# Patient Record
Sex: MALE | Race: OTHER | HISPANIC OR LATINO | URBAN - METROPOLITAN AREA
[De-identification: names, ages, dates, MRNs, and addresses within clinical notes are randomized per-mention and may not be internally consistent; named-entity substitution may affect disease eponyms.]

---

## 2017-01-13 ENCOUNTER — EMERGENCY (EMERGENCY)
Facility: HOSPITAL | Age: 62
LOS: 0 days | Discharge: HOME | End: 2017-01-13

## 2017-06-07 ENCOUNTER — OUTPATIENT (OUTPATIENT)
Dept: OUTPATIENT SERVICES | Facility: HOSPITAL | Age: 62
LOS: 1 days | Discharge: HOME | End: 2017-06-07

## 2017-06-07 DIAGNOSIS — J44.1 CHRONIC OBSTRUCTIVE PULMONARY DISEASE WITH (ACUTE) EXACERBATION: ICD-10-CM

## 2017-06-07 DIAGNOSIS — R06.02 SHORTNESS OF BREATH: ICD-10-CM

## 2017-06-28 DIAGNOSIS — E78.00 PURE HYPERCHOLESTEROLEMIA, UNSPECIFIED: ICD-10-CM

## 2017-06-28 DIAGNOSIS — E11.9 TYPE 2 DIABETES MELLITUS WITHOUT COMPLICATIONS: ICD-10-CM

## 2017-07-19 ENCOUNTER — OUTPATIENT (OUTPATIENT)
Dept: OUTPATIENT SERVICES | Facility: HOSPITAL | Age: 62
LOS: 1 days | Discharge: HOME | End: 2017-07-19

## 2017-07-19 DIAGNOSIS — Z00.00 ENCOUNTER FOR GENERAL ADULT MEDICAL EXAMINATION WITHOUT ABNORMAL FINDINGS: ICD-10-CM

## 2017-07-19 DIAGNOSIS — J44.1 CHRONIC OBSTRUCTIVE PULMONARY DISEASE WITH (ACUTE) EXACERBATION: ICD-10-CM

## 2017-07-19 DIAGNOSIS — R53.82 CHRONIC FATIGUE, UNSPECIFIED: ICD-10-CM

## 2017-07-19 DIAGNOSIS — R06.02 SHORTNESS OF BREATH: ICD-10-CM

## 2018-01-19 ENCOUNTER — OUTPATIENT (OUTPATIENT)
Dept: OUTPATIENT SERVICES | Facility: HOSPITAL | Age: 63
LOS: 1 days | Discharge: HOME | End: 2018-01-19

## 2018-01-19 DIAGNOSIS — I10 ESSENTIAL (PRIMARY) HYPERTENSION: ICD-10-CM

## 2018-01-19 DIAGNOSIS — R06.02 SHORTNESS OF BREATH: ICD-10-CM

## 2018-01-19 DIAGNOSIS — E78.00 PURE HYPERCHOLESTEROLEMIA, UNSPECIFIED: ICD-10-CM

## 2018-01-19 DIAGNOSIS — R55 SYNCOPE AND COLLAPSE: ICD-10-CM

## 2018-01-19 DIAGNOSIS — J44.1 CHRONIC OBSTRUCTIVE PULMONARY DISEASE WITH (ACUTE) EXACERBATION: ICD-10-CM

## 2018-01-28 ENCOUNTER — INPATIENT (INPATIENT)
Facility: HOSPITAL | Age: 63
LOS: 3 days | Discharge: HOME | End: 2018-02-01
Attending: INTERNAL MEDICINE | Admitting: INTERNAL MEDICINE

## 2018-02-04 DIAGNOSIS — J44.1 CHRONIC OBSTRUCTIVE PULMONARY DISEASE WITH (ACUTE) EXACERBATION: ICD-10-CM

## 2018-02-04 DIAGNOSIS — R06.02 SHORTNESS OF BREATH: ICD-10-CM

## 2018-02-04 DIAGNOSIS — R55 SYNCOPE AND COLLAPSE: ICD-10-CM

## 2018-02-05 DIAGNOSIS — K59.03 DRUG INDUCED CONSTIPATION: ICD-10-CM

## 2018-02-05 DIAGNOSIS — I50.32 CHRONIC DIASTOLIC (CONGESTIVE) HEART FAILURE: ICD-10-CM

## 2018-02-05 DIAGNOSIS — F41.9 ANXIETY DISORDER, UNSPECIFIED: ICD-10-CM

## 2018-02-05 DIAGNOSIS — I11.0 HYPERTENSIVE HEART DISEASE WITH HEART FAILURE: ICD-10-CM

## 2018-02-05 DIAGNOSIS — E11.51 TYPE 2 DIABETES MELLITUS WITH DIABETIC PERIPHERAL ANGIOPATHY WITHOUT GANGRENE: ICD-10-CM

## 2018-02-05 DIAGNOSIS — D12.5 BENIGN NEOPLASM OF SIGMOID COLON: ICD-10-CM

## 2018-02-05 DIAGNOSIS — Z79.84 LONG TERM (CURRENT) USE OF ORAL HYPOGLYCEMIC DRUGS: ICD-10-CM

## 2018-02-05 DIAGNOSIS — K21.9 GASTRO-ESOPHAGEAL REFLUX DISEASE WITHOUT ESOPHAGITIS: ICD-10-CM

## 2018-02-05 DIAGNOSIS — Z83.3 FAMILY HISTORY OF DIABETES MELLITUS: ICD-10-CM

## 2018-02-05 DIAGNOSIS — R55 SYNCOPE AND COLLAPSE: ICD-10-CM

## 2018-02-05 DIAGNOSIS — M51.36 OTHER INTERVERTEBRAL DISC DEGENERATION, LUMBAR REGION: ICD-10-CM

## 2018-02-05 DIAGNOSIS — K57.30 DIVERTICULOSIS OF LARGE INTESTINE WITHOUT PERFORATION OR ABSCESS WITHOUT BLEEDING: ICD-10-CM

## 2018-02-05 DIAGNOSIS — R00.1 BRADYCARDIA, UNSPECIFIED: ICD-10-CM

## 2018-02-05 DIAGNOSIS — T40.605A ADVERSE EFFECT OF UNSPECIFIED NARCOTICS, INITIAL ENCOUNTER: ICD-10-CM

## 2018-02-05 DIAGNOSIS — R29.6 REPEATED FALLS: ICD-10-CM

## 2018-02-05 DIAGNOSIS — K64.8 OTHER HEMORRHOIDS: ICD-10-CM

## 2018-02-05 DIAGNOSIS — Z82.49 FAMILY HISTORY OF ISCHEMIC HEART DISEASE AND OTHER DISEASES OF THE CIRCULATORY SYSTEM: ICD-10-CM

## 2018-02-05 DIAGNOSIS — K64.4 RESIDUAL HEMORRHOIDAL SKIN TAGS: ICD-10-CM

## 2018-02-05 DIAGNOSIS — Z87.891 PERSONAL HISTORY OF NICOTINE DEPENDENCE: ICD-10-CM

## 2018-02-05 DIAGNOSIS — G89.29 OTHER CHRONIC PAIN: ICD-10-CM

## 2018-02-05 DIAGNOSIS — E78.5 HYPERLIPIDEMIA, UNSPECIFIED: ICD-10-CM

## 2018-02-05 DIAGNOSIS — J44.9 CHRONIC OBSTRUCTIVE PULMONARY DISEASE, UNSPECIFIED: ICD-10-CM
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
## 2018-02-06 DIAGNOSIS — M54.9 DORSALGIA, UNSPECIFIED: ICD-10-CM

## 2018-03-06 ENCOUNTER — INPATIENT (INPATIENT)
Facility: HOSPITAL | Age: 63
LOS: 2 days | Discharge: HOME | End: 2018-03-09
Attending: INTERNAL MEDICINE | Admitting: INTERNAL MEDICINE

## 2018-03-06 VITALS
RESPIRATION RATE: 20 BRPM | TEMPERATURE: 97 F | DIASTOLIC BLOOD PRESSURE: 82 MMHG | SYSTOLIC BLOOD PRESSURE: 137 MMHG | OXYGEN SATURATION: 93 % | HEART RATE: 66 BPM

## 2018-03-06 LAB
APTT BLD: 35 SEC — SIGNIFICANT CHANGE UP (ref 27–39.2)
BASOPHILS # BLD AUTO: 0.07 K/UL — SIGNIFICANT CHANGE UP (ref 0–0.2)
BASOPHILS NFR BLD AUTO: 1.2 % — HIGH (ref 0–1)
EOSINOPHIL # BLD AUTO: 0.45 K/UL — SIGNIFICANT CHANGE UP (ref 0–0.7)
EOSINOPHIL NFR BLD AUTO: 7.9 % — SIGNIFICANT CHANGE UP (ref 0–8)
HCT VFR BLD CALC: 46.2 % — SIGNIFICANT CHANGE UP (ref 42–52)
HGB BLD-MCNC: 15.7 G/DL — SIGNIFICANT CHANGE UP (ref 14–18)
IMM GRANULOCYTES NFR BLD AUTO: 0.5 % — HIGH (ref 0.1–0.3)
INR BLD: 1.13 RATIO — SIGNIFICANT CHANGE UP (ref 0.65–1.3)
LYMPHOCYTES # BLD AUTO: 2.12 K/UL — SIGNIFICANT CHANGE UP (ref 1.2–3.4)
LYMPHOCYTES # BLD AUTO: 37.3 % — SIGNIFICANT CHANGE UP (ref 20.5–51.1)
MCHC RBC-ENTMCNC: 30.2 PG — SIGNIFICANT CHANGE UP (ref 27–31)
MCHC RBC-ENTMCNC: 34 G/DL — SIGNIFICANT CHANGE UP (ref 32–37)
MCV RBC AUTO: 88.8 FL — SIGNIFICANT CHANGE UP (ref 80–94)
MONOCYTES # BLD AUTO: 0.38 K/UL — SIGNIFICANT CHANGE UP (ref 0.1–0.6)
MONOCYTES NFR BLD AUTO: 6.7 % — SIGNIFICANT CHANGE UP (ref 1.7–9.3)
NEUTROPHILS # BLD AUTO: 2.64 K/UL — SIGNIFICANT CHANGE UP (ref 1.4–6.5)
NEUTROPHILS NFR BLD AUTO: 46.4 % — SIGNIFICANT CHANGE UP (ref 42.2–75.2)
NRBC # BLD: 0 /100 WBCS — SIGNIFICANT CHANGE UP (ref 0–0)
PLATELET # BLD AUTO: 207 K/UL — SIGNIFICANT CHANGE UP (ref 130–400)
PROTHROM AB SERPL-ACNC: 12.2 SEC — SIGNIFICANT CHANGE UP (ref 9.95–12.87)
RBC # BLD: 5.2 M/UL — SIGNIFICANT CHANGE UP (ref 4.7–6.1)
RBC # FLD: 13.8 % — SIGNIFICANT CHANGE UP (ref 11.5–14.5)
WBC # BLD: 5.69 K/UL — SIGNIFICANT CHANGE UP (ref 4.8–10.8)
WBC # FLD AUTO: 5.69 K/UL — SIGNIFICANT CHANGE UP (ref 4.8–10.8)

## 2018-03-06 RX ORDER — SODIUM CHLORIDE 9 MG/ML
1000 INJECTION INTRAMUSCULAR; INTRAVENOUS; SUBCUTANEOUS ONCE
Qty: 0 | Refills: 0 | Status: COMPLETED | OUTPATIENT
Start: 2018-03-06 | End: 2018-03-06

## 2018-03-06 RX ADMIN — SODIUM CHLORIDE 1 MILLILITER(S): 9 INJECTION INTRAMUSCULAR; INTRAVENOUS; SUBCUTANEOUS at 23:30

## 2018-03-07 LAB
ALBUMIN SERPL ELPH-MCNC: 4.3 G/DL — SIGNIFICANT CHANGE UP (ref 3–5.5)
ALP SERPL-CCNC: 58 U/L — SIGNIFICANT CHANGE UP (ref 30–115)
ALT FLD-CCNC: 19 U/L — SIGNIFICANT CHANGE UP (ref 0–41)
ANION GAP SERPL CALC-SCNC: 7 MMOL/L — SIGNIFICANT CHANGE UP (ref 7–14)
ANION GAP SERPL CALC-SCNC: 8 MMOL/L — SIGNIFICANT CHANGE UP (ref 7–14)
AST SERPL-CCNC: 14 U/L — SIGNIFICANT CHANGE UP (ref 0–41)
B-TYPE NATRIURETIC PEPTIDE BNP RESULT: 25 PG/ML — SIGNIFICANT CHANGE UP (ref 0–99)
BASOPHILS # BLD AUTO: 0.06 K/UL — SIGNIFICANT CHANGE UP (ref 0–0.2)
BASOPHILS NFR BLD AUTO: 1.3 % — HIGH (ref 0–1)
BILIRUB SERPL-MCNC: 0.8 MG/DL — SIGNIFICANT CHANGE UP (ref 0.2–1.2)
BUN SERPL-MCNC: 18 MG/DL — SIGNIFICANT CHANGE UP (ref 10–20)
BUN SERPL-MCNC: 20 MG/DL — SIGNIFICANT CHANGE UP (ref 10–20)
CALCIUM SERPL-MCNC: 9.4 MG/DL — SIGNIFICANT CHANGE UP (ref 8.5–10.1)
CALCIUM SERPL-MCNC: 9.5 MG/DL — SIGNIFICANT CHANGE UP (ref 8.5–10.1)
CHLORIDE SERPL-SCNC: 102 MMOL/L — SIGNIFICANT CHANGE UP (ref 98–110)
CHLORIDE SERPL-SCNC: 105 MMOL/L — SIGNIFICANT CHANGE UP (ref 98–110)
CK MB BLD-MCNC: 1 % — SIGNIFICANT CHANGE UP (ref 0–4)
CK MB CFR SERPL CALC: 0.8 NG/ML — SIGNIFICANT CHANGE UP (ref 0.6–6.3)
CK SERPL-CCNC: 72 U/L — SIGNIFICANT CHANGE UP (ref 0–225)
CO2 SERPL-SCNC: 27 MMOL/L — SIGNIFICANT CHANGE UP (ref 17–32)
CO2 SERPL-SCNC: 31 MMOL/L — SIGNIFICANT CHANGE UP (ref 17–32)
CREAT SERPL-MCNC: 1.1 MG/DL — SIGNIFICANT CHANGE UP (ref 0.7–1.5)
CREAT SERPL-MCNC: 1.1 MG/DL — SIGNIFICANT CHANGE UP (ref 0.7–1.5)
EOSINOPHIL # BLD AUTO: 0.44 K/UL — SIGNIFICANT CHANGE UP (ref 0–0.7)
EOSINOPHIL NFR BLD AUTO: 9.4 % — HIGH (ref 0–8)
GLUCOSE SERPL-MCNC: 94 MG/DL — SIGNIFICANT CHANGE UP (ref 70–110)
GLUCOSE SERPL-MCNC: 96 MG/DL — SIGNIFICANT CHANGE UP (ref 70–110)
HCT VFR BLD CALC: 43.2 % — SIGNIFICANT CHANGE UP (ref 42–52)
HGB BLD-MCNC: 14.5 G/DL — SIGNIFICANT CHANGE UP (ref 14–18)
IMM GRANULOCYTES NFR BLD AUTO: 0.4 % — HIGH (ref 0.1–0.3)
LACTATE SERPL-SCNC: 0.8 MMOL/L — SIGNIFICANT CHANGE UP (ref 0.5–2.2)
LYMPHOCYTES # BLD AUTO: 1.4 K/UL — SIGNIFICANT CHANGE UP (ref 1.2–3.4)
LYMPHOCYTES # BLD AUTO: 29.8 % — SIGNIFICANT CHANGE UP (ref 20.5–51.1)
MCHC RBC-ENTMCNC: 30.2 PG — SIGNIFICANT CHANGE UP (ref 27–31)
MCHC RBC-ENTMCNC: 33.6 G/DL — SIGNIFICANT CHANGE UP (ref 32–37)
MCV RBC AUTO: 90 FL — SIGNIFICANT CHANGE UP (ref 80–94)
MONOCYTES # BLD AUTO: 0.44 K/UL — SIGNIFICANT CHANGE UP (ref 0.1–0.6)
MONOCYTES NFR BLD AUTO: 9.4 % — HIGH (ref 1.7–9.3)
NEUTROPHILS # BLD AUTO: 2.34 K/UL — SIGNIFICANT CHANGE UP (ref 1.4–6.5)
NEUTROPHILS NFR BLD AUTO: 49.7 % — SIGNIFICANT CHANGE UP (ref 42.2–75.2)
PLATELET # BLD AUTO: 192 K/UL — SIGNIFICANT CHANGE UP (ref 130–400)
POTASSIUM SERPL-MCNC: 3.5 MMOL/L — SIGNIFICANT CHANGE UP (ref 3.5–5)
POTASSIUM SERPL-MCNC: 3.9 MMOL/L — SIGNIFICANT CHANGE UP (ref 3.5–5)
POTASSIUM SERPL-SCNC: 3.5 MMOL/L — SIGNIFICANT CHANGE UP (ref 3.5–5)
POTASSIUM SERPL-SCNC: 3.9 MMOL/L — SIGNIFICANT CHANGE UP (ref 3.5–5)
PROT SERPL-MCNC: 7 G/DL — SIGNIFICANT CHANGE UP (ref 6–8)
RBC # BLD: 4.8 M/UL — SIGNIFICANT CHANGE UP (ref 4.7–6.1)
RBC # FLD: 13.8 % — SIGNIFICANT CHANGE UP (ref 11.5–14.5)
SODIUM SERPL-SCNC: 139 MMOL/L — SIGNIFICANT CHANGE UP (ref 135–146)
SODIUM SERPL-SCNC: 141 MMOL/L — SIGNIFICANT CHANGE UP (ref 135–146)
TROPONIN I SERPL-MCNC: <0.02 NG/ML — SIGNIFICANT CHANGE UP (ref 0–0.05)
WBC # BLD: 4.7 K/UL — LOW (ref 4.8–10.8)
WBC # FLD AUTO: 4.7 K/UL — LOW (ref 4.8–10.8)

## 2018-03-07 RX ORDER — FUROSEMIDE 40 MG
20 TABLET ORAL DAILY
Qty: 0 | Refills: 0 | Status: DISCONTINUED | OUTPATIENT
Start: 2018-03-07 | End: 2018-03-09

## 2018-03-07 RX ORDER — LEVETIRACETAM 250 MG/1
500 TABLET, FILM COATED ORAL
Qty: 0 | Refills: 0 | Status: DISCONTINUED | OUTPATIENT
Start: 2018-03-07 | End: 2018-03-09

## 2018-03-07 RX ORDER — METRONIDAZOLE 500 MG
500 TABLET ORAL EVERY 8 HOURS
Qty: 0 | Refills: 0 | Status: DISCONTINUED | OUTPATIENT
Start: 2018-03-07 | End: 2018-03-08

## 2018-03-07 RX ORDER — OXYCODONE AND ACETAMINOPHEN 5; 325 MG/1; MG/1
2 TABLET ORAL EVERY 6 HOURS
Qty: 0 | Refills: 0 | Status: DISCONTINUED | OUTPATIENT
Start: 2018-03-07 | End: 2018-03-08

## 2018-03-07 RX ORDER — BACLOFEN 100 %
10 POWDER (GRAM) MISCELLANEOUS THREE TIMES A DAY
Qty: 0 | Refills: 0 | Status: DISCONTINUED | OUTPATIENT
Start: 2018-03-07 | End: 2018-03-09

## 2018-03-07 RX ORDER — SIMVASTATIN 20 MG/1
20 TABLET, FILM COATED ORAL AT BEDTIME
Qty: 0 | Refills: 0 | Status: DISCONTINUED | OUTPATIENT
Start: 2018-03-07 | End: 2018-03-09

## 2018-03-07 RX ORDER — ENOXAPARIN SODIUM 100 MG/ML
40 INJECTION SUBCUTANEOUS DAILY
Qty: 0 | Refills: 0 | Status: DISCONTINUED | OUTPATIENT
Start: 2018-03-07 | End: 2018-03-09

## 2018-03-07 RX ORDER — ALBUTEROL 90 UG/1
2 AEROSOL, METERED ORAL EVERY 4 HOURS
Qty: 0 | Refills: 0 | Status: DISCONTINUED | OUTPATIENT
Start: 2018-03-07 | End: 2018-03-08

## 2018-03-07 RX ORDER — FENOFIBRATE,MICRONIZED 130 MG
145 CAPSULE ORAL DAILY
Qty: 0 | Refills: 0 | Status: DISCONTINUED | OUTPATIENT
Start: 2018-03-07 | End: 2018-03-09

## 2018-03-07 RX ORDER — TAMSULOSIN HYDROCHLORIDE 0.4 MG/1
0.4 CAPSULE ORAL AT BEDTIME
Qty: 0 | Refills: 0 | Status: DISCONTINUED | OUTPATIENT
Start: 2018-03-07 | End: 2018-03-09

## 2018-03-07 RX ORDER — MORPHINE SULFATE 50 MG/1
60 CAPSULE, EXTENDED RELEASE ORAL EVERY 12 HOURS
Qty: 0 | Refills: 0 | Status: DISCONTINUED | OUTPATIENT
Start: 2018-03-07 | End: 2018-03-09

## 2018-03-07 RX ORDER — OXYCODONE AND ACETAMINOPHEN 5; 325 MG/1; MG/1
2 TABLET ORAL ONCE
Qty: 0 | Refills: 0 | Status: DISCONTINUED | OUTPATIENT
Start: 2018-03-07 | End: 2018-03-07

## 2018-03-07 RX ORDER — AZTREONAM 2 G
2000 VIAL (EA) INJECTION ONCE
Qty: 0 | Refills: 0 | Status: COMPLETED | OUTPATIENT
Start: 2018-03-07 | End: 2018-03-07

## 2018-03-07 RX ORDER — VANCOMYCIN HCL 1 G
1500 VIAL (EA) INTRAVENOUS ONCE
Qty: 0 | Refills: 0 | Status: COMPLETED | OUTPATIENT
Start: 2018-03-07 | End: 2018-03-07

## 2018-03-07 RX ORDER — ALPRAZOLAM 0.25 MG
1 TABLET ORAL THREE TIMES A DAY
Qty: 0 | Refills: 0 | Status: DISCONTINUED | OUTPATIENT
Start: 2018-03-07 | End: 2018-03-09

## 2018-03-07 RX ORDER — FAMCICLOVIR 500 MG/1
500 TABLET, FILM COATED ORAL EVERY 8 HOURS
Qty: 0 | Refills: 0 | Status: DISCONTINUED | OUTPATIENT
Start: 2018-03-07 | End: 2018-03-08

## 2018-03-07 RX ORDER — LOSARTAN POTASSIUM 100 MG/1
100 TABLET, FILM COATED ORAL DAILY
Qty: 0 | Refills: 0 | Status: DISCONTINUED | OUTPATIENT
Start: 2018-03-07 | End: 2018-03-09

## 2018-03-07 RX ADMIN — LEVETIRACETAM 500 MILLIGRAM(S): 250 TABLET, FILM COATED ORAL at 18:16

## 2018-03-07 RX ADMIN — OXYCODONE AND ACETAMINOPHEN 2 TABLET(S): 5; 325 TABLET ORAL at 04:02

## 2018-03-07 RX ADMIN — Medication 100 MILLIGRAM(S): at 02:19

## 2018-03-07 RX ADMIN — Medication 100 MILLIGRAM(S): at 14:26

## 2018-03-07 RX ADMIN — SIMVASTATIN 20 MILLIGRAM(S): 20 TABLET, FILM COATED ORAL at 22:06

## 2018-03-07 RX ADMIN — Medication 20 MILLIGRAM(S): at 06:34

## 2018-03-07 RX ADMIN — FAMCICLOVIR 500 MILLIGRAM(S): 500 TABLET, FILM COATED ORAL at 16:24

## 2018-03-07 RX ADMIN — MORPHINE SULFATE 60 MILLIGRAM(S): 50 CAPSULE, EXTENDED RELEASE ORAL at 18:15

## 2018-03-07 RX ADMIN — Medication 100 MILLIGRAM(S): at 22:06

## 2018-03-07 RX ADMIN — Medication 10 MILLIGRAM(S): at 06:34

## 2018-03-07 RX ADMIN — LOSARTAN POTASSIUM 100 MILLIGRAM(S): 100 TABLET, FILM COATED ORAL at 06:34

## 2018-03-07 RX ADMIN — Medication 1 APPLICATION(S): at 22:07

## 2018-03-07 RX ADMIN — FAMCICLOVIR 500 MILLIGRAM(S): 500 TABLET, FILM COATED ORAL at 06:34

## 2018-03-07 RX ADMIN — Medication 145 MILLIGRAM(S): at 12:24

## 2018-03-07 RX ADMIN — Medication 300 MILLIGRAM(S): at 03:42

## 2018-03-07 RX ADMIN — OXYCODONE AND ACETAMINOPHEN 2 TABLET(S): 5; 325 TABLET ORAL at 04:45

## 2018-03-07 RX ADMIN — FAMCICLOVIR 500 MILLIGRAM(S): 500 TABLET, FILM COATED ORAL at 22:06

## 2018-03-07 RX ADMIN — TAMSULOSIN HYDROCHLORIDE 0.4 MILLIGRAM(S): 0.4 CAPSULE ORAL at 22:06

## 2018-03-07 RX ADMIN — Medication 100 MILLIGRAM(S): at 06:08

## 2018-03-07 RX ADMIN — Medication 10 MILLIGRAM(S): at 22:06

## 2018-03-07 RX ADMIN — LEVETIRACETAM 500 MILLIGRAM(S): 250 TABLET, FILM COATED ORAL at 06:34

## 2018-03-07 RX ADMIN — OXYCODONE AND ACETAMINOPHEN 2 TABLET(S): 5; 325 TABLET ORAL at 16:22

## 2018-03-07 RX ADMIN — ENOXAPARIN SODIUM 40 MILLIGRAM(S): 100 INJECTION SUBCUTANEOUS at 12:24

## 2018-03-07 RX ADMIN — Medication 100 MILLIGRAM(S): at 01:36

## 2018-03-07 RX ADMIN — Medication 10 MILLIGRAM(S): at 14:26

## 2018-03-07 NOTE — H&P ADULT - NSHPSOCIALHISTORY_GEN_ALL_CORE
smoked 2 cigarettes daily. 50 PY smoking in the past.  history of smoking weed. previous history of alcohol abuse. smoked 2 cigarettes daily. 50 PY smoking in the past.  history of smoking weed. previous history of alcohol abuse.  On disability; [place]

## 2018-03-07 NOTE — H&P ADULT - REASON FOR ADMISSION
lower extremities pain and redness for 2 weeks lower extremities pain and redness for 2 weeks; only had many years ago around the time of his back injury/surgry.

## 2018-03-07 NOTE — H&P ADULT - NSHPLABSRESULTS_GEN_ALL_CORE
15.7   5.69  )-----------( 207      ( 06 Mar 2018 23:21 )             46.2     03-06    139  |  105  |  18  ----------------------------<  96  3.9   |  27  |  1.1    Ca    9.4      06 Mar 2018 23:21    TPro  7.0  /  Alb  4.3  /  TBili  0.8  /  DBili  x   /  AST  14  /  ALT  19  /  AlkPhos  58  03-06     PT/INR - ( 06 Mar 2018 23:21 )   PT: 12.20 sec;   INR: 1.13 ratio       PTT - ( 06 Mar 2018 23:21 )  PTT:35.0 sec    Lactate Trend  03-06 @ 23:21 Lactate:0.8     CARDIAC MARKERS ( 06 Mar 2018 23:21 )  <0.02 ng/mL / x     / 72 U/L / x     / 0.8 ng/mL    CXR read by me: no acute pathology

## 2018-03-07 NOTE — ED PROVIDER NOTE - NS ED ROS FT
Constitutional:  no fevers, no chills, no malaise  Eyes:  No visual changes  ENMT: No neck pain or stiffness, no nasal congestion, no ear pain, no throat pain  Cardiac:  No chest pain, + HTN  Respiratory:  No cough or sob  GI:  No nausea, vomiting, diarrhea or abdominal pain.  :  No dysuria, frequency or burning.  MS:  No back pain, no joint pain.  Neuro:  No headache, no dizziness, no change in mental status  Skin: As per HPI

## 2018-03-07 NOTE — H&P ADULT - ASSESSMENT
63 yo man with DM2, HTN, DLD, chronic back pain, HFpEF, and seizure disorder, presented with 2 week history of bilateral feet and legs pain, redness and swelling.    1- LE erythema and pain and swelling, DDx: venous stasis, unlikely infectious (not hot, no fever, no chills, no WBC), unlikely arterial disease  admit to med  infectious disease consult  follow up LE venous duplex results for DVT  will hold off antibiotics for now until ID recommendations    2- HFpEF  recent echo, no need for repeat  no SOB, normal BNP, no Acute CHF  continue home dose of lasix    3- DM2  hold metformin for now, check FS    4- HTN  continue home meds    5- DVT ppx  FULL CODE    start flomax for likely BPH 61 yo man with DM2, HTN, DLD, chronic back pain, HFpEF, and seizure disorder, presented with 2 week history of bilateral feet and legs pain, redness and swelling.    1- LE erythema and pain and swelling, DDx: venous stasis, possibly infectious (but not hot, no fever, no chills, no hi WBC, and no change in the pt's BS.), unlikely arterial disease  admit to med  infectious disease consult-agree; & add Derm consult-service  follow up LE venous duplex results for DVT  continue empiric antibiotics for now - until ID recommendations    2- HFpEF; see ECHO-1/18-[facility]  recent echo, no need for repeat  no SOB, normal BNP, no Acute CHF; nl BNP  continue home dose of lasix    3- DM2  hold metformin for now, check FS    4- HTN  continue home meds    se HX-chronic back pain on daily opioids-continue    5- DVT ppx  FULL CODE    start flomax for likely BPH

## 2018-03-07 NOTE — H&P ADULT - PMH
Anxiety    Asthma    Diabetes    High cholesterol    HTN (hypertension) Anxiety    Asthma    Chronic low back pain with sciatica, sciatica laterality unspecified, unspecified back pain laterality    Diabetes    High cholesterol    HTN (hypertension)    Uncomplicated opioid dependence

## 2018-03-07 NOTE — H&P ADULT - HISTORY OF PRESENT ILLNESS
63 yo man with DM2, HTN, DLD, chronic back pain, HFpEF, and seizure disorder, presented with above CC  patient reports pain in both lower extremities, associated with swelling and redness that has been getting worse for the past 2 weeks.  he was hospitalized for a syncopal episode end of january 2018.  never had this problem before.  he denies any fever or chills. no N/V/D, no abd pain or headaches, no chest pain or SOB. 61 yo man with DM2, HTN, DLD, chronic back pain, and seizure disorder, presented with above CC  patient reports pain in both lower extremities, associated with swelling and redness that has been getting worse for the past 2 weeks.  he was hospitalized for a syncopal episode end of january 2018.  never had this problem before.  he denies any fever or chills. no N/V/D, no abd pain or headaches, no chest pain or SOB.

## 2018-03-07 NOTE — ED PROVIDER NOTE - PHYSICAL EXAMINATION
CONSTITUTIONAL: Well-developed; well-nourished; in no acute distress, nontoxic appearing  SKIN: + erythema, edema and ttp of b/l LE. No open skin wounds, no skin necrosis  HEAD: Normocephalic; atraumatic.  EYES: PERRL, 3 mm bilateral, no nystagmus, EOM intact; conjunctiva and sclera clear.  ENT: MMM, no nasal congestion  NECK: Supple; non tender.+ full passive ROM in all directions. No JVD  CARD: S1, S2 normal, no murmur  RESP: + mild diffuse expiratory wheezes, no rales or rhonchi. Good air movement bilaterally  ABD: soft; non-distended; non-tender. No Rebound, No guarding  EXT: Normal ROM. No cyanosis or edema. Dp Pulses intact.   NEURO: awake, alert, following commands, oriented, grossly unremarkable. No Focal deficits. GCS 15.   PSYCH: Cooperative, appropriate.

## 2018-03-07 NOTE — H&P ADULT - NSHPREVIEWOFSYSTEMS_GEN_ALL_CORE
REVIEW OF SYSTEMS:  CONSTITUTIONAL: No fever, weight loss, or fatigue  EYES: No eye pain, visual disturbances, or discharge  ENMT:  No difficulty hearing, tinnitus, vertigo; No sinus or throat pain  NECK: No pain or stiffness  BREASTS: No pain, masses, or nipple discharge  RESPIRATORY: No cough, wheezing, chills or hemoptysis; No shortness of breath  CARDIOVASCULAR: No chest pain, palpitations, dizziness, or leg swelling  GASTROINTESTINAL: No abdominal or epigastric pain. No nausea, vomiting, or hematemesis; No diarrhea or constipation. No melena or hematochezia.  GENITOURINARY: No dysuria, frequency, hematuria, or incontinence  NEUROLOGICAL: No headaches, memory loss, loss of strength, numbness, or tremors  LYMPH NODES: No enlarged glands  ENDOCRINE: No heat or cold intolerance; No hair loss  MUSCULOSKELETAL: No joint pain or swelling; No muscle pain  PSYCHIATRIC: No depression, mood swings, or difficulty sleeping  HEME/LYMPH: No easy bruising, or bleeding gums  ALLERY AND IMMUNOLOGIC: No hives or eczema REVIEW OF SYSTEMS:  CONSTITUTIONAL: No fever, weight loss, or fatigue  EYES: No eye pain, visual disturbances, or discharge  ENMT:  No difficulty hearing, tinnitus, vertigo; No sinus or throat pain  NECK: No pain or stiffness  BREASTS: No pain, masses, or nipple discharge  RESPIRATORY: No cough, wheezing, chills or hemoptysis; No shortness of breath  CARDIOVASCULAR: No chest pain, palpitations, dizziness, or leg swelling  GASTROINTESTINAL: No abdominal or epigastric pain. No nausea, vomiting, or hematemesis; No diarrhea or constipation. No melena or hematochezia.  GENITOURINARY: No dysuria, frequency, hematuria, or incontinence  NEUROLOGICAL: No headaches, memory loss, loss of strength, numbness, or tremors  LYMPH NODES: No enlarged glands  ENDOCRINE: No heat or cold intolerance; No hair loss  MUSCULOSKELETAL: No joint swelling; No muscle pain; see HPI; chronic pain on opioids-MS Contin 60 q12; and [id number]/[id number]/D  PSYCHIATRIC: No depression, mood swings, or difficulty sleeping  HEME/LYMPH: No easy bruising, or bleeding gums  ALLERY AND IMMUNOLOGIC: No hives or eczema

## 2018-03-07 NOTE — ED PROVIDER NOTE - PROGRESS NOTE DETAILS
Pt refused any respiratory treatment for his wheezes As per ED  Dr. Benji leal left a message with his service stating pt her pt can be admitted to her and she will see them in the am

## 2018-03-07 NOTE — ED PROVIDER NOTE - OBJECTIVE STATEMENT
62 yr M with hx of HTN, DM, HLD, and seizure d/o was sent in by Dr. Benji Schwartz his PMD for evaluation of LE pain redness. Pt reports that skincolor changes has been progressing for the past 2 weeks and he can barely walk. Denies any fevers, no trauma, no CP, SOB. No open wounds.

## 2018-03-08 DIAGNOSIS — L03.119 CELLULITIS OF UNSPECIFIED PART OF LIMB: ICD-10-CM

## 2018-03-08 RX ORDER — ALBUTEROL 90 UG/1
2 AEROSOL, METERED ORAL EVERY 4 HOURS
Qty: 0 | Refills: 0 | Status: DISCONTINUED | OUTPATIENT
Start: 2018-03-08 | End: 2018-03-09

## 2018-03-08 RX ORDER — OXYCODONE AND ACETAMINOPHEN 5; 325 MG/1; MG/1
2 TABLET ORAL EVERY 4 HOURS
Qty: 0 | Refills: 0 | Status: DISCONTINUED | OUTPATIENT
Start: 2018-03-08 | End: 2018-03-09

## 2018-03-08 RX ADMIN — OXYCODONE AND ACETAMINOPHEN 2 TABLET(S): 5; 325 TABLET ORAL at 02:24

## 2018-03-08 RX ADMIN — LEVETIRACETAM 500 MILLIGRAM(S): 250 TABLET, FILM COATED ORAL at 06:22

## 2018-03-08 RX ADMIN — Medication 10 MILLIGRAM(S): at 06:22

## 2018-03-08 RX ADMIN — Medication 20 MILLIGRAM(S): at 06:22

## 2018-03-08 RX ADMIN — Medication 1 APPLICATION(S): at 16:12

## 2018-03-08 RX ADMIN — Medication 145 MILLIGRAM(S): at 11:36

## 2018-03-08 RX ADMIN — Medication 1 MILLIGRAM(S): at 16:26

## 2018-03-08 RX ADMIN — Medication 1 APPLICATION(S): at 06:23

## 2018-03-08 RX ADMIN — OXYCODONE AND ACETAMINOPHEN 2 TABLET(S): 5; 325 TABLET ORAL at 16:58

## 2018-03-08 RX ADMIN — MORPHINE SULFATE 60 MILLIGRAM(S): 50 CAPSULE, EXTENDED RELEASE ORAL at 18:40

## 2018-03-08 RX ADMIN — OXYCODONE AND ACETAMINOPHEN 2 TABLET(S): 5; 325 TABLET ORAL at 04:05

## 2018-03-08 RX ADMIN — Medication 10 MILLIGRAM(S): at 21:09

## 2018-03-08 RX ADMIN — ALBUTEROL 2 PUFF(S): 90 AEROSOL, METERED ORAL at 01:56

## 2018-03-08 RX ADMIN — SIMVASTATIN 20 MILLIGRAM(S): 20 TABLET, FILM COATED ORAL at 21:09

## 2018-03-08 RX ADMIN — ENOXAPARIN SODIUM 40 MILLIGRAM(S): 100 INJECTION SUBCUTANEOUS at 11:36

## 2018-03-08 RX ADMIN — FAMCICLOVIR 500 MILLIGRAM(S): 500 TABLET, FILM COATED ORAL at 06:22

## 2018-03-08 RX ADMIN — MORPHINE SULFATE 60 MILLIGRAM(S): 50 CAPSULE, EXTENDED RELEASE ORAL at 06:26

## 2018-03-08 RX ADMIN — Medication 1 APPLICATION(S): at 21:09

## 2018-03-08 RX ADMIN — LOSARTAN POTASSIUM 100 MILLIGRAM(S): 100 TABLET, FILM COATED ORAL at 06:22

## 2018-03-08 RX ADMIN — OXYCODONE AND ACETAMINOPHEN 2 TABLET(S): 5; 325 TABLET ORAL at 21:40

## 2018-03-08 RX ADMIN — TAMSULOSIN HYDROCHLORIDE 0.4 MILLIGRAM(S): 0.4 CAPSULE ORAL at 21:09

## 2018-03-08 RX ADMIN — OXYCODONE AND ACETAMINOPHEN 2 TABLET(S): 5; 325 TABLET ORAL at 10:29

## 2018-03-08 RX ADMIN — LEVETIRACETAM 500 MILLIGRAM(S): 250 TABLET, FILM COATED ORAL at 18:40

## 2018-03-08 RX ADMIN — Medication 10 MILLIGRAM(S): at 16:12

## 2018-03-08 RX ADMIN — Medication 100 MILLIGRAM(S): at 06:23

## 2018-03-08 RX ADMIN — OXYCODONE AND ACETAMINOPHEN 2 TABLET(S): 5; 325 TABLET ORAL at 21:09

## 2018-03-08 RX ADMIN — OXYCODONE AND ACETAMINOPHEN 2 TABLET(S): 5; 325 TABLET ORAL at 11:31

## 2018-03-08 NOTE — CONSULT NOTE ADULT - SUBJECTIVE AND OBJECTIVE BOX
SANA JOE  62y, Male  Allergy: aspirin (Other)  Originally Entered as [ANGIOEDEMA] reaction to [pineapple] (Unknown)  penicillins (Other)      HPI:  63 yo man with DM2, HTN, DLD, chronic back pain, and seizure disorder, presented with above CC.  patient reports pain in both lower extremities, associated with swelling and redness that has been getting worse for the past 2 weeks.  he was hospitalized for a syncopal episode end of january 2018.  never had this problem before.  he denies any fever or chills. no N/V/D, no abd pain or headaches, no chest pain or SOB. (07 Mar 2018 05:27)    FAMILY HISTORY:    PAST MEDICAL & SURGICAL HISTORY:  Uncomplicated opioid dependence  Chronic low back pain with sciatica, sciatica laterality unspecified, unspecified back pain laterality  Asthma  High cholesterol  Diabetes  HTN (hypertension)  Anxiety        VITALS:  T(F): 98, Max: 98 (03-08-18 @ 05:41)  HR: 54  BP: 158/79  RR: 20Vital Signs Last 24 Hrs  T(C): 36.7 (08 Mar 2018 05:41), Max: 36.7 (08 Mar 2018 05:41)  T(F): 98 (08 Mar 2018 05:41), Max: 98 (08 Mar 2018 05:41)  HR: 54 (08 Mar 2018 05:41) (54 - 55)  BP: 158/79 (08 Mar 2018 05:41) (158/79 - 163/68)  BP(mean): --  RR: 20 (08 Mar 2018 05:41) (20 - 20)  SpO2: --    PE:  Gen: NAD  HEENT: NCAT  CVS: nl s1s2, no m/r/g  Abd: +BS, NTND  Ext: b/l PItting edema +1 in b/l LE. +2 pulses b/l LE. +blanching erythema    TESTS & MEASUREMENTS:                        14.5   4.70  )-----------( 192      ( 07 Mar 2018 10:13 )             43.2     03-07    141  |  102  |  20  ----------------------------<  94  3.5   |  31  |  1.1    Ca    9.5      07 Mar 2018 10:13    TPro  7.0  /  Alb  4.3  /  TBili  0.8  /  DBili  x   /  AST  14  /  ALT  19  /  AlkPhos  58  03-06    LIVER FUNCTIONS - ( 06 Mar 2018 23:21 )  Alb: 4.3 g/dL / Pro: 7.0 g/dL / ALK PHOS: 58 U/L / ALT: 19 U/L / AST: 14 U/L / GGT: x               RADIOLOGY & ADDITIONAL TESTS:    CXR: Bibasilar Atelactasis    LE Duplex b/l:  NO DVT or SVT    ANTIBIOTICS:  famciclovir 500 milliGRAM(s) Oral every 8 hours  metroNIDAZOLE  IVPB 500 milliGRAM(s) IV Intermittent every 8 hours
63 yo M PMHx DM, HTN, HFpEF, chronic low back pain p/w BL LE edema and erythema for the past 2 weeks.  States that one day he woke up, and the erythema and edema was suddenly there.  A/w burning sensation in his BL LE's, worsened with ambulation.  Denies fevers, recent travel, new medications.  Was seen by ID- who recommended to stop antibiotics, as the rash is not infectious in etiology.  Has been on triamcinolone cream in the hospital without improvement.      PAST MEDICAL & SURGICAL HISTORY:  Uncomplicated opioid dependence  Chronic low back pain with sciatica, sciatica laterality unspecified, unspecified back pain laterality  Asthma  High cholesterol  Diabetes  HTN (hypertension)  Anxiety    Allergies  aspirin (Other)  Originally Entered as [ANGIOEDEMA] reaction to [pineapple] (Unknown)  penicillins (Other)    MEDICATIONS  (STANDING):  baclofen 10 milliGRAM(s) Oral three times a day  enoxaparin Injectable 40 milliGRAM(s) SubCutaneous daily  fenofibrate Tablet 145 milliGRAM(s) Oral daily  furosemide    Tablet 20 milliGRAM(s) Oral daily  levETIRAcetam 500 milliGRAM(s) Oral two times a day  losartan 100 milliGRAM(s) Oral daily  morphine ER Tablet 60 milliGRAM(s) Oral every 12 hours  simvastatin 20 milliGRAM(s) Oral at bedtime  tamsulosin 0.4 milliGRAM(s) Oral at bedtime  triamcinolone 0.1% Cream 1 Application(s) Topical every 8 hours    MEDICATIONS  (PRN):  ALBUTerol    90 MICROgram(s) HFA Inhaler 2 Puff(s) Inhalation every 4 hours PRN Shortness of Breath  ALPRAZolam 1 milliGRAM(s) Oral three times a day PRN anxiety  oxyCODONE    5 mG/acetaminophen 325 mG 2 Tablet(s) Oral every 6 hours PRN Severe Pain (7 - 10)    Vital Signs Last 24 Hrs  T(C): 36.7 (08 Mar 2018 05:41), Max: 36.7 (08 Mar 2018 05:41)  T(F): 98 (08 Mar 2018 05:41), Max: 98 (08 Mar 2018 05:41)  HR: 54 (08 Mar 2018 05:41) (54 - 55)  BP: 158/79 (08 Mar 2018 05:41) (158/79 - 163/68)  BP(mean): --  RR: 20 (08 Mar 2018 05:41) (20 - 20)  SpO2: --    Gen: NAD, AAOx3, obese  CV:  RRR  Pulm: CTA  Abd: soft, ND, NT  Ext: BL LE +1 pitting edema, circumferential erythema from toes to anterior tibial region (below knee), TTP.  no pulses palpable.  cyanotic appearing toes, which are cool to touch.      < from: [facility] Duplex Lower Ext Vein Scan, Right (03.06.18 @ 23:50) >  Impression:    No evidence of deep venous thrombosis or superficial thrombophlebitis in   right lower extremity     < end of copied text >    < from: [facility] Duplex Lower Ext Vein Scan, Left (03.06.18 @ 23:50) >  Impression:    No evidence of deep venous thrombosis or superficial thrombophlebitis in   left lower extremity   < end of copied text >

## 2018-03-08 NOTE — CONSULT NOTE ADULT - ASSESSMENT
#B/l LE erythema, and swelling likely inflammatory. UNlikely infectious etiology  - No evidence of sepsis. - Pt afebrile and no leukocytosis  - PE: +2 pitting edema and peripheral pulses intact and blanching erythema. No evidence of open wounds or onychomycosis in LE suggesting nidus for infection  - Duplex b/l LE: No Evidence of DVT or SVT  - D/C abx    #VZV  - c/w Acylovir #B/l LE erythema, and swelling likely inflammatory. UNlikely infectious etiology  - No evidence of sepsis. - Pt afebrile and no leukocytosis  - PE: +2 pitting edema and peripheral pulses intact and blanching erythema. No evidence of open wounds or onychomycosis in LE suggesting nidus for infection  - Duplex b/l LE: No Evidence of DVT or SVT  - D/C abx  - will discuss with attending    #VZV  - c/w Acylovir #B/l LE erythema, and swelling likely inflammatory. UNlikely infectious etiology  - No evidence of sepsis. - Pt afebrile and no leukocytosis  - PE: +2 pitting edema and peripheral pulses intact and blanching erythema. No evidence of open wounds or onychomycosis in LE suggesting nidus for infection  - Duplex b/l LE: No Evidence of DVT or SVT  - D/C abx  - will discuss with attending    No evidence of infection. Pt has mixed arterial/venous insufficiency. The pain /skin changes are ischemic in nature.  No antibiotics.   Recall prn please.

## 2018-03-08 NOTE — PROGRESS NOTE ADULT - SUBJECTIVE AND OBJECTIVE BOX
SUBJECTIVE:    Patient is a 62y old Male who presents with a chief complaint of lower extremities pain and redness for 2 weeks; only had many years ago around the time of his back injury/surgry. (07 Mar 2018 05:27)    Today is hospital day 1d. This morning he is resting comfortably in bed and reports no new issues or overnight events.     PAST MEDICAL & SURGICAL HISTORY  Uncomplicated opioid dependence  Chronic low back pain with sciatica, sciatica laterality unspecified, unspecified back pain laterality  Asthma  High cholesterol  Diabetes  HTN (hypertension)  Anxiety    SOCIAL HISTORY:  Negative for smoking/alcohol/drug use.     ALLERGIES:  aspirin (Other)  Originally Entered as [ANGIOEDEMA] reaction to [pineapple] (Unknown)  penicillins (Other)    MEDICATIONS:  STANDING MEDICATIONS  baclofen 10 milliGRAM(s) Oral three times a day  enoxaparin Injectable 40 milliGRAM(s) SubCutaneous daily  fenofibrate Tablet 145 milliGRAM(s) Oral daily  furosemide    Tablet 20 milliGRAM(s) Oral daily  levETIRAcetam 500 milliGRAM(s) Oral two times a day  losartan 100 milliGRAM(s) Oral daily  morphine ER Tablet 60 milliGRAM(s) Oral every 12 hours  simvastatin 20 milliGRAM(s) Oral at bedtime  tamsulosin 0.4 milliGRAM(s) Oral at bedtime  triamcinolone 0.1% Cream 1 Application(s) Topical every 8 hours    PRN MEDICATIONS  ALBUTerol    90 MICROgram(s) HFA Inhaler 2 Puff(s) Inhalation every 4 hours PRN  ALPRAZolam 1 milliGRAM(s) Oral three times a day PRN  oxyCODONE    5 mG/acetaminophen 325 mG 2 Tablet(s) Oral every 6 hours PRN    VITALS:   T(F): 98  HR: 54  BP: 158/79  RR: 20  SpO2: --    LABS:                        14.5   4.70  )-----------( 192      ( 07 Mar 2018 10:13 )             43.2     03-07    141  |  102  |  20  ----------------------------<  94  3.5   |  31  |  1.1    Ca    9.5      07 Mar 2018 10:13    TPro  7.0  /  Alb  4.3  /  TBili  0.8  /  DBili  x   /  AST  14  /  ALT  19  /  AlkPhos  58  03-06    PT/INR - ( 06 Mar 2018 23:21 )   PT: 12.20 sec;   INR: 1.13 ratio         PTT - ( 06 Mar 2018 23:21 )  PTT:35.0 sec          CARDIAC MARKERS ( 06 Mar 2018 23:21 )  <0.02 ng/mL / x     / 72 U/L / x     / 0.8 ng/mL      RADIOLOGY:    PHYSICAL EXAM:  GEN: No acute distress  LUNGS: Clear to auscultation bilaterally   HEART: S1/S2 present. RRR.   ABD: Soft, non-tender, distended. Bowel sounds present  EXT: LE Redness, swelling, mild tenderness  NEURO: AAOX3

## 2018-03-08 NOTE — PROGRESS NOTE ADULT - SUBJECTIVE AND OBJECTIVE BOX
SANA JOE  62y  Male  HPI:  61 yo man with DM2, HTN, DLD, chronic back pain, and seizure disorder, presented with above CC  patient reports pain in both lower extremities, associated with swelling and redness that has been getting worse for the past 2 weeks.  he was hospitalized for a syncopal episode end of january 2018.  never had this problem before.  he denies any fever or chills. no N/V/D, no abd pain or headaches, no chest pain or SOB. (07 Mar 2018 05:27)    MEDICATIONS  (STANDING):  baclofen 10 milliGRAM(s) Oral three times a day  enoxaparin Injectable 40 milliGRAM(s) SubCutaneous daily  famciclovir 500 milliGRAM(s) Oral every 8 hours  fenofibrate Tablet 145 milliGRAM(s) Oral daily  furosemide    Tablet 20 milliGRAM(s) Oral daily  levETIRAcetam 500 milliGRAM(s) Oral two times a day  losartan 100 milliGRAM(s) Oral daily  metroNIDAZOLE  IVPB 500 milliGRAM(s) IV Intermittent every 8 hours  morphine ER Tablet 60 milliGRAM(s) Oral every 12 hours  simvastatin 20 milliGRAM(s) Oral at bedtime  tamsulosin 0.4 milliGRAM(s) Oral at bedtime  triamcinolone 0.1% Cream 1 Application(s) Topical every 8 hours    MEDICATIONS  (PRN):  ALBUTerol    90 MICROgram(s) HFA Inhaler 2 Puff(s) Inhalation every 4 hours PRN Shortness of Breath  ALPRAZolam 1 milliGRAM(s) Oral three times a day PRN anxiety  oxyCODONE    5 mG/acetaminophen 325 mG 2 Tablet(s) Oral every 6 hours PRN Severe Pain (7 - 10)    INTERVAL EVENTS:    T(C): 36.7 (03-08-18 @ 05:41), Max: 36.7 (03-08-18 @ 05:41)  HR: 54 (03-08-18 @ 05:41) (54 - 55)  BP: 158/79 (03-08-18 @ 05:41) (158/79 - 163/68)  RR: 20 (03-08-18 @ 05:41) (20 - 20)  SpO2: --  Wt(kg): --Vital Signs Last 24 Hrs  T(C): 36.7 (08 Mar 2018 05:41), Max: 36.7 (08 Mar 2018 05:41)  T(F): 98 (08 Mar 2018 05:41), Max: 98 (08 Mar 2018 05:41)  HR: 54 (08 Mar 2018 05:41) (54 - 55)  BP: 158/79 (08 Mar 2018 05:41) (158/79 - 163/68)  BP(mean): --  RR: 20 (08 Mar 2018 05:41) (20 - 20)  SpO2: --    PHYSICAL EXAM:  GENERAL:   NECK: Supple, No JVD  CHEST/LUNG: Clear;  HEART: S1, S2, Regular rate and rhythm;   ABDOMEN: Soft, Nontender, Nondistended, BS+  EXTREMITIES: edema  SKIN:    LABS:  Labs:                        14.5   4.70  )-----------( 192      ( 07 Mar 2018 10:13 )             43.2             03-07    141  |  102  |  20  ----------------------------<  94  3.5   |  31  |  1.1    Ca    9.5      07 Mar 2018 10:13    TPro  7.0  /  Alb  4.3  /  TBili  0.8  /  DBili  x   /  AST  14  /  ALT  19  /  AlkPhos  58  03-06    LIVER FUNCTIONS - ( 06 Mar 2018 23:21 )  Alb: 4.3 g/dL / Pro: 7.0 g/dL / ALK PHOS: 58 U/L / ALT: 19 U/L / AST: 14 U/L / GGT: x                 PT/INR - ( 06 Mar 2018 23:21 )   PT: 12.20 sec;   INR: 1.13 ratio         PTT - ( 06 Mar 2018 23:21 )  PTT:35.0 sec  CARDIAC MARKERS ( 06 Mar 2018 23:21 )  <0.02 ng/mL / x     / 72 U/L / x     / 0.8 ng/mL      RADIOLOGY & ADDITIONAL TESTS:  < from: Xray Chest 1 View- PORTABLE-Urgent (03.07.18 @ 01:48) >  Impression:      Bibasilar atelectasis    < from: [facility] Duplex Lower Ext Vein Scan, Right (03.06.18 @ 23:50) >  Impression:    No evidence of deep venous thrombosis or superficial thrombophlebitis in   right lower extremity       < end of copied text >  < from: [facility] Duplex Lower Ext Vein Scan, Left (03.06.18 @ 23:50) >  Impression:    No evidence of deep venous thrombosis or superficial thrombophlebitis in   left lower extremity     < end of copied text > SANA JOE  62y  Male  HPI:  63 yo man with DM2, HTN, DLD, chronic back pain, and seizure disorder, presented with above CC  patient reports pain in both lower extremities, associated with swelling and redness that has been getting worse for the past 2 weeks.  he was hospitalized for a syncopal episode end of january 2018.  never had this problem before.  he denies any fever or chills. no N/V/D, no abd pain or headaches, no chest pain or SOB. (07 Mar 2018 05:27)    MEDICATIONS  (STANDING):  baclofen 10 milliGRAM(s) Oral three times a day  enoxaparin Injectable 40 milliGRAM(s) SubCutaneous daily  famciclovir 500 milliGRAM(s) Oral every 8 hours  fenofibrate Tablet 145 milliGRAM(s) Oral daily  furosemide    Tablet 20 milliGRAM(s) Oral daily  levETIRAcetam 500 milliGRAM(s) Oral two times a day  losartan 100 milliGRAM(s) Oral daily  metroNIDAZOLE  IVPB 500 milliGRAM(s) IV Intermittent every 8 hours  morphine ER Tablet 60 milliGRAM(s) Oral every 12 hours  simvastatin 20 milliGRAM(s) Oral at bedtime  tamsulosin 0.4 milliGRAM(s) Oral at bedtime  triamcinolone 0.1% Cream 1 Application(s) Topical every 8 hours    MEDICATIONS  (PRN):  ALBUTerol    90 MICROgram(s) HFA Inhaler 2 Puff(s) Inhalation every 4 hours PRN Shortness of Breath  ALPRAZolam 1 milliGRAM(s) Oral three times a day PRN anxiety  oxyCODONE    5 mG/acetaminophen 325 mG 2 Tablet(s) Oral every 6 hours PRN Severe Pain (7 - 10)    INTERVAL EVENTS: Patient seen with decreased edema, legs have been elevated.     T(C): 36.7 (03-08-18 @ 05:41), Max: 36.7 (03-08-18 @ 05:41)  HR: 54 (03-08-18 @ 05:41) (54 - 55)  BP: 158/79 (03-08-18 @ 05:41) (158/79 - 163/68)  RR: 20 (03-08-18 @ 05:41) (20 - 20)  SpO2: --  Wt(kg): --Vital Signs Last 24 Hrs  T(C): 36.7 (08 Mar 2018 05:41), Max: 36.7 (08 Mar 2018 05:41)  T(F): 98 (08 Mar 2018 05:41), Max: 98 (08 Mar 2018 05:41)  HR: 54 (08 Mar 2018 05:41) (54 - 55)  BP: 158/79 (08 Mar 2018 05:41) (158/79 - 163/68)  BP(mean): --  RR: 20 (08 Mar 2018 05:41) (20 - 20)  SpO2: --    PHYSICAL EXAM:  GENERAL: NAD  NECK: Supple, No JVD  CHEST/LUNG: Clear;  HEART: S1, S2, Regular rate and rhythm;   ABDOMEN: Soft, Nontender, Nondistended, BS+  EXTREMITIES: edema trace, mild erythema, no warmth, no weeping, no open areas  SKIN:    LABS:  Labs:                        14.5   4.70  )-----------( 192      ( 07 Mar 2018 10:13 )             43.2             03-07    141  |  102  |  20  ----------------------------<  94  3.5   |  31  |  1.1    Ca    9.5      07 Mar 2018 10:13    TPro  7.0  /  Alb  4.3  /  TBili  0.8  /  DBili  x   /  AST  14  /  ALT  19  /  AlkPhos  58  03-06    LIVER FUNCTIONS - ( 06 Mar 2018 23:21 )  Alb: 4.3 g/dL / Pro: 7.0 g/dL / ALK PHOS: 58 U/L / ALT: 19 U/L / AST: 14 U/L / GGT: x                 PT/INR - ( 06 Mar 2018 23:21 )   PT: 12.20 sec;   INR: 1.13 ratio         PTT - ( 06 Mar 2018 23:21 )  PTT:35.0 sec  CARDIAC MARKERS ( 06 Mar 2018 23:21 )  <0.02 ng/mL / x     / 72 U/L / x     / 0.8 ng/mL      RADIOLOGY & ADDITIONAL TESTS:  < from: Xray Chest 1 View- PORTABLE-Urgent (03.07.18 @ 01:48) >  Impression:      Bibasilar atelectasis    < from: [facility] Duplex Lower Ext Vein Scan, Right (03.06.18 @ 23:50) >  Impression:    No evidence of deep venous thrombosis or superficial thrombophlebitis in   right lower extremity       < end of copied text >  < from: [facility] Duplex Lower Ext Vein Scan, Left (03.06.18 @ 23:50) >  Impression:    No evidence of deep venous thrombosis or superficial thrombophlebitis in   left lower extremity     < end of copied text >

## 2018-03-08 NOTE — CONSULT NOTE ADULT - ASSESSMENT
61 yo M PMHx HTN, HLD, DM, HFpEF, chronic low back pain pw 2weeks of BL LE edema + erythema most consistent with stasis dermatitis vs PAD.    1.  LE edema/erythema  - r/o PAD with arterial duplex of LE   - check HbA1c, LDL   - compression stockings and leg elevation  - may benefit from gabapentin for neuropathic like pain 61 yo M PMHx HTN, HLD, DM, HFpEF, chronic low back pain pw 2weeks of BL LE edema + erythema most consistent with stasis dermatitis.    1.  LE edema/erythema likely stasis dermatitis  - r/o PAD with arterial duplex of LE   - 20 mmHg knee high compression stockings (can be bought OTC) and leg elevation 63 yo M PMHx HTN, HLD, DM, HFpEF, chronic low back pain pw 2weeks of BL LE edema + erythema most consistent with stasis dermatitis.    1.  LE edema/erythema likely stasis dermatitis  - r/o PAD with arterial duplex of LE   - 20 mmHg knee high compression stockings (can be bought OTC) and leg elevation  Physical  findings are consistant with stasis dermatitis however he reports a 4 day history of these  changes.  Stasis changes tend to be slowly evolving and chronic in nature and we await arterial studies.   Burning sensation may also be treated with silvadene cream and agree with leg elevation and 20mm Hg compression knee hi stockings.

## 2018-03-09 ENCOUNTER — TRANSCRIPTION ENCOUNTER (OUTPATIENT)
Age: 63
End: 2018-03-09

## 2018-03-09 VITALS
TEMPERATURE: 97 F | HEART RATE: 73 BPM | DIASTOLIC BLOOD PRESSURE: 85 MMHG | RESPIRATION RATE: 18 BRPM | SYSTOLIC BLOOD PRESSURE: 147 MMHG

## 2018-03-09 PROBLEM — Z00.00 ENCOUNTER FOR PREVENTIVE HEALTH EXAMINATION: Status: ACTIVE | Noted: 2018-03-09

## 2018-03-09 RX ORDER — FAMCICLOVIR 500 MG/1
1 TABLET, FILM COATED ORAL
Qty: 0 | Refills: 0 | COMMUNITY

## 2018-03-09 RX ADMIN — Medication 20 MILLIGRAM(S): at 05:13

## 2018-03-09 RX ADMIN — OXYCODONE AND ACETAMINOPHEN 2 TABLET(S): 5; 325 TABLET ORAL at 01:17

## 2018-03-09 RX ADMIN — Medication 145 MILLIGRAM(S): at 12:23

## 2018-03-09 RX ADMIN — OXYCODONE AND ACETAMINOPHEN 2 TABLET(S): 5; 325 TABLET ORAL at 18:18

## 2018-03-09 RX ADMIN — OXYCODONE AND ACETAMINOPHEN 2 TABLET(S): 5; 325 TABLET ORAL at 05:45

## 2018-03-09 RX ADMIN — LEVETIRACETAM 500 MILLIGRAM(S): 250 TABLET, FILM COATED ORAL at 05:13

## 2018-03-09 RX ADMIN — Medication 10 MILLIGRAM(S): at 14:22

## 2018-03-09 RX ADMIN — Medication 1 MILLIGRAM(S): at 09:26

## 2018-03-09 RX ADMIN — Medication 1 MILLIGRAM(S): at 01:17

## 2018-03-09 RX ADMIN — Medication 10 MILLIGRAM(S): at 05:13

## 2018-03-09 RX ADMIN — OXYCODONE AND ACETAMINOPHEN 2 TABLET(S): 5; 325 TABLET ORAL at 18:19

## 2018-03-09 RX ADMIN — MORPHINE SULFATE 60 MILLIGRAM(S): 50 CAPSULE, EXTENDED RELEASE ORAL at 07:05

## 2018-03-09 RX ADMIN — Medication 1 APPLICATION(S): at 05:13

## 2018-03-09 RX ADMIN — LOSARTAN POTASSIUM 100 MILLIGRAM(S): 100 TABLET, FILM COATED ORAL at 05:13

## 2018-03-09 RX ADMIN — OXYCODONE AND ACETAMINOPHEN 2 TABLET(S): 5; 325 TABLET ORAL at 14:23

## 2018-03-09 RX ADMIN — OXYCODONE AND ACETAMINOPHEN 2 TABLET(S): 5; 325 TABLET ORAL at 01:45

## 2018-03-09 RX ADMIN — OXYCODONE AND ACETAMINOPHEN 2 TABLET(S): 5; 325 TABLET ORAL at 09:15

## 2018-03-09 RX ADMIN — ENOXAPARIN SODIUM 40 MILLIGRAM(S): 100 INJECTION SUBCUTANEOUS at 12:23

## 2018-03-09 RX ADMIN — OXYCODONE AND ACETAMINOPHEN 2 TABLET(S): 5; 325 TABLET ORAL at 05:12

## 2018-03-09 NOTE — DISCHARGE NOTE ADULT - REASON FOR ADMISSION
lower extremities pain and redness for 2 weeks; only had many years ago around the time of his back injury/surgry.

## 2018-03-09 NOTE — DISCHARGE NOTE ADULT - HOSPITAL COURSE
62 M w/ pmh DM2, HTN, DLD, chronic back pain, HFpEF, shingles and seizure disorder, presented with 2 week history of bilateral feet and legs pain, redness and swelling originally admitted for cellulitis and was started on flagyl, had a neg keely duplex, a negative arterial duplex. He was seen by ID who recommended to discontinue antibiotics and that is is stasis dermatitis. He was then seen by dermatologist Dr. Patel who recommended silvadene cream and to get compression stockings from the pharmacy for stasis dermatitis and venous insufficiency.

## 2018-03-09 NOTE — DISCHARGE NOTE ADULT - INSTRUCTIONS
none as stated in discharge to apply cream prescribed by Dr. Patel to lower extremities and to use compression stockings(sold over the counter)

## 2018-03-09 NOTE — DISCHARGE NOTE ADULT - PLAN OF CARE
Resolve the dermatitis Apply cream as prescribed by Dr. Patel and follow up at the office.  Buy Compression stockings for your legs from the pharmacy ( they are over the counter item ).

## 2018-03-09 NOTE — DISCHARGE NOTE ADULT - PATIENT PORTAL LINK FT
You can access the [facility] Portal, offered by [facility], by registering with the following website: [email or web address]

## 2018-03-09 NOTE — DISCHARGE NOTE ADULT - CARE PROVIDER_API CALL
Shannan Rios), Internal Medicine  [address] [place] [address]  Phone: [phone]  Fax: [phone]    Sohail Patel), Dermatology  [address] [place] [address]  Phone: [phone]  Fax: [phone]

## 2018-03-09 NOTE — PROGRESS NOTE ADULT - SUBJECTIVE AND OBJECTIVE BOX
SANA JOE  62y  Male  HPI:  63 yo man with DM2, HTN, DLD, chronic back pain, and seizure disorder, presented with above CC  patient reports pain in both lower extremities, associated with swelling and redness that has been getting worse for the past 2 weeks.  he was hospitalized for a syncopal episode end of january 2018.  never had this problem before.  he denies any fever or chills. no N/V/D, no abd pain or headaches, no chest pain or SOB. (07 Mar 2018 05:27)    MEDICATIONS  (STANDING):  baclofen 10 milliGRAM(s) Oral three times a day  enoxaparin Injectable 40 milliGRAM(s) SubCutaneous daily  fenofibrate Tablet 145 milliGRAM(s) Oral daily  furosemide    Tablet 20 milliGRAM(s) Oral daily  levETIRAcetam 500 milliGRAM(s) Oral two times a day  losartan 100 milliGRAM(s) Oral daily  morphine ER Tablet 60 milliGRAM(s) Oral every 12 hours  simvastatin 20 milliGRAM(s) Oral at bedtime  tamsulosin 0.4 milliGRAM(s) Oral at bedtime  triamcinolone 0.1% Cream 1 Application(s) Topical every 8 hours    MEDICATIONS  (PRN):  ALBUTerol    90 MICROgram(s) HFA Inhaler 2 Puff(s) Inhalation every 4 hours PRN Shortness of Breath  ALPRAZolam 1 milliGRAM(s) Oral three times a day PRN anxiety  oxyCODONE    5 mG/acetaminophen 325 mG 2 Tablet(s) Oral every 4 hours PRN Severe Pain (7 - 10)    INTERVAL EVENTS: Patient without distress. No fever. W/u negative    T(C): 36.2 (03-09-18 @ 04:47), Max: 36.2 (03-08-18 @ 21:00)  HR: 57 (03-09-18 @ 04:47) (57 - 73)  BP: 156/73 (03-09-18 @ 04:47) (147/77 - 174/89)  RR: 20 (03-09-18 @ 04:47) (19 - 20)  SpO2: 96% (03-08-18 @ 21:00) (96% - 96%)  Wt(kg): --Vital Signs Last 24 Hrs  T(C): 36.2 (09 Mar 2018 04:47), Max: 36.2 (08 Mar 2018 21:00)  T(F): 97.1 (09 Mar 2018 04:47), Max: 97.1 (08 Mar 2018 21:00)  HR: 57 (09 Mar 2018 04:47) (57 - 73)  BP: 156/73 (09 Mar 2018 04:47) (147/77 - 174/89)  BP(mean): --  RR: 20 (09 Mar 2018 04:47) (19 - 20)  SpO2: 96% (08 Mar 2018 21:00) (96% - 96%)    PHYSICAL EXAM:  GENERAL: NAD  NECK: Supple, No JVD  CHEST/LUNG: Clear  HEART: S1, S2, Regular rate and rhythm;   ABDOMEN: Soft, Nontender, Nondistended; BS present  EXTREMITIES: edema  SKIN:  rashes or lesions    LABS:  No new labs  Culture - Blood (collected 07 Mar 2018 10:13)  Source: .Blood None  Preliminary Report (08 Mar 2018 15:01):    No growth to date.      RADIOLOGY & ADDITIONAL TESTS:  Arterial duplex negative

## 2018-03-09 NOTE — DISCHARGE NOTE ADULT - MEDICATION SUMMARY - MEDICATIONS TO TAKE
I will START or STAY ON the medications listed below when I get home from the hospital:    Percocet 10/325  -- Indication: For Chronic low back pain with sciatica, sciatica laterality unspecified, unspecified back pain laterality    losartan 100 mg oral tablet  -- 1 tab(s) by mouth once a day  -- Indication: For HTN (hypertension)    levETIRAcetam 500 mg oral tablet, extended release  -- 2 tab(s) by mouth once a day  -- Indication: For H/o Seizure    metFORMIN 500 mg oral tablet  -- 1 tab(s) by mouth 2 times a day  -- Indication: For Diabetes    fenofibrate 160 mg oral tablet  -- 1 tab(s) by mouth once a day  -- Indication: For High cholesterol    simvastatin 20 mg oral tablet  -- 1 tab(s) by mouth once a day (at bedtime)  -- Indication: For High cholesterol    ALPRAZolam 1 mg oral tablet  -- 1 tab(s) by mouth 3 times a day  -- Indication: For Anxiety    Atrovent HFA  -- Indication: For Asthma    Ventolin HFA 90 mcg/inh inhalation aerosol  -- Indication: For Asthma    Silvadene 1% topical cream  -- Apply on skin to affected area once a day   -- For external use only.    -- Indication: For Stasis Dermatitis    furosemide 20 mg oral tablet  -- 1 tab(s) by mouth once a day  -- Indication: For HTN (hypertension)    baclofen 10 mg oral tablet  -- 1 tab(s) by mouth 3 times a day  -- Indication: For Chronic low back pain with sciatica, sciatica laterality unspecified, unspecified back pain laterality    Asmanex  mcg/inh inhalation aerosol  -- Indication: For Asthma

## 2018-03-09 NOTE — DISCHARGE NOTE ADULT - MEDICATION SUMMARY - MEDICATIONS TO STOP TAKING
I will STOP taking the medications listed below when I get home from the hospital:    famciclovir 500 mg oral tablet  -- 1 tab(s) by mouth every 8 hours

## 2018-03-09 NOTE — DISCHARGE NOTE ADULT - CARE PLAN
Principal Discharge DX:	Stasis dermatitis of both legs  Goal:	Resolve the dermatitis  Assessment and plan of treatment:	Apply cream as prescribed by Dr. Patel and follow up at the office.  Buy Compression stockings for your legs from the pharmacy ( they are over the counter item ).

## 2018-03-09 NOTE — PROGRESS NOTE ADULT - ASSESSMENT
62 M w/ pmh DM2, HTN, DLD, chronic back pain, HFpEF, shingles and seizure disorder, presented with 2 week history of bilateral feet and legs pain, redness and swelling originally admitted for cellulitis and was started on flagyl, had a neg keely duplex.    # Venous insufficiency  - Vascular was consulted per Dr. Orosco, may need unna boot    # R/o Cellulitis  - Antibiotics were discontinued, pending ID consult ( awaiting attending addendum to note )    # R/o PAD  - A. duplex ordered    # HFpEF; see ECHO-1/18-[facility]  - On lasix    # DM2  # HTN  # Chronic back pain # h/o shingles  - on metformin at home  - home meds  - flomax  - dc famciclovir    # DVT ppx ( Lovenox )    FULL CODE
62 M w/ pmh DM2, HTN, DLD, chronic back pain, HFpEF, shingles and seizure disorder, presented with 2 week history of bilateral feet and legs pain, redness and swelling originally admitted for cellulitis and was started on flagyl, had a neg keely duplex.    LE edema secondary to Venous insufficiency  - leg elevation  - arterial duplex negative  - Vascular evaluation can be discontinued  - no evidence of infection, off antibiotics  - place [facility]'s compression stockings prior to discharge    [facility]  - On Lasix    DM2    - on Metformin     HTN  - continue Losartan    Chronic back pain  - opioid dependent  - continue rx    hx of shingles  - no evidence of active disease  - discontinue Famvir    BPH  - continue Flomax    # DVT ppx ( Lovenox )    Discharge home today with services. Patient to f/u with Dr Leblanc in the office next week.
62 M w/ pmh DM2, HTN, DLD, chronic back pain, HFpEF, shingles and seizure disorder, presented with 2 week history of bilateral feet and legs pain, redness and swelling originally admitted for cellulitis and was started on flagyl, had a neg keely duplex.    LE edema secondary to Venous insufficiency  - leg elevation  - await arterial duplex  - Vascular evaluation ? unna boot  - no evidence of infection, discontinue IV antibiotics    [facility]  - On Lasix    DM2    - on Metformin     HTN  - continue Losartan    Chronic back pain  - opioid dependent  - continue rx    hx of shingles  - no evidence of active disease  - discontinue Famvir    BPH  - continue Flomax    # DVT ppx ( Lovenox )    FULL CODE  Discharge planning

## 2018-03-12 DIAGNOSIS — N40.0 BENIGN PROSTATIC HYPERPLASIA WITHOUT LOWER URINARY TRACT SYMPTOMS: ICD-10-CM

## 2018-03-12 DIAGNOSIS — F41.9 ANXIETY DISORDER, UNSPECIFIED: ICD-10-CM

## 2018-03-12 DIAGNOSIS — E78.5 HYPERLIPIDEMIA, UNSPECIFIED: ICD-10-CM

## 2018-03-12 DIAGNOSIS — Z79.84 LONG TERM (CURRENT) USE OF ORAL HYPOGLYCEMIC DRUGS: ICD-10-CM

## 2018-03-12 DIAGNOSIS — Z88.0 ALLERGY STATUS TO PENICILLIN: ICD-10-CM

## 2018-03-12 DIAGNOSIS — I87.2 VENOUS INSUFFICIENCY (CHRONIC) (PERIPHERAL): ICD-10-CM

## 2018-03-12 DIAGNOSIS — I11.0 HYPERTENSIVE HEART DISEASE WITH HEART FAILURE: ICD-10-CM

## 2018-03-12 DIAGNOSIS — L03.119 CELLULITIS OF UNSPECIFIED PART OF LIMB: ICD-10-CM

## 2018-03-12 DIAGNOSIS — J45.909 UNSPECIFIED ASTHMA, UNCOMPLICATED: ICD-10-CM

## 2018-03-12 DIAGNOSIS — G40.909 EPILEPSY, UNSPECIFIED, NOT INTRACTABLE, WITHOUT STATUS EPILEPTICUS: ICD-10-CM

## 2018-03-12 DIAGNOSIS — G89.29 OTHER CHRONIC PAIN: ICD-10-CM

## 2018-03-12 DIAGNOSIS — I50.32 CHRONIC DIASTOLIC (CONGESTIVE) HEART FAILURE: ICD-10-CM

## 2018-03-12 DIAGNOSIS — E11.9 TYPE 2 DIABETES MELLITUS WITHOUT COMPLICATIONS: ICD-10-CM

## 2018-03-12 DIAGNOSIS — M54.9 DORSALGIA, UNSPECIFIED: ICD-10-CM

## 2018-03-12 LAB
CULTURE RESULTS: SIGNIFICANT CHANGE UP
SPECIMEN SOURCE: SIGNIFICANT CHANGE UP

## 2018-04-04 ENCOUNTER — OUTPATIENT (OUTPATIENT)
Dept: OUTPATIENT SERVICES | Facility: HOSPITAL | Age: 63
LOS: 1 days | Discharge: HOME | End: 2018-04-04

## 2018-04-04 DIAGNOSIS — E78.5 HYPERLIPIDEMIA, UNSPECIFIED: ICD-10-CM

## 2018-04-04 DIAGNOSIS — I25.10 ATHEROSCLEROTIC HEART DISEASE OF NATIVE CORONARY ARTERY WITHOUT ANGINA PECTORIS: ICD-10-CM

## 2018-04-04 DIAGNOSIS — I10 ESSENTIAL (PRIMARY) HYPERTENSION: ICD-10-CM

## 2018-04-04 PROBLEM — J45.909 UNSPECIFIED ASTHMA, UNCOMPLICATED: Chronic | Status: ACTIVE | Noted: 2018-03-06

## 2018-04-04 PROBLEM — E78.00 PURE HYPERCHOLESTEROLEMIA, UNSPECIFIED: Chronic | Status: ACTIVE | Noted: 2018-03-06

## 2018-04-04 PROBLEM — F41.9 ANXIETY DISORDER, UNSPECIFIED: Chronic | Status: ACTIVE | Noted: 2018-03-06

## 2018-04-04 PROBLEM — E11.9 TYPE 2 DIABETES MELLITUS WITHOUT COMPLICATIONS: Chronic | Status: ACTIVE | Noted: 2018-03-06

## 2018-07-27 ENCOUNTER — OUTPATIENT (OUTPATIENT)
Dept: OUTPATIENT SERVICES | Facility: HOSPITAL | Age: 63
LOS: 1 days | Discharge: HOME | End: 2018-07-27

## 2018-07-27 DIAGNOSIS — E11.9 TYPE 2 DIABETES MELLITUS WITHOUT COMPLICATIONS: ICD-10-CM

## 2018-07-27 DIAGNOSIS — Z00.00 ENCOUNTER FOR GENERAL ADULT MEDICAL EXAMINATION WITHOUT ABNORMAL FINDINGS: ICD-10-CM

## 2018-07-27 DIAGNOSIS — E78.00 PURE HYPERCHOLESTEROLEMIA, UNSPECIFIED: ICD-10-CM

## 2018-07-27 PROBLEM — M54.40 LUMBAGO WITH SCIATICA, UNSPECIFIED SIDE: Chronic | Status: ACTIVE | Noted: 2018-03-07

## 2018-07-27 PROBLEM — F11.20 OPIOID DEPENDENCE, UNCOMPLICATED: Chronic | Status: ACTIVE | Noted: 2018-03-07

## 2018-09-17 ENCOUNTER — INPATIENT (INPATIENT)
Facility: HOSPITAL | Age: 63
LOS: 3 days | Discharge: HOME | End: 2018-09-21
Attending: SURGERY | Admitting: SURGERY
Payer: MEDICARE

## 2018-09-17 VITALS
WEIGHT: 225.09 LBS | HEART RATE: 65 BPM | SYSTOLIC BLOOD PRESSURE: 132 MMHG | HEIGHT: 71 IN | TEMPERATURE: 98 F | OXYGEN SATURATION: 92 % | DIASTOLIC BLOOD PRESSURE: 71 MMHG | RESPIRATION RATE: 18 BRPM

## 2018-09-17 DIAGNOSIS — Z98.890 OTHER SPECIFIED POSTPROCEDURAL STATES: Chronic | ICD-10-CM

## 2018-09-17 DIAGNOSIS — I26.99 OTHER PULMONARY EMBOLISM WITHOUT ACUTE COR PULMONALE: ICD-10-CM

## 2018-09-17 DIAGNOSIS — I80.10 PHLEBITIS AND THROMBOPHLEBITIS OF UNSPECIFIED FEMORAL VEIN: ICD-10-CM

## 2018-09-17 LAB
ALBUMIN SERPL ELPH-MCNC: 4.1 G/DL — SIGNIFICANT CHANGE UP (ref 3.5–5.2)
ALP SERPL-CCNC: 64 U/L — SIGNIFICANT CHANGE UP (ref 30–115)
ALT FLD-CCNC: 9 U/L — SIGNIFICANT CHANGE UP (ref 0–41)
ANION GAP SERPL CALC-SCNC: 16 MMOL/L — HIGH (ref 7–14)
ANION GAP SERPL CALC-SCNC: 18 MMOL/L — HIGH (ref 7–14)
APTT BLD: 39.7 SEC — HIGH (ref 27–39.2)
AST SERPL-CCNC: 30 U/L — SIGNIFICANT CHANGE UP (ref 0–41)
BASE EXCESS BLDV CALC-SCNC: 3.8 MMOL/L — HIGH (ref -2–2)
BASOPHILS # BLD AUTO: 0.08 K/UL — SIGNIFICANT CHANGE UP (ref 0–0.2)
BASOPHILS NFR BLD AUTO: 1 % — SIGNIFICANT CHANGE UP (ref 0–1)
BILIRUB SERPL-MCNC: 0.4 MG/DL — SIGNIFICANT CHANGE UP (ref 0.2–1.2)
BLD GP AB SCN SERPL QL: SIGNIFICANT CHANGE UP
BUN SERPL-MCNC: 15 MG/DL — SIGNIFICANT CHANGE UP (ref 10–20)
BUN SERPL-MCNC: 22 MG/DL — HIGH (ref 10–20)
CA-I SERPL-SCNC: 1.24 MMOL/L — SIGNIFICANT CHANGE UP (ref 1.12–1.3)
CALCIUM SERPL-MCNC: 9.1 MG/DL — SIGNIFICANT CHANGE UP (ref 8.5–10.1)
CALCIUM SERPL-MCNC: 9.7 MG/DL — SIGNIFICANT CHANGE UP (ref 8.5–10.1)
CHLORIDE SERPL-SCNC: 99 MMOL/L — SIGNIFICANT CHANGE UP (ref 98–110)
CHLORIDE SERPL-SCNC: 99 MMOL/L — SIGNIFICANT CHANGE UP (ref 98–110)
CO2 SERPL-SCNC: 26 MMOL/L — SIGNIFICANT CHANGE UP (ref 17–32)
CO2 SERPL-SCNC: 26 MMOL/L — SIGNIFICANT CHANGE UP (ref 17–32)
CREAT SERPL-MCNC: 0.9 MG/DL — SIGNIFICANT CHANGE UP (ref 0.7–1.5)
CREAT SERPL-MCNC: 1.2 MG/DL — SIGNIFICANT CHANGE UP (ref 0.7–1.5)
EOSINOPHIL # BLD AUTO: 0.45 K/UL — SIGNIFICANT CHANGE UP (ref 0–0.7)
EOSINOPHIL NFR BLD AUTO: 5.6 % — SIGNIFICANT CHANGE UP (ref 0–8)
GAS PNL BLDV: 145 MMOL/L — SIGNIFICANT CHANGE UP (ref 136–145)
GAS PNL BLDV: SIGNIFICANT CHANGE UP
GLUCOSE BLDC GLUCOMTR-MCNC: 141 MG/DL — HIGH (ref 70–99)
GLUCOSE BLDC GLUCOMTR-MCNC: 173 MG/DL — HIGH (ref 70–99)
GLUCOSE SERPL-MCNC: 101 MG/DL — HIGH (ref 70–99)
GLUCOSE SERPL-MCNC: 144 MG/DL — HIGH (ref 70–99)
HCO3 BLDV-SCNC: 33 MMOL/L — HIGH (ref 22–29)
HCT VFR BLD CALC: 45.3 % — SIGNIFICANT CHANGE UP (ref 42–52)
HCT VFR BLDA CALC: 52.3 % — HIGH (ref 34–44)
HGB BLD CALC-MCNC: 17 G/DL — SIGNIFICANT CHANGE UP (ref 14–18)
HGB BLD-MCNC: 15.2 G/DL — SIGNIFICANT CHANGE UP (ref 14–18)
IMM GRANULOCYTES NFR BLD AUTO: 0.5 % — HIGH (ref 0.1–0.3)
INR BLD: 1.18 RATIO — SIGNIFICANT CHANGE UP (ref 0.65–1.3)
LACTATE BLDV-MCNC: 2.6 MMOL/L — HIGH (ref 0.5–1.6)
LYMPHOCYTES # BLD AUTO: 2.24 K/UL — SIGNIFICANT CHANGE UP (ref 1.2–3.4)
LYMPHOCYTES # BLD AUTO: 27.8 % — SIGNIFICANT CHANGE UP (ref 20.5–51.1)
MCHC RBC-ENTMCNC: 29.7 PG — SIGNIFICANT CHANGE UP (ref 27–31)
MCHC RBC-ENTMCNC: 33.6 G/DL — SIGNIFICANT CHANGE UP (ref 32–37)
MCV RBC AUTO: 88.6 FL — SIGNIFICANT CHANGE UP (ref 80–94)
MONOCYTES # BLD AUTO: 0.79 K/UL — HIGH (ref 0.1–0.6)
MONOCYTES NFR BLD AUTO: 9.8 % — HIGH (ref 1.7–9.3)
NEUTROPHILS # BLD AUTO: 4.46 K/UL — SIGNIFICANT CHANGE UP (ref 1.4–6.5)
NEUTROPHILS NFR BLD AUTO: 55.3 % — SIGNIFICANT CHANGE UP (ref 42.2–75.2)
NRBC # BLD: 0 /100 WBCS — SIGNIFICANT CHANGE UP (ref 0–0)
NT-PROBNP SERPL-SCNC: 57 PG/ML — SIGNIFICANT CHANGE UP (ref 0–300)
PCO2 BLDV: 69 MMHG — HIGH (ref 41–51)
PH BLDV: 7.29 — SIGNIFICANT CHANGE UP (ref 7.26–7.43)
PLATELET # BLD AUTO: 220 K/UL — SIGNIFICANT CHANGE UP (ref 130–400)
PO2 BLDV: 20 MMHG — SIGNIFICANT CHANGE UP (ref 20–40)
POTASSIUM BLDV-SCNC: 4.1 MMOL/L — SIGNIFICANT CHANGE UP (ref 3.3–5.6)
POTASSIUM SERPL-MCNC: 4.5 MMOL/L — SIGNIFICANT CHANGE UP (ref 3.5–5)
POTASSIUM SERPL-MCNC: 7.3 MMOL/L — CRITICAL HIGH (ref 3.5–5)
POTASSIUM SERPL-SCNC: 4.5 MMOL/L — SIGNIFICANT CHANGE UP (ref 3.5–5)
POTASSIUM SERPL-SCNC: 7.3 MMOL/L — CRITICAL HIGH (ref 3.5–5)
PROT SERPL-MCNC: 7.4 G/DL — SIGNIFICANT CHANGE UP (ref 6–8)
PROTHROM AB SERPL-ACNC: 12.7 SEC — SIGNIFICANT CHANGE UP (ref 9.95–12.87)
RBC # BLD: 5.11 M/UL — SIGNIFICANT CHANGE UP (ref 4.7–6.1)
RBC # FLD: 13.8 % — SIGNIFICANT CHANGE UP (ref 11.5–14.5)
SAO2 % BLDV: 26 % — SIGNIFICANT CHANGE UP
SODIUM SERPL-SCNC: 141 MMOL/L — SIGNIFICANT CHANGE UP (ref 135–146)
SODIUM SERPL-SCNC: 143 MMOL/L — SIGNIFICANT CHANGE UP (ref 135–146)
TROPONIN T SERPL-MCNC: <0.01 NG/ML — SIGNIFICANT CHANGE UP
TYPE + AB SCN PNL BLD: SIGNIFICANT CHANGE UP
WBC # BLD: 8.06 K/UL — SIGNIFICANT CHANGE UP (ref 4.8–10.8)
WBC # FLD AUTO: 8.06 K/UL — SIGNIFICANT CHANGE UP (ref 4.8–10.8)

## 2018-09-17 PROCEDURE — 93970 EXTREMITY STUDY: CPT | Mod: 26

## 2018-09-17 RX ORDER — INSULIN LISPRO 100/ML
3 VIAL (ML) SUBCUTANEOUS
Qty: 0 | Refills: 0 | Status: DISCONTINUED | OUTPATIENT
Start: 2018-09-17 | End: 2018-09-18

## 2018-09-17 RX ORDER — SODIUM CHLORIDE 9 MG/ML
1000 INJECTION, SOLUTION INTRAVENOUS
Qty: 0 | Refills: 0 | Status: DISCONTINUED | OUTPATIENT
Start: 2018-09-17 | End: 2018-09-18

## 2018-09-17 RX ORDER — BACLOFEN 100 %
10 POWDER (GRAM) MISCELLANEOUS ONCE
Qty: 0 | Refills: 0 | Status: COMPLETED | OUTPATIENT
Start: 2018-09-17 | End: 2018-09-17

## 2018-09-17 RX ORDER — HEPARIN SODIUM 5000 [USP'U]/ML
4000 INJECTION INTRAVENOUS; SUBCUTANEOUS EVERY 6 HOURS
Qty: 0 | Refills: 0 | Status: DISCONTINUED | OUTPATIENT
Start: 2018-09-17 | End: 2018-09-17

## 2018-09-17 RX ORDER — IPRATROPIUM/ALBUTEROL SULFATE 18-103MCG
3 AEROSOL WITH ADAPTER (GRAM) INHALATION ONCE
Qty: 0 | Refills: 0 | Status: COMPLETED | OUTPATIENT
Start: 2018-09-17 | End: 2018-09-17

## 2018-09-17 RX ORDER — CYCLOBENZAPRINE HYDROCHLORIDE 10 MG/1
10 TABLET, FILM COATED ORAL THREE TIMES A DAY
Qty: 0 | Refills: 0 | Status: DISCONTINUED | OUTPATIENT
Start: 2018-09-17 | End: 2018-09-18

## 2018-09-17 RX ORDER — LIDOCAINE 4 G/100G
1 CREAM TOPICAL EVERY 24 HOURS
Qty: 0 | Refills: 0 | Status: DISCONTINUED | OUTPATIENT
Start: 2018-09-17 | End: 2018-09-18

## 2018-09-17 RX ORDER — OXYCODONE AND ACETAMINOPHEN 5; 325 MG/1; MG/1
2 TABLET ORAL ONCE
Qty: 0 | Refills: 0 | Status: DISCONTINUED | OUTPATIENT
Start: 2018-09-17 | End: 2018-09-17

## 2018-09-17 RX ORDER — DEXTROSE 50 % IN WATER 50 %
25 SYRINGE (ML) INTRAVENOUS ONCE
Qty: 0 | Refills: 0 | Status: DISCONTINUED | OUTPATIENT
Start: 2018-09-17 | End: 2018-09-18

## 2018-09-17 RX ORDER — ALPRAZOLAM 0.25 MG
1 TABLET ORAL THREE TIMES A DAY
Qty: 0 | Refills: 0 | Status: DISCONTINUED | OUTPATIENT
Start: 2018-09-17 | End: 2018-09-18

## 2018-09-17 RX ORDER — ZOLPIDEM TARTRATE 10 MG/1
5 TABLET ORAL AT BEDTIME
Qty: 0 | Refills: 0 | Status: DISCONTINUED | OUTPATIENT
Start: 2018-09-17 | End: 2018-09-18

## 2018-09-17 RX ORDER — IPRATROPIUM/ALBUTEROL SULFATE 18-103MCG
3 AEROSOL WITH ADAPTER (GRAM) INHALATION
Qty: 0 | Refills: 0 | Status: COMPLETED | OUTPATIENT
Start: 2018-09-17 | End: 2018-09-17

## 2018-09-17 RX ORDER — MOMETASONE FUROATE 220 UG/1
2 INHALANT RESPIRATORY (INHALATION) DAILY
Qty: 0 | Refills: 0 | Status: DISCONTINUED | OUTPATIENT
Start: 2018-09-17 | End: 2018-09-18

## 2018-09-17 RX ORDER — MORPHINE SULFATE 50 MG/1
15 CAPSULE, EXTENDED RELEASE ORAL ONCE
Qty: 0 | Refills: 0 | Status: DISCONTINUED | OUTPATIENT
Start: 2018-09-17 | End: 2018-09-17

## 2018-09-17 RX ORDER — SIMVASTATIN 20 MG/1
20 TABLET, FILM COATED ORAL AT BEDTIME
Qty: 0 | Refills: 0 | Status: DISCONTINUED | OUTPATIENT
Start: 2018-09-17 | End: 2018-09-18

## 2018-09-17 RX ORDER — CYCLOBENZAPRINE HYDROCHLORIDE 10 MG/1
10 TABLET, FILM COATED ORAL ONCE
Qty: 0 | Refills: 0 | Status: COMPLETED | OUTPATIENT
Start: 2018-09-17 | End: 2018-09-17

## 2018-09-17 RX ORDER — DEXTROSE 50 % IN WATER 50 %
12.5 SYRINGE (ML) INTRAVENOUS ONCE
Qty: 0 | Refills: 0 | Status: DISCONTINUED | OUTPATIENT
Start: 2018-09-17 | End: 2018-09-18

## 2018-09-17 RX ORDER — DEXTROSE 50 % IN WATER 50 %
15 SYRINGE (ML) INTRAVENOUS ONCE
Qty: 0 | Refills: 0 | Status: DISCONTINUED | OUTPATIENT
Start: 2018-09-17 | End: 2018-09-18

## 2018-09-17 RX ORDER — GLUCAGON INJECTION, SOLUTION 0.5 MG/.1ML
1 INJECTION, SOLUTION SUBCUTANEOUS ONCE
Qty: 0 | Refills: 0 | Status: DISCONTINUED | OUTPATIENT
Start: 2018-09-17 | End: 2018-09-18

## 2018-09-17 RX ORDER — LEVETIRACETAM 250 MG/1
500 TABLET, FILM COATED ORAL
Qty: 0 | Refills: 0 | Status: DISCONTINUED | OUTPATIENT
Start: 2018-09-17 | End: 2018-09-18

## 2018-09-17 RX ORDER — ALBUTEROL 90 UG/1
2 AEROSOL, METERED ORAL EVERY 6 HOURS
Qty: 0 | Refills: 0 | Status: DISCONTINUED | OUTPATIENT
Start: 2018-09-17 | End: 2018-09-18

## 2018-09-17 RX ORDER — ENOXAPARIN SODIUM 100 MG/ML
100 INJECTION SUBCUTANEOUS ONCE
Qty: 0 | Refills: 0 | Status: COMPLETED | OUTPATIENT
Start: 2018-09-17 | End: 2018-09-17

## 2018-09-17 RX ORDER — HEPARIN SODIUM 5000 [USP'U]/ML
INJECTION INTRAVENOUS; SUBCUTANEOUS
Qty: 25000 | Refills: 0 | Status: DISCONTINUED | OUTPATIENT
Start: 2018-09-17 | End: 2018-09-17

## 2018-09-17 RX ORDER — INSULIN GLARGINE 100 [IU]/ML
10 INJECTION, SOLUTION SUBCUTANEOUS AT BEDTIME
Qty: 0 | Refills: 0 | Status: DISCONTINUED | OUTPATIENT
Start: 2018-09-17 | End: 2018-09-18

## 2018-09-17 RX ORDER — DULOXETINE HYDROCHLORIDE 30 MG/1
60 CAPSULE, DELAYED RELEASE ORAL DAILY
Qty: 0 | Refills: 0 | Status: DISCONTINUED | OUTPATIENT
Start: 2018-09-17 | End: 2018-09-18

## 2018-09-17 RX ORDER — OXYCODONE AND ACETAMINOPHEN 5; 325 MG/1; MG/1
1 TABLET ORAL EVERY 6 HOURS
Qty: 0 | Refills: 0 | Status: DISCONTINUED | OUTPATIENT
Start: 2018-09-17 | End: 2018-09-18

## 2018-09-17 RX ORDER — BACLOFEN 100 %
10 POWDER (GRAM) MISCELLANEOUS THREE TIMES A DAY
Qty: 0 | Refills: 0 | Status: DISCONTINUED | OUTPATIENT
Start: 2018-09-17 | End: 2018-09-18

## 2018-09-17 RX ORDER — FUROSEMIDE 40 MG
20 TABLET ORAL DAILY
Qty: 0 | Refills: 0 | Status: DISCONTINUED | OUTPATIENT
Start: 2018-09-17 | End: 2018-09-18

## 2018-09-17 RX ORDER — MORPHINE SULFATE 50 MG/1
20 CAPSULE, EXTENDED RELEASE ORAL ONCE
Qty: 0 | Refills: 0 | Status: DISCONTINUED | OUTPATIENT
Start: 2018-09-17 | End: 2018-09-17

## 2018-09-17 RX ORDER — HEPARIN SODIUM 5000 [USP'U]/ML
8500 INJECTION INTRAVENOUS; SUBCUTANEOUS EVERY 6 HOURS
Qty: 0 | Refills: 0 | Status: DISCONTINUED | OUTPATIENT
Start: 2018-09-17 | End: 2018-09-17

## 2018-09-17 RX ORDER — HEPARIN SODIUM 5000 [USP'U]/ML
8500 INJECTION INTRAVENOUS; SUBCUTANEOUS ONCE
Qty: 0 | Refills: 0 | Status: COMPLETED | OUTPATIENT
Start: 2018-09-17 | End: 2018-09-17

## 2018-09-17 RX ORDER — LOSARTAN POTASSIUM 100 MG/1
100 TABLET, FILM COATED ORAL DAILY
Qty: 0 | Refills: 0 | Status: DISCONTINUED | OUTPATIENT
Start: 2018-09-17 | End: 2018-09-18

## 2018-09-17 RX ADMIN — ENOXAPARIN SODIUM 100 MILLIGRAM(S): 100 INJECTION SUBCUTANEOUS at 18:36

## 2018-09-17 RX ADMIN — OXYCODONE AND ACETAMINOPHEN 2 TABLET(S): 5; 325 TABLET ORAL at 09:36

## 2018-09-17 RX ADMIN — LEVETIRACETAM 500 MILLIGRAM(S): 250 TABLET, FILM COATED ORAL at 17:33

## 2018-09-17 RX ADMIN — Medication 10 MILLIGRAM(S): at 21:49

## 2018-09-17 RX ADMIN — DULOXETINE HYDROCHLORIDE 60 MILLIGRAM(S): 30 CAPSULE, DELAYED RELEASE ORAL at 15:49

## 2018-09-17 RX ADMIN — Medication 1 MILLIGRAM(S): at 21:49

## 2018-09-17 RX ADMIN — MORPHINE SULFATE 20 MILLIGRAM(S): 50 CAPSULE, EXTENDED RELEASE ORAL at 15:48

## 2018-09-17 RX ADMIN — Medication 3 MILLILITER(S): at 07:03

## 2018-09-17 RX ADMIN — Medication 10 MILLIGRAM(S): at 15:49

## 2018-09-17 RX ADMIN — HEPARIN SODIUM 1800 UNIT(S)/HR: 5000 INJECTION INTRAVENOUS; SUBCUTANEOUS at 12:33

## 2018-09-17 RX ADMIN — Medication 3 UNIT(S): at 17:26

## 2018-09-17 RX ADMIN — HEPARIN SODIUM 8500 UNIT(S): 5000 INJECTION INTRAVENOUS; SUBCUTANEOUS at 12:33

## 2018-09-17 RX ADMIN — LOSARTAN POTASSIUM 100 MILLIGRAM(S): 100 TABLET, FILM COATED ORAL at 15:49

## 2018-09-17 RX ADMIN — ZOLPIDEM TARTRATE 5 MILLIGRAM(S): 10 TABLET ORAL at 21:49

## 2018-09-17 RX ADMIN — INSULIN GLARGINE 10 UNIT(S): 100 INJECTION, SOLUTION SUBCUTANEOUS at 21:49

## 2018-09-17 RX ADMIN — SIMVASTATIN 20 MILLIGRAM(S): 20 TABLET, FILM COATED ORAL at 21:49

## 2018-09-17 RX ADMIN — Medication 125 MILLIGRAM(S): at 10:42

## 2018-09-17 RX ADMIN — Medication 40 MILLIGRAM(S): at 15:49

## 2018-09-17 RX ADMIN — Medication 3 MILLILITER(S): at 10:43

## 2018-09-17 RX ADMIN — Medication 20 MILLIGRAM(S): at 15:51

## 2018-09-17 NOTE — CONSULT NOTE ADULT - ASSESSMENT
ASSESSMENT:  63y Male with PMH significant for COPD, Asthma, chronic back pain, HTN, DM, DLD, Anxiety presents complaining of pain and swelling of RLE for  days, found to have DVT in femoral, popliteal, PT veins on venous duplex and bilateral segmental and subsegmental pulmonary emboli on CTA Chest.    PLAN:   -recommend start heparin drip        --------------------------------------------------------------------------------------    09-17-18 @ 09:45 ASSESSMENT:  63y Male with PMH significant for COPD, Asthma, chronic back pain, HTN, DM, DLD, Anxiety presents complaining of pain and swelling of RLE for  days, found to have DVT in femoral, popliteal, PT veins on venous duplex and bilateral segmental and subsegmental pulmonary emboli on CTA Chest.    PLAN:   -recommend start heparin drip      --------------------------------------------------------------------------------------    09-17-18 @ 09:45 ASSESSMENT:  63y Male with PMH significant for COPD, Asthma, chronic back pain, HTN, DM, DLD, Anxiety presents complaining of pain and swelling of RLE for  days, found to have DVT in femoral, popliteal, PT veins on venous duplex and bilateral segmental and subsegmental pulmonary emboli on CTA Chest.    PLAN:   -recommend start heparin drip  -OR tomorrow for RLE thrombolysis  -Preop: labs, CXR, EKG, T&S, NPO after midnight      --------------------------------------------------------------------------------------    09-17-18 @ 09:45

## 2018-09-17 NOTE — H&P ADULT - HISTORY OF PRESENT ILLNESS
Patient is a 63 year old male with PMH significant for COPD, Asthma, chronic back pain, HTN, DM, DLD, Anxiety presents complaining of pain and swelling of RLE for 3 days.  Patient states that he noticed this swelling 3 days ago and it has been gradually getting worse, patient is sedentary at home due to chronic pack pain.  He states the pain was so bad this morning he came to the ED.  Patient denies chest pain, SOB, fever, chills, nausea, vomiting.  States that he has never had a DVT in the past, his last colonoscopy was 3 years ago and he states that he was told it was "ok".  Patient was found to have DVT in femoral, popliteal, gastroc veins on venous duplex and CTA showed bilateral segmental and subsegmental pulmonary emboli.  Patient is a current smoker, has smoked 1/2 PPD for 44 years.

## 2018-09-17 NOTE — H&P ADULT - NSHPPHYSICALEXAM_GEN_ALL_CORE
PHYSICAL EXAM:      Constitutional: well developed and well nourished    Respiratory: lungs B/L wheezing on auscultation, no crackles, or accessory use of muscles.    Cardiovascular: S1S2 normal, no murmur heard    Gastrointestinal: Abd soft, non distended, non tender, BS+    Extremities: RLE swollen, red, mild tenderness from knee to foot.    Neurological: AAOX3, No FND

## 2018-09-17 NOTE — ED ADULT NURSE NOTE - NSIMPLEMENTINTERV_GEN_ALL_ED
Implemented All Fall Risk Interventions:  [place] to call system. Call bell, personal items and telephone within reach. Instruct patient to call for assistance. Room bathroom lighting operational. Non-slip footwear when patient is off stretcher. Physically safe environment: no spills, clutter or unnecessary equipment. Stretcher in lowest position, wheels locked, appropriate side rails in place. Provide visual cue, wrist band, yellow gown, etc. Monitor gait and stability. Monitor for mental status changes and reorient to person, place, and time. Review medications for side effects contributing to fall risk. Reinforce activity limits and safety measures with patient and family.

## 2018-09-17 NOTE — ED PROVIDER NOTE - PROGRESS NOTE DETAILS
Pt signed out to  pending labs, duplex and reassessment prelim pos for DVT fem/pop/calf veins. pt informed. no cp, sob. sat 84-85% RA, pt asymptomatic, LCTAB, put on 2L NC up to 100%, hx copd, no home o2, will discuss w pmd and get vascular consult. needs admission prelim pos for DVT fem/pop/calf veins. pt informed. no cp, sob. sat 84-85% RA, pt asymptomatic, LCTAB, ?PE - will order CTA, put on 2L NC up to 100%, hx copd, no wheezing, no home o2, will discuss w pmd and get vascular consult for dvt. needs admission ruben from vascular consult ICU fellow Sandi consulted for approval, will see pt. nebs/steroids ordered for hypercarbia/acidosis on VBG. ICU fellow evaluated, recommends tele admission, lovenox, coags sent, pending call back from PMD who admits and coag results dr sandhu accepts admission under dr ella leal dr sandhu accepts admission under dr ella leal - requesting pain management c/s. vascular note recommends heparin drip for OR tomorrow RLE thrombolysis, will order once coags back

## 2018-09-17 NOTE — H&P ADULT - ASSESSMENT
63y Male with PMH significant for COPD not on O2, Asthma, chronic back pain s/p back Sx and on opioids, HTN, DM II, DLD, Anxiety presents complaining of pain and swelling of RLE for  days, found to have DVT in femoral, popliteal, PT veins on venous duplex and bilateral segmental and subsegmental pulmonary emboli on CTA Chest.    PLAN:   -recommend start heparin drip  -OR tomorrow for RLE thrombolysis  -Preop: labs, CXR, EKG, T&S, NPO after midnight 63y Male with PMH significant for COPD not on O2, Asthma, chronic back pain s/p back Sx and on opioids, HTN, DM II on metformin, DLD, Anxiety on medications, seizure disorder on keppra, presents complaining of pain and swelling of RLE for  days, found to have DVT in femoral, popliteal, PT veins on venous duplex and bilateral segmental and subsegmental pulmonary emboli on CTA Chest.       # RLE extensive DVT (femoral/popliteal veins), B/L segmental and subsegmental PE: No Sign of herat strain on CT scan, Sao2 98% on RA, hemodynamically stable)  -recommend start heparin drip, PTT goal 60-80, will d/w vascular considering switching to lovenox  -OR tomorrow from vascular for RLE thrombolysis  -Preop: labs, CXR, EKG, T&S, NPO after midnight.  - ICU team on board, recommends tele monitoring only.  -needs hypercoaguble work up as outpatient.    # ASthma/COPD: patient is currently wheezing  - c/w home ventolin, asmanex  -No need of Abx, CXR Clear  -Start on Prednisone 40mg q24hrs for total of 5 days.    # DM II: Uncomplicated  -FS goal 110-180, start on lispro and lantus  -Hold Metformin    # Anxiety disorder:  -c/w home medications including Alprazolam and Zolpidem    #C/C Back pain: On high dose of opioids   - Will c/w Baclofen, cyclobenzaprine, lidocaine patch topical  -Percocet for pain control  -He takes Morphine 60mg q12hrs, but need to check on ISTOP before giving him the meds.    # Seizure disorder: c/w keppra.    Full code  DVT PPX: AC  OOB  Diet: carb consistent  CHD bath daily.

## 2018-09-17 NOTE — ED PROVIDER NOTE - NS ED ROS FT
· Constitutional [-]: no chills, no fever, no night sweats, no weight loss  · CARDIOVASCULAR: normal rate and rhythm, no chest pain and no edema.  · RESPIRATORY: no chest pain, no cough, and no shortness of breath.  · GASTROINTESTINAL: no abdominal pain, no bloating, no constipation, no diarrhea, no nausea and no vomiting.  · Ext.: +R leg swelling, redness and pain  · ROS STATEMENT: all other ROS negative except as per HPI

## 2018-09-17 NOTE — ED ADULT NURSE NOTE - PMH
Anxiety    Asthma    Chronic low back pain with sciatica, sciatica laterality unspecified, unspecified back pain laterality    Diabetes    High cholesterol    HTN (hypertension)    Syncopal episodes    Uncomplicated opioid dependence

## 2018-09-17 NOTE — H&P ADULT - NSHPLABSRESULTS_GEN_ALL_CORE
15.2   8.06  )-----------( 220      ( 17 Sep 2018 05:51 )             45.3       09-17    141  |  99  |  15  ----------------------------<  101<H>  7.3<HH>   |  26  |  0.9    Ca    9.1      17 Sep 2018 05:51    TPro  7.4  /  Alb  4.1  /  TBili  0.4  /  DBili  x   /  AST  30  /  ALT  9   /  AlkPhos  64  09-17                  PT/INR - ( 17 Sep 2018 09:44 )   PT: 12.70 sec;   INR: 1.18 ratio         PTT - ( 17 Sep 2018 09:44 )  PTT:39.7 sec    Lactate Trend      CARDIAC MARKERS ( 17 Sep 2018 05:51 )  x     / <0.01 ng/mL / x     / x     / x

## 2018-09-17 NOTE — ED PROVIDER NOTE - OBJECTIVE STATEMENT
62 yo M with h/o HTN, DM, syncope, copd, chronic back pain, seizure d/o, chronic venous statsis, statsis dermatitis p/w right leg swelling. Pt states that for the past "few" days, he has been having swelling and redness to RLE, worsened since last night. Pt also c/o pain to leg. Pt has previously had swelling to b/l LE but never just one leg. Denies any fever/chills, ha, dizziness, chest pain, sob, abdominal pain, n/v/d. No recent travel, surgery, h/o cancer, coagulopathies or prior DVTs.

## 2018-09-17 NOTE — ED PROVIDER NOTE - CARE PLAN
Principal Discharge DX:	Pulmonary embolism  Secondary Diagnosis:	DVT (deep venous thrombosis)  Secondary Diagnosis:	Hypoxia

## 2018-09-17 NOTE — CONSULT NOTE ADULT - SUBJECTIVE AND OBJECTIVE BOX
Patient is a 63y old  Male who presents with a chief complaint of     HPI:  62 yo M with h/o HTN, DM, syncope, copd, chronic back pain, seizure d/o, chronic venous statsis, statsis dermatitis p/w right leg swelling. Pt states that for the past "few" days, he has been having swelling and redness to RLE, worsened since last night. Pt also c/o pain to leg. Pt has previously had swelling to b/l LE but never just one leg. Denies any fever/chills, ha, dizziness, chest pain, sob, abdominal pain, n/v/d. No recent travel, surgery, h/o cancer, coagulopathies or prior DVTs.  In ED patient with pulse ox 84% improved to 99% on  2L NC.   ICU eval requested for bilateral PE:    PAST MEDICAL & SURGICAL HISTORY:  Syncopal episodes  Uncomplicated opioid dependence  Chronic low back pain with sciatica, sciatica laterality unspecified, unspecified back pain laterality  Asthma  High cholesterol  Diabetes  HTN (hypertension)  Anxiety  History of back surgery: x 3      SOCIAL HX:   Smoking                         ETOH                            Other    FAMILY HISTORY:      Review of system:  See HPI    Allergies    aspirin (Other)  Originally Entered as [ANGIOEDEMA] reaction to [pineapple] (Unknown)  penicillins (Other)  pineapples (Anaphylaxis)      PHYSICAL EXAM    ICU Vital Signs Last 24 Hrs  T(C): 35.9 (17 Sep 2018 08:11), Max: 36.7 (17 Sep 2018 04:57)  T(F): 96.7 (17 Sep 2018 08:11), Max: 98.1 (17 Sep 2018 04:57)  HR: 102 (17 Sep 2018 08:11) (65 - 102)  BP: 109/79 (17 Sep 2018 08:11) (109/79 - 132/71)  RR: 20 (17 Sep 2018 08:11) (18 - 20)  SpO2: 95% (17 Sep 2018 08:11) (92% - 95%)    I&O's Detail    General: Comfortable in bed  HEENT:  On NC  Lymph node: No palpable LN             Lungs: CTA  Cardiovascular: RRR, S1S2  Abdomen: BS+ve; soft non tender  Extremities: Right LE edema; chronic venous stasis changes  Skin:  No evident Rash  Neurological:  AAOx3; No focal deficit      LABS:                          15.2   8.06  )-----------( 220      ( 17 Sep 2018 05:51 )             45.3   09-17     141          |  99  |  15  ----------------------------<  101<H>  7.3<HH>   |  26  |  0.9    Ca    9.1      17 Sep 2018 05:51    TPro  7.4  /  Alb  4.1  /  TBili  0.4  /  DBili  x   /  AST  30  /  ALT  9   /  AlkPhos  64  09-17    CARDIAC MARKERS ( 17 Sep 2018 05:51 )  x     / <0.01 ng/mL / x     / x     / x        LIVER FUNCTIONS - ( 17 Sep 2018 05:51 )  Alb: 4.1 g/dL / Pro: 7.4 g/dL / ALK PHOS: 64 U/L / ALT: 9 U/L / AST: 30 U/L / GGT: x                                            Serum Pro-Brain Natriuretic Peptide: 57 pg/mL (09-17-18 @ 08:50)      Lactate (09-17-18 @ 09:07): 2.6<H>    MEDICATIONS  (STANDING):  ALBUTerol/ipratropium for Nebulization.. 3 milliLiter(s) Nebulizer every 20 minutes  methylPREDNISolone sodium succinate Injectable 125 milliGRAM(s) IV Push Once    MEDICATIONS  (PRN):      Radiology:  < from: CT Angio Chest w/ IV Cont (09.17.18 @ 09:25) >  IMPRESSION:     *Bilateral segmental and subsegmental pulmonary emboli.  *No CT evidence for right heart strain.    Debris within the right mainstem bronchus extending predominantly into   the right middle lower lobe bronchi.    < end of copied text > Patient is a 63y old  Male who presents with a chief complaint of     HPI:  64 yo M with h/o HTN, DM, syncope, copd, chronic back pain, seizure d/o, chronic venous statsis, statsis dermatitis p/w right leg swelling. Pt states that for the past "few" days, he has been having swelling and redness to RLE, worsened since last night. Pt also c/o pain to leg. Pt has previously had swelling to b/l LE but never just one leg. Denies any fever/chills, ha, dizziness, chest pain, sob, abdominal pain, n/v/d. No recent travel, surgery, h/o cancer, coagulopathies or prior DVTs. Family history of DVT in father at age 80.  In ED patient with pulse ox 84% improved to 99% on  2L NC.   ICU eval requested for bilateral PE;  Recent colonoscopy 3 years ago negative as per patient    PAST MEDICAL & SURGICAL HISTORY:  Syncopal episodes  Uncomplicated opioid dependence  Chronic low back pain with sciatica, sciatica laterality unspecified, unspecified back pain laterality  Asthma  High cholesterol  Diabetes  HTN (hypertension)  Anxiety  History of back surgery: x 3      SOCIAL HX:   Smoking  neg                       ETOH      neg                        FAMILY HISTORY:      Review of system:  See HPI    Allergies    aspirin (Other)  Originally Entered as [ANGIOEDEMA] reaction to [pineapple] (Unknown)  penicillins (Other)  pineapples (Anaphylaxis)      PHYSICAL EXAM    ICU Vital Signs Last 24 Hrs  T(C): 35.9 (17 Sep 2018 08:11), Max: 36.7 (17 Sep 2018 04:57)  T(F): 96.7 (17 Sep 2018 08:11), Max: 98.1 (17 Sep 2018 04:57)  HR: 102 (17 Sep 2018 08:11) (65 - 102)  BP: 109/79 (17 Sep 2018 08:11) (109/79 - 132/71)  RR: 20 (17 Sep 2018 08:11) (18 - 20)  SpO2: 95% (17 Sep 2018 08:11) (92% - 95%)    I&O's Detail    General: Comfortable in chair  HEENT:  On RA        Lungs: Minimal exp wheezing  Cardiovascular: RRR, S1S2  Abdomen: BS+ve; soft non tender  Extremities: Right LE edema; chronic venous stasis changes  Skin:  Chronic LE venous changes  Neurological:  AAOx3; No focal deficit      LABS:                          15.2   8.06  )-----------( 220      ( 17 Sep 2018 05:51 )             45.3   09-17     141          |  99  |  15  ----------------------------<  101<H>  7.3<HH>   |  26  |  0.9    Ca    9.1      17 Sep 2018 05:51    TPro  7.4  /  Alb  4.1  /  TBili  0.4  /  DBili  x   /  AST  30  /  ALT  9   /  AlkPhos  64  09-17    CARDIAC MARKERS ( 17 Sep 2018 05:51 )  x     / <0.01 ng/mL / x     / x     / x        LIVER FUNCTIONS - ( 17 Sep 2018 05:51 )  Alb: 4.1 g/dL / Pro: 7.4 g/dL / ALK PHOS: 64 U/L / ALT: 9 U/L / AST: 30 U/L / GGT: x                                            Serum Pro-Brain Natriuretic Peptide: 57 pg/mL (09-17-18 @ 08:50)      Lactate (09-17-18 @ 09:07): 2.6<H>    MEDICATIONS  (STANDING):  ALBUTerol/ipratropium for Nebulization.. 3 milliLiter(s) Nebulizer every 20 minutes  methylPREDNISolone sodium succinate Injectable 125 milliGRAM(s) IV Push Once    MEDICATIONS  (PRN):      Radiology:  < from: CT Angio Chest w/ IV Cont (09.17.18 @ 09:25) >  IMPRESSION:     *Bilateral segmental and subsegmental pulmonary emboli.  *No CT evidence for right heart strain.    Debris within the right mainstem bronchus extending predominantly into   the right middle lower lobe bronchi.    < end of copied text > Patient is a 63y old  Male who presents with a chief complaint of     HPI:  62 yo M with h/o HTN, DM, syncope, copd, chronic back pain, seizure d/o, chronic venous statsis, statsis dermatitis p/w right leg swelling. Pt states that for the past "few" days, he has been having swelling and redness to RLE, worsened since last night. Pt also c/o pain to leg. Pt has previously had swelling to b/l LE but never just one leg. Denies any fever/chills, ha, dizziness, chest pain, sob, abdominal pain, n/v/d. No recent travel, surgery, h/o cancer, coagulopathies or prior DVTs. Family history of DVT in father at age 80.  In ED patient with pulse ox 84% improved to 99% on  2L NC.   ICU eval requested for bilateral PE;  Recent colonoscopy 3 years ago negative as per patient    PAST MEDICAL & SURGICAL HISTORY:  Syncopal episodes  Uncomplicated opioid dependence  Chronic low back pain with sciatica, sciatica laterality unspecified, unspecified back pain laterality  Asthma  High cholesterol  Diabetes  HTN (hypertension)  Anxiety  History of back surgery: x 3      SOCIAL HX:   Smoking  5 cig/day ETOH      neg                        FAMILY HISTORY:      Review of system:  See HPI    Allergies    aspirin (Other)  Originally Entered as [ANGIOEDEMA] reaction to [pineapple] (Unknown)  penicillins (Other)  pineapples (Anaphylaxis)      PHYSICAL EXAM    ICU Vital Signs Last 24 Hrs  T(C): 35.9 (17 Sep 2018 08:11), Max: 36.7 (17 Sep 2018 04:57)  T(F): 96.7 (17 Sep 2018 08:11), Max: 98.1 (17 Sep 2018 04:57)  HR: 102 (17 Sep 2018 08:11) (65 - 102)  BP: 109/79 (17 Sep 2018 08:11) (109/79 - 132/71)  RR: 20 (17 Sep 2018 08:11) (18 - 20)  SpO2: 95% (17 Sep 2018 08:11) (92% - 95%)    I&O's Detail    General: Comfortable in chair  HEENT:  On RA        Lungs: Minimal exp wheezing  Cardiovascular: RRR, S1S2  Abdomen: BS+ve; soft non tender  Extremities: Right LE edema; chronic venous stasis changes  Skin:  Chronic LE venous changes  Neurological:  AAOx3; No focal deficit      LABS:                          15.2   8.06  )-----------( 220      ( 17 Sep 2018 05:51 )             45.3   09-17     141          |  99  |  15  ----------------------------<  101<H>  7.3<HH>   |  26  |  0.9    Ca    9.1      17 Sep 2018 05:51    TPro  7.4  /  Alb  4.1  /  TBili  0.4  /  DBili  x   /  AST  30  /  ALT  9   /  AlkPhos  64  09-17    CARDIAC MARKERS ( 17 Sep 2018 05:51 )  x     / <0.01 ng/mL / x     / x     / x        LIVER FUNCTIONS - ( 17 Sep 2018 05:51 )  Alb: 4.1 g/dL / Pro: 7.4 g/dL / ALK PHOS: 64 U/L / ALT: 9 U/L / AST: 30 U/L / GGT: x                                            Serum Pro-Brain Natriuretic Peptide: 57 pg/mL (09-17-18 @ 08:50)      Lactate (09-17-18 @ 09:07): 2.6<H>    MEDICATIONS  (STANDING):  ALBUTerol/ipratropium for Nebulization.. 3 milliLiter(s) Nebulizer every 20 minutes  methylPREDNISolone sodium succinate Injectable 125 milliGRAM(s) IV Push Once    MEDICATIONS  (PRN):      Radiology:  < from: CT Angio Chest w/ IV Cont (09.17.18 @ 09:25) >  IMPRESSION:     *Bilateral segmental and subsegmental pulmonary emboli.  *No CT evidence for right heart strain.    Debris within the right mainstem bronchus extending predominantly into   the right middle lower lobe bronchi.    < end of copied text > Patient is a 63y old  Male who presents with a chief complaint of RLE swelling    HPI:  62 yo M with h/o HTN, DM, syncope, copd, chronic back pain, seizure d/o, chronic venous statsis, statsis dermatitis p/w right leg swelling. Pt states that for the past "few" days, he has been having swelling and redness to RLE, worsened since last night. Pt also c/o pain to leg. Pt has previously had swelling to b/l LE but never just one leg. Denies any fever/chills, ha, dizziness, chest pain, sob, abdominal pain, n/v/d. No recent travel, surgery, h/o cancer, coagulopathies or prior DVTs. Family history of DVT in father at age 80.  In ED patient with pulse ox 84% improved to 99% on  2L NC. DENIES SOB  ICU eval requested for bilateral PE;  Recent colonoscopy 3 years ago negative as per patient    PAST MEDICAL & SURGICAL HISTORY:  Syncopal episodes  Uncomplicated opioid dependence  Chronic low back pain with sciatica, sciatica laterality unspecified, unspecified back pain laterality  Asthma  High cholesterol  Diabetes  HTN (hypertension)  Anxiety  History of back surgery: x 3      SOCIAL HX:   Smoking  5 cig/day ETOH      neg                        FAMILY HISTORY:      Review of system:  See HPI    Allergies    aspirin (Other)  Originally Entered as [ANGIOEDEMA] reaction to [pineapple] (Unknown)  penicillins (Other)  pineapples (Anaphylaxis)      PHYSICAL EXAM    ICU Vital Signs Last 24 Hrs  T(C): 35.9 (17 Sep 2018 08:11), Max: 36.7 (17 Sep 2018 04:57)  T(F): 96.7 (17 Sep 2018 08:11), Max: 98.1 (17 Sep 2018 04:57)  HR: 102 (17 Sep 2018 08:11) (65 - 102)  BP: 109/79 (17 Sep 2018 08:11) (109/79 - 132/71)  RR: 20 (17 Sep 2018 08:11) (18 - 20)  SpO2: 95% (17 Sep 2018 08:11) (92% - 95%)    I&O's Detail    General: Comfortable in chair  HEENT:  On RA        Lungs: Minimal exp wheezing  Cardiovascular: RRR, S1S2  Abdomen: BS+ve; soft non tender  Extremities: Right LE edema; chronic venous stasis changes  Skin:  Chronic LE venous changes  Neurological:  AAOx3; No focal deficit      LABS:                          15.2   8.06  )-----------( 220      ( 17 Sep 2018 05:51 )             45.3   09-17     141          |  99  |  15  ----------------------------<  101<H>  7.3<HH>   |  26  |  0.9    Ca    9.1      17 Sep 2018 05:51    TPro  7.4  /  Alb  4.1  /  TBili  0.4  /  DBili  x   /  AST  30  /  ALT  9   /  AlkPhos  64  09-17    CARDIAC MARKERS ( 17 Sep 2018 05:51 )  x     / <0.01 ng/mL / x     / x     / x        LIVER FUNCTIONS - ( 17 Sep 2018 05:51 )  Alb: 4.1 g/dL / Pro: 7.4 g/dL / ALK PHOS: 64 U/L / ALT: 9 U/L / AST: 30 U/L / GGT: x                                            Serum Pro-Brain Natriuretic Peptide: 57 pg/mL (09-17-18 @ 08:50)      Lactate (09-17-18 @ 09:07): 2.6<H>    MEDICATIONS  (STANDING):  ALBUTerol/ipratropium for Nebulization.. 3 milliLiter(s) Nebulizer every 20 minutes  methylPREDNISolone sodium succinate Injectable 125 milliGRAM(s) IV Push Once    MEDICATIONS  (PRN):      Radiology:  < from: CT Angio Chest w/ IV Cont (09.17.18 @ 09:25) >  IMPRESSION:     *Bilateral segmental and subsegmental pulmonary emboli.  *No CT evidence for right heart strain.    Debris within the right mainstem bronchus extending predominantly into   the right middle lower lobe bronchi.    < end of copied text >

## 2018-09-17 NOTE — CONSULT NOTE ADULT - ASSESSMENT
IMPRESSION:  Acute on chronic hypercapnic respiratory failure  Acute Pulmonary Embolism- Unprovoked  Right LE DVT (FV-PV-PTV)  HO asthma  HO Opiate dependence  High risk JESSICA    PLAN:    CNS:  No excessive sedation     HEENT: Oral care    PULMONARY: HOB at 45 degrees; O2 supplementation to keep So2 >92%; Nebs q 6 hrs and PRN; Prednisone 40 mg PO    CARDIOVASCULAR: Keep I=O; TTE stat; CE x2    GI: GI prophylaxis.  Feeding  as tolerated    RENAL: FU lytes    INFECTIOUS DISEASE: NO indication for ABx    HEMATOLOGICAL:  Vascular eval; LMWH for now; Outpatient hypercoagulable workup    ENDOCRINE:  Follow up FS.  Insulin protocol if needed.    Tele Monitoring for now IMPRESSION:      Acute Pulmonary Embolism- Unprovoked  Right LE DVT (FV-PV-PTV)  HO asthma  HO Opiate dependence  High risk JESSICA    PLAN:    CNS:  No excessive sedation     HEENT: Oral care    PULMONARY: HOB at 45 degrees; O2 supplementation to keep So2 >92%; Nebs q 6 hrs and PRN;     CARDIOVASCULAR: Keep I=O; TTE stat; CE x2    GI: GI prophylaxis.  Feeding  as tolerated    RENAL: FU lytes    INFECTIOUS DISEASE: NO indication for ABx    HEMATOLOGICAL:  Vascular eval; HEPARIN; Outpatient hypercoagulable workup    ENDOCRINE:  Follow up FS.  Insulin protocol if needed.    Tele Monitoring for now/ recall for any changes

## 2018-09-17 NOTE — ED PROVIDER NOTE - PHYSICAL EXAMINATION
· CONSTITUTIONAL: Well appearing, well nourished, awake, alert, oriented to person, place, time/situation and in no apparent distress.  · CARDIAC: Normal rate, regular rhythm.  Heart sounds S1, S2.  No murmurs, rubs or gallops.  · RESPIRATORY: Breath sounds clear and equal bilaterally. +b/l exp wheezing, no rales/rhonchi  · GASTROINTESTINAL: Abdomen soft, non-tender, no guarding.  · Ext: +RLE erythema/warmth/swelling, 2+DP b/l, +sensation intact to light touch

## 2018-09-17 NOTE — CONSULT NOTE ADULT - SUBJECTIVE AND OBJECTIVE BOX
Vascular Surgery Consultation Note  =====================================================  HPI:  Patient is a 63 year old male with PMH significant for COPD, Asthma, chronic back pain, HTN, DM, DLD, Anxiety presents complaining of pain and swelling of RLE for  days.         PAST MEDICAL & SURGICAL HISTORY:  Syncopal episodes  Uncomplicated opioid dependence  Chronic low back pain with sciatica, sciatica laterality unspecified, unspecified back pain laterality  Asthma  High cholesterol  Diabetes  HTN (hypertension)  Anxiety  History of back surgery: x 3    Home Meds: Home Medications:  ALPRAZolam 1 mg oral tablet: 1 tab(s) orally 3 times a day (07 Mar 2018 05:27)  Asmanex  mcg/inh inhalation aerosol:  (07 Mar 2018 05:27)  Atrovent HFA:  (07 Mar 2018 05:27)  baclofen 10 mg oral tablet: 1 tab(s) orally 3 times a day (07 Mar 2018 05:27)  fenofibrate 160 mg oral tablet: 1 tab(s) orally once a day (07 Mar 2018 05:27)  furosemide 20 mg oral tablet: 1 tab(s) orally once a day (07 Mar 2018 05:27)  levETIRAcetam 500 mg oral tablet, extended release: 2 tab(s) orally once a day (07 Mar 2018 05:27)  losartan 100 mg oral tablet: 1 tab(s) orally once a day (07 Mar 2018 05:27)  metFORMIN 500 mg oral tablet: 1 tab(s) orally 2 times a day (07 Mar 2018 05:27)  Percocet 10/325:  (07 Mar 2018 05:27)  simvastatin 20 mg oral tablet: 1 tab(s) orally once a day (at bedtime) (07 Mar 2018 05:27)  Ventolin HFA 90 mcg/inh inhalation aerosol:  (07 Mar 2018 05:27)    Allergies: Allergies    aspirin (Other)  Originally Entered as [ANGIOEDEMA] reaction to [pineapple] (Unknown)  penicillins (Other)  pineapples (Anaphylaxis)    Intolerances      Soc:   Advanced Directives: Presumed Full Code     ROS:    REVIEW OF SYSTEMS    [x ] A ten-point review of systems was otherwise negative except as noted.  [ ] Due to altered mental status/intubation, subjective information were not able to be obtained from the patient. History was obtained, to the extent possible, from review of the chart and collateral sources of information.      CURRENT MEDICATIONS:   --------------------------------------------------------------------------------------  Respiratory Medications  ALBUTerol/ipratropium for Nebulization.. 3 milliLiter(s) Nebulizer every 20 minutes    Endocrine/Metabolic Medications  methylPREDNISolone sodium succinate Injectable 125 milliGRAM(s) IV Push Once    --------------------------------------------------------------------------------------    VITAL SIGNS, INS/OUTS (last 24 hours):  --------------------------------------------------------------------------------------  ICU Vital Signs Last 24 Hrs  T(C): 35.9 (17 Sep 2018 08:11), Max: 36.7 (17 Sep 2018 04:57)  T(F): 96.7 (17 Sep 2018 08:11), Max: 98.1 (17 Sep 2018 04:57)  HR: 102 (17 Sep 2018 08:11) (65 - 102)  BP: 109/79 (17 Sep 2018 08:11) (109/79 - 132/71)  RR: 20 (17 Sep 2018 08:11) (18 - 20)  SpO2: 95% (17 Sep 2018 08:11) (92% - 95%)    --------------------------------------------------------------------------------------  PHYSICAL EXAM    General: NAD, AAOx3, calm and cooperative  HEENT: NCAT, RAHEEM, EOMI, Trachea ML, Neck supple  Cardiac: RRR S1, S2, no Murmurs, rubs or gallops  Respiratory: CTAB, normal respiratory effort, breath sounds equal BL, no wheeze, rhonchi or crackles  Abdomen: Soft, non-distended, non-tender, +bowel sounds  Musculoskeletal: Strength 5/5 BL UE/LE, ROM intact, compartments soft  Neuro: Sensation grossly intact and equal throughout, CN II-XII intact, no focal deficits  Vascular: Pulses 2+ throughout, extremities well perfused  Skin: Warm/dry, normal color, no jaundice    LABS  --------------------------------------------------------------------------------------  Labs:  CAPILLARY BLOOD GLUCOSE                              15.2   8.06  )-----------( 220      ( 17 Sep 2018 05:51 )             45.3       Auto Immature Granulocyte %: 0.5 % (09-17-18 @ 05:51)  Auto Neutrophil %: 55.3 % (09-17-18 @ 05:51)    09-17    141  |  99  |  15  ----------------------------<  101<H>  7.3<HH>   |  26  |  0.9      Calcium, Total Serum: 9.1 mg/dL (09-17-18 @ 05:51)      LFTs:             7.4  | 0.4  | 30       ------------------[64      ( 17 Sep 2018 05:51 )  4.1  | x    | 9           Lipase:x      Amylase:x         Blood Gas Venous - Lactate: 2.6 mmoL/L (09-17-18 @ 09:07)      Coags:    CARDIAC MARKERS ( 17 Sep 2018 05:51 )  x     / <0.01 ng/mL / x     / x     / x                --------------------------------------------------------------------------------------    OTHER LABS    IMAGING RESULTS  < from: CT Angio Chest w/ IV Cont (09.17.18 @ 09:25) >  IMPRESSION:     *Bilateral segmental and subsegmental pulmonary emboli.  *No CT evidence for right heart strain.    Debris within the right mainstem bronchus extending predominantly into   the right middle lower lobe bronchi.    < end of copied text > Vascular Surgery Consultation Note  =====================================================  HPI:  Patient is a 63 year old male with PMH significant for COPD, Asthma, chronic back pain, HTN, DM, DLD, Anxiety presents complaining of pain and swelling of RLE for 3 days.  Patient states that he noticed this swelling 3 days ago and it has been gradually getting worse, patient is sedentary at home due to chronic pack pain.  He states the pain was so bad this morning he came to the ED.  Patient denies chest pain, SOB, fever, chills, nausea, vomiting.  States that he has never had a DVT in the past, his last colonoscopy was 3 years ago and he states that he was told it was "ok".  Patient was found to have DVT in femoral, popliteal, gastroc veins on venous duplex and CTA showed bilateral segmental and subsegmental pulmonary emboli.  Patient is a current smoker, has smoked 1/2 PPD for 44 years.     PAST MEDICAL & SURGICAL HISTORY:  Syncopal episodes  Uncomplicated opioid dependence  Chronic low back pain with sciatica, sciatica laterality unspecified, unspecified back pain laterality  Asthma  High cholesterol  Diabetes  HTN (hypertension)  Anxiety  History of back surgery: x 3    Home Meds: Home Medications:  ALPRAZolam 1 mg oral tablet: 1 tab(s) orally 3 times a day (07 Mar 2018 05:27)  Asmanex  mcg/inh inhalation aerosol:  (07 Mar 2018 05:27)  Atrovent HFA:  (07 Mar 2018 05:27)  baclofen 10 mg oral tablet: 1 tab(s) orally 3 times a day (07 Mar 2018 05:27)  fenofibrate 160 mg oral tablet: 1 tab(s) orally once a day (07 Mar 2018 05:27)  furosemide 20 mg oral tablet: 1 tab(s) orally once a day (07 Mar 2018 05:27)  levETIRAcetam 500 mg oral tablet, extended release: 2 tab(s) orally once a day (07 Mar 2018 05:27)  losartan 100 mg oral tablet: 1 tab(s) orally once a day (07 Mar 2018 05:27)  metFORMIN 500 mg oral tablet: 1 tab(s) orally 2 times a day (07 Mar 2018 05:27)  Percocet 10/325:  (07 Mar 2018 05:27)  simvastatin 20 mg oral tablet: 1 tab(s) orally once a day (at bedtime) (07 Mar 2018 05:27)  Ventolin HFA 90 mcg/inh inhalation aerosol:  (07 Mar 2018 05:27)    Allergies: Allergies    aspirin (Other)  Originally Entered as [ANGIOEDEMA] reaction to [pineapple] (Unknown)  penicillins (Other)  pineapples (Anaphylaxis)    Intolerances      Soc:   Advanced Directives: Presumed Full Code     ROS:    REVIEW OF SYSTEMS    [x ] A ten-point review of systems was otherwise negative except as noted.  [ ] Due to altered mental status/intubation, subjective information were not able to be obtained from the patient. History was obtained, to the extent possible, from review of the chart and collateral sources of information.      CURRENT MEDICATIONS:   --------------------------------------------------------------------------------------  Respiratory Medications  ALBUTerol/ipratropium for Nebulization.. 3 milliLiter(s) Nebulizer every 20 minutes    Endocrine/Metabolic Medications  methylPREDNISolone sodium succinate Injectable 125 milliGRAM(s) IV Push Once    --------------------------------------------------------------------------------------    VITAL SIGNS, INS/OUTS (last 24 hours):  --------------------------------------------------------------------------------------  ICU Vital Signs Last 24 Hrs  T(C): 35.9 (17 Sep 2018 08:11), Max: 36.7 (17 Sep 2018 04:57)  T(F): 96.7 (17 Sep 2018 08:11), Max: 98.1 (17 Sep 2018 04:57)  HR: 102 (17 Sep 2018 08:11) (65 - 102)  BP: 109/79 (17 Sep 2018 08:11) (109/79 - 132/71)  RR: 20 (17 Sep 2018 08:11) (18 - 20)  SpO2: 95% (17 Sep 2018 08:11) (92% - 95%)    --------------------------------------------------------------------------------------  PHYSICAL EXAM    General: NAD, AAOx3, calm and cooperative  HEENT: NCAT, RAHEEM, EOMI  Cardiac: RRR S1, S2, no Murmurs, rubs or gallops  Respiratory: CTAB  Abdomen: Soft, non-distended, non-tender, +bowel sounds  Musculoskeletal: Strength 5/5 BL UE/LE, ROM intact, RLE erythema and edema, tender, venous stasis present b/l LEs      LABS  --------------------------------------------------------------------------------------  Labs:  CAPILLARY BLOOD GLUCOSE                              15.2   8.06  )-----------( 220      ( 17 Sep 2018 05:51 )             45.3       Auto Immature Granulocyte %: 0.5 % (09-17-18 @ 05:51)  Auto Neutrophil %: 55.3 % (09-17-18 @ 05:51)    09-17    141  |  99  |  15  ----------------------------<  101<H>  7.3<HH>   |  26  |  0.9      Calcium, Total Serum: 9.1 mg/dL (09-17-18 @ 05:51)      LFTs:             7.4  | 0.4  | 30       ------------------[64      ( 17 Sep 2018 05:51 )  4.1  | x    | 9           Lipase:x      Amylase:x         Blood Gas Venous - Lactate: 2.6 mmoL/L (09-17-18 @ 09:07)      Coags:    CARDIAC MARKERS ( 17 Sep 2018 05:51 )  x     / <0.01 ng/mL / x     / x     / x                --------------------------------------------------------------------------------------    OTHER LABS    IMAGING RESULTS    < from: CT Angio Chest w/ IV Cont (09.17.18 @ 09:25) >  IMPRESSION:     *Bilateral segmental and subsegmental pulmonary emboli.  *No CT evidence for right heart strain.    Debris within the right mainstem bronchus extending predominantly into   the right middle lower lobe bronchi.    < end of copied text >

## 2018-09-17 NOTE — ED ADULT NURSE NOTE - OBJECTIVE STATEMENT
PT C/O RLE PAIN, REDNESS, AND SWELLING OVER THE LAST "FEW" DAYS. STATES SWELLING WORSEND OVER NIGHT.       62 yo M with h/o HTN, DM, syncope, copd, chronic back pain, seizure d/o, chronic venous statsis, statsis dermatitis p/w right leg swelling. Pt states that for the past "few" days, he has been having swelling and redness to RLE, worsened since last night. Pt also c/o pain to leg. Pt has previously had swelling to b/l LE but never just one leg. Denies any fever/chills, ha, dizziness, chest pain, sob, abdominal pain, n/v/d. No recent travel, surgery, h/o cancer, coagulopathies or prior DVTs.

## 2018-09-18 ENCOUNTER — APPOINTMENT (OUTPATIENT)
Dept: VASCULAR SURGERY | Facility: HOSPITAL | Age: 63
End: 2018-09-18
Payer: MEDICARE

## 2018-09-18 LAB
ALBUMIN SERPL ELPH-MCNC: 4.2 G/DL — SIGNIFICANT CHANGE UP (ref 3.5–5.2)
ALP SERPL-CCNC: 74 U/L — SIGNIFICANT CHANGE UP (ref 30–115)
ALT FLD-CCNC: 8 U/L — SIGNIFICANT CHANGE UP (ref 0–41)
ANION GAP SERPL CALC-SCNC: 14 MMOL/L — SIGNIFICANT CHANGE UP (ref 7–14)
ANION GAP SERPL CALC-SCNC: 15 MMOL/L — HIGH (ref 7–14)
APTT BLD: 38.7 SEC — SIGNIFICANT CHANGE UP (ref 27–39.2)
AST SERPL-CCNC: 8 U/L — SIGNIFICANT CHANGE UP (ref 0–41)
BILIRUB SERPL-MCNC: 0.4 MG/DL — SIGNIFICANT CHANGE UP (ref 0.2–1.2)
BUN SERPL-MCNC: 22 MG/DL — HIGH (ref 10–20)
BUN SERPL-MCNC: 22 MG/DL — HIGH (ref 10–20)
CALCIUM SERPL-MCNC: 9.6 MG/DL — SIGNIFICANT CHANGE UP (ref 8.5–10.1)
CALCIUM SERPL-MCNC: 9.7 MG/DL — SIGNIFICANT CHANGE UP (ref 8.5–10.1)
CHLORIDE SERPL-SCNC: 102 MMOL/L — SIGNIFICANT CHANGE UP (ref 98–110)
CHLORIDE SERPL-SCNC: 103 MMOL/L — SIGNIFICANT CHANGE UP (ref 98–110)
CHOLEST SERPL-MCNC: 150 MG/DL — SIGNIFICANT CHANGE UP (ref 100–200)
CK MB CFR SERPL CALC: 1 NG/ML — SIGNIFICANT CHANGE UP (ref 0.6–6.3)
CK MB CFR SERPL CALC: 1.2 NG/ML — SIGNIFICANT CHANGE UP (ref 0.6–6.3)
CK SERPL-CCNC: 37 U/L — SIGNIFICANT CHANGE UP (ref 0–225)
CK SERPL-CCNC: 39 U/L — SIGNIFICANT CHANGE UP (ref 0–225)
CO2 SERPL-SCNC: 27 MMOL/L — SIGNIFICANT CHANGE UP (ref 17–32)
CO2 SERPL-SCNC: 28 MMOL/L — SIGNIFICANT CHANGE UP (ref 17–32)
CREAT SERPL-MCNC: 0.9 MG/DL — SIGNIFICANT CHANGE UP (ref 0.7–1.5)
CREAT SERPL-MCNC: 0.9 MG/DL — SIGNIFICANT CHANGE UP (ref 0.7–1.5)
GLUCOSE BLDC GLUCOMTR-MCNC: 126 MG/DL — HIGH (ref 70–99)
GLUCOSE BLDC GLUCOMTR-MCNC: 177 MG/DL — HIGH (ref 70–99)
GLUCOSE SERPL-MCNC: 123 MG/DL — HIGH (ref 70–99)
GLUCOSE SERPL-MCNC: 127 MG/DL — HIGH (ref 70–99)
HCT VFR BLD CALC: 45.4 % — SIGNIFICANT CHANGE UP (ref 42–52)
HCT VFR BLD CALC: 46.2 % — SIGNIFICANT CHANGE UP (ref 42–52)
HDLC SERPL-MCNC: 36 MG/DL — LOW
HGB BLD-MCNC: 15.2 G/DL — SIGNIFICANT CHANGE UP (ref 14–18)
HGB BLD-MCNC: 15.6 G/DL — SIGNIFICANT CHANGE UP (ref 14–18)
INR BLD: 1.17 RATIO — SIGNIFICANT CHANGE UP (ref 0.65–1.3)
LIPID PNL WITH DIRECT LDL SERPL: 94 MG/DL — SIGNIFICANT CHANGE UP (ref 4–129)
MAGNESIUM SERPL-MCNC: 2.2 MG/DL — SIGNIFICANT CHANGE UP (ref 1.8–2.4)
MCHC RBC-ENTMCNC: 29.3 PG — SIGNIFICANT CHANGE UP (ref 27–31)
MCHC RBC-ENTMCNC: 29.5 PG — SIGNIFICANT CHANGE UP (ref 27–31)
MCHC RBC-ENTMCNC: 33.5 G/DL — SIGNIFICANT CHANGE UP (ref 32–37)
MCHC RBC-ENTMCNC: 33.8 G/DL — SIGNIFICANT CHANGE UP (ref 32–37)
MCV RBC AUTO: 87.5 FL — SIGNIFICANT CHANGE UP (ref 80–94)
MCV RBC AUTO: 87.6 FL — SIGNIFICANT CHANGE UP (ref 80–94)
NRBC # BLD: 0 /100 WBCS — SIGNIFICANT CHANGE UP (ref 0–0)
NRBC # BLD: 0 /100 WBCS — SIGNIFICANT CHANGE UP (ref 0–0)
PHOSPHATE SERPL-MCNC: 3.2 MG/DL — SIGNIFICANT CHANGE UP (ref 2.1–4.9)
PHOSPHATE SERPL-MCNC: 3.3 MG/DL — SIGNIFICANT CHANGE UP (ref 2.1–4.9)
PLATELET # BLD AUTO: 266 K/UL — SIGNIFICANT CHANGE UP (ref 130–400)
PLATELET # BLD AUTO: 286 K/UL — SIGNIFICANT CHANGE UP (ref 130–400)
POTASSIUM SERPL-MCNC: 4.5 MMOL/L — SIGNIFICANT CHANGE UP (ref 3.5–5)
POTASSIUM SERPL-MCNC: 4.6 MMOL/L — SIGNIFICANT CHANGE UP (ref 3.5–5)
POTASSIUM SERPL-SCNC: 4.5 MMOL/L — SIGNIFICANT CHANGE UP (ref 3.5–5)
POTASSIUM SERPL-SCNC: 4.6 MMOL/L — SIGNIFICANT CHANGE UP (ref 3.5–5)
PROT SERPL-MCNC: 7 G/DL — SIGNIFICANT CHANGE UP (ref 6–8)
PROTHROM AB SERPL-ACNC: 12.6 SEC — SIGNIFICANT CHANGE UP (ref 9.95–12.87)
RBC # BLD: 5.18 M/UL — SIGNIFICANT CHANGE UP (ref 4.7–6.1)
RBC # BLD: 5.28 M/UL — SIGNIFICANT CHANGE UP (ref 4.7–6.1)
RBC # FLD: 13.5 % — SIGNIFICANT CHANGE UP (ref 11.5–14.5)
RBC # FLD: 13.7 % — SIGNIFICANT CHANGE UP (ref 11.5–14.5)
SODIUM SERPL-SCNC: 144 MMOL/L — SIGNIFICANT CHANGE UP (ref 135–146)
SODIUM SERPL-SCNC: 145 MMOL/L — SIGNIFICANT CHANGE UP (ref 135–146)
TOTAL CHOLESTEROL/HDL RATIO MEASUREMENT: 4.2 RATIO — SIGNIFICANT CHANGE UP (ref 4–5.5)
TRIGL SERPL-MCNC: 118 MG/DL — SIGNIFICANT CHANGE UP (ref 10–149)
TROPONIN T SERPL-MCNC: <0.01 NG/ML — SIGNIFICANT CHANGE UP
TROPONIN T SERPL-MCNC: <0.01 NG/ML — SIGNIFICANT CHANGE UP
WBC # BLD: 10.69 K/UL — SIGNIFICANT CHANGE UP (ref 4.8–10.8)
WBC # BLD: 12.67 K/UL — HIGH (ref 4.8–10.8)
WBC # FLD AUTO: 10.69 K/UL — SIGNIFICANT CHANGE UP (ref 4.8–10.8)
WBC # FLD AUTO: 12.67 K/UL — HIGH (ref 4.8–10.8)

## 2018-09-18 PROCEDURE — 36000 PLACE NEEDLE IN VEIN: CPT | Mod: 59,RT

## 2018-09-18 PROCEDURE — 76937 US GUIDE VASCULAR ACCESS: CPT | Mod: 26

## 2018-09-18 PROCEDURE — 36010 PLACE CATHETER IN VEIN: CPT | Mod: 59,RT

## 2018-09-18 PROCEDURE — 75825 VEIN X-RAY TRUNK: CPT | Mod: 26

## 2018-09-18 PROCEDURE — 37212 THROMBOLYTIC VENOUS THERAPY: CPT | Mod: RT

## 2018-09-18 PROCEDURE — 75820 VEIN X-RAY ARM/LEG: CPT | Mod: 26

## 2018-09-18 PROCEDURE — 36012 PLACE CATHETER IN VEIN: CPT | Mod: RT

## 2018-09-18 RX ORDER — ALPRAZOLAM 0.25 MG
1 TABLET ORAL THREE TIMES A DAY
Qty: 0 | Refills: 0 | Status: DISCONTINUED | OUTPATIENT
Start: 2018-09-18 | End: 2018-09-20

## 2018-09-18 RX ORDER — SIMVASTATIN 20 MG/1
20 TABLET, FILM COATED ORAL AT BEDTIME
Qty: 0 | Refills: 0 | Status: DISCONTINUED | OUTPATIENT
Start: 2018-09-18 | End: 2018-09-20

## 2018-09-18 RX ORDER — LOSARTAN POTASSIUM 100 MG/1
100 TABLET, FILM COATED ORAL DAILY
Qty: 0 | Refills: 0 | Status: DISCONTINUED | OUTPATIENT
Start: 2018-09-18 | End: 2018-09-20

## 2018-09-18 RX ORDER — HEPARIN SODIUM 5000 [USP'U]/ML
500 INJECTION INTRAVENOUS; SUBCUTANEOUS
Qty: 25000 | Refills: 0 | Status: DISCONTINUED | OUTPATIENT
Start: 2018-09-18 | End: 2018-09-20

## 2018-09-18 RX ORDER — LIDOCAINE 4 G/100G
1 CREAM TOPICAL EVERY 24 HOURS
Qty: 0 | Refills: 0 | Status: DISCONTINUED | OUTPATIENT
Start: 2018-09-18 | End: 2018-09-20

## 2018-09-18 RX ORDER — HEPARIN SODIUM 5000 [USP'U]/ML
500 INJECTION INTRAVENOUS; SUBCUTANEOUS
Qty: 25000 | Refills: 0 | Status: DISCONTINUED | OUTPATIENT
Start: 2018-09-18 | End: 2018-09-18

## 2018-09-18 RX ORDER — HYDROMORPHONE HYDROCHLORIDE 2 MG/ML
0.5 INJECTION INTRAMUSCULAR; INTRAVENOUS; SUBCUTANEOUS EVERY 4 HOURS
Qty: 0 | Refills: 0 | Status: DISCONTINUED | OUTPATIENT
Start: 2018-09-18 | End: 2018-09-19

## 2018-09-18 RX ORDER — MOMETASONE FUROATE 220 UG/1
2 INHALANT RESPIRATORY (INHALATION) DAILY
Qty: 0 | Refills: 0 | Status: DISCONTINUED | OUTPATIENT
Start: 2018-09-18 | End: 2018-09-20

## 2018-09-18 RX ORDER — ALBUTEROL 90 UG/1
2 AEROSOL, METERED ORAL EVERY 6 HOURS
Qty: 0 | Refills: 0 | Status: DISCONTINUED | OUTPATIENT
Start: 2018-09-18 | End: 2018-09-20

## 2018-09-18 RX ORDER — DULOXETINE HYDROCHLORIDE 30 MG/1
60 CAPSULE, DELAYED RELEASE ORAL DAILY
Qty: 0 | Refills: 0 | Status: DISCONTINUED | OUTPATIENT
Start: 2018-09-18 | End: 2018-09-20

## 2018-09-18 RX ORDER — MEPERIDINE HYDROCHLORIDE 50 MG/ML
12.5 INJECTION INTRAMUSCULAR; INTRAVENOUS; SUBCUTANEOUS
Qty: 0 | Refills: 0 | Status: DISCONTINUED | OUTPATIENT
Start: 2018-09-18 | End: 2018-09-18

## 2018-09-18 RX ORDER — SODIUM CHLORIDE 9 MG/ML
1000 INJECTION, SOLUTION INTRAVENOUS
Qty: 0 | Refills: 0 | Status: DISCONTINUED | OUTPATIENT
Start: 2018-09-18 | End: 2018-09-18

## 2018-09-18 RX ORDER — OXYCODONE AND ACETAMINOPHEN 5; 325 MG/1; MG/1
1 TABLET ORAL EVERY 4 HOURS
Qty: 0 | Refills: 0 | Status: DISCONTINUED | OUTPATIENT
Start: 2018-09-18 | End: 2018-09-20

## 2018-09-18 RX ORDER — CYCLOBENZAPRINE HYDROCHLORIDE 10 MG/1
10 TABLET, FILM COATED ORAL THREE TIMES A DAY
Qty: 0 | Refills: 0 | Status: DISCONTINUED | OUTPATIENT
Start: 2018-09-18 | End: 2018-09-20

## 2018-09-18 RX ORDER — INSULIN GLARGINE 100 [IU]/ML
10 INJECTION, SOLUTION SUBCUTANEOUS AT BEDTIME
Qty: 0 | Refills: 0 | Status: DISCONTINUED | OUTPATIENT
Start: 2018-09-18 | End: 2018-09-20

## 2018-09-18 RX ORDER — ZOLPIDEM TARTRATE 10 MG/1
5 TABLET ORAL AT BEDTIME
Qty: 0 | Refills: 0 | Status: DISCONTINUED | OUTPATIENT
Start: 2018-09-18 | End: 2018-09-20

## 2018-09-18 RX ORDER — ALTEPLASE 100 MG
0.5 KIT INTRAVENOUS
Qty: 5 | Refills: 0 | Status: COMPLETED | OUTPATIENT
Start: 2018-09-18 | End: 2018-09-19

## 2018-09-18 RX ORDER — INSULIN LISPRO 100/ML
3 VIAL (ML) SUBCUTANEOUS
Qty: 0 | Refills: 0 | Status: DISCONTINUED | OUTPATIENT
Start: 2018-09-18 | End: 2018-09-20

## 2018-09-18 RX ORDER — ONDANSETRON 8 MG/1
4 TABLET, FILM COATED ORAL ONCE
Qty: 0 | Refills: 0 | Status: DISCONTINUED | OUTPATIENT
Start: 2018-09-18 | End: 2018-09-18

## 2018-09-18 RX ORDER — HYDROMORPHONE HYDROCHLORIDE 2 MG/ML
1 INJECTION INTRAMUSCULAR; INTRAVENOUS; SUBCUTANEOUS
Qty: 0 | Refills: 0 | Status: DISCONTINUED | OUTPATIENT
Start: 2018-09-18 | End: 2018-09-18

## 2018-09-18 RX ORDER — OXYCODONE AND ACETAMINOPHEN 5; 325 MG/1; MG/1
1 TABLET ORAL EVERY 6 HOURS
Qty: 0 | Refills: 0 | Status: DISCONTINUED | OUTPATIENT
Start: 2018-09-18 | End: 2018-09-18

## 2018-09-18 RX ORDER — ENOXAPARIN SODIUM 100 MG/ML
100 INJECTION SUBCUTANEOUS EVERY 12 HOURS
Qty: 0 | Refills: 0 | Status: DISCONTINUED | OUTPATIENT
Start: 2018-09-18 | End: 2018-09-18

## 2018-09-18 RX ORDER — METOCLOPRAMIDE HCL 10 MG
10 TABLET ORAL ONCE
Qty: 0 | Refills: 0 | Status: DISCONTINUED | OUTPATIENT
Start: 2018-09-18 | End: 2018-09-18

## 2018-09-18 RX ORDER — ENOXAPARIN SODIUM 100 MG/ML
40 INJECTION SUBCUTANEOUS DAILY
Qty: 0 | Refills: 0 | Status: DISCONTINUED | OUTPATIENT
Start: 2018-09-18 | End: 2018-09-18

## 2018-09-18 RX ORDER — LEVETIRACETAM 250 MG/1
500 TABLET, FILM COATED ORAL
Qty: 0 | Refills: 0 | Status: DISCONTINUED | OUTPATIENT
Start: 2018-09-18 | End: 2018-09-20

## 2018-09-18 RX ORDER — ALBUTEROL 90 UG/1
2.5 AEROSOL, METERED ORAL ONCE
Qty: 0 | Refills: 0 | Status: DISCONTINUED | OUTPATIENT
Start: 2018-09-18 | End: 2018-09-20

## 2018-09-18 RX ORDER — FUROSEMIDE 40 MG
20 TABLET ORAL DAILY
Qty: 0 | Refills: 0 | Status: DISCONTINUED | OUTPATIENT
Start: 2018-09-18 | End: 2018-09-20

## 2018-09-18 RX ORDER — OXYCODONE AND ACETAMINOPHEN 5; 325 MG/1; MG/1
2 TABLET ORAL EVERY 4 HOURS
Qty: 0 | Refills: 0 | Status: DISCONTINUED | OUTPATIENT
Start: 2018-09-18 | End: 2018-09-20

## 2018-09-18 RX ORDER — HYDROMORPHONE HYDROCHLORIDE 2 MG/ML
0.5 INJECTION INTRAMUSCULAR; INTRAVENOUS; SUBCUTANEOUS
Qty: 0 | Refills: 0 | Status: DISCONTINUED | OUTPATIENT
Start: 2018-09-18 | End: 2018-09-18

## 2018-09-18 RX ORDER — BACLOFEN 100 %
10 POWDER (GRAM) MISCELLANEOUS THREE TIMES A DAY
Qty: 0 | Refills: 0 | Status: DISCONTINUED | OUTPATIENT
Start: 2018-09-18 | End: 2018-09-20

## 2018-09-18 RX ORDER — DEXAMETHASONE 0.5 MG/5ML
8 ELIXIR ORAL ONCE
Qty: 0 | Refills: 0 | Status: DISCONTINUED | OUTPATIENT
Start: 2018-09-18 | End: 2018-09-18

## 2018-09-18 RX ADMIN — OXYCODONE AND ACETAMINOPHEN 1 TABLET(S): 5; 325 TABLET ORAL at 05:29

## 2018-09-18 RX ADMIN — OXYCODONE AND ACETAMINOPHEN 1 TABLET(S): 5; 325 TABLET ORAL at 07:02

## 2018-09-18 RX ADMIN — Medication 40 MILLIGRAM(S): at 05:29

## 2018-09-18 RX ADMIN — Medication 10 MILLIGRAM(S): at 22:57

## 2018-09-18 RX ADMIN — HYDROMORPHONE HYDROCHLORIDE 1 MILLIGRAM(S): 2 INJECTION INTRAMUSCULAR; INTRAVENOUS; SUBCUTANEOUS at 11:04

## 2018-09-18 RX ADMIN — HYDROMORPHONE HYDROCHLORIDE 0.5 MILLIGRAM(S): 2 INJECTION INTRAMUSCULAR; INTRAVENOUS; SUBCUTANEOUS at 22:52

## 2018-09-18 RX ADMIN — ALTEPLASE 10 MG/HR: KIT at 12:20

## 2018-09-18 RX ADMIN — Medication 1 MILLIGRAM(S): at 05:29

## 2018-09-18 RX ADMIN — LEVETIRACETAM 500 MILLIGRAM(S): 250 TABLET, FILM COATED ORAL at 19:52

## 2018-09-18 RX ADMIN — Medication 20 MILLIGRAM(S): at 05:29

## 2018-09-18 RX ADMIN — ENOXAPARIN SODIUM 100 MILLIGRAM(S): 100 INJECTION SUBCUTANEOUS at 08:11

## 2018-09-18 RX ADMIN — Medication 3 UNIT(S): at 18:47

## 2018-09-18 RX ADMIN — SODIUM CHLORIDE 125 MILLILITER(S): 9 INJECTION, SOLUTION INTRAVENOUS at 10:39

## 2018-09-18 RX ADMIN — HYDROMORPHONE HYDROCHLORIDE 1 MILLIGRAM(S): 2 INJECTION INTRAMUSCULAR; INTRAVENOUS; SUBCUTANEOUS at 11:27

## 2018-09-18 RX ADMIN — OXYCODONE AND ACETAMINOPHEN 1 TABLET(S): 5; 325 TABLET ORAL at 16:15

## 2018-09-18 RX ADMIN — DULOXETINE HYDROCHLORIDE 60 MILLIGRAM(S): 30 CAPSULE, DELAYED RELEASE ORAL at 15:58

## 2018-09-18 RX ADMIN — SIMVASTATIN 20 MILLIGRAM(S): 20 TABLET, FILM COATED ORAL at 22:52

## 2018-09-18 RX ADMIN — Medication 10 MILLIGRAM(S): at 15:42

## 2018-09-18 RX ADMIN — OXYCODONE AND ACETAMINOPHEN 1 TABLET(S): 5; 325 TABLET ORAL at 15:45

## 2018-09-18 RX ADMIN — HEPARIN SODIUM 500 UNIT(S)/HR: 5000 INJECTION INTRAVENOUS; SUBCUTANEOUS at 12:25

## 2018-09-18 RX ADMIN — Medication 10 MILLIGRAM(S): at 05:29

## 2018-09-18 RX ADMIN — Medication 1 MILLIGRAM(S): at 22:51

## 2018-09-18 RX ADMIN — INSULIN GLARGINE 10 UNIT(S): 100 INJECTION, SOLUTION SUBCUTANEOUS at 21:50

## 2018-09-18 RX ADMIN — LOSARTAN POTASSIUM 100 MILLIGRAM(S): 100 TABLET, FILM COATED ORAL at 05:29

## 2018-09-18 RX ADMIN — LEVETIRACETAM 500 MILLIGRAM(S): 250 TABLET, FILM COATED ORAL at 05:29

## 2018-09-18 RX ADMIN — ALBUTEROL 2 PUFF(S): 90 AEROSOL, METERED ORAL at 12:41

## 2018-09-18 NOTE — BRIEF OPERATIVE NOTE - PROCEDURE
<<-----Click on this checkbox to enter Procedure Thrombolysis of vein of extremity  09/18/2018  Right lower limb Deep vein thrombectomy, thrombolysis and tPA instillation.  Active  [id number]

## 2018-09-18 NOTE — CHART NOTE - NSCHARTNOTEFT_GEN_A_CORE
Post Operative Note  Patient: SANA JOE 63y (1955) Male   MRN: [id number]  Location: [facility] [address] A  Visit: 09-17-18 Inpatient  Date: 09-18-18 @ 17:26    Procedure: S/P thrombolysis of RLE DVT    Subjective: Patient states he is feeling fine, tolerated procedure well.  Denies pain in LEs, chest pain, shortness of breath, nausea, vomiting.  Tolerating diet, leg is elevated, on bedrest.  Nausea: no, Vomiting: no, Ambulating: no, Flatus: no  Pain Assessment: no     Objective:  Vitals: T(F): 97.1 (09-18-18 @ 13:30), Max: 97.6 (09-18-18 @ 10:39)  HR: 50 (09-18-18 @ 15:00)  BP: 131/81 (09-18-18 @ 15:00) (115/84 - 143/84)  RR: 16 (09-18-18 @ 15:00)  SpO2: 97% (09-18-18 @ 15:00)  Vent Settings:     In:   09-18-18 @ 07:01  -  09-18-18 @ 17:26  --------------------------------------------------------  IN: 455 mL      IV Fluids:     Out:   09-18-18 @ 07:01  -  09-18-18 @ 17:26  --------------------------------------------------------  OUT: 500 mL      EBL:     Voided Urine:   09-18-18 @ 07:01  -  09-18-18 @ 17:26  --------------------------------------------------------  OUT: 500 mL      Quintana Catheter: yes no   Drains:   DARIO:    ,   Chest Tube:      NG Tube:       Physical Examination:  General Appearance: NAD, alert and cooperative  HEENT: NCAT,  RAHEEM, EOMI  Heart: S1 and S2. No murmurs. Rhythm is regular   Lungs: Clear to auscultation BL   Abdomen:  Positive bowel sounds. Soft, nondistended, nontender  MSK/Extremities: erythema of RLE, palpable PT and DP bilateral LEs, catheter in place in RLE, dressing clean and dry    Medications: [Standing]  ALBUTerol    90 MICROgram(s) HFA Inhaler 2 Puff(s) Inhalation every 6 hours PRN  ALPRAZolam 1 milliGRAM(s) Oral three times a day  alteplase    Infusion 0.5 mG/Hr IV Continuous <Continuous>  baclofen 10 milliGRAM(s) Oral three times a day  cyclobenzaprine 10 milliGRAM(s) Oral three times a day PRN  DULoxetine 60 milliGRAM(s) Oral daily  furosemide    Tablet 20 milliGRAM(s) Oral daily  heparin  Infusion 500 Unit(s)/Hr IV Continuous <Continuous>  insulin glargine Injectable (LANTUS) 10 Unit(s) SubCutaneous at bedtime  insulin lispro Injectable (HumaLOG) 3 Unit(s) SubCutaneous three times a day before meals  levETIRAcetam 500 milliGRAM(s) Oral two times a day  lidocaine   Patch 1 Patch Transdermal every 24 hours  losartan 100 milliGRAM(s) Oral daily  mometasone 100 MICROgram(s) HFA Inhaler 2 Puff(s) Inhalation daily PRN  oxyCODONE    5 mG/acetaminophen 325 mG 1 Tablet(s) Oral every 6 hours PRN  predniSONE   Tablet 40 milliGRAM(s) Oral daily  simvastatin 20 milliGRAM(s) Oral at bedtime  zolpidem 5 milliGRAM(s) Oral at bedtime PRN    Medications: [PRN]  ALBUTerol    90 MICROgram(s) HFA Inhaler 2 Puff(s) Inhalation every 6 hours PRN  ALPRAZolam 1 milliGRAM(s) Oral three times a day  alteplase    Infusion 0.5 mG/Hr IV Continuous <Continuous>  baclofen 10 milliGRAM(s) Oral three times a day  cyclobenzaprine 10 milliGRAM(s) Oral three times a day PRN  DULoxetine 60 milliGRAM(s) Oral daily  furosemide    Tablet 20 milliGRAM(s) Oral daily  heparin  Infusion 500 Unit(s)/Hr IV Continuous <Continuous>  insulin glargine Injectable (LANTUS) 10 Unit(s) SubCutaneous at bedtime  insulin lispro Injectable (HumaLOG) 3 Unit(s) SubCutaneous three times a day before meals  levETIRAcetam 500 milliGRAM(s) Oral two times a day  lidocaine   Patch 1 Patch Transdermal every 24 hours  losartan 100 milliGRAM(s) Oral daily  mometasone 100 MICROgram(s) HFA Inhaler 2 Puff(s) Inhalation daily PRN  oxyCODONE    5 mG/acetaminophen 325 mG 1 Tablet(s) Oral every 6 hours PRN  predniSONE   Tablet 40 milliGRAM(s) Oral daily  simvastatin 20 milliGRAM(s) Oral at bedtime  zolpidem 5 milliGRAM(s) Oral at bedtime PRN    Labs:                        15.6   12.67 )-----------( 266      ( 18 Sep 2018 11:50 )             46.2     09-18    145  |  103  |  22<H>  ----------------------------<  123<H>  4.6   |  28  |  0.9    Ca    9.7      18 Sep 2018 11:50  Phos  3.2     09-18  Mg     2.2     09-18    TPro  7.0  /  Alb  4.2  /  TBili  0.4  /  DBili  x   /  AST  8   /  ALT  8   /  AlkPhos  74  09-18    PT/INR - ( 18 Sep 2018 11:50 )   PT: 12.60 sec;   INR: 1.17 ratio         PTT - ( 18 Sep 2018 11:50 )  PTT:38.7 sec  CARDIAC MARKERS ( 18 Sep 2018 11:50 )  x     / <0.01 ng/mL / 39 U/L / x     / 1.0 ng/mL  CARDIAC MARKERS ( 17 Sep 2018 05:51 )  x     / <0.01 ng/mL / x     / x     / x          Imaging:  No post-op imaging studies    Assessment:  63yMale patient S/P thrombolysis of RLE DVT    Plan:  -tpa at 0.5  -heparin at 500U  -bedrest  -continue SICU care       Date/Time: 09-18-18 @ 17:26

## 2018-09-18 NOTE — PROGRESS NOTE ADULT - SUBJECTIVE AND OBJECTIVE BOX
OVERNIGHT EVENTS: looks comfortable, no events overnight    Vital Signs Last 24 Hrs  T(C): 36.1 (18 Sep 2018 06:22), Max: 36.7 (17 Sep 2018 17:16)  T(F): 97 (18 Sep 2018 06:22), Max: 98 (17 Sep 2018 17:16)  HR: 67 (18 Sep 2018 06:22) (67 - 84)  BP: 134/77 (18 Sep 2018 06:22) (127/71 - 136/78)  BP(mean): --  RR: 18 (18 Sep 2018 06:22) (18 - 18)  SpO2: 86% (17 Sep 2018 17:05) (86% - 86%)    PHYSICAL EXAMINATION:    GENERAL: The patient is awake and alert in no apparent distress.     HEENT: Head is normocephalic and atraumatic. Extraocular muscles are intact. Mucous membranes are moist.    NECK: Supple.    LUNGS: dec bs both bases    HEART: Regular rate and rhythm without murmur.    ABDOMEN: Soft, nontender, and nondistended.      EXTREMITIES: RLE swelling          LABS:                        15.2   10.69 )-----------( 286      ( 18 Sep 2018 06:02 )             45.4     09-18    144  |  102  |  22<H>  ----------------------------<  127<H>  4.5   |  27  |  0.9    Ca    9.6      18 Sep 2018 06:02  Phos  3.3     09-18    TPro  7.0  /  Alb  4.2  /  TBili  0.4  /  DBili  x   /  AST  8   /  ALT  8   /  AlkPhos  74  09-18    PT/INR - ( 17 Sep 2018 09:44 )   PT: 12.70 sec;   INR: 1.18 ratio         PTT - ( 17 Sep 2018 09:44 )  PTT:39.7 sec      CARDIAC MARKERS ( 17 Sep 2018 05:51 )  x     / <0.01 ng/mL / x     / x     / x            Serum Pro-Brain Natriuretic Peptide: 57 pg/mL (09-17-18 @ 08:50)              MICROBIOLOGY:      MEDICATIONS  (STANDING):  ALPRAZolam 1 milliGRAM(s) Oral three times a day  baclofen 10 milliGRAM(s) Oral three times a day  dextrose 5%. 1000 milliLiter(s) (50 mL/Hr) IV Continuous <Continuous>  dextrose 50% Injectable 12.5 Gram(s) IV Push once  dextrose 50% Injectable 25 Gram(s) IV Push once  dextrose 50% Injectable 25 Gram(s) IV Push once  DULoxetine 60 milliGRAM(s) Oral daily  enoxaparin Injectable 100 milliGRAM(s) SubCutaneous every 12 hours  furosemide    Tablet 20 milliGRAM(s) Oral daily  insulin glargine Injectable (LANTUS) 10 Unit(s) SubCutaneous at bedtime  insulin lispro Injectable (HumaLOG) 3 Unit(s) SubCutaneous three times a day before meals  levETIRAcetam 500 milliGRAM(s) Oral two times a day  lidocaine   Patch 1 Patch Transdermal every 24 hours  losartan 100 milliGRAM(s) Oral daily  predniSONE   Tablet 40 milliGRAM(s) Oral daily  simvastatin 20 milliGRAM(s) Oral at bedtime    MEDICATIONS  (PRN):  ALBUTerol    90 MICROgram(s) HFA Inhaler 2 Puff(s) Inhalation every 6 hours PRN Bronchospasm  cyclobenzaprine 10 milliGRAM(s) Oral three times a day PRN Muscle Spasm  dextrose 40% Gel 15 Gram(s) Oral once PRN Blood Glucose LESS THAN 70 milliGRAM(s)/deciliter  glucagon  Injectable 1 milliGRAM(s) IntraMuscular once PRN Glucose LESS THAN 70 milligrams/deciliter  mometasone 100 MICROgram(s) HFA Inhaler 2 Puff(s) Inhalation daily PRN Bronchospasm  oxyCODONE    5 mG/acetaminophen 325 mG 1 Tablet(s) Oral every 6 hours PRN Moderate Pain (4 - 6)  zolpidem 5 milliGRAM(s) Oral at bedtime PRN Insomnia  zolpidem 5 milliGRAM(s) Oral at bedtime PRN Insomnia      RADIOLOGY & ADDITIONAL STUDIES:

## 2018-09-18 NOTE — CHART NOTE - NSCHARTNOTEFT_GEN_A_CORE
PACU ANESTHESIA ADMISSION NOTE      Procedure: Thrombolysis of vein of extremity: Right lower limb Deep vein thrombectomy, thrombolysis and tPA instillation.    Post op diagnosis:  Acute deep vein thrombosis (DVT) of femoral vein of right lower extremity      ____  Intubated  TV:______       Rate: ______      FiO2: ______    _x___  Patent Airway    _x___  Full return of protective reflexes    _x___  Full recovery from anesthesia / back to baseline status    Vitals:  T(C): 36.4 (09-18-18 @ 10:39), Max: 36.7 (09-17-18 @ 17:16)  HR: 58 (09-18-18 @ 10:44) (58 - 84)  BP: 117/64 (09-18-18 @ 10:44) (117/64 - 136/78)  RR: 15 (09-18-18 @ 10:44) (15 - 18)  SpO2: 99% (09-18-18 @ 10:44) (86% - 99%)    Mental Status:  _x___ Awake   _____ Alert   _____ Drowsy   _____ Sedated    Nausea/Vomiting:  _x___  NO       ______Yes,   See Post - Op Orders         Pain Scale (0-10):  __0___    Treatment: ____ None    ___x_ See Post - Op/PCA Orders    Post - Operative Fluids:   ____ Oral   ___x_ See Post - Op Orders    Plan: Discharge:   ____Home       _____Floor     ____x_Critical Care    _____  Other:_________________    Comments:  No anesthesia issues or complications noted.  Discharge when criteria met.  full signout given to icu staff.

## 2018-09-18 NOTE — PROGRESS NOTE ADULT - ASSESSMENT
IMPRESSION:      Acute Pulmonary Embolism- Unprovoked  Right LE DVT (FV-PV-PTV)  HO asthma  HO Opiate dependence  High risk JESSICA    PLAN:    - keep AC  - VASCULAR F/UP  - ECHO  TELE  WILL FOLLOW

## 2018-09-18 NOTE — CONSULT NOTE ADULT - ASSESSMENT
ASSESSMENT:  63y Male s/p R leg venogram, thrombolysis, thrombectomy admitted to ICU for neuro-vascular checks    PLAN:   Neurologic: Pain control Dilaudid/Demerol. Q1 neurovascular check  Con't anti-anxiety and pain meds   Respiratory:  Extubated, on 2L NC. wean off as tolerated    Cardiovascular: Sinus ghanshyam, con't to minitor  Gastrointestinal/Nutrition: Carb consistent diet, PPI  Renal/Genitourinary: no escamilla.monitor lytes, monitor I/O's  Hematologic: Heparin at 500 through peripheral IV's for 24hrs. TPA at .5mg through R leg catheter for 24hrs.  Infectious Disease: no abx  Lines/Tubes: 2PIV's   Endocrine: on Decadron. on Insulin. monitor FS  Disposition: Admit to SICU    -------------------------------------------------------------------------------------- ASSESSMENT:  63y Male s/p R leg venogram, thrombolysis, thrombectomy admitted to ICU for neuro-vascular checks    PLAN:   Neurologic: Pain control Dilaudid/Demerol. Q1 neurovascular check  Con't anti-anxiety and pain meds   Respiratory:  Extubated, on 2L NC. wean off as tolerated    Cardiovascular: Sinus ghanshyam, con't to minitor  Gastrointestinal/Nutrition: Carb consistent diet, PPI.  make NPO wed night for OR on Thurs  Renal/Genitourinary: no escamilla.monitor lytes, monitor I/O's  Hematologic: Heparin at 500 through peripheral IV's for 24hrs. TPA at .5mg through R leg catheter for 24hrs.  Infectious Disease: no abx  Lines/Tubes: 2PIV's   Endocrine: on Decadron. on Insulin. monitor FS  Disposition: Admit to SICU    -------------------------------------------------------------------------------------- ASSESSMENT:  63y Male s/p R leg venogram, thrombolysis, thrombectomy admitted to ICU for neuro-vascular checks    PLAN:   Neurologic: Pain control Dilaudid. Q1 neurovascular check  Con't anti-anxiety and pain meds   Respiratory:  Extubated, on 2L NC. wean off as tolerated. Currently using inhaler    Cardiovascular: Sinus bradycardia post-op.  con't to monitor. f/u 2D echo result. f/u 3 sets of CE  Cardiologist; Dr Altman ( last saw pt in March 2018): no severe cardiac issues from last visit  Gastrointestinal/Nutrition: Carb consistent diet, PPI.  make NPO wed night for OR on Thurs  Renal/Genitourinary: no escamilla.monitor lytes, monitor I/O's  Hematologic: Heparin at 500 through peripheral IV's for 24hrs, per vascular team, not making therapeutic. TPA at .5mg through R leg catheter for 24hrs.  Infectious Disease: no abx  Lines/Tubes: 2PIV's   Endocrine: on Decadron. on Insulin. monitor FS  Disposition: Admit to SICU    --------------------------------------------------------------------------------------

## 2018-09-18 NOTE — CONSULT NOTE ADULT - SUBJECTIVE AND OBJECTIVE BOX
SICU Consultation Note  =====================================================  HPI: 63y Male  HPI:  Patient is a 63 year old male with PMH significant for COPD, Asthma, chronic back pain, HTN, DM, DLD, Anxiety presents complaining of pain and swelling of RLE for 3 days.  Patient states that he noticed this swelling 3 days ago and it has been gradually getting worse, patient is sedentary at home due to chronic pack pain.  He states the pain was so bad this morning he came to the ED.  Patient denies chest pain, SOB, fever, chills, nausea, vomiting.  States that he has never had a DVT in the past, his last colonoscopy was 3 years ago and he states that he was told it was "ok".  Patient was found to have DVT in femoral, popliteal, gastroc veins on venous duplex and CTA showed bilateral segmental and subsegmental pulmonary emboli.  Patient is a current smoker, has smoked 1/2 PPD for 44 years. pt seen in PACU, extubated, neurologically intact with palpable DP/PT pulses.     Surgery Information  OR time:  16min    EBL:   5cc       IV Fluids:   300    Blood Products: none  UOP:          PAST MEDICAL & SURGICAL HISTORY:  Syncopal episodes  Uncomplicated opioid dependence  Chronic low back pain with sciatica, sciatica laterality unspecified, unspecified back pain laterality  Asthma  High cholesterol  Diabetes  HTN (hypertension)  Anxiety  History of back surgery: x 3    Home Meds: Home Medications:  ALPRAZolam 1 mg oral tablet: 1 tab(s) orally 3 times a day (17 Sep 2018 14:35)  Asmanex  mcg/inh inhalation aerosol:  (17 Sep 2018 14:35)  Atrovent HFA:  (17 Sep 2018 14:35)  baclofen 10 mg oral tablet: 1 tab(s) orally 3 times a day (17 Sep 2018 14:35)  cyclobenzaprine 10 mg oral tablet: 1 tab(s) orally 3 times a day (17 Sep 2018 14:35)  DULoxetine 60 mg oral delayed release capsule: 1 cap(s) orally once a day (17 Sep 2018 14:35)  fenofibrate 160 mg oral tablet: 1 tab(s) orally once a day (17 Sep 2018 14:35)  furosemide 20 mg oral tablet: 1 tab(s) orally once a day (07 Mar 2018 05:27)  levETIRAcetam 500 mg oral tablet, extended release: 2 tab(s) orally once a day (07 Mar 2018 05:27)  losartan 100 mg oral tablet: 1 tab(s) orally once a day (07 Mar 2018 05:27)  metFORMIN 500 mg oral tablet: 1 tab(s) orally 2 times a day (07 Mar 2018 05:27)  morphine 60 mg/12 hours oral tablet, extended release: 1 tab(s) orally every 12 hours (17 Sep 2018 14:35)  Percocet 10/325:  (07 Mar 2018 05:27)  simvastatin 20 mg oral tablet: 1 tab(s) orally once a day (at bedtime) (07 Mar 2018 05:27)  tamsulosin 0.4 mg oral capsule: 1 cap(s) orally once a day (17 Sep 2018 14:35)  Ventolin HFA 90 mcg/inh inhalation aerosol:  (07 Mar 2018 05:27)  zolpidem 10 mg oral tablet: 1 tab(s) orally once a day (at bedtime) (17 Sep 2018 14:35)    Allergies: Allergies    aspirin (Other)  Originally Entered as [ANGIOEDEMA] reaction to [pineapple] (Unknown)  penicillins (Other)  pineapples (Anaphylaxis)    Intolerances      Soc:   Advanced Directives: Presumed Full Code     ROS:    REVIEW OF SYSTEMS    [X ] A ten-point review of systems was otherwise negative except as noted.      CURRENT MEDICATIONS:   --------------------------------------------------------------------------------------  Neurologic Medications  ALPRAZolam 1 milliGRAM(s) Oral three times a day  baclofen 10 milliGRAM(s) Oral three times a day  cyclobenzaprine 10 milliGRAM(s) Oral three times a day PRN Muscle Spasm  DULoxetine 60 milliGRAM(s) Oral daily  HYDROmorphone  Injectable 0.5 milliGRAM(s) IV Push every 10 minutes PRN Moderate Pain (4 - 6)  HYDROmorphone  Injectable 1 milliGRAM(s) IV Push every 10 minutes PRN Severe Pain (7 - 10)  levETIRAcetam 500 milliGRAM(s) Oral two times a day  meperidine     Injectable 12.5 milliGRAM(s) IV Push every 10 minutes PRN Shivering  metoclopramide Injectable 10 milliGRAM(s) IV Push once PRN Nausea and/or Vomiting  ondansetron Injectable 4 milliGRAM(s) IV Push once PRN Nausea and/or Vomiting  oxyCODONE    5 mG/acetaminophen 325 mG 1 Tablet(s) Oral every 6 hours PRN Moderate Pain (4 - 6)  zolpidem 5 milliGRAM(s) Oral at bedtime PRN Insomnia    Respiratory Medications  ALBUTerol    90 MICROgram(s) HFA Inhaler 2 Puff(s) Inhalation every 6 hours PRN Bronchospasm  mometasone 100 MICROgram(s) HFA Inhaler 2 Puff(s) Inhalation daily PRN Bronchospasm    Cardiovascular Medications  furosemide    Tablet 20 milliGRAM(s) Oral daily  losartan 100 milliGRAM(s) Oral daily    Gastrointestinal Medications  lactated ringers. 1000 milliLiter(s) IV Continuous <Continuous>    Genitourinary Medications    Hematologic/Oncologic Medications  alteplase    Infusion 0.5 mG/Hr IV Continuous <Continuous>  heparin  Infusion. 500 Unit(s)/Hr IV Continuous <Continuous>    Antimicrobial/Immunologic Medications    Endocrine/Metabolic Medications  dexamethasone  IVPB 8 milliGRAM(s) IV Intermittent once PRN Nausea and/or Vomiting  insulin glargine Injectable (LANTUS) 10 Unit(s) SubCutaneous at bedtime  insulin lispro Injectable (HumaLOG) 3 Unit(s) SubCutaneous three times a day before meals  predniSONE   Tablet 40 milliGRAM(s) Oral daily  simvastatin 20 milliGRAM(s) Oral at bedtime    Topical/Other Medications  lidocaine   Patch 1 Patch Transdermal every 24 hours    --------------------------------------------------------------------------------------    VITAL SIGNS, INS/OUTS (last 24 hours):  --------------------------------------------------------------------------------------  ICU Vital Signs Last 24 Hrs  T(C): 36.3 (18 Sep 2018 11:39), Max: 36.7 (17 Sep 2018 17:16)  T(F): 97.4 (18 Sep 2018 11:39), Max: 98 (17 Sep 2018 17:16)  HR: 51 (18 Sep 2018 11:39) (51 - 84)  BP: 143/84 (18 Sep 2018 11:39) (115/84 - 143/84)  BP(mean): --  ABP: --  ABP(mean): --  RR: 14 (18 Sep 2018 11:39) (10 - 18)  SpO2: 100% (18 Sep 2018 11:39) (86% - 100%)    I&O's Summary    18 Sep 2018 07:01  -  18 Sep 2018 11:55  --------------------------------------------------------  IN: 0 mL / OUT: 500 mL / NET: -500 mL      --------------------------------------------------------------------------------------    EXAM:  General/Neuro  Exam: Normal, NAD, alert, oriented x 3, follows commands, no focal deficits.     Respiratory  Exam: Lungs clear to auscultation, Normal expansion/effort. No wheezes, rales or rhonchi    Cardiovascular  Exam: S1, S2.  Regular rate and rhythm.  no peripheral edema   Cardiac Rhythm:  Sinus Bradycardia   ECHO:     GI  Exam: Abdomen soft, Non-tender, Non-distended.    Current Diet:  Carb Consistent     Extremities  Exam: DP pulses palpable b/l. PT/DP dopplerable b/l   Wound:  R. leg catheter c/d/i    :   Exam: No escamilla catheter in place.     Tubes/Lines/Drains    [x] Peripheral IV    LABS  --------------------------------------------------------------------------------------  Labs:  CAPILLARY BLOOD GLUCOSE      POCT Blood Glucose.: 177 mg/dL (18 Sep 2018 07:46)  POCT Blood Glucose.: 141 mg/dL (17 Sep 2018 21:06)  POCT Blood Glucose.: 173 mg/dL (17 Sep 2018 17:13)                          15.2   10.69 )-----------( 286      ( 18 Sep 2018 06:02 )             45.4         09-18    144  |  102  |  22<H>  ----------------------------<  127<H>  4.5   |  27  |  0.9      Calcium, Total Serum: 9.6 mg/dL (09-18-18 @ 06:02)      LFTs:             7.0  | 0.4  | 8        ------------------[74      ( 18 Sep 2018 06:02 )  4.2  | x    | 8           Lipase:x      Amylase:x         Blood Gas Venous - Lactate: 2.6 mmoL/L (09-17-18 @ 09:07)      Coags:     12.70  ----< 1.18    ( 17 Sep 2018 09:44 )     39.7        CARDIAC MARKERS ( 17 Sep 2018 05:51 )  x     / <0.01 ng/mL / x     / x     / x                --------------------------------------------------------------------------------------    OTHER LABS    IMAGING RESULTS SICU Consultation Note  =====================================================  HPI: 63y Male  HPI:  Patient is a 63 year old male with PMH significant for COPD, Asthma, chronic back pain, HTN, DM, DLD, Anxiety presents complaining of pain and swelling of RLE for 3 days.  Patient states that he noticed this swelling 3 days ago and it has been gradually getting worse, patient is sedentary at home due to chronic pack pain.  He states the pain was so bad this morning he came to the ED.  Patient denies chest pain, SOB, fever, chills, nausea, vomiting.  States that he has never had a DVT in the past, his last colonoscopy was 3 years ago and he states that he was told it was "ok".  Patient was found to have DVT in femoral, popliteal, gastroc veins on venous duplex and CTA showed bilateral segmental and subsegmental pulmonary emboli.  Patient is a current smoker, has smoked 1/2 PPD for 44 years. pt seen in PACU, extubated, neurologically intact with palpable DP/PT pulses.     Surgery Information  OR time:  16min    EBL:   5cc       IV Fluids:   300    Blood Products: none  UOP:          PAST MEDICAL & SURGICAL HISTORY:  Syncopal episodes  Uncomplicated opioid dependence  Chronic low back pain with sciatica, sciatica laterality unspecified, unspecified back pain laterality  Asthma  High cholesterol  Diabetes  HTN (hypertension)  Anxiety  History of back surgery: x 3    Home Meds: Home Medications:  ALPRAZolam 1 mg oral tablet: 1 tab(s) orally 3 times a day (17 Sep 2018 14:35)  Asmanex  mcg/inh inhalation aerosol:  (17 Sep 2018 14:35)  Atrovent HFA:  (17 Sep 2018 14:35)  baclofen 10 mg oral tablet: 1 tab(s) orally 3 times a day (17 Sep 2018 14:35)  cyclobenzaprine 10 mg oral tablet: 1 tab(s) orally 3 times a day (17 Sep 2018 14:35)  DULoxetine 60 mg oral delayed release capsule: 1 cap(s) orally once a day (17 Sep 2018 14:35)  fenofibrate 160 mg oral tablet: 1 tab(s) orally once a day (17 Sep 2018 14:35)  furosemide 20 mg oral tablet: 1 tab(s) orally once a day (07 Mar 2018 05:27)  levETIRAcetam 500 mg oral tablet, extended release: 2 tab(s) orally once a day (07 Mar 2018 05:27)  losartan 100 mg oral tablet: 1 tab(s) orally once a day (07 Mar 2018 05:27)  metFORMIN 500 mg oral tablet: 1 tab(s) orally 2 times a day (07 Mar 2018 05:27)  morphine 60 mg/12 hours oral tablet, extended release: 1 tab(s) orally every 12 hours (17 Sep 2018 14:35)  Percocet 10/325:  (07 Mar 2018 05:27)  simvastatin 20 mg oral tablet: 1 tab(s) orally once a day (at bedtime) (07 Mar 2018 05:27)  tamsulosin 0.4 mg oral capsule: 1 cap(s) orally once a day (17 Sep 2018 14:35)  Ventolin HFA 90 mcg/inh inhalation aerosol:  (07 Mar 2018 05:27)  zolpidem 10 mg oral tablet: 1 tab(s) orally once a day (at bedtime) (17 Sep 2018 14:35)    Allergies: Allergies    aspirin (Other)  Originally Entered as [ANGIOEDEMA] reaction to [pineapple] (Unknown)  penicillins (Other)  pineapples (Anaphylaxis)    Intolerances      Soc:   Advanced Directives: Presumed Full Code     ROS:    REVIEW OF SYSTEMS    [X ] A ten-point review of systems was otherwise negative except as noted.      CURRENT MEDICATIONS:   --------------------------------------------------------------------------------------  Neurologic Medications  ALPRAZolam 1 milliGRAM(s) Oral three times a day  baclofen 10 milliGRAM(s) Oral three times a day  cyclobenzaprine 10 milliGRAM(s) Oral three times a day PRN Muscle Spasm  DULoxetine 60 milliGRAM(s) Oral daily  HYDROmorphone  Injectable 0.5 milliGRAM(s) IV Push every 10 minutes PRN Moderate Pain (4 - 6)  HYDROmorphone  Injectable 1 milliGRAM(s) IV Push every 10 minutes PRN Severe Pain (7 - 10)  levETIRAcetam 500 milliGRAM(s) Oral two times a day  meperidine     Injectable 12.5 milliGRAM(s) IV Push every 10 minutes PRN Shivering  metoclopramide Injectable 10 milliGRAM(s) IV Push once PRN Nausea and/or Vomiting  ondansetron Injectable 4 milliGRAM(s) IV Push once PRN Nausea and/or Vomiting  oxyCODONE    5 mG/acetaminophen 325 mG 1 Tablet(s) Oral every 6 hours PRN Moderate Pain (4 - 6)  zolpidem 5 milliGRAM(s) Oral at bedtime PRN Insomnia    Respiratory Medications  ALBUTerol    90 MICROgram(s) HFA Inhaler 2 Puff(s) Inhalation every 6 hours PRN Bronchospasm  mometasone 100 MICROgram(s) HFA Inhaler 2 Puff(s) Inhalation daily PRN Bronchospasm    Cardiovascular Medications  furosemide    Tablet 20 milliGRAM(s) Oral daily  losartan 100 milliGRAM(s) Oral daily    Gastrointestinal Medications  lactated ringers. 1000 milliLiter(s) IV Continuous <Continuous>    Genitourinary Medications    Hematologic/Oncologic Medications  alteplase    Infusion 0.5 mG/Hr IV Continuous <Continuous>  heparin  Infusion. 500 Unit(s)/Hr IV Continuous <Continuous>    Antimicrobial/Immunologic Medications    Endocrine/Metabolic Medications  dexamethasone  IVPB 8 milliGRAM(s) IV Intermittent once PRN Nausea and/or Vomiting  insulin glargine Injectable (LANTUS) 10 Unit(s) SubCutaneous at bedtime  insulin lispro Injectable (HumaLOG) 3 Unit(s) SubCutaneous three times a day before meals  predniSONE   Tablet 40 milliGRAM(s) Oral daily  simvastatin 20 milliGRAM(s) Oral at bedtime    Topical/Other Medications  lidocaine   Patch 1 Patch Transdermal every 24 hours    --------------------------------------------------------------------------------------    VITAL SIGNS, INS/OUTS (last 24 hours):  --------------------------------------------------------------------------------------  ICU Vital Signs Last 24 Hrs  T(C): 36.3 (18 Sep 2018 11:39), Max: 36.7 (17 Sep 2018 17:16)  T(F): 97.4 (18 Sep 2018 11:39), Max: 98 (17 Sep 2018 17:16)  HR: 51 (18 Sep 2018 11:39) (51 - 84)  BP: 143/84 (18 Sep 2018 11:39) (115/84 - 143/84)  BP(mean): --  ABP: --  ABP(mean): --  RR: 14 (18 Sep 2018 11:39) (10 - 18)  SpO2: 100% (18 Sep 2018 11:39) (86% - 100%)    I&O's Summary    18 Sep 2018 07:01  -  18 Sep 2018 11:55  --------------------------------------------------------  IN: 0 mL / OUT: 500 mL / NET: -500 mL      --------------------------------------------------------------------------------------    EXAM:  General/Neuro  Exam: Normal, NAD, alert, oriented x 3, follows commands, no focal deficits.     Respiratory  Exam: Lungs clear to auscultation, Normal expansion/effort. No wheezes, rales or rhonchi    Cardiovascular  Exam: S1, S2.  Regular rate and rhythm.  no peripheral edema   Cardiac Rhythm:  Sinus Bradycardia   ECHO:     GI  Exam: Abdomen soft, Non-tender, Non-distended.    Current Diet:  Carb Consistent     Extremities  Exam: swollen R leg. DP pulses palpable b/l. PT/DP dopplerable b/l.    Wound:  2 catheter in R. popliteal  c/d/i    :   Exam: No escamilla catheter in place.     Tubes/Lines/Drains    [x] Peripheral IV    LABS  --------------------------------------------------------------------------------------  Labs:  CAPILLARY BLOOD GLUCOSE      POCT Blood Glucose.: 177 mg/dL (18 Sep 2018 07:46)  POCT Blood Glucose.: 141 mg/dL (17 Sep 2018 21:06)  POCT Blood Glucose.: 173 mg/dL (17 Sep 2018 17:13)                          15.2   10.69 )-----------( 286      ( 18 Sep 2018 06:02 )             45.4         09-18    144  |  102  |  22<H>  ----------------------------<  127<H>  4.5   |  27  |  0.9      Calcium, Total Serum: 9.6 mg/dL (09-18-18 @ 06:02)      LFTs:             7.0  | 0.4  | 8        ------------------[74      ( 18 Sep 2018 06:02 )  4.2  | x    | 8           Lipase:x      Amylase:x         Blood Gas Venous - Lactate: 2.6 mmoL/L (09-17-18 @ 09:07)      Coags:     12.70  ----< 1.18    ( 17 Sep 2018 09:44 )     39.7        CARDIAC MARKERS ( 17 Sep 2018 05:51 )  x     / <0.01 ng/mL / x     / x     / x                --------------------------------------------------------------------------------------    OTHER LABS    IMAGING RESULTS SICU Consultation Note  =====================================================  HPI: 63y Male  HPI:  Patient is a 63 year old male with PMH significant for COPD, Asthma, chronic back pain, HTN, DM, DLD, Anxiety presents complaining of pain and swelling of RLE for 3 days.  Patient states that he noticed this swelling 3 days ago and it has been gradually getting worse, patient is sedentary at home due to chronic pack pain.  He states the pain was so bad this morning he came to the ED.  Patient denies chest pain, SOB, fever, chills, nausea, vomiting.  States that he has never had a DVT in the past, his last colonoscopy was 3 years ago and he states that he was told it was "ok".  Patient was found to have DVT in femoral, popliteal, gastroc veins on venous duplex and CTA showed bilateral segmental and subsegmental pulmonary emboli.  Patient is a current smoker, has smoked 1/2 PPD for 44 years. Vascular consult called, pt evaluated and went to OR for Right Leg venogram, thrombolysis & thrombectomy. The procedure was uneventful, no pressors used, no blood transfusions given.  Pt seen in PACU, extubated, neurologically intact with palpable DP/PT pulses. There are 2 catheters in R. popliteal fossa, 1 will be running the TPA and Heparin will run peripherally for 24hrs. Due to extensive clots found during the procedure, the patient will go back to OR on Thursday so TPA will have more time to be effective.     Surgery Information  OR time:  16min    EBL:   5cc       IV Fluids:   300    Blood Products: none  UOP:   no escamilla       PAST MEDICAL & SURGICAL HISTORY:  Syncopal episodes  Uncomplicated opioid dependence  Chronic low back pain with sciatica, sciatica laterality unspecified, unspecified back pain laterality  Asthma  High cholesterol  Diabetes  HTN (hypertension)  Anxiety  History of back surgery: x 3    Home Meds: Home Medications:  ALPRAZolam 1 mg oral tablet: 1 tab(s) orally 3 times a day (17 Sep 2018 14:35)  Asmanex  mcg/inh inhalation aerosol:  (17 Sep 2018 14:35)  Atrovent HFA:  (17 Sep 2018 14:35)  baclofen 10 mg oral tablet: 1 tab(s) orally 3 times a day (17 Sep 2018 14:35)  cyclobenzaprine 10 mg oral tablet: 1 tab(s) orally 3 times a day (17 Sep 2018 14:35)  DULoxetine 60 mg oral delayed release capsule: 1 cap(s) orally once a day (17 Sep 2018 14:35)  fenofibrate 160 mg oral tablet: 1 tab(s) orally once a day (17 Sep 2018 14:35)  furosemide 20 mg oral tablet: 1 tab(s) orally once a day (07 Mar 2018 05:27)  levETIRAcetam 500 mg oral tablet, extended release: 2 tab(s) orally once a day (07 Mar 2018 05:27)  losartan 100 mg oral tablet: 1 tab(s) orally once a day (07 Mar 2018 05:27)  metFORMIN 500 mg oral tablet: 1 tab(s) orally 2 times a day (07 Mar 2018 05:27)  morphine 60 mg/12 hours oral tablet, extended release: 1 tab(s) orally every 12 hours (17 Sep 2018 14:35)  Percocet 10/325:  (07 Mar 2018 05:27)  simvastatin 20 mg oral tablet: 1 tab(s) orally once a day (at bedtime) (07 Mar 2018 05:27)  tamsulosin 0.4 mg oral capsule: 1 cap(s) orally once a day (17 Sep 2018 14:35)  Ventolin HFA 90 mcg/inh inhalation aerosol:  (07 Mar 2018 05:27)  zolpidem 10 mg oral tablet: 1 tab(s) orally once a day (at bedtime) (17 Sep 2018 14:35)    Allergies: Allergies    aspirin (Other)  Originally Entered as [ANGIOEDEMA] reaction to [pineapple] (Unknown)  penicillins (Other)  pineapples (Anaphylaxis)    Intolerances      Soc:   Advanced Directives: Presumed Full Code     ROS:    REVIEW OF SYSTEMS    [X ] A ten-point review of systems was otherwise negative except as noted.      CURRENT MEDICATIONS:   --------------------------------------------------------------------------------------  Neurologic Medications  ALPRAZolam 1 milliGRAM(s) Oral three times a day  baclofen 10 milliGRAM(s) Oral three times a day  cyclobenzaprine 10 milliGRAM(s) Oral three times a day PRN Muscle Spasm  DULoxetine 60 milliGRAM(s) Oral daily  HYDROmorphone  Injectable 0.5 milliGRAM(s) IV Push every 10 minutes PRN Moderate Pain (4 - 6)  HYDROmorphone  Injectable 1 milliGRAM(s) IV Push every 10 minutes PRN Severe Pain (7 - 10)  levETIRAcetam 500 milliGRAM(s) Oral two times a day  meperidine     Injectable 12.5 milliGRAM(s) IV Push every 10 minutes PRN Shivering  metoclopramide Injectable 10 milliGRAM(s) IV Push once PRN Nausea and/or Vomiting  ondansetron Injectable 4 milliGRAM(s) IV Push once PRN Nausea and/or Vomiting  oxyCODONE    5 mG/acetaminophen 325 mG 1 Tablet(s) Oral every 6 hours PRN Moderate Pain (4 - 6)  zolpidem 5 milliGRAM(s) Oral at bedtime PRN Insomnia    Respiratory Medications  ALBUTerol    90 MICROgram(s) HFA Inhaler 2 Puff(s) Inhalation every 6 hours PRN Bronchospasm  mometasone 100 MICROgram(s) HFA Inhaler 2 Puff(s) Inhalation daily PRN Bronchospasm    Cardiovascular Medications  furosemide    Tablet 20 milliGRAM(s) Oral daily  losartan 100 milliGRAM(s) Oral daily    Gastrointestinal Medications  lactated ringers. 1000 milliLiter(s) IV Continuous <Continuous>    Genitourinary Medications    Hematologic/Oncologic Medications  alteplase    Infusion 0.5 mG/Hr IV Continuous <Continuous>  heparin  Infusion. 500 Unit(s)/Hr IV Continuous <Continuous>    Antimicrobial/Immunologic Medications    Endocrine/Metabolic Medications  dexamethasone  IVPB 8 milliGRAM(s) IV Intermittent once PRN Nausea and/or Vomiting  insulin glargine Injectable (LANTUS) 10 Unit(s) SubCutaneous at bedtime  insulin lispro Injectable (HumaLOG) 3 Unit(s) SubCutaneous three times a day before meals  predniSONE   Tablet 40 milliGRAM(s) Oral daily  simvastatin 20 milliGRAM(s) Oral at bedtime    Topical/Other Medications  lidocaine   Patch 1 Patch Transdermal every 24 hours    --------------------------------------------------------------------------------------    VITAL SIGNS, INS/OUTS (last 24 hours):  --------------------------------------------------------------------------------------  ICU Vital Signs Last 24 Hrs  T(C): 36.3 (18 Sep 2018 11:39), Max: 36.7 (17 Sep 2018 17:16)  T(F): 97.4 (18 Sep 2018 11:39), Max: 98 (17 Sep 2018 17:16)  HR: 51 (18 Sep 2018 11:39) (51 - 84)  BP: 143/84 (18 Sep 2018 11:39) (115/84 - 143/84)  BP(mean): --  ABP: --  ABP(mean): --  RR: 14 (18 Sep 2018 11:39) (10 - 18)  SpO2: 100% (18 Sep 2018 11:39) (86% - 100%)    I&O's Summary    18 Sep 2018 07:01  -  18 Sep 2018 11:55  --------------------------------------------------------  IN: 0 mL / OUT: 500 mL / NET: -500 mL      --------------------------------------------------------------------------------------    EXAM:  General/Neuro  Exam: Normal, NAD, alert, oriented x 3, follows commands, no focal deficits.     Respiratory  Exam: Lungs clear to auscultation, Normal expansion/effort. No wheezes, rales or rhonchi    Cardiovascular  Exam: S1, S2.  Regular rate and rhythm.  no peripheral edema   Cardiac Rhythm:  Sinus Bradycardia   EKG: Pre-Op: sinus rhythm with 1st degree AV block. rate 67bpm.   Post-op EKG: Sinus bradycardia with 1st degree AV block. rate  51bpm    GI  Exam: Abdomen soft, Non-tender, Non-distended.    Current Diet:  Carb Consistent     Extremities  Exam: swollen R leg. DP/PT pulses palpable and dopplerable  b/l.   Wound:  2 catheter in R. popliteal  c/d/i    :   Exam: No escamilla catheter in place.     Tubes/Lines/Drains    [x] Peripheral IV    LABS  --------------------------------------------------------------------------------------  Labs:  CAPILLARY BLOOD GLUCOSE      POCT Blood Glucose.: 177 mg/dL (18 Sep 2018 07:46)  POCT Blood Glucose.: 141 mg/dL (17 Sep 2018 21:06)  POCT Blood Glucose.: 173 mg/dL (17 Sep 2018 17:13)                          15.2   10.69 )-----------( 286      ( 18 Sep 2018 06:02 )             45.4         09-18    144  |  102  |  22<H>  ----------------------------<  127<H>  4.5   |  27  |  0.9      Calcium, Total Serum: 9.6 mg/dL (09-18-18 @ 06:02)      LFTs:             7.0  | 0.4  | 8        ------------------[74      ( 18 Sep 2018 06:02 )  4.2  | x    | 8           Lipase:x      Amylase:x         Blood Gas Venous - Lactate: 2.6 mmoL/L (09-17-18 @ 09:07)      Coags:     12.70  ----< 1.18    ( 17 Sep 2018 09:44 )     39.7        CARDIAC MARKERS ( 17 Sep 2018 05:51 )  x     / <0.01 ng/mL / x     / x     / x                --------------------------------------------------------------------------------------    OTHER LABS    IMAGING RESULTS SICU Consultation Note  =====================================================  HPI: 63y Male  HPI:  Patient is a 63 year old male with PMH significant for COPD, Asthma, chronic back pain, HTN, DM, DLD, Anxiety presents complaining of pain and swelling of RLE for 3 days.  Patient states that he noticed this swelling 3 days ago and it has been gradually getting worse, patient is sedentary at home due to chronic pack pain.  He states the pain was so bad this morning he came to the ED.  Patient denies chest pain, SOB, fever, chills, nausea, vomiting.  States that he has never had a DVT in the past, his last colonoscopy was 3 years ago and he states that he was told it was "ok".  Patient was found to have DVT in femoral, popliteal, gastroc veins on venous duplex and CTA showed bilateral segmental and subsegmental pulmonary emboli.  Patient is a current smoker, has smoked 1/2 PPD for 44 years. Vascular consult called, pt evaluated and went to OR for Right Leg venogram, thrombolysis & thrombectomy. The procedure was uneventful, no pressors used, no blood transfusions given.  Pt seen in PACU, extubated, neurologically intact with palpable DP/PT pulses. There are 2 catheters in R. popliteal fossa, 1 will be running the TPA and Heparin will run peripherally for 24hrs. Due to extensive clots found during the procedure, the patient will go back to OR on Thursday so TPA will have more time to be effective.     Surgery Information  OR time:  16min    EBL:   5cc       IV Fluids:   300    Blood Products: none  UOP:   no escamilla       PAST MEDICAL & SURGICAL HISTORY:  Syncopal episodes  Uncomplicated opioid dependence  Chronic low back pain with sciatica, sciatica laterality unspecified, unspecified back pain laterality  Asthma  High cholesterol  Diabetes  HTN (hypertension)  Anxiety  History of back surgery: x 3    Home Meds: Home Medications:  ALPRAZolam 1 mg oral tablet: 1 tab(s) orally 3 times a day (17 Sep 2018 14:35)  Asmanex  mcg/inh inhalation aerosol:  (17 Sep 2018 14:35)  Atrovent HFA:  (17 Sep 2018 14:35)  baclofen 10 mg oral tablet: 1 tab(s) orally 3 times a day (17 Sep 2018 14:35)  cyclobenzaprine 10 mg oral tablet: 1 tab(s) orally 3 times a day (17 Sep 2018 14:35)  DULoxetine 60 mg oral delayed release capsule: 1 cap(s) orally once a day (17 Sep 2018 14:35)  fenofibrate 160 mg oral tablet: 1 tab(s) orally once a day (17 Sep 2018 14:35)  furosemide 20 mg oral tablet: 1 tab(s) orally once a day (07 Mar 2018 05:27)  levETIRAcetam 500 mg oral tablet, extended release: 2 tab(s) orally once a day (07 Mar 2018 05:27)  losartan 100 mg oral tablet: 1 tab(s) orally once a day (07 Mar 2018 05:27)  metFORMIN 500 mg oral tablet: 1 tab(s) orally 2 times a day (07 Mar 2018 05:27)  morphine 60 mg/12 hours oral tablet, extended release: 1 tab(s) orally every 12 hours (17 Sep 2018 14:35)  Percocet 10/325:  (07 Mar 2018 05:27)  simvastatin 20 mg oral tablet: 1 tab(s) orally once a day (at bedtime) (07 Mar 2018 05:27)  tamsulosin 0.4 mg oral capsule: 1 cap(s) orally once a day (17 Sep 2018 14:35)  Ventolin HFA 90 mcg/inh inhalation aerosol:  (07 Mar 2018 05:27)  zolpidem 10 mg oral tablet: 1 tab(s) orally once a day (at bedtime) (17 Sep 2018 14:35)    Allergies: Allergies    aspirin (Other)  Originally Entered as [ANGIOEDEMA] reaction to [pineapple] (Unknown)  penicillins (Other)  pineapples (Anaphylaxis)    Intolerances      Soc:   Advanced Directives: Presumed Full Code     ROS:    REVIEW OF SYSTEMS    [X ] A ten-point review of systems was otherwise negative except as noted.      CURRENT MEDICATIONS:   --------------------------------------------------------------------------------------  Neurologic Medications  ALPRAZolam 1 milliGRAM(s) Oral three times a day  baclofen 10 milliGRAM(s) Oral three times a day  cyclobenzaprine 10 milliGRAM(s) Oral three times a day PRN Muscle Spasm  DULoxetine 60 milliGRAM(s) Oral daily  HYDROmorphone  Injectable 0.5 milliGRAM(s) IV Push every 10 minutes PRN Moderate Pain (4 - 6)  HYDROmorphone  Injectable 1 milliGRAM(s) IV Push every 10 minutes PRN Severe Pain (7 - 10)  levETIRAcetam 500 milliGRAM(s) Oral two times a day  meperidine     Injectable 12.5 milliGRAM(s) IV Push every 10 minutes PRN Shivering  metoclopramide Injectable 10 milliGRAM(s) IV Push once PRN Nausea and/or Vomiting  ondansetron Injectable 4 milliGRAM(s) IV Push once PRN Nausea and/or Vomiting  oxyCODONE    5 mG/acetaminophen 325 mG 1 Tablet(s) Oral every 6 hours PRN Moderate Pain (4 - 6)  zolpidem 5 milliGRAM(s) Oral at bedtime PRN Insomnia    Respiratory Medications  ALBUTerol    90 MICROgram(s) HFA Inhaler 2 Puff(s) Inhalation every 6 hours PRN Bronchospasm  mometasone 100 MICROgram(s) HFA Inhaler 2 Puff(s) Inhalation daily PRN Bronchospasm    Cardiovascular Medications  furosemide    Tablet 20 milliGRAM(s) Oral daily  losartan 100 milliGRAM(s) Oral daily    Gastrointestinal Medications  lactated ringers. 1000 milliLiter(s) IV Continuous <Continuous>    Genitourinary Medications    Hematologic/Oncologic Medications  alteplase    Infusion 0.5 mG/Hr IV Continuous <Continuous>  heparin  Infusion. 500 Unit(s)/Hr IV Continuous <Continuous>    Antimicrobial/Immunologic Medications    Endocrine/Metabolic Medications  dexamethasone  IVPB 8 milliGRAM(s) IV Intermittent once PRN Nausea and/or Vomiting  insulin glargine Injectable (LANTUS) 10 Unit(s) SubCutaneous at bedtime  insulin lispro Injectable (HumaLOG) 3 Unit(s) SubCutaneous three times a day before meals  predniSONE   Tablet 40 milliGRAM(s) Oral daily  simvastatin 20 milliGRAM(s) Oral at bedtime    Topical/Other Medications  lidocaine   Patch 1 Patch Transdermal every 24 hours    --------------------------------------------------------------------------------------    VITAL SIGNS, INS/OUTS (last 24 hours):  --------------------------------------------------------------------------------------  ICU Vital Signs Last 24 Hrs  T(C): 36.3 (18 Sep 2018 11:39), Max: 36.7 (17 Sep 2018 17:16)  T(F): 97.4 (18 Sep 2018 11:39), Max: 98 (17 Sep 2018 17:16)  HR: 51 (18 Sep 2018 11:39) (51 - 84)  BP: 143/84 (18 Sep 2018 11:39) (115/84 - 143/84)  BP(mean): --  ABP: --  ABP(mean): --  RR: 14 (18 Sep 2018 11:39) (10 - 18)  SpO2: 100% (18 Sep 2018 11:39) (86% - 100%)    I&O's Summary    18 Sep 2018 07:01  -  18 Sep 2018 11:55  --------------------------------------------------------  IN: 0 mL / OUT: 500 mL / NET: -500 mL      --------------------------------------------------------------------------------------    EXAM:  General/Neuro  Exam: Normal, NAD, alert, oriented x 3, follows commands, no focal deficits.     Respiratory  Exam: Lungs clear to auscultation, Normal expansion/effort. No wheezes, rales or rhonchi    Cardiovascular  Exam: S1, S2.  Regular rate and rhythm.  no peripheral edema   Cardiac Rhythm:  Sinus Bradycardia   EKG: Pre-Op: sinus rhythm with 1st degree AV block. rate 67bpm.   Post-op EKG: Sinus bradycardia with 1st degree AV block. rate  51bpm    GI  Exam: Abdomen soft, Non-tender, Non-distended.    Current Diet:  Carb Consistent     Extremities  Exam: swollen R leg. DP/PT pulses palpable and dopplerable  b/l.   Wound:  2 catheter in R. popliteal  c/d/i    :   Exam: No escamilla catheter in place.     Tubes/Lines/Drains    [x] Peripheral IV    LABS  --------------------------------------------------------------------------------------  Labs:  CAPILLARY BLOOD GLUCOSE      POCT Blood Glucose.: 177 mg/dL (18 Sep 2018 07:46)  POCT Blood Glucose.: 141 mg/dL (17 Sep 2018 21:06)  POCT Blood Glucose.: 173 mg/dL (17 Sep 2018 17:13)                          15.2   10.69 )-----------( 286      ( 18 Sep 2018 06:02 )             45.4         09-18    144  |  102  |  22<H>  ----------------------------<  127<H>  4.5   |  27  |  0.9      Calcium, Total Serum: 9.6 mg/dL (09-18-18 @ 06:02)      LFTs:             7.0  | 0.4  | 8        ------------------[74      ( 18 Sep 2018 06:02 )  4.2  | x    | 8           Lipase:x      Amylase:x         Blood Gas Venous - Lactate: 2.6 mmoL/L (09-17-18 @ 09:07)      Coags:     12.70  ----< 1.18    ( 17 Sep 2018 09:44 )     39.7        CARDIAC MARKERS ( 17 Sep 2018 05:51 )  x     / <0.01 ng/mL / x     / x     / x                --------------------------------------------------------------------------------------    OTHER LABS    IMAGING RESULTS  INTERPRETATION:  CLINICAL HISTORY/REASON FOR EXAM: DVT, tachycardia and   hypoxemia.    TECHNIQUE: Contiguous axial CTPA images were obtained from the thoracic   inletto the upper abdomen, following the administration of 80 cc   intravenous contrast.    COMPARISON: CT chest dated 9/13/2016    FINDINGS:    LUNGS, PLEURA AND AIRWAYS: There is right basilar dependent atelectasis.   No focal consolidation, pleural effusion or pneumothorax. There is debris   within the right mainstem bronchus extending predominantly into the right   middle and lower lobe bronchi.    HEART AND VESSELS: Heart size is normal. No pericardial effusion is   present. Thoracic aorta is normal in caliber. There are 2 segmental and   subsegmental pulmonary emboli to all lobes. The main pulmonary arteries   are essentially clear. No CT evidence for right heart strain.    THORACIC INLET/MEDIASTINUM/LYMPH NODES: The visualized portion of the   thyroid gland is unremarkable. There are unchanged enlarged mediastinal   and right hilar lymph nodes measuring up to 1.3 cm in short axis   diameter. No enlarged axillary lymph nodes.    UPPER ABDOMEN: Limited evaluation of the upper abdomen demonstrates no   acute abnormality.    BONES AND SOFT TISSUES: No acute osseous abnormality is noted.    IMPRESSION:     *Bilateral segmental and subsegmental pulmonary emboli.  *No CT evidence for right heart strain.    Debris within the right mainstem bronchus extending predominantly into   the right middle lower lobe bronchi.        EXAM:  DUPLEX SCAN EXT VEINS LOWER BI          PROCEDURE DATE:  09/17/2018      INTERPRETATION:  Clinical History / Reason for exam: The patient is a 63   year old male with leg swelling. A venous duplex examination was   performed to evaluate the patient for deep venous thrombosis of the lower   extremities.    The left common femoral, greater saphenous, superficial femoral,   popliteal and lesser saphenous veins were visualized with no evidence of   deep venous thrombosis    All veinswere fully compressible.  There was presence of spontaneous   flow, augmentation with distal compression and phasicity.  DVT noted in the right femoral, popliteal veins the common femoral vein   appears to be patent  The right anterior posterior and peroneal veins are thrombosed.    Left anterior posterior and peroneal veins are patent      Impression:    DVT noted in the right distal femoral, popliteal. The right anterior   posterior and peroneal veins are thrombosed.    Left lower extremity free from thrombus

## 2018-09-18 NOTE — CHART NOTE - NSCHARTNOTEFT_GEN_A_CORE
Right leg swelling and pain noted to have Right leg extensive DVT  Plan for right leg thrombolysis and thrombectomy

## 2018-09-19 LAB
ANION GAP SERPL CALC-SCNC: 13 MMOL/L — SIGNIFICANT CHANGE UP (ref 7–14)
APTT BLD: 38 SEC — SIGNIFICANT CHANGE UP (ref 27–39.2)
BUN SERPL-MCNC: 21 MG/DL — HIGH (ref 10–20)
CALCIUM SERPL-MCNC: 9 MG/DL — SIGNIFICANT CHANGE UP (ref 8.5–10.1)
CHLORIDE SERPL-SCNC: 107 MMOL/L — SIGNIFICANT CHANGE UP (ref 98–110)
CK MB CFR SERPL CALC: <1 NG/ML — SIGNIFICANT CHANGE UP (ref 0.6–6.3)
CO2 SERPL-SCNC: 25 MMOL/L — SIGNIFICANT CHANGE UP (ref 17–32)
CREAT SERPL-MCNC: 0.9 MG/DL — SIGNIFICANT CHANGE UP (ref 0.7–1.5)
GLUCOSE BLDC GLUCOMTR-MCNC: 111 MG/DL — HIGH (ref 70–99)
GLUCOSE BLDC GLUCOMTR-MCNC: 114 MG/DL — HIGH (ref 70–99)
GLUCOSE BLDC GLUCOMTR-MCNC: 123 MG/DL — HIGH (ref 70–99)
GLUCOSE BLDC GLUCOMTR-MCNC: 141 MG/DL — HIGH (ref 70–99)
GLUCOSE SERPL-MCNC: 97 MG/DL — SIGNIFICANT CHANGE UP (ref 70–99)
HCT VFR BLD CALC: 43.4 % — SIGNIFICANT CHANGE UP (ref 42–52)
HGB BLD-MCNC: 14.7 G/DL — SIGNIFICANT CHANGE UP (ref 14–18)
INR BLD: 1.22 RATIO — SIGNIFICANT CHANGE UP (ref 0.65–1.3)
MAGNESIUM SERPL-MCNC: 2 MG/DL — SIGNIFICANT CHANGE UP (ref 1.8–2.4)
MCHC RBC-ENTMCNC: 29.5 PG — SIGNIFICANT CHANGE UP (ref 27–31)
MCHC RBC-ENTMCNC: 33.9 G/DL — SIGNIFICANT CHANGE UP (ref 32–37)
MCV RBC AUTO: 87.1 FL — SIGNIFICANT CHANGE UP (ref 80–94)
NRBC # BLD: 0 /100 WBCS — SIGNIFICANT CHANGE UP (ref 0–0)
PHOSPHATE SERPL-MCNC: 2.5 MG/DL — SIGNIFICANT CHANGE UP (ref 2.1–4.9)
PLATELET # BLD AUTO: 221 K/UL — SIGNIFICANT CHANGE UP (ref 130–400)
POTASSIUM SERPL-MCNC: 4.1 MMOL/L — SIGNIFICANT CHANGE UP (ref 3.5–5)
POTASSIUM SERPL-SCNC: 4.1 MMOL/L — SIGNIFICANT CHANGE UP (ref 3.5–5)
PROTHROM AB SERPL-ACNC: 13.2 SEC — HIGH (ref 9.95–12.87)
RBC # BLD: 4.98 M/UL — SIGNIFICANT CHANGE UP (ref 4.7–6.1)
RBC # FLD: 13.6 % — SIGNIFICANT CHANGE UP (ref 11.5–14.5)
SODIUM SERPL-SCNC: 145 MMOL/L — SIGNIFICANT CHANGE UP (ref 135–146)
TROPONIN T SERPL-MCNC: <0.01 NG/ML — SIGNIFICANT CHANGE UP
WBC # BLD: 11.45 K/UL — HIGH (ref 4.8–10.8)
WBC # FLD AUTO: 11.45 K/UL — HIGH (ref 4.8–10.8)

## 2018-09-19 PROCEDURE — 99233 SBSQ HOSP IP/OBS HIGH 50: CPT

## 2018-09-19 RX ORDER — LOSARTAN POTASSIUM 100 MG/1
100 TABLET, FILM COATED ORAL ONCE
Qty: 0 | Refills: 0 | Status: COMPLETED | OUTPATIENT
Start: 2018-09-19 | End: 2018-09-19

## 2018-09-19 RX ORDER — MORPHINE SULFATE 50 MG/1
60 CAPSULE, EXTENDED RELEASE ORAL EVERY 12 HOURS
Qty: 0 | Refills: 0 | Status: DISCONTINUED | OUTPATIENT
Start: 2018-09-19 | End: 2018-09-20

## 2018-09-19 RX ORDER — PANTOPRAZOLE SODIUM 20 MG/1
40 TABLET, DELAYED RELEASE ORAL DAILY
Qty: 0 | Refills: 0 | Status: DISCONTINUED | OUTPATIENT
Start: 2018-09-19 | End: 2018-09-20

## 2018-09-19 RX ORDER — ALTEPLASE 100 MG
0.5 KIT INTRAVENOUS
Qty: 5 | Refills: 0 | Status: DISCONTINUED | OUTPATIENT
Start: 2018-09-19 | End: 2018-09-20

## 2018-09-19 RX ORDER — LOSARTAN POTASSIUM 100 MG/1
100 TABLET, FILM COATED ORAL DAILY
Qty: 0 | Refills: 0 | Status: DISCONTINUED | OUTPATIENT
Start: 2018-09-19 | End: 2018-09-19

## 2018-09-19 RX ADMIN — LOSARTAN POTASSIUM 100 MILLIGRAM(S): 100 TABLET, FILM COATED ORAL at 05:35

## 2018-09-19 RX ADMIN — Medication 20 MILLIGRAM(S): at 05:34

## 2018-09-19 RX ADMIN — PANTOPRAZOLE SODIUM 40 MILLIGRAM(S): 20 TABLET, DELAYED RELEASE ORAL at 22:26

## 2018-09-19 RX ADMIN — Medication 1 MILLIGRAM(S): at 13:31

## 2018-09-19 RX ADMIN — HEPARIN SODIUM 5 UNIT(S)/HR: 5000 INJECTION INTRAVENOUS; SUBCUTANEOUS at 15:25

## 2018-09-19 RX ADMIN — Medication 1 MILLIGRAM(S): at 05:35

## 2018-09-19 RX ADMIN — ALTEPLASE 10 MG/HR: KIT at 15:25

## 2018-09-19 RX ADMIN — Medication 1 MILLIGRAM(S): at 22:39

## 2018-09-19 RX ADMIN — Medication 3 UNIT(S): at 11:14

## 2018-09-19 RX ADMIN — Medication 40 MILLIGRAM(S): at 05:34

## 2018-09-19 RX ADMIN — OXYCODONE AND ACETAMINOPHEN 1 TABLET(S): 5; 325 TABLET ORAL at 13:31

## 2018-09-19 RX ADMIN — DULOXETINE HYDROCHLORIDE 60 MILLIGRAM(S): 30 CAPSULE, DELAYED RELEASE ORAL at 11:52

## 2018-09-19 RX ADMIN — LEVETIRACETAM 500 MILLIGRAM(S): 250 TABLET, FILM COATED ORAL at 05:34

## 2018-09-19 RX ADMIN — MORPHINE SULFATE 60 MILLIGRAM(S): 50 CAPSULE, EXTENDED RELEASE ORAL at 17:08

## 2018-09-19 RX ADMIN — Medication 10 MILLIGRAM(S): at 05:34

## 2018-09-19 RX ADMIN — HYDROMORPHONE HYDROCHLORIDE 0.5 MILLIGRAM(S): 2 INJECTION INTRAMUSCULAR; INTRAVENOUS; SUBCUTANEOUS at 12:15

## 2018-09-19 RX ADMIN — LOSARTAN POTASSIUM 100 MILLIGRAM(S): 100 TABLET, FILM COATED ORAL at 18:21

## 2018-09-19 RX ADMIN — OXYCODONE AND ACETAMINOPHEN 2 TABLET(S): 5; 325 TABLET ORAL at 19:05

## 2018-09-19 RX ADMIN — Medication 3 UNIT(S): at 08:01

## 2018-09-19 RX ADMIN — HYDROMORPHONE HYDROCHLORIDE 0.5 MILLIGRAM(S): 2 INJECTION INTRAMUSCULAR; INTRAVENOUS; SUBCUTANEOUS at 05:35

## 2018-09-19 RX ADMIN — OXYCODONE AND ACETAMINOPHEN 2 TABLET(S): 5; 325 TABLET ORAL at 08:15

## 2018-09-19 RX ADMIN — OXYCODONE AND ACETAMINOPHEN 2 TABLET(S): 5; 325 TABLET ORAL at 01:47

## 2018-09-19 RX ADMIN — HYDROMORPHONE HYDROCHLORIDE 1 MILLIGRAM(S): 2 INJECTION INTRAMUSCULAR; INTRAVENOUS; SUBCUTANEOUS at 00:33

## 2018-09-19 RX ADMIN — OXYCODONE AND ACETAMINOPHEN 2 TABLET(S): 5; 325 TABLET ORAL at 18:35

## 2018-09-19 RX ADMIN — SIMVASTATIN 20 MILLIGRAM(S): 20 TABLET, FILM COATED ORAL at 21:35

## 2018-09-19 RX ADMIN — LEVETIRACETAM 500 MILLIGRAM(S): 250 TABLET, FILM COATED ORAL at 17:08

## 2018-09-19 RX ADMIN — Medication 1.25 MILLIGRAM(S): at 19:32

## 2018-09-19 RX ADMIN — OXYCODONE AND ACETAMINOPHEN 2 TABLET(S): 5; 325 TABLET ORAL at 00:33

## 2018-09-19 RX ADMIN — OXYCODONE AND ACETAMINOPHEN 1 TABLET(S): 5; 325 TABLET ORAL at 14:39

## 2018-09-19 RX ADMIN — HYDROMORPHONE HYDROCHLORIDE 0.5 MILLIGRAM(S): 2 INJECTION INTRAMUSCULAR; INTRAVENOUS; SUBCUTANEOUS at 11:52

## 2018-09-19 RX ADMIN — ALTEPLASE 10 MG/HR: KIT at 23:41

## 2018-09-19 RX ADMIN — OXYCODONE AND ACETAMINOPHEN 2 TABLET(S): 5; 325 TABLET ORAL at 09:23

## 2018-09-19 RX ADMIN — INSULIN GLARGINE 10 UNIT(S): 100 INJECTION, SOLUTION SUBCUTANEOUS at 22:07

## 2018-09-19 RX ADMIN — LIDOCAINE 1 PATCH: 4 CREAM TOPICAL at 13:00

## 2018-09-19 RX ADMIN — Medication 10 MILLIGRAM(S): at 13:31

## 2018-09-19 RX ADMIN — OXYCODONE AND ACETAMINOPHEN 1 TABLET(S): 5; 325 TABLET ORAL at 11:08

## 2018-09-19 RX ADMIN — OXYCODONE AND ACETAMINOPHEN 1 TABLET(S): 5; 325 TABLET ORAL at 22:38

## 2018-09-19 RX ADMIN — Medication 10 MILLIGRAM(S): at 21:35

## 2018-09-19 RX ADMIN — LIDOCAINE 1 PATCH: 4 CREAM TOPICAL at 01:06

## 2018-09-19 RX ADMIN — MORPHINE SULFATE 60 MILLIGRAM(S): 50 CAPSULE, EXTENDED RELEASE ORAL at 17:38

## 2018-09-19 RX ADMIN — Medication 3 UNIT(S): at 16:02

## 2018-09-19 NOTE — PROGRESS NOTE ADULT - SUBJECTIVE AND OBJECTIVE BOX
Patient should be restarted on home medications.  PA spoken with about restarting the medications and if the patient still complains of unmanageable chronic not acute pain please re consult

## 2018-09-19 NOTE — PROGRESS NOTE ADULT - SUBJECTIVE AND OBJECTIVE BOX
Progress Note: General Surgery  Patient: SANA JOE , 63y (1955)Male   MRN: [id number]  Location: [place] [address] A  Visit: 09-17-18 Inpatient  Date: 09-19-18 @ 06:15  Hospital Day: 2  Post-op Day: 1    Procedure/Diagnosis: s/p thrombolysis of RLE DVT  Events over 24h: No acute events, patient tolerated procedure well.  Patient tolerating diet, complains of back pain from chronic back pain and lying in bed, states he does not want to be on bedrest any longer.  Denies chest pain, shortness of breath, nausea, vomiting, fever, chills.    Vitals: T(F): 97.1 (09-19-18 @ 04:00), Max: 97.6 (09-18-18 @ 10:39)  HR: 56 (09-19-18 @ 06:00)  BP: 164/78 (09-19-18 @ 06:00) (115/84 - 179/91)  RR: 16 (09-19-18 @ 06:00)  SpO2: 94% (09-19-18 @ 06:00)    In:   09-18-18 @ 07:01  -  09-19-18 @ 06:15  --------------------------------------------------------  IN: 1440 mL      Out:   09-18-18 @ 07:01  -  09-19-18 @ 06:15  --------------------------------------------------------  OUT:    Voided: 1350 mL  Total OUT: 1350 mL        Net:   09-18-18 @ 07:01  -  09-19-18 @ 06:15  --------------------------------------------------------  NET: 90 mL      Diet: Diet, Consistent Carbohydrate/No Snacks (09-18-18 @ 10:54)      Physical Examination:  General Appearance: NAD, alert and cooperative  HEENT: NCAT,  RAHEEM, EOMI  Heart: S1 and S2. No murmurs. Rhythm is regular   Lungs: Clear to auscultation BL   Abdomen:  Positive bowel sounds. Soft, nondistended, nontender  MSK/Extremities: erythema of RLE, palpable PT and DP bilateral LEs, catheter in place in RLE, dressing clean and dry    Medications: [Standing]  ALBUTerol    0.083%. 2.5 milliGRAM(s) Nebulizer once  ALPRAZolam 1 milliGRAM(s) Oral three times a day  alteplase    Infusion 0.5 mG/Hr (10 mL/Hr) IV Continuous <Continuous>  baclofen 10 milliGRAM(s) Oral three times a day  DULoxetine 60 milliGRAM(s) Oral daily  furosemide    Tablet 20 milliGRAM(s) Oral daily  heparin  Infusion 500 Unit(s)/Hr (5 mL/Hr) IV Continuous <Continuous>  insulin glargine Injectable (LANTUS) 10 Unit(s) SubCutaneous at bedtime  insulin lispro Injectable (HumaLOG) 3 Unit(s) SubCutaneous three times a day before meals  levETIRAcetam 500 milliGRAM(s) Oral two times a day  lidocaine   Patch 1 Patch Transdermal every 24 hours  losartan 100 milliGRAM(s) Oral daily  predniSONE   Tablet 40 milliGRAM(s) Oral daily  simvastatin 20 milliGRAM(s) Oral at bedtime    DVT Prophylaxis: heparin  Infusion 500 Unit(s)/Hr IV Continuous <Continuous>    GI Prophylaxis:   Antibiotics:   Anticoagulation:   Medications:[PRN]  ALBUTerol    90 MICROgram(s) HFA Inhaler 2 Puff(s) Inhalation every 6 hours PRN  cyclobenzaprine 10 milliGRAM(s) Oral three times a day PRN  HYDROmorphone  Injectable 0.5 milliGRAM(s) IV Push every 4 hours PRN  mometasone 100 MICROgram(s) HFA Inhaler 2 Puff(s) Inhalation daily PRN  oxyCODONE    5 mG/acetaminophen 325 mG 1 Tablet(s) Oral every 4 hours PRN  oxyCODONE    5 mG/acetaminophen 325 mG 2 Tablet(s) Oral every 4 hours PRN  zolpidem 5 milliGRAM(s) Oral at bedtime PRN    Labs:                        14.7   11.45 )-----------( 221      ( 19 Sep 2018 04:35 )             43.4     09-19    145  |  107  |  21<H>  ----------------------------<  97  4.1   |  25  |  0.9    Ca    9.0      19 Sep 2018 04:35  Phos  2.5     09-19  Mg     2.0     09-19    TPro  7.0  /  Alb  4.2  /  TBili  0.4  /  DBili  x   /  AST  8   /  ALT  8   /  AlkPhos  74  09-18    LIVER FUNCTIONS - ( 18 Sep 2018 06:02 )  Alb: 4.2 g/dL / Pro: 7.0 g/dL / ALK PHOS: 74 U/L / ALT: 8 U/L / AST: 8 U/L / GGT: x           PT/INR - ( 19 Sep 2018 04:35 )   PT: 13.20 sec;   INR: 1.22 ratio         PTT - ( 19 Sep 2018 04:35 )  PTT:38.0 sec    CARDIAC MARKERS ( 19 Sep 2018 04:35 )  x     / <0.01 ng/mL / x     / x     / <1.0 ng/mL  CARDIAC MARKERS ( 18 Sep 2018 17:25 )  x     / <0.01 ng/mL / 37 U/L / x     / 1.2 ng/mL  CARDIAC MARKERS ( 18 Sep 2018 11:50 )  x     / <0.01 ng/mL / 39 U/L / x     / 1.0 ng/mL      Urine/Micro:      Imaging:  None/24h

## 2018-09-19 NOTE — PROGRESS NOTE ADULT - SUBJECTIVE AND OBJECTIVE BOX
SNAA JOE  [id number]  63y Male    Indication for ICU admission:  Admit Date:09-17-18  ICU Date: 09-17-18  OR Date: 09-17-18    aspirin (Other)  Originally Entered as [ANGIOEDEMA] reaction to [pineapple] (Unknown)  penicillins (Other)  pineapples (Anaphylaxis)    PAST MEDICAL & SURGICAL HISTORY:  Syncopal episodes  Uncomplicated opioid dependence  Chronic low back pain with sciatica, sciatica laterality unspecified, unspecified back pain laterality  Asthma  High cholesterol  Diabetes  HTN (hypertension)  Anxiety  History of back surgery: x 3    Home Medications:  ALPRAZolam 1 mg oral tablet: 1 tab(s) orally 3 times a day (17 Sep 2018 14:35)  Asmanex  mcg/inh inhalation aerosol:  (17 Sep 2018 14:35)  Atrovent HFA:  (17 Sep 2018 14:35)  baclofen 10 mg oral tablet: 1 tab(s) orally 3 times a day (17 Sep 2018 14:35)  cyclobenzaprine 10 mg oral tablet: 1 tab(s) orally 3 times a day (17 Sep 2018 14:35)  DULoxetine 60 mg oral delayed release capsule: 1 cap(s) orally once a day (17 Sep 2018 14:35)  fenofibrate 160 mg oral tablet: 1 tab(s) orally once a day (17 Sep 2018 14:35)  furosemide 20 mg oral tablet: 1 tab(s) orally once a day (07 Mar 2018 05:27)  levETIRAcetam 500 mg oral tablet, extended release: 2 tab(s) orally once a day (07 Mar 2018 05:27)  losartan 100 mg oral tablet: 1 tab(s) orally once a day (07 Mar 2018 05:27)  metFORMIN 500 mg oral tablet: 1 tab(s) orally 2 times a day (07 Mar 2018 05:27)  morphine 60 mg/12 hours oral tablet, extended release: 1 tab(s) orally every 12 hours (17 Sep 2018 14:35)  Percocet 10/325:  (07 Mar 2018 05:27)  simvastatin 20 mg oral tablet: 1 tab(s) orally once a day (at bedtime) (07 Mar 2018 05:27)  tamsulosin 0.4 mg oral capsule: 1 cap(s) orally once a day (17 Sep 2018 14:35)  Ventolin HFA 90 mcg/inh inhalation aerosol:  (07 Mar 2018 05:27)  zolpidem 10 mg oral tablet: 1 tab(s) orally once a day (at bedtime) (17 Sep 2018 14:35)        24HRS EVENT:    Patient is a 63 year old male with PMH significant for COPD, Asthma, chronic back pain, HTN, DM, DLD, Anxiety presents complaining of pain and swelling of RLE for 3 days.  Patient states that he noticed this swelling 3 days ago and it has been gradually getting worse, patient is sedentary at home due to chronic pack pain.  He states the pain was so bad this morning he came to the ED.  Patient denies chest pain, SOB, fever, chills, nausea, vomiting.  States that he has never had a DVT in the past, his last colonoscopy was 3 years ago and he states that he was told it was "ok".  Patient was found to have DVT in femoral, popliteal, gastroc veins on venous duplex and CTA showed bilateral segmental and subsegmental pulmonary emboli.  Patient is a current smoker, has smoked 1/2 PPD for 44 years. Vascular consult called, pt evaluated and went to OR for Right Leg venogram, thrombolysis & thrombectomy. The procedure was uneventful, no pressors used, no blood transfusions given.  Pt seen in PACU, extubated, neurologically intact with palpable DP/PT pulses. There are 2 catheters in R. popliteal fossa, 1 will be running the TPA and Heparin will run peripherally for 24hrs. Due to extensive clots found during the procedure, the patient will go back to OR on Thursday so TPA will have more time to be effective.     Neurologic: Intact; Pain control Oxy 5mg 1 tab q4 mild, 2 tabs q4 moderate, dilaudid 0.5 severe....q1 neurovascular check  Con't anti-anxiety meds..pt takes morphine 60mg q12 at home and percocet....consider pain management consult.   Respiratory:  Extubated, on 2L NC. wean off as tolerated. Currently using inhaler    Cardiovascular: Sinus bradycardia post-op.  con't to monitor. f/u 2D echo result. trop (-)x1..f/u 1800 and 23:30  Cardiologist; Dr Altman ( last saw pt in March 2018): no severe cardiac issues from last visit  Gastrointestinal/Nutrition: DM on Carb consistent diet, PPI.  make NPO wed night for OR on Thurs  Renal/Genitourinary: no escamilla.post-op lytes wnl, monitor I/O's. f/u 23:30labs  Hematologic:post-op hg stable at 15.6..on pt specific Heparin at 500 through peripheral IV's for 24hrs, per vascular team, not making therapeutic. TPA at .5mg through R leg catheter for 24hrs.  Infectious Disease: no abx...post-op wbc 12.67 from 10.69  Lines/Tubes: 2PIV's   Endocrine: DM, on Insulin. monitor FS  Disposition: Admit to SICU            DVT PTX:     GI PTX: pptx    ***Tubes/Lines/Drains  ***  Peripheral IV  Central Venous Line     	Date   Arterial Line		                Date   [] PICC:         	[] Midline		[] Mediport             Urinary Catheter		Indication: Strict I&O    Date Placed:       REVIEW OF SYSTEMS    [ X] A ten-point review of systems was otherwise negative except as noted. SANA JOE  [id number]  63y Male    Indication for ICU admission:  Admit Date:18  ICU Date: 18  OR Date: 18    aspirin (Other)  Originally Entered as [ANGIOEDEMA] reaction to [pineapple] (Unknown)  penicillins (Other)  pineapples (Anaphylaxis)    PAST MEDICAL & SURGICAL HISTORY:  Syncopal episodes  Uncomplicated opioid dependence  Chronic low back pain with sciatica, sciatica laterality unspecified, unspecified back pain laterality  Asthma  High cholesterol  Diabetes  HTN (hypertension)  Anxiety  History of back surgery: x 3    Home Medications:  ALPRAZolam 1 mg oral tablet: 1 tab(s) orally 3 times a day (17 Sep 2018 14:35)  Asmanex  mcg/inh inhalation aerosol:  (17 Sep 2018 14:35)  Atrovent HFA:  (17 Sep 2018 14:35)  baclofen 10 mg oral tablet: 1 tab(s) orally 3 times a day (17 Sep 2018 14:35)  cyclobenzaprine 10 mg oral tablet: 1 tab(s) orally 3 times a day (17 Sep 2018 14:35)  DULoxetine 60 mg oral delayed release capsule: 1 cap(s) orally once a day (17 Sep 2018 14:35)  fenofibrate 160 mg oral tablet: 1 tab(s) orally once a day (17 Sep 2018 14:35)  furosemide 20 mg oral tablet: 1 tab(s) orally once a day (07 Mar 2018 05:27)  levETIRAcetam 500 mg oral tablet, extended release: 2 tab(s) orally once a day (07 Mar 2018 05:27)  losartan 100 mg oral tablet: 1 tab(s) orally once a day (07 Mar 2018 05:27)  metFORMIN 500 mg oral tablet: 1 tab(s) orally 2 times a day (07 Mar 2018 05:27)  morphine 60 mg/12 hours oral tablet, extended release: 1 tab(s) orally every 12 hours (17 Sep 2018 14:35)  Percocet 10/325:  (07 Mar 2018 05:27)  simvastatin 20 mg oral tablet: 1 tab(s) orally once a day (at bedtime) (07 Mar 2018 05:27)  tamsulosin 0.4 mg oral capsule: 1 cap(s) orally once a day (17 Sep 2018 14:35)  Ventolin HFA 90 mcg/inh inhalation aerosol:  (07 Mar 2018 05:27)  zolpidem 10 mg oral tablet: 1 tab(s) orally once a day (at bedtime) (17 Sep 2018 14:35)        24HRS EVENT:    Patient is a 63 year old male with PMH significant for COPD, Asthma, chronic back pain, HTN, DM, DLD, Anxiety presents complaining of pain and swelling of RLE for 3 days.  Patient states that he noticed this swelling 3 days ago and it has been gradually getting worse, patient is sedentary at home due to chronic pack pain.  He states the pain was so bad this morning he came to the ED.  Patient denies chest pain, SOB, fever, chills, nausea, vomiting.  States that he has never had a DVT in the past, his last colonoscopy was 3 years ago and he states that he was told it was "ok".  Patient was found to have DVT in femoral, popliteal, gastroc veins on venous duplex and CTA showed bilateral segmental and subsegmental pulmonary emboli.  Patient is a current smoker, has smoked 1/2 PPD for 44 years. Vascular consult called, pt evaluated and went to OR for Right Leg venogram, thrombolysis & thrombectomy. The procedure was uneventful, no pressors used, no blood transfusions given.  Pt seen in PACU, extubated, neurologically intact with palpable DP/PT pulses. There are 2 catheters in R. popliteal fossa, 1 will be running the TPA and Heparin will run peripherally for 24hrs. Due to extensive clots found during the procedure, the patient will go back to OR on Thursday so TPA will have more time to be effective.     Neurologic: Intact; Pain control Oxy 5mg 1 tab q4 mild, 2 tabs q4 moderate, dilaudid 0.5 severe....q1 neurovascular check  Con't anti-anxiety meds..pt takes morphine 60mg q12 at home and percocet....r pain management consult pending  Respiratory: Sating well on RA. Hx of COPD-- use inhaler prn   Cardiovascular: Sinus bradycardia post-op.  con't to monitor. trop (-)3.  2D echo ordered - results pending  Cardiologist; spoke with private cardiologist Dr Altman  ( last saw pt in 2018): no severe cardiac issues from last visit  Gastrointestinal/Nutrition: DM on Carb consistent diet, PPI.  make NPO wed night for OR on Thurs  Renal/Genitourinary: no escamilla.post-op lytes wnl, monitor I/O's.  Hematologic:post-op hg stable at 15.6..on pt specific Heparin at 500 through peripheral IV's for 24hrs, per vascular team, not making therapeutic. TPA at .5mg through R leg catheter for 24hrs.  Infectious Disease: no abx...post-op wbc 12.67 from 10.69  Lines/Tubes: 2PIV's   Endocrine: DM, on Insulin. monitor FS  Disposition: Admit to SICU      Overnight: pt complain of back pain...      DVT PTX:     GI PTX: pptx    ***Tubes/Lines/Drains  ***  Peripheral IV    REVIEW OF SYSTEMS    [ X] A ten-point review of systems was otherwise negative except as noted.    Daily Height in cm: 180.34 (18 Sep 2018 09:18)    Daily Weight in k (18 Sep 2018 14:00)    Diet, Consistent Carbohydrate/No Snacks (18 @ 08:26)      CURRENT MEDS:  Neurologic Medications  ALPRAZolam 1 milliGRAM(s) Oral three times a day  baclofen 10 milliGRAM(s) Oral three times a day  cyclobenzaprine 10 milliGRAM(s) Oral three times a day PRN Muscle Spasm  DULoxetine 60 milliGRAM(s) Oral daily  HYDROmorphone  Injectable 0.5 milliGRAM(s) IV Push every 4 hours PRN Severe Pain (7 - 10)  levETIRAcetam 500 milliGRAM(s) Oral two times a day  oxyCODONE    5 mG/acetaminophen 325 mG 1 Tablet(s) Oral every 4 hours PRN Mild Pain (1 - 3)  oxyCODONE    5 mG/acetaminophen 325 mG 2 Tablet(s) Oral every 4 hours PRN Moderate Pain (4 - 6)  zolpidem 5 milliGRAM(s) Oral at bedtime PRN Insomnia    Respiratory Medications  ALBUTerol    0.083%. 2.5 milliGRAM(s) Nebulizer once  ALBUTerol    90 MICROgram(s) HFA Inhaler 2 Puff(s) Inhalation every 6 hours PRN Bronchospasm  mometasone 100 MICROgram(s) HFA Inhaler 2 Puff(s) Inhalation daily PRN Bronchospasm    Cardiovascular Medications  furosemide    Tablet 20 milliGRAM(s) Oral daily  losartan 100 milliGRAM(s) Oral daily    Gastrointestinal Medications    Genitourinary Medications    Hematologic/Oncologic Medications  alteplase    Infusion 0.5 mG/Hr IV Continuous <Continuous>  heparin  Infusion 500 Unit(s)/Hr IV Continuous <Continuous>    Antimicrobial/Immunologic Medications    Endocrine/Metabolic Medications  insulin glargine Injectable (LANTUS) 10 Unit(s) SubCutaneous at bedtime  insulin lispro Injectable (HumaLOG) 3 Unit(s) SubCutaneous three times a day before meals  predniSONE   Tablet 40 milliGRAM(s) Oral daily  simvastatin 20 milliGRAM(s) Oral at bedtime    Topical/Other Medications  lidocaine   Patch 1 Patch Transdermal every 24 hours      ICU Vital Signs Last 24 Hrs  T(C): 35.6 (19 Sep 2018 07:30), Max: 36.4 (18 Sep 2018 10:39)  T(F): 96 (19 Sep 2018 07:30), Max: 97.6 (18 Sep 2018 10:39)  HR: 56 (19 Sep 2018 08:00) (42 - 72)  BP: 155/77 (19 Sep 2018 08:00) (115/84 - 179/91)  BP(mean): 109 (19 Sep 2018 08:00) (83 - 130)  ABP: --  ABP(mean): --  RR: 22 (19 Sep 2018 08:00) (10 - 47)  SpO2: 93% (19 Sep 2018 08:00) (87% - 100%)          I&O's Summary    18 Sep 2018 07:  -  19 Sep 2018 07:00  --------------------------------------------------------  IN: 1455 mL / OUT: 1350 mL / NET: 105 mL    19 Sep 2018 07:  -  19 Sep 2018 08:33  --------------------------------------------------------  IN: 15 mL / OUT: 0 mL / NET: 15 mL      I&O's Detail    18 Sep 2018 07:  -  19 Sep 2018 07:00  --------------------------------------------------------  IN:    alteplase Infusion: 186 mL    heparin Infusion: 93 mL    lactated ringers.: 416 mL    Oral Fluid: 760 mL  Total IN: 1455 mL    OUT:    Voided: 1350 mL  Total OUT: 1350 mL    Total NET: 105 mL      19 Sep 2018 07:01  -  19 Sep 2018 08:33  --------------------------------------------------------  IN:    alteplase Infusion: 10 mL    heparin Infusion: 5 mL  Total IN: 15 mL    OUT:  Total OUT: 0 mL    Total NET: 15 mL          PHYSICAL EXAM:    General/Neuro  Exam:    alert & oriented x 3, no focal deficits                           Lungs:      clear to auscultation, Normal expansion/effort.     Cardiovascular : S1, S2.  Regular rate and rhythm.  Peripheral edema   Cardiac Rhythm: Normal Sinus Rhythm    GI: Abdomen soft, Non-tender, Non-distended.      Extremities: Pulses palpable b/l  Catheter in R. popliteal: incision c/d/i     :       no escamilla catheter in place.      CXR:     LABS:  CAPILLARY BLOOD GLUCOSE      POCT Blood Glucose.: 114 mg/dL (19 Sep 2018 07:56)  POCT Blood Glucose.: 126 mg/dL (18 Sep 2018 17:03)                          14.7   11.45 )-----------( 221      ( 19 Sep 2018 04:35 )             43.4           145  |  107  |  21<H>  ----------------------------<  97  4.1   |  25  |  0.9    Ca    9.0      19 Sep 2018 04:35  Phos  2.5       Mg     2.0         TPro  7.0  /  Alb  4.2  /  TBili  0.4  /  DBili  x   /  AST  8   /  ALT  8   /  AlkPhos  74  18      PT/INR - ( 19 Sep 2018 04:35 )   PT: 13.20 sec;   INR: 1.22 ratio         PTT - ( 19 Sep 2018 04:35 )  PTT:38.0 sec  CARDIAC MARKERS ( 19 Sep 2018 04:35 )  x     / <0.01 ng/mL / x     / x     / <1.0 ng/mL  CARDIAC MARKERS ( 18 Sep 2018 17:25 )  x     / <0.01 ng/mL / 37 U/L / x     / 1.2 ng/mL  CARDIAC MARKERS ( 18 Sep 2018 11:50 )  x     / <0.01 ng/mL / 39 U/L / x     / 1.0 ng/mL

## 2018-09-19 NOTE — PROGRESS NOTE ADULT - ASSESSMENT
PLAN:   Neurologic: Pain control Dilaudid. Q1 neurovascular check  Con't anti-anxiety and pain meds   Respiratory:  Extubated, on 2L NC. wean off as tolerated. Currently using inhaler    Cardiovascular: Sinus bradycardia post-op.  con't to monitor. f/u 2D echo result. f/u 3 sets of CE  Cardiologist; Dr Altman ( last saw pt in March 2018): no severe cardiac issues from last visit  Gastrointestinal/Nutrition: Carb consistent diet, PPI.  make NPO wed night for OR on Thurs  Renal/Genitourinary: no escamilla.monitor lytes, monitor I/O's  Hematologic: Heparin at 500 through peripheral IV's for 24hrs, per vascular team, not making therapeutic. TPA at .5mg through R leg catheter for 24hrs.  Infectious Disease: no abx  Lines/Tubes: 2PIV's   Endocrine: on Decadron. on Insulin. monitor FS  Disposition: Admit to SICU PLAN:   Neurologic: Pain control Oxy 5mg 1 tab q4 mild, 2 tabs q4 moderate, dilaudid 0.5 severe. Q1 neurovascular check  Con't anti-anxiety and pain meds. pain management consulted: results pending    Respiratory:  Extubated, Sating well on RA. Hx of COPD - use inhaler prn  Cardiovascular: Keep normotensive.  Trop (-)x3  2D echo ordered - f/u results   spoke with private cardiologist Dr Altman ( last saw pt in March 2018): no severe cardiac issues from last visit  Gastrointestinal/Nutrition: Carb consistent diet, PPI.  NPO after midnight or OR on Thurs  Renal/Genitourinary: no escamilla in place. monitor lytes, monitor I/O's  Hematologic: Hg stable. con't Heparin at 500 through peripheral IV's for 24hrs, per vascular team, not making therapeutic. TPA at .5mg through R leg catheter for 24hrs.  Infectious Disease: no abx  Lines/Tubes: 2PIV's   Endocrine: Hx of DM on Insulin. monitor FS  Disposition: Con't  SICU

## 2018-09-19 NOTE — PROGRESS NOTE ADULT - ASSESSMENT
Assessment:  63y Male patient admitted S/P thrombolysis of RLE DVT, with the above physical exam, labs, and imaging findings.    Plan:  -heparin at 500U  -tpa at 0.5  -pain control  -continue regular diet  -OR Thursday for thrombectomy and catheter removal  -Preop tonight    Date/Time: 09-19-18 @ 06:15

## 2018-09-19 NOTE — PROGRESS NOTE ADULT - ASSESSMENT
RLE + DVT + PE-  POD#1-thrombectomy-still with TPA;  Obesity w chronic LBP, on chronic opioid Rx- reviewed meds with RN and pt; + DM  PLAN:   Neurologic: Pain control Oxy 5mg 1 tab q4 mild, 2 tabs q4 moderate, dilaudid 0.5 severe. Q1 neurovascular check  Con't anti-anxiety and pain meds. pain management consulted: results pending    Respiratory:  Extubated, Sating well on RA. Hx of COPD - use inhaler prn  Cardiovascular: Keep normotensive.  Trop (-)x3  2D echo ordered - f/u results   spoke with private cardiologist Dr Altman ( last saw pt in March 2018): no severe cardiac issues from last visit  Gastrointestinal/Nutrition: Carb consistent diet, PPI.  NPO after midnight or OR on Thurs  Renal/Genitourinary: no escamilla in place. monitor lytes, monitor I/O's  Hematologic: Hg stable. con't Heparin at 500 through peripheral IV's for 24hrs, per vascular team, not making therapeutic. TPA at .5mg through R leg catheter for 24hrs.  Infectious Disease: no abx  Lines/Tubes: 2PIV's   Endocrine: Hx of DM on Insulin. monitor FS  Disposition: Con't  SICU; will follow with surgery

## 2018-09-19 NOTE — PROGRESS NOTE ADULT - SUBJECTIVE AND OBJECTIVE BOX
Chart reviewed, patient examined. Pertinent results reviewed.  specialist f/u reviewed  HD#3; POD#1    SANA JOE  [id number]  63y Male    Indication for ICU admission:  Admit Date:18  ICU Date: 18- perioperative for Vascular surgery;   OR Date: 18- LE thrombolysis & thrombectomy    aspirin (Other)  Originally Entered as [ANGIOEDEMA] reaction to [pineapple] (Unknown)  penicillins (Other)  pineapples (Anaphylaxis)    PAST MEDICAL & SURGICAL HISTORY:  Syncopal episodes  Uncomplicated opioid dependence  Chronic low back pain with sciatica, sciatica laterality unspecified, unspecified back pain laterality  Asthma  High cholesterol  Diabetes  HTN (hypertension)  Anxiety  History of back surgery: x 3    Home Medications:  ALPRAZolam 1 mg oral tablet: 1 tab(s) orally 3 times a day (17 Sep 2018 14:35)  Asmanex  mcg/inh inhalation aerosol:  (17 Sep 2018 14:35)  Atrovent HFA:  (17 Sep 2018 14:35)  baclofen 10 mg oral tablet: 1 tab(s) orally 3 times a day (17 Sep 2018 14:35)  cyclobenzaprine 10 mg oral tablet: 1 tab(s) orally 3 times a day (17 Sep 2018 14:35)  DULoxetine 60 mg oral delayed release capsule: 1 cap(s) orally once a day (17 Sep 2018 14:35)  fenofibrate 160 mg oral tablet: 1 tab(s) orally once a day (17 Sep 2018 14:35)  furosemide 20 mg oral tablet: 1 tab(s) orally once a day (07 Mar 2018 05:27)  levETIRAcetam 500 mg oral tablet, extended release: 2 tab(s) orally once a day (07 Mar 2018 05:27)  losartan 100 mg oral tablet: 1 tab(s) orally once a day (07 Mar 2018 05:27)  metFORMIN 500 mg oral tablet: 1 tab(s) orally 2 times a day (07 Mar 2018 05:27)  morphine 60 mg/12 hours oral tablet, extended release: 1 tab(s) orally every 12 hours (17 Sep 2018 14:35)  Percocet 10/325:  (07 Mar 2018 05:27)  simvastatin 20 mg oral tablet: 1 tab(s) orally once a day (at bedtime) (07 Mar 2018 05:27)  tamsulosin 0.4 mg oral capsule: 1 cap(s) orally once a day (17 Sep 2018 14:35)  Ventolin HFA 90 mcg/inh inhalation aerosol:  (07 Mar 2018 05:27)  zolpidem 10 mg oral tablet: 1 tab(s) orally once a day (at bedtime) (17 Sep 2018 14:35)        24HRS EVENT:    Patient is a 63 year old male with PMH significant for COPD, Asthma, chronic back pain, HTN, DM, DLD, Anxiety presents complaining of pain and swelling of RLE for 3 days.  Patient states that he noticed this swelling 3 days ago and it has been gradually getting worse, patient is sedentary at home due to chronic pack pain.  He states the pain was so bad this morning he came to the ED.  Patient denies chest pain, SOB, fever, chills, nausea, vomiting.  States that he has never had a DVT in the past, his last colonoscopy was 3 years ago and he states that he was told it was "ok".  Patient was found to have DVT in femoral, popliteal, gastroc veins on venous duplex and CTA showed bilateral segmental and subsegmental pulmonary emboli.  Patient is a current smoker, has smoked 1/2 PPD for 44 years. Vascular consult called, pt evaluated and went to OR for Right Leg venogram, thrombolysis & thrombectomy. The procedure was uneventful, no pressors used, no blood transfusions given.  Pt seen in PACU, extubated, neurologically intact with palpable DP/PT pulses. There are 2 catheters in R. popliteal fossa, 1 will be running the TPA and Heparin will run peripherally for 24hrs. Due to extensive clots found during the procedure, the patient will go back to OR on Thursday so TPA will have more time to be effective.     Neurologic: Intact; Pain control Oxy 5mg 1 tab q4 mild, 2 tabs q4 moderate, dilaudid 0.5 severe....q1 neurovascular check  Con't anti-anxiety meds..pt takes morphine 60mg q12 at home and percocet....r pain management consult pending  Respiratory: Sating well on RA. Hx of COPD-- use inhaler prn   Cardiovascular: Sinus bradycardia post-op.  con't to monitor. trop (-)3.  2D echo ordered - results pending  Cardiologist; spoke with private cardiologist Dr Altman  ( last saw pt in 2018): no severe cardiac issues from last visit  Gastrointestinal/Nutrition: DM on Carb consistent diet, PPI.  make NPO wed night for OR on Thurs  Renal/Genitourinary: no escamilla.post-op lytes wnl, monitor I/O's.  Hematologic:post-op hg stable at 15.6..on pt specific Heparin at 500 through peripheral IV's for 24hrs, per vascular team, not making therapeutic. TPA at .5mg through R leg catheter for 24hrs.  Infectious Disease: no abx...post-op wbc 12.67 from 10.69  Lines/Tubes: 2PIV's   Endocrine: DM, on Insulin. monitor FS  Disposition: Admit to SICU      Overnight: pt complain of back pain...and during my exam today;      DVT PTX:     GI PTX: pptx    ***Tubes/Lines/Drains  ***  Peripheral IV    REVIEW OF SYSTEMS    [ X] A ten-point review of systems was otherwise negative except as noted.    Daily Height in cm: 180.34 (18 Sep 2018 09:18)    Daily Weight in k (18 Sep 2018 14:00)    Diet, Consistent Carbohydrate/No Snacks (18 @ 08:26)      CURRENT MEDS:  Neurologic Medications  ALPRAZolam 1 milliGRAM(s) Oral three times a day  baclofen 10 milliGRAM(s) Oral three times a day  cyclobenzaprine 10 milliGRAM(s) Oral three times a day PRN Muscle Spasm  DULoxetine 60 milliGRAM(s) Oral daily  HYDROmorphone  Injectable 0.5 milliGRAM(s) IV Push every 4 hours PRN Severe Pain (7 - 10)  levETIRAcetam 500 milliGRAM(s) Oral two times a day  oxyCODONE    5 mG/acetaminophen 325 mG 1 Tablet(s) Oral every 4 hours PRN Mild Pain (1 - 3)  oxyCODONE    5 mG/acetaminophen 325 mG 2 Tablet(s) Oral every 4 hours PRN Moderate Pain (4 - 6)  zolpidem 5 milliGRAM(s) Oral at bedtime PRN Insomnia    Respiratory Medications  ALBUTerol    0.083%. 2.5 milliGRAM(s) Nebulizer once  ALBUTerol    90 MICROgram(s) HFA Inhaler 2 Puff(s) Inhalation every 6 hours PRN Bronchospasm  mometasone 100 MICROgram(s) HFA Inhaler 2 Puff(s) Inhalation daily PRN Bronchospasm    Cardiovascular Medications  furosemide    Tablet 20 milliGRAM(s) Oral daily  losartan 100 milliGRAM(s) Oral daily    Gastrointestinal Medications    Genitourinary Medications    Hematologic/Oncologic Medications  alteplase    Infusion 0.5 mG/Hr IV Continuous <Continuous>  heparin  Infusion 500 Unit(s)/Hr IV Continuous <Continuous>    Antimicrobial/Immunologic Medications    Endocrine/Metabolic Medications  insulin glargine Injectable (LANTUS) 10 Unit(s) SubCutaneous at bedtime  insulin lispro Injectable (HumaLOG) 3 Unit(s) SubCutaneous three times a day before meals  predniSONE   Tablet 40 milliGRAM(s) Oral daily  simvastatin 20 milliGRAM(s) Oral at bedtime    Topical/Other Medications  lidocaine   Patch 1 Patch Transdermal every 24 hours    ICU Vital Signs Last 24 Hrs  T(C): 35.6 (19 Sep 2018 07:30), Max: 36.3 (19 Sep 2018 00:00)  T(F): 96 (19 Sep 2018 07:30), Max: 97.4 (19 Sep 2018 00:00)  HR: 48 (19 Sep 2018 14:30) (42 - 72)  BP: 161/78 (19 Sep 2018 14:30) (117/66 - 179/91)  BP(mean): 116 (19 Sep 2018 14:30) (83 - 130)  ABP: --  ABP(mean): --  RR: 22 (19 Sep 2018 14:30) (12 - 47)  SpO2: 97% (19 Sep 2018 14:30) (87% - 98%)        I&O's Summary    18 Sep 2018 07:  -  19 Sep 2018 07:00  --------------------------------------------------------  IN: 1455 mL / OUT: 1350 mL / NET: 105 mL    19 Sep 2018 07:  -  19 Sep 2018 08:33  --------------------------------------------------------  IN: 15 mL / OUT: 0 mL / NET: 15 mL      I&O's Detail    18 Sep 2018 07:01  -  19 Sep 2018 07:00  --------------------------------------------------------  IN:    alteplase Infusion: 186 mL    heparin Infusion: 93 mL    lactated ringers.: 416 mL    Oral Fluid: 760 mL  Total IN: 1455 mL    OUT:    Voided: 1350 mL  Total OUT: 1350 mL    Total NET: 105 mL      19 Sep 2018 07:01  -  19 Sep 2018 08:33  --------------------------------------------------------  IN:    alteplase Infusion: 10 mL    heparin Infusion: 5 mL  Total IN: 15 mL    OUT:  Total OUT: 0 mL    Total NET: 15 mL          PHYSICAL EXAM:    General/Neuro  Exam:    alert & oriented x 3, no focal deficits; weak LE; obese                           Lungs:      clear to auscultation, Normal expansion/effort.     Cardiovascular : S1, S2.  Regular rate and rhythm.  Peripheral edema   Cardiac Rhythm: Normal Sinus Rhythm    GI: Abdomen soft, Non-tender, Non-distended. obese     Extremities: Pulses palpable b/l; cath in RLE  Catheter in R. popliteal: incision c/d/i     :       no escamilla catheter in place.      CXR:     LABS:  CAPILLARY BLOOD GLUCOSE      POCT Blood Glucose.: 114 mg/dL (19 Sep 2018 07:56)  POCT Blood Glucose.: 126 mg/dL (18 Sep 2018 17:03)                          14.7   11.45 )-----------( 221      ( 19 Sep 2018 04:35 )             43.4           145  |  107  |  21<H>  ----------------------------<  97  4.1   |  25  |  0.9    Ca    9.0      19 Sep 2018 04:35  Phos  2.5       Mg     2.0         TPro  7.0  /  Alb  4.2  /  TBili  0.4  /  DBili  x   /  AST  8   /  ALT  8   /  AlkPhos  74  09-18      PT/INR - ( 19 Sep 2018 04:35 )   PT: 13.20 sec;   INR: 1.22 ratio         PTT - ( 19 Sep 2018 04:35 )  PTT:38.0 sec  CARDIAC MARKERS ( 19 Sep 2018 04:35 )  x     / <0.01 ng/mL / x     / x     / <1.0 ng/mL  CARDIAC MARKERS ( 18 Sep 2018 17:25 )  x     / <0.01 ng/mL / 37 U/L / x     / 1.2 ng/mL  CARDIAC MARKERS ( 18 Sep 2018 11:50 )  x     / <0.01 ng/mL / 39 U/L / x     / 1.0 ng/mL

## 2018-09-20 ENCOUNTER — APPOINTMENT (OUTPATIENT)
Dept: VASCULAR SURGERY | Facility: HOSPITAL | Age: 63
End: 2018-09-20
Payer: MEDICARE

## 2018-09-20 LAB
ANION GAP SERPL CALC-SCNC: 12 MMOL/L — SIGNIFICANT CHANGE UP (ref 7–14)
APTT BLD: 39.2 SEC — SIGNIFICANT CHANGE UP (ref 27–39.2)
BUN SERPL-MCNC: 15 MG/DL — SIGNIFICANT CHANGE UP (ref 10–20)
CALCIUM SERPL-MCNC: 8.9 MG/DL — SIGNIFICANT CHANGE UP (ref 8.5–10.1)
CHLORIDE SERPL-SCNC: 105 MMOL/L — SIGNIFICANT CHANGE UP (ref 98–110)
CO2 SERPL-SCNC: 25 MMOL/L — SIGNIFICANT CHANGE UP (ref 17–32)
CREAT SERPL-MCNC: 0.9 MG/DL — SIGNIFICANT CHANGE UP (ref 0.7–1.5)
GLUCOSE BLDC GLUCOMTR-MCNC: 103 MG/DL — HIGH (ref 70–99)
GLUCOSE BLDC GLUCOMTR-MCNC: 110 MG/DL — HIGH (ref 70–99)
GLUCOSE BLDC GLUCOMTR-MCNC: 128 MG/DL — HIGH (ref 70–99)
GLUCOSE BLDC GLUCOMTR-MCNC: 96 MG/DL — SIGNIFICANT CHANGE UP (ref 70–99)
GLUCOSE SERPL-MCNC: 107 MG/DL — HIGH (ref 70–99)
HCT VFR BLD CALC: 43.5 % — SIGNIFICANT CHANGE UP (ref 42–52)
HCT VFR BLD CALC: 44.5 % — SIGNIFICANT CHANGE UP (ref 42–52)
HGB BLD-MCNC: 14.8 G/DL — SIGNIFICANT CHANGE UP (ref 14–18)
HGB BLD-MCNC: 14.8 G/DL — SIGNIFICANT CHANGE UP (ref 14–18)
INR BLD: 1.27 RATIO — SIGNIFICANT CHANGE UP (ref 0.65–1.3)
MAGNESIUM SERPL-MCNC: 2.2 MG/DL — SIGNIFICANT CHANGE UP (ref 1.8–2.4)
MCHC RBC-ENTMCNC: 28.9 PG — SIGNIFICANT CHANGE UP (ref 27–31)
MCHC RBC-ENTMCNC: 29.3 PG — SIGNIFICANT CHANGE UP (ref 27–31)
MCHC RBC-ENTMCNC: 33.3 G/DL — SIGNIFICANT CHANGE UP (ref 32–37)
MCHC RBC-ENTMCNC: 34 G/DL — SIGNIFICANT CHANGE UP (ref 32–37)
MCV RBC AUTO: 86.1 FL — SIGNIFICANT CHANGE UP (ref 80–94)
MCV RBC AUTO: 86.9 FL — SIGNIFICANT CHANGE UP (ref 80–94)
NRBC # BLD: 0 /100 WBCS — SIGNIFICANT CHANGE UP (ref 0–0)
NRBC # BLD: 0 /100 WBCS — SIGNIFICANT CHANGE UP (ref 0–0)
PHOSPHATE SERPL-MCNC: 3.2 MG/DL — SIGNIFICANT CHANGE UP (ref 2.1–4.9)
PLATELET # BLD AUTO: 194 K/UL — SIGNIFICANT CHANGE UP (ref 130–400)
PLATELET # BLD AUTO: 207 K/UL — SIGNIFICANT CHANGE UP (ref 130–400)
POTASSIUM SERPL-MCNC: 3.7 MMOL/L — SIGNIFICANT CHANGE UP (ref 3.5–5)
POTASSIUM SERPL-SCNC: 3.7 MMOL/L — SIGNIFICANT CHANGE UP (ref 3.5–5)
PROTHROM AB SERPL-ACNC: 13.7 SEC — HIGH (ref 9.95–12.87)
RBC # BLD: 5.05 M/UL — SIGNIFICANT CHANGE UP (ref 4.7–6.1)
RBC # BLD: 5.12 M/UL — SIGNIFICANT CHANGE UP (ref 4.7–6.1)
RBC # FLD: 13.5 % — SIGNIFICANT CHANGE UP (ref 11.5–14.5)
RBC # FLD: 13.7 % — SIGNIFICANT CHANGE UP (ref 11.5–14.5)
SODIUM SERPL-SCNC: 142 MMOL/L — SIGNIFICANT CHANGE UP (ref 135–146)
WBC # BLD: 10.83 K/UL — HIGH (ref 4.8–10.8)
WBC # BLD: 9.81 K/UL — SIGNIFICANT CHANGE UP (ref 4.8–10.8)
WBC # FLD AUTO: 10.83 K/UL — HIGH (ref 4.8–10.8)
WBC # FLD AUTO: 9.81 K/UL — SIGNIFICANT CHANGE UP (ref 4.8–10.8)

## 2018-09-20 PROCEDURE — 37249 TRLUML BALO ANGIOP ADDL VEIN: CPT | Mod: RT

## 2018-09-20 PROCEDURE — 37188 VEN MECHNL THRMBC REPEAT TX: CPT | Mod: RT

## 2018-09-20 PROCEDURE — 76937 US GUIDE VASCULAR ACCESS: CPT | Mod: 26

## 2018-09-20 PROCEDURE — 36012 PLACE CATHETER IN VEIN: CPT | Mod: RT

## 2018-09-20 PROCEDURE — 37248 TRLUML BALO ANGIOP 1ST VEIN: CPT | Mod: RT

## 2018-09-20 PROCEDURE — 99291 CRITICAL CARE FIRST HOUR: CPT

## 2018-09-20 PROCEDURE — 36010 PLACE CATHETER IN VEIN: CPT | Mod: 59,RT

## 2018-09-20 PROCEDURE — 75825 VEIN X-RAY TRUNK: CPT | Mod: 26

## 2018-09-20 PROCEDURE — 75820 VEIN X-RAY ARM/LEG: CPT | Mod: 26

## 2018-09-20 RX ORDER — LEVETIRACETAM 250 MG/1
500 TABLET, FILM COATED ORAL
Qty: 0 | Refills: 0 | Status: DISCONTINUED | OUTPATIENT
Start: 2018-09-20 | End: 2018-09-21

## 2018-09-20 RX ORDER — SODIUM CHLORIDE 9 MG/ML
1000 INJECTION, SOLUTION INTRAVENOUS
Qty: 0 | Refills: 0 | Status: DISCONTINUED | OUTPATIENT
Start: 2018-09-20 | End: 2018-09-20

## 2018-09-20 RX ORDER — MOMETASONE FUROATE 220 UG/1
1 INHALANT RESPIRATORY (INHALATION) DAILY
Qty: 0 | Refills: 0 | Status: DISCONTINUED | OUTPATIENT
Start: 2018-09-20 | End: 2018-09-21

## 2018-09-20 RX ORDER — BACLOFEN 100 %
10 POWDER (GRAM) MISCELLANEOUS THREE TIMES A DAY
Qty: 0 | Refills: 0 | Status: DISCONTINUED | OUTPATIENT
Start: 2018-09-20 | End: 2018-09-21

## 2018-09-20 RX ORDER — FUROSEMIDE 40 MG
20 TABLET ORAL DAILY
Qty: 0 | Refills: 0 | Status: DISCONTINUED | OUTPATIENT
Start: 2018-09-20 | End: 2018-09-21

## 2018-09-20 RX ORDER — INSULIN LISPRO 100/ML
3 VIAL (ML) SUBCUTANEOUS
Qty: 0 | Refills: 0 | Status: DISCONTINUED | OUTPATIENT
Start: 2018-09-20 | End: 2018-09-21

## 2018-09-20 RX ORDER — ONDANSETRON 8 MG/1
4 TABLET, FILM COATED ORAL ONCE
Qty: 0 | Refills: 0 | Status: DISCONTINUED | OUTPATIENT
Start: 2018-09-20 | End: 2018-09-20

## 2018-09-20 RX ORDER — MORPHINE SULFATE 50 MG/1
60 CAPSULE, EXTENDED RELEASE ORAL EVERY 12 HOURS
Qty: 0 | Refills: 0 | Status: DISCONTINUED | OUTPATIENT
Start: 2018-09-20 | End: 2018-09-21

## 2018-09-20 RX ORDER — ALBUTEROL 90 UG/1
2.5 AEROSOL, METERED ORAL ONCE
Qty: 0 | Refills: 0 | Status: DISCONTINUED | OUTPATIENT
Start: 2018-09-20 | End: 2018-09-21

## 2018-09-20 RX ORDER — ROBINUL 0.2 MG/ML
0.4 INJECTION INTRAMUSCULAR; INTRAVENOUS ONCE
Qty: 0 | Refills: 0 | Status: COMPLETED | OUTPATIENT
Start: 2018-09-20 | End: 2018-09-20

## 2018-09-20 RX ORDER — POTASSIUM CHLORIDE 20 MEQ
20 PACKET (EA) ORAL ONCE
Qty: 0 | Refills: 0 | Status: COMPLETED | OUTPATIENT
Start: 2018-09-20 | End: 2018-09-20

## 2018-09-20 RX ORDER — INSULIN GLARGINE 100 [IU]/ML
10 INJECTION, SOLUTION SUBCUTANEOUS AT BEDTIME
Qty: 0 | Refills: 0 | Status: DISCONTINUED | OUTPATIENT
Start: 2018-09-20 | End: 2018-09-21

## 2018-09-20 RX ORDER — APIXABAN 2.5 MG/1
10 TABLET, FILM COATED ORAL EVERY 12 HOURS
Qty: 0 | Refills: 0 | Status: DISCONTINUED | OUTPATIENT
Start: 2018-09-20 | End: 2018-09-21

## 2018-09-20 RX ORDER — LIDOCAINE 4 G/100G
1 CREAM TOPICAL EVERY 24 HOURS
Qty: 0 | Refills: 0 | Status: DISCONTINUED | OUTPATIENT
Start: 2018-09-20 | End: 2018-09-21

## 2018-09-20 RX ORDER — DULOXETINE HYDROCHLORIDE 30 MG/1
60 CAPSULE, DELAYED RELEASE ORAL DAILY
Qty: 0 | Refills: 0 | Status: DISCONTINUED | OUTPATIENT
Start: 2018-09-20 | End: 2018-09-21

## 2018-09-20 RX ORDER — HYDROMORPHONE HYDROCHLORIDE 2 MG/ML
1 INJECTION INTRAMUSCULAR; INTRAVENOUS; SUBCUTANEOUS
Qty: 0 | Refills: 0 | Status: DISCONTINUED | OUTPATIENT
Start: 2018-09-20 | End: 2018-09-20

## 2018-09-20 RX ORDER — ZOLPIDEM TARTRATE 10 MG/1
5 TABLET ORAL AT BEDTIME
Qty: 0 | Refills: 0 | Status: DISCONTINUED | OUTPATIENT
Start: 2018-09-20 | End: 2018-09-21

## 2018-09-20 RX ORDER — OXYCODONE AND ACETAMINOPHEN 5; 325 MG/1; MG/1
2 TABLET ORAL EVERY 4 HOURS
Qty: 0 | Refills: 0 | Status: DISCONTINUED | OUTPATIENT
Start: 2018-09-20 | End: 2018-09-21

## 2018-09-20 RX ORDER — APIXABAN 2.5 MG/1
5 TABLET, FILM COATED ORAL EVERY 12 HOURS
Qty: 0 | Refills: 0 | Status: CANCELLED | OUTPATIENT
Start: 2018-09-27 | End: 2018-09-21

## 2018-09-20 RX ORDER — DEXAMETHASONE 0.5 MG/5ML
8 ELIXIR ORAL ONCE
Qty: 0 | Refills: 0 | Status: DISCONTINUED | OUTPATIENT
Start: 2018-09-20 | End: 2018-09-20

## 2018-09-20 RX ORDER — OXYCODONE AND ACETAMINOPHEN 5; 325 MG/1; MG/1
1 TABLET ORAL EVERY 4 HOURS
Qty: 0 | Refills: 0 | Status: DISCONTINUED | OUTPATIENT
Start: 2018-09-20 | End: 2018-09-21

## 2018-09-20 RX ORDER — LOSARTAN POTASSIUM 100 MG/1
100 TABLET, FILM COATED ORAL DAILY
Qty: 0 | Refills: 0 | Status: DISCONTINUED | OUTPATIENT
Start: 2018-09-20 | End: 2018-09-21

## 2018-09-20 RX ORDER — MEPERIDINE HYDROCHLORIDE 50 MG/ML
12.5 INJECTION INTRAMUSCULAR; INTRAVENOUS; SUBCUTANEOUS
Qty: 0 | Refills: 0 | Status: DISCONTINUED | OUTPATIENT
Start: 2018-09-20 | End: 2018-09-20

## 2018-09-20 RX ORDER — MOMETASONE FUROATE 220 UG/1
2 INHALANT RESPIRATORY (INHALATION) DAILY
Qty: 0 | Refills: 0 | Status: DISCONTINUED | OUTPATIENT
Start: 2018-09-20 | End: 2018-09-20

## 2018-09-20 RX ORDER — SIMVASTATIN 20 MG/1
20 TABLET, FILM COATED ORAL AT BEDTIME
Qty: 0 | Refills: 0 | Status: DISCONTINUED | OUTPATIENT
Start: 2018-09-20 | End: 2018-09-21

## 2018-09-20 RX ORDER — HYDROMORPHONE HYDROCHLORIDE 2 MG/ML
0.5 INJECTION INTRAMUSCULAR; INTRAVENOUS; SUBCUTANEOUS
Qty: 0 | Refills: 0 | Status: DISCONTINUED | OUTPATIENT
Start: 2018-09-20 | End: 2018-09-20

## 2018-09-20 RX ORDER — ALBUTEROL 90 UG/1
2 AEROSOL, METERED ORAL EVERY 6 HOURS
Qty: 0 | Refills: 0 | Status: DISCONTINUED | OUTPATIENT
Start: 2018-09-20 | End: 2018-09-21

## 2018-09-20 RX ORDER — ALPRAZOLAM 0.25 MG
1 TABLET ORAL THREE TIMES A DAY
Qty: 0 | Refills: 0 | Status: DISCONTINUED | OUTPATIENT
Start: 2018-09-20 | End: 2018-09-21

## 2018-09-20 RX ORDER — METOCLOPRAMIDE HCL 10 MG
10 TABLET ORAL ONCE
Qty: 0 | Refills: 0 | Status: DISCONTINUED | OUTPATIENT
Start: 2018-09-20 | End: 2018-09-20

## 2018-09-20 RX ORDER — CYCLOBENZAPRINE HYDROCHLORIDE 10 MG/1
10 TABLET, FILM COATED ORAL THREE TIMES A DAY
Qty: 0 | Refills: 0 | Status: DISCONTINUED | OUTPATIENT
Start: 2018-09-20 | End: 2018-09-21

## 2018-09-20 RX ADMIN — ROBINUL 0.4 MILLIGRAM(S): 0.2 INJECTION INTRAMUSCULAR; INTRAVENOUS at 15:29

## 2018-09-20 RX ADMIN — HYDROMORPHONE HYDROCHLORIDE 1 MILLIGRAM(S): 2 INJECTION INTRAMUSCULAR; INTRAVENOUS; SUBCUTANEOUS at 15:41

## 2018-09-20 RX ADMIN — Medication 40 MILLIGRAM(S): at 05:31

## 2018-09-20 RX ADMIN — HYDROMORPHONE HYDROCHLORIDE 1 MILLIGRAM(S): 2 INJECTION INTRAMUSCULAR; INTRAVENOUS; SUBCUTANEOUS at 15:23

## 2018-09-20 RX ADMIN — Medication 20 MILLIGRAM(S): at 05:31

## 2018-09-20 RX ADMIN — LIDOCAINE 1 PATCH: 4 CREAM TOPICAL at 16:09

## 2018-09-20 RX ADMIN — OXYCODONE AND ACETAMINOPHEN 2 TABLET(S): 5; 325 TABLET ORAL at 04:43

## 2018-09-20 RX ADMIN — Medication 1 MILLIGRAM(S): at 21:51

## 2018-09-20 RX ADMIN — Medication 1.25 MILLIGRAM(S): at 05:31

## 2018-09-20 RX ADMIN — MORPHINE SULFATE 60 MILLIGRAM(S): 50 CAPSULE, EXTENDED RELEASE ORAL at 19:09

## 2018-09-20 RX ADMIN — INSULIN GLARGINE 10 UNIT(S): 100 INJECTION, SOLUTION SUBCUTANEOUS at 21:51

## 2018-09-20 RX ADMIN — MORPHINE SULFATE 60 MILLIGRAM(S): 50 CAPSULE, EXTENDED RELEASE ORAL at 06:01

## 2018-09-20 RX ADMIN — Medication 50 MILLIEQUIVALENT(S): at 04:30

## 2018-09-20 RX ADMIN — Medication 10 MILLIGRAM(S): at 16:07

## 2018-09-20 RX ADMIN — APIXABAN 10 MILLIGRAM(S): 2.5 TABLET, FILM COATED ORAL at 20:29

## 2018-09-20 RX ADMIN — LEVETIRACETAM 500 MILLIGRAM(S): 250 TABLET, FILM COATED ORAL at 05:31

## 2018-09-20 RX ADMIN — OXYCODONE AND ACETAMINOPHEN 1 TABLET(S): 5; 325 TABLET ORAL at 11:36

## 2018-09-20 RX ADMIN — Medication 1.25 MILLIGRAM(S): at 16:46

## 2018-09-20 RX ADMIN — LOSARTAN POTASSIUM 100 MILLIGRAM(S): 100 TABLET, FILM COATED ORAL at 06:31

## 2018-09-20 RX ADMIN — HYDROMORPHONE HYDROCHLORIDE 1 MILLIGRAM(S): 2 INJECTION INTRAMUSCULAR; INTRAVENOUS; SUBCUTANEOUS at 16:41

## 2018-09-20 RX ADMIN — HYDROMORPHONE HYDROCHLORIDE 1 MILLIGRAM(S): 2 INJECTION INTRAMUSCULAR; INTRAVENOUS; SUBCUTANEOUS at 15:11

## 2018-09-20 RX ADMIN — Medication 1 MILLIGRAM(S): at 05:31

## 2018-09-20 RX ADMIN — SODIUM CHLORIDE 125 MILLILITER(S): 9 INJECTION, SOLUTION INTRAVENOUS at 14:32

## 2018-09-20 RX ADMIN — MORPHINE SULFATE 60 MILLIGRAM(S): 50 CAPSULE, EXTENDED RELEASE ORAL at 05:31

## 2018-09-20 RX ADMIN — LEVETIRACETAM 500 MILLIGRAM(S): 250 TABLET, FILM COATED ORAL at 20:29

## 2018-09-20 RX ADMIN — LIDOCAINE 1 PATCH: 4 CREAM TOPICAL at 00:37

## 2018-09-20 RX ADMIN — Medication 1 MILLIGRAM(S): at 16:08

## 2018-09-20 RX ADMIN — Medication 10 MILLIGRAM(S): at 05:31

## 2018-09-20 RX ADMIN — OXYCODONE AND ACETAMINOPHEN 1 TABLET(S): 5; 325 TABLET ORAL at 10:11

## 2018-09-20 RX ADMIN — OXYCODONE AND ACETAMINOPHEN 2 TABLET(S): 5; 325 TABLET ORAL at 05:13

## 2018-09-20 RX ADMIN — Medication 10 MILLIGRAM(S): at 21:51

## 2018-09-20 RX ADMIN — LIDOCAINE 1 PATCH: 4 CREAM TOPICAL at 16:00

## 2018-09-20 RX ADMIN — HYDROMORPHONE HYDROCHLORIDE 1 MILLIGRAM(S): 2 INJECTION INTRAMUSCULAR; INTRAVENOUS; SUBCUTANEOUS at 16:02

## 2018-09-20 RX ADMIN — HYDROMORPHONE HYDROCHLORIDE 1 MILLIGRAM(S): 2 INJECTION INTRAMUSCULAR; INTRAVENOUS; SUBCUTANEOUS at 14:44

## 2018-09-20 RX ADMIN — SIMVASTATIN 20 MILLIGRAM(S): 20 TABLET, FILM COATED ORAL at 21:52

## 2018-09-20 NOTE — CHART NOTE - NSCHARTNOTEFT_GEN_A_CORE
Patient is a 63y old  Male who presents with a chief complaint of Acute deep vein thrombosis (DVT) of iliac vein of right lower extremity .   On 9/18 he underwent right leg thrombolysis and thrombectomy and tPA thropmbolysis.    Pt was admitted to SICU for monitoring while on On TPA and heparin drip. He went OR today removal of tpa cath removal.   The procedure was uneventful and pt has been downgraded to the floor post OR.

## 2018-09-20 NOTE — PROGRESS NOTE ADULT - SUBJECTIVE AND OBJECTIVE BOX
SANA JOE  63y Male   [id number]    Post Operative Day:2  Procedure: s/p thrombolysis tpa cath  Patient is a 63y old  Male who presents with a chief complaint of RLE extensive DVT (20 Sep 2018 03:10)    PAST MEDICAL & SURGICAL HISTORY:  Syncopal episodes  Uncomplicated opioid dependence  Chronic low back pain with sciatica, sciatica laterality unspecified, unspecified back pain laterality  Asthma  High cholesterol  Diabetes  HTN (hypertension)  Anxiety  History of back surgery: x 3      Events of the Last 24h:none  Vital Signs Last 24 Hrs  T(C): 36.5 (20 Sep 2018 04:00), Max: 37.2 (19 Sep 2018 20:00)  T(F): 97.7 (20 Sep 2018 04:00), Max: 98.9 (19 Sep 2018 20:00)  HR: 48 (20 Sep 2018 05:00) (42 - 74)  BP: 163/91 (20 Sep 2018 05:00) (122/66 - 184/79)  BP(mean): 128 (20 Sep 2018 05:00) (80 - 130)  RR: 18 (20 Sep 2018 05:00) (14 - 32)  SpO2: 96% (20 Sep 2018 05:00) (91% - 97%)        Diet, Consistent Carbohydrate/No Snacks (18 @ 08:26)  Diet, NPO after Midnight:      NPO Start Date: 19-Sep-2018,   NPO Start Time: 23:59 (18 @ 11:09)  Diet, NPO after Midnight:      NPO Start Date: 19-Sep-2018,   NPO Start Time: 23:59 (18 @ 16:23)      I&O's Summary    18 Sep 2018 07:  -  19 Sep 2018 07:00  --------------------------------------------------------  IN: 1455 mL / OUT: 1350 mL / NET: 105 mL    19 Sep 2018 07:01  -  20 Sep 2018 05:42  --------------------------------------------------------  IN: 330 mL / OUT: 2125 mL / NET: -1795 mL     I&O's Detail    18 Sep 2018 07:  -  19 Sep 2018 07:00  --------------------------------------------------------  IN:    alteplase Infusion: 186 mL    heparin Infusion: 93 mL    lactated ringers.: 416 mL    Oral Fluid: 760 mL  Total IN: 1455 mL    OUT:    Voided: 1350 mL  Total OUT: 1350 mL    Total NET: 105 mL      19 Sep 2018 07:  -  20 Sep 2018 05:42  --------------------------------------------------------  IN:    alteplase Infusion: 160 mL    alteplase Infusion: 60 mL    heparin Infusion: 110 mL  Total IN: 330 mL    OUT:    Voided: 2125 mL  Total OUT: 2125 mL    Total NET: -1795 mL          MEDICATIONS  (STANDING):  ALBUTerol    0.083%. 2.5 milliGRAM(s) Nebulizer once  ALPRAZolam 1 milliGRAM(s) Oral three times a day  alteplase    Infusion 0.5 mG/Hr (10 mL/Hr) IV Continuous <Continuous>  baclofen 10 milliGRAM(s) Oral three times a day  DULoxetine 60 milliGRAM(s) Oral daily  furosemide    Tablet 20 milliGRAM(s) Oral daily  heparin  Infusion 500 Unit(s)/Hr (5 mL/Hr) IV Continuous <Continuous>  insulin glargine Injectable (LANTUS) 10 Unit(s) SubCutaneous at bedtime  insulin lispro Injectable (HumaLOG) 3 Unit(s) SubCutaneous three times a day before meals  levETIRAcetam 500 milliGRAM(s) Oral two times a day  lidocaine   Patch 1 Patch Transdermal every 24 hours  losartan 100 milliGRAM(s) Oral daily  morphine ER Tablet 60 milliGRAM(s) Oral every 12 hours  pantoprazole   Suspension 40 milliGRAM(s) Oral daily  predniSONE   Tablet 40 milliGRAM(s) Oral daily  simvastatin 20 milliGRAM(s) Oral at bedtime    MEDICATIONS  (PRN):  ALBUTerol    90 MICROgram(s) HFA Inhaler 2 Puff(s) Inhalation every 6 hours PRN Bronchospasm  cyclobenzaprine 10 milliGRAM(s) Oral three times a day PRN Muscle Spasm  mometasone 100 MICROgram(s) HFA Inhaler 2 Puff(s) Inhalation daily PRN Bronchospasm  oxyCODONE    5 mG/acetaminophen 325 mG 1 Tablet(s) Oral every 4 hours PRN Mild Pain (1 - 3)  oxyCODONE    5 mG/acetaminophen 325 mG 2 Tablet(s) Oral every 4 hours PRN Moderate Pain (4 - 6)  zolpidem 5 milliGRAM(s) Oral at bedtime PRN Insomnia      PHYSICAL EXAM:    GENERAL: NAD    HEENT: NCAT    CHEST/LUNGS: CTAB    HEART: RRR,  No murmurs, rubs, or gallops    ABDOMEN: SNTND +BS    EXTREMITIES:  FROM, No clubbing, cyanosis, or edema, palpable pulse    NEURO: No focal neurological deficits    SKIN: No rashes or lesions    INCISION/WOUNDS:                          14.8   9.81  )-----------( 194      ( 20 Sep 2018 05:17 )             43.5        CBC Full  -  ( 20 Sep 2018 05:17 )  WBC Count : 9.81 K/uL  Hemoglobin : 14.8 g/dL  Hematocrit : 43.5 %  Platelet Count - Automated : 194 K/uL  Mean Cell Volume : 86.1 fL  Mean Cell Hemoglobin : 29.3 pg  Mean Cell Hemoglobin Concentration : 34.0 g/dL  Auto Neutrophil # : x  Auto Lymphocyte # : x  Auto Monocyte # : x  Auto Eosinophil # : x  Auto Basophil # : x  Auto Neutrophil % : x  Auto Lymphocyte % : x  Auto Monocyte % : x  Auto Eosinophil % : x  Auto Basophil % : x               142   |  105   |  15                 Ca: 8.9    BMP:   ----------------------------< 107    M.2   (18 @ 01:41)             3.7    |  25    | 0.9                Ph: 3.2      LFT:     TPro: 7.0 / Alb: 4.2 / TBili: 0.4 / DBili: x / AST: 8 / ALT: 8 / AlkPhos: 74   (18 @ 06:02)    LIVER FUNCTIONS - ( 18 Sep 2018 06:02 )  Alb: 4.2 g/dL / Pro: 7.0 g/dL / ALK PHOS: 74 U/L / ALT: 8 U/L / AST: 8 U/L / GGT: x           PT/INR - ( 20 Sep 2018 01:41 )   PT: 13.70 sec;   INR: 1.27 ratio         PTT - ( 20 Sep 2018 01:41 )  PTT:39.2 sec  CARDIAC MARKERS ( 19 Sep 2018 04:35 )  x     / <0.01 ng/mL / x     / x     / <1.0 ng/mL  CARDIAC MARKERS ( 18 Sep 2018 17:25 )  x     / <0.01 ng/mL / 37 U/L / x     / 1.2 ng/mL  CARDIAC MARKERS ( 18 Sep 2018 11:50 )  x     / <0.01 ng/mL / 39 U/L / x     / 1.0 ng/mL

## 2018-09-20 NOTE — BRIEF OPERATIVE NOTE - OPERATION/FINDINGS
Minimal residual thrombus. Patent right common iliac vein and IVC. Catheter removed at case completion.
Right lower limb - Iliac veins open, significant clot burden in the femoral and popliteal veins. Deep vein thrombectomy, thrombolysis and tPA instillation done.

## 2018-09-20 NOTE — PROGRESS NOTE ADULT - SUBJECTIVE AND OBJECTIVE BOX
SANA JOE  [id number]  63y Male    Indication for ICU admission:  Admit Date:09-17-18  ICU Date:9-18-18  OR Date:9-18-18    aspirin (Other)  Originally Entered as [ANGIOEDEMA] reaction to [pineapple] (Unknown)  penicillins (Other)  pineapples (Anaphylaxis)    PAST MEDICAL & SURGICAL HISTORY:  Syncopal episodes  Uncomplicated opioid dependence  Chronic low back pain with sciatica, sciatica laterality unspecified, unspecified back pain laterality  Asthma  High cholesterol  Diabetes  HTN (hypertension)  Anxiety  History of back surgery: x 3    Home Medications:  ALPRAZolam 1 mg oral tablet: 1 tab(s) orally 3 times a day (17 Sep 2018 14:35)  Asmanex  mcg/inh inhalation aerosol:  (17 Sep 2018 14:35)  Atrovent HFA:  (17 Sep 2018 14:35)  baclofen 10 mg oral tablet: 1 tab(s) orally 3 times a day (17 Sep 2018 14:35)  cyclobenzaprine 10 mg oral tablet: 1 tab(s) orally 3 times a day (17 Sep 2018 14:35)  DULoxetine 60 mg oral delayed release capsule: 1 cap(s) orally once a day (17 Sep 2018 14:35)  fenofibrate 160 mg oral tablet: 1 tab(s) orally once a day (17 Sep 2018 14:35)  furosemide 20 mg oral tablet: 1 tab(s) orally once a day (07 Mar 2018 05:27)  levETIRAcetam 500 mg oral tablet, extended release: 2 tab(s) orally once a day (07 Mar 2018 05:27)  losartan 100 mg oral tablet: 1 tab(s) orally once a day (07 Mar 2018 05:27)  metFORMIN 500 mg oral tablet: 1 tab(s) orally 2 times a day (07 Mar 2018 05:27)  morphine 60 mg/12 hours oral tablet, extended release: 1 tab(s) orally every 12 hours (17 Sep 2018 14:35)  Percocet 10/325:  (07 Mar 2018 05:27)  simvastatin 20 mg oral tablet: 1 tab(s) orally once a day (at bedtime) (07 Mar 2018 05:27)  tamsulosin 0.4 mg oral capsule: 1 cap(s) orally once a day (17 Sep 2018 14:35)  Ventolin HFA 90 mcg/inh inhalation aerosol:  (07 Mar 2018 05:27)  zolpidem 10 mg oral tablet: 1 tab(s) orally once a day (at bedtime) (17 Sep 2018 14:35)        24HRS EVENT:  Patient is a 63 year old male with PMH significant for COPD, Asthma, chronic back pain, HTN, DM, DLD, Anxiety presents complaining of pain and swelling of RLE for 3 days.  Patient states that he noticed this swelling 3 days ago and it has been gradually getting worse, patient is sedentary at home due to chronic pack pain.  He states the pain was so bad this morning he came to the ED.  Patient denies chest pain, SOB, fever, chills, nausea, vomiting.  States that he has never had a DVT in the past, his last colonoscopy was 3 years ago and he states that he was told it was "ok".  Patient was found to have DVT in femoral, popliteal, gastroc veins on venous duplex and CTA showed bilateral segmental and subsegmental pulmonary emboli.  Patient is a current smoker, has smoked 1/2 PPD for 44 years. Vascular consult called, pt evaluated and went to OR for Right Leg venogram, thrombolysis & thrombectomy. The procedure was uneventful, no pressors used, no blood transfusions given.  Pt seen in PACU, extubated, neurologically intact with palpable DP/PT pulses. There are 2 catheters in R. popliteal fossa, 1 will be running the TPA and Heparin will run peripherally for 24hrs. Due to extensive clots found during the procedure, the patient will go back to OR on Thursday so TPA will have more time to be effective.     Neurologic: Intact; Pain control Oxy 5mg 1 tab q4 mild, 2 tabs q4 moderate, dilaudid 0.5 severe....q1 neurovascular check  Con't anti-anxiety meds..pt takes morphine 60mg q12 at home and percocet  Respiratory: Sating well on RA. Hx of COPD-- use inhaler prn   Cardiovascular: Sinus bradycardia when sleeping, resolves upon awakening. will monitor; trop (-)3.  2D echo ordered - results pending  Cardiologist; spoke with private cardiologist Dr Altman  ( last saw pt in March 2018): no severe cardiac issues from last visit  Gastrointestinal/Nutrition: DM on Carb consistent diet, PPI.  make NPO wed night for OR on Thurs  Renal/Genitourinary: no escamilla.post-op lytes wnl, monitor I/O's.  Hematologic:post-op hg stable at 15.6..on pt specific Heparin at 500 through peripheral IV's for 24hrs, per vascular team, not making therapeutic. TPA at .5mg through R leg catheter for 24hrs.  Infectious Disease: no abx...post-op wbc 12.67 from 10.69  Lines/Tubes: 2PIV's   Endocrine: DM, on Insulin. monitor FS  Disposition: Admit to SICU            DVT PTX: tpa, heparin    GI PTX: ppi    ***Tubes/Lines/Drains  ***  Peripheral IV  tpa sheath        REVIEW OF SYSTEMS    [ ] A ten-point review of systems was otherwise negative except as noted.  [ ] Due to altered mental status/intubation, subjective information were not able to be obtained from the patient. History was obtained, to the extent possible, from review of the chart and collateral sources of information. SANA JOE  [id number]  63y Male    Indication for ICU admission: Extensive RLE DVT, tPA thrombolysis  Admit Date:18  ICU Date:18  OR Date:18    aspirin (Other)  Originally Entered as [ANGIOEDEMA] reaction to [pineapple] (Unknown)  penicillins (Other)  pineapples (Anaphylaxis)    PAST MEDICAL & SURGICAL HISTORY:  Syncopal episodes  Uncomplicated opioid dependence  Chronic low back pain with sciatica, sciatica laterality unspecified, unspecified back pain laterality  Asthma  High cholesterol  Diabetes  HTN (hypertension)  Anxiety  History of back surgery: x 3    Home Medications:  ALPRAZolam 1 mg oral tablet: 1 tab(s) orally 3 times a day (17 Sep 2018 14:35)  Asmanex  mcg/inh inhalation aerosol:  (17 Sep 2018 14:35)  Atrovent HFA:  (17 Sep 2018 14:35)  baclofen 10 mg oral tablet: 1 tab(s) orally 3 times a day (17 Sep 2018 14:35)  cyclobenzaprine 10 mg oral tablet: 1 tab(s) orally 3 times a day (17 Sep 2018 14:35)  DULoxetine 60 mg oral delayed release capsule: 1 cap(s) orally once a day (17 Sep 2018 14:35)  fenofibrate 160 mg oral tablet: 1 tab(s) orally once a day (17 Sep 2018 14:35)  furosemide 20 mg oral tablet: 1 tab(s) orally once a day (07 Mar 2018 05:27)  levETIRAcetam 500 mg oral tablet, extended release: 2 tab(s) orally once a day (07 Mar 2018 05:27)  losartan 100 mg oral tablet: 1 tab(s) orally once a day (07 Mar 2018 05:27)  metFORMIN 500 mg oral tablet: 1 tab(s) orally 2 times a day (07 Mar 2018 05:27)  morphine 60 mg/12 hours oral tablet, extended release: 1 tab(s) orally every 12 hours (17 Sep 2018 14:35)  Percocet 10/325:  (07 Mar 2018 05:27)  simvastatin 20 mg oral tablet: 1 tab(s) orally once a day (at bedtime) (07 Mar 2018 05:27)  tamsulosin 0.4 mg oral capsule: 1 cap(s) orally once a day (17 Sep 2018 14:35)  Ventolin HFA 90 mcg/inh inhalation aerosol:  (07 Mar 2018 05:27)  zolpidem 10 mg oral tablet: 1 tab(s) orally once a day (at bedtime) (17 Sep 2018 14:35)        24HRS EVENT:  Patient is a 63 year old male with PMH significant for COPD, Asthma, chronic back pain, HTN, DM, DLD, Anxiety presents complaining of pain and swelling of RLE for 3 days.  Patient states that he noticed this swelling 3 days ago and it has been gradually getting worse, patient is sedentary at home due to chronic pack pain.  He states the pain was so bad this morning he came to the ED.  Patient denies chest pain, SOB, fever, chills, nausea, vomiting.  States that he has never had a DVT in the past, his last colonoscopy was 3 years ago and he states that he was told it was "ok".  Patient was found to have DVT in femoral, popliteal, gastroc veins on venous duplex and CTA showed bilateral segmental and subsegmental pulmonary emboli.  Patient is a current smoker, has smoked 1/2 PPD for 44 years. Vascular consult called, pt evaluated and went to OR for Right Leg venogram, thrombolysis & thrombectomy. The procedure was uneventful, no pressors used, no blood transfusions given.  Pt seen in PACU, extubated, neurologically intact with palpable DP/PT pulses. There are 2 catheters in R. popliteal fossa, 1 will be running the TPA and Heparin will run peripherally for 24hrs. Due to extensive clots found during the procedure, the patient will go back to OR on Thursday so TPA will have more time to be effective.     Neurologic: Intact; Pain control: home dose Morphine 60mg q12... Oxy 5mg 1 tab q4 mild, 2 tabs q4 moderate, ....q1 neurovascular check  Con't anti-anxiety meds..  Respiratory: Sating well on RA. Hx of COPD-- use inhaler prn   Cardiovascular: Sinus bradycardia when sleeping, resolves upon awakening. will monitor; trop (-)3.  2D echo ordered - results pending  Cardiologist; spoke with private cardiologist Dr Altman  ( last saw pt in 2018): no severe cardiac issues from last visit  Gastrointestinal/Nutrition: DM on Carb consistent diet, PPI. NPO after midnight for OR on Thurs for catheter removal  Renal/Genitourinary: no escamilla .replete lytes as needed, monitor I/O's.  Hematologic:p: Hg stable....on pt specific Heparin at 500 through peripheral IV's, per vascular team, not making therapeutic. TPA at .5mg through R leg catheter.  Infectious Disease: no abx...  Lines/Tubes: 2PIV's   Endocrine: DM, on Insulin. monitor FS    OVERNIGHT:  -Pt hypertensive to 's---he was treated with enalipril 1.25mg.  pt responded well and SBP decreased to 130"s    DVT PTX: tpa, heparin    GI PTX: ppi    ***Tubes/Lines/Drains  ***  Peripheral IV  tpa sheath        REVIEW OF SYSTEMS    [X ] A ten-point review of systems was otherwise negative except as noted.    Daily     Daily Weight in k.5 (20 Sep 2018 00:00)    Diet, Consistent Carbohydrate/No Snacks (18 @ 08:26)  Diet, NPO after Midnight:      NPO Start Date: 19-Sep-2018,   NPO Start Time: 23:59 (18 @ 11:09)  Diet, NPO after Midnight:      NPO Start Date: 19-Sep-2018,   NPO Start Time: 23:59 (18 @ 16:23)      CURRENT MEDS:  Neurologic Medications  ALPRAZolam 1 milliGRAM(s) Oral three times a day  baclofen 10 milliGRAM(s) Oral three times a day  cyclobenzaprine 10 milliGRAM(s) Oral three times a day PRN Muscle Spasm  DULoxetine 60 milliGRAM(s) Oral daily  levETIRAcetam 500 milliGRAM(s) Oral two times a day  morphine ER Tablet 60 milliGRAM(s) Oral every 12 hours  oxyCODONE    5 mG/acetaminophen 325 mG 1 Tablet(s) Oral every 4 hours PRN Mild Pain (1 - 3)  oxyCODONE    5 mG/acetaminophen 325 mG 2 Tablet(s) Oral every 4 hours PRN Moderate Pain (4 - 6)  zolpidem 5 milliGRAM(s) Oral at bedtime PRN Insomnia    Respiratory Medications  ALBUTerol    0.083%. 2.5 milliGRAM(s) Nebulizer once  ALBUTerol    90 MICROgram(s) HFA Inhaler 2 Puff(s) Inhalation every 6 hours PRN Bronchospasm  mometasone 100 MICROgram(s) HFA Inhaler 2 Puff(s) Inhalation daily PRN Bronchospasm    Cardiovascular Medications  furosemide    Tablet 20 milliGRAM(s) Oral daily  losartan 100 milliGRAM(s) Oral daily    Gastrointestinal Medications  pantoprazole   Suspension 40 milliGRAM(s) Oral daily    Genitourinary Medications    Hematologic/Oncologic Medications  alteplase    Infusion 0.5 mG/Hr IV Continuous <Continuous>  heparin  Infusion 500 Unit(s)/Hr IV Continuous <Continuous>    Antimicrobial/Immunologic Medications    Endocrine/Metabolic Medications  insulin glargine Injectable (LANTUS) 10 Unit(s) SubCutaneous at bedtime  insulin lispro Injectable (HumaLOG) 3 Unit(s) SubCutaneous three times a day before meals  predniSONE   Tablet 40 milliGRAM(s) Oral daily  simvastatin 20 milliGRAM(s) Oral at bedtime    Topical/Other Medications  lidocaine   Patch 1 Patch Transdermal every 24 hours      ICU Vital Signs Last 24 Hrs  T(C): 35.6 (20 Sep 2018 07:30), Max: 37.2 (19 Sep 2018 20:00)  T(F): 96 (20 Sep 2018 07:30), Max: 98.9 (19 Sep 2018 20:00)  HR: 50 (20 Sep 2018 08:30) (42 - 74)  BP: 135/79 (20 Sep 2018 08:30) (122/66 - 192/97)  BP(mean): 108 (20 Sep 2018 08:30) (80 - 134)  ABP: --  ABP(mean): --  RR: 13 (20 Sep 2018 08:30) (13 - 35)  SpO2: 91% (20 Sep 2018 08:30) (91% - 97%)      I&O's Summary    19 Sep 2018 07:  -  20 Sep 2018 07:00  --------------------------------------------------------  IN: 460 mL / OUT: 2125 mL / NET: -1665 mL    20 Sep 2018 07:01  -  20 Sep 2018 08:54  --------------------------------------------------------  IN: 15 mL / OUT: 0 mL / NET: 15 mL      I&O's Detail    19 Sep 2018 07:01  -  20 Sep 2018 07:00  --------------------------------------------------------  IN:    alteplase Infusion: 160 mL    alteplase Infusion: 80 mL    heparin Infusion: 120 mL    IV PiggyBack: 100 mL  Total IN: 460 mL    OUT:    Voided: 2125 mL  Total OUT: 2125 mL    Total NET: -1665 mL      20 Sep 2018 07:01  -  20 Sep 2018 08:54  --------------------------------------------------------  IN:    alteplase Infusion: 10 mL    heparin Infusion: 5 mL  Total IN: 15 mL    OUT:  Total OUT: 0 mL    Total NET: 15 mL          PHYSICAL EXAM:    General/Neuro  RASS:             GCS:     = E   / V   / M      Deficits:                             alert & oriented x 3, no focal deficits  Pupils:    Lungs:      clear to auscultation, Normal expansion/effort.     Cardiovascular : S1, S2.  Regular rate and rhythm.  Peripheral edema   Cardiac Rhythm: Normal Sinus Rhythm    GI: Abdomen soft, Non-tender, Non-distended.    Gastrostomy / Jejunostomy tube in place.  Nasogastric tube in place.  Colostomy / Ileostomy.    Wound:    Extremities: Extremities warm, pink, well-perfused. Pulses:Rt     Lt    Derm: Good skin turgor, no skin breakdown.      :       Escamilla catheter in place.      CXR:     LABS:  CAPILLARY BLOOD GLUCOSE  103 (20 Sep 2018 06:00)  111 (19 Sep 2018 22:00)  123 (19 Sep 2018 16:00)  141 (19 Sep 2018 11:00)      POCT Blood Glucose.: 103 mg/dL (20 Sep 2018 05:57)  POCT Blood Glucose.: 111 mg/dL (19 Sep 2018 22:03)  POCT Blood Glucose.: 123 mg/dL (19 Sep 2018 16:05)  POCT Blood Glucose.: 141 mg/dL (19 Sep 2018 11:11)                          14.8   9.81  )-----------( 194      ( 20 Sep 2018 05:17 )             43.5       09-20    142  |  105  |  15  ----------------------------<  107<H>  3.7   |  25  |  0.9    Ca    8.9      20 Sep 2018 01:41  Phos  3.2       Mg     2.2     -        PT/INR - ( 20 Sep 2018 01:41 )   PT: 13.70 sec;   INR: 1.27 ratio         PTT - ( 20 Sep 2018 01:41 )  PTT:39.2 sec  CARDIAC MARKERS ( 19 Sep 2018 04:35 )  x     / <0.01 ng/mL / x     / x     / <1.0 ng/mL  CARDIAC MARKERS ( 18 Sep 2018 17:25 )  x     / <0.01 ng/mL / 37 U/L / x     / 1.2 ng/mL  CARDIAC MARKERS ( 18 Sep 2018 11:50 )  x     / <0.01 ng/mL / 39 U/L / x     / 1.0 ng/mL

## 2018-09-20 NOTE — PROGRESS NOTE ADULT - ATTENDING COMMENTS
Patient has a PE and DVT which is extensive.  On TPA and heparin drip.    Plan for OR tomorrow.
Patient has a PE and DVT which is extensive.  On TPA and heparin drip.    Plan for OR tomorrow.
doing well   for RTOR today   will re evaluate post op

## 2018-09-20 NOTE — PROGRESS NOTE ADULT - ASSESSMENT
63y Male with PMH significant for COPD not on O2, Asthma, chronic back pain s/p back Sx and on opioids, HTN, DM II on metformin, DLD, Anxiety on medications, seizure disorder on keppra, presents complaining of pain and swelling of RLE for  days, found to have DVT in femoral, popliteal, PT veins on venous duplex and bilateral segmental and subsegmental pulmonary emboli on CTA Chest.       RLE extensive DVT (femoral/popliteal veins), B/L segmental and subsegmental PE  - followed by vascular  - post thrombolysis and TPA cath  - for second procedure    Asthma/COPD  - continue respiratory treatments    DM II: Uncomplicated  - on lispro and lantus  - Metformin held    Anxiety disorder:  -continue home medications including Alprazolam and Zolpidem    Chronic Back pain:   - post multiple procedure  - opioid dependent  - on Baclofen, cyclobenzaprine  - Percocet for pain control    Seizure disorder:   - continue Keppra.    Full code  DVT PPX: AC  OOB  Diet: NPO for procedure

## 2018-09-20 NOTE — BRIEF OPERATIVE NOTE - PROCEDURE
<<-----Click on this checkbox to enter Procedure Venogram  09/20/2018  Right leg venogram; mechanical thrombectomy and venoplasty of right femoral and external iliac veins.  Active  [email or web address]

## 2018-09-20 NOTE — CHART NOTE - NSCHARTNOTEFT_GEN_A_CORE
Pre-Op Dx: PULMONARY EMBOLISM; DVT (DEEP VENOUS THROMBOSIS); HYPOXIA  Acute deep vein thrombosis (DVT) of iliac vein of right lower extremity  Pulmonary embolism    Procedure: right leg Venogram, Thrombectomy, iliac and femoral venoplasty    Subjective: Patient seen and examined, doing well, sitting up in bed, no complaints, no acute events post-op    Vital Signs Last 24 Hrs  T(C): 36.1 (20 Sep 2018 18:41), Max: 37.2 (19 Sep 2018 20:00)  T(F): 97 (20 Sep 2018 18:41), Max: 98.9 (19 Sep 2018 20:00)  HR: 66 (20 Sep 2018 18:41) (40 - 74)  BP: 137/83 (20 Sep 2018 18:41) (122/66 - 205/111)  BP(mean): 91 (20 Sep 2018 12:50) (80 - 134)  RR: 18 (20 Sep 2018 18:41) (12 - 35)  SpO2: 96% (20 Sep 2018 17:20) (89% - 97%)    Physical Exam:  General: NAD, resting comfortably in bed  Pulmonary: Nonlabored breathing, no respiratory distress  Cardiovascular: NSR  Abdominal: soft, NT/ND  Extremities: WWP, normal strength  Neuro: A/O x 3, CNs II-XII grossly intact, normal motor/sensation, no focal deficits  Pulses: palpable RLE pulses     LABS:                        14.8   9.81  )-----------( 194      ( 20 Sep 2018 05:17 )             43.5     09-20    142  |  105  |  15  ----------------------------<  107<H>  3.7   |  25  |  0.9    Ca    8.9      20 Sep 2018 01:41  Phos  3.2     09-20  Mg     2.2     09-20      PT/INR - ( 20 Sep 2018 01:41 )   PT: 13.70 sec;   INR: 1.27 ratio         PTT - ( 20 Sep 2018 01:41 )  PTT:39.2 sec  CAPILLARY BLOOD GLUCOSE  128 (20 Sep 2018 11:30)  103 (20 Sep 2018 06:00)  111 (19 Sep 2018 22:00)      POCT Blood Glucose.: 110 mg/dL (20 Sep 2018 16:37)  POCT Blood Glucose.: 128 mg/dL (20 Sep 2018 11:26)  POCT Blood Glucose.: 103 mg/dL (20 Sep 2018 05:57)  POCT Blood Glucose.: 111 mg/dL (19 Sep 2018 22:03)      Radiology and Additional Studies:    Assessment:63y Male s/p above procedure    Plan:  Pain/nausea control PRN, Home meds, Incentive spirometer/OOB/Ambulate, diet as tolerated

## 2018-09-20 NOTE — CHART NOTE - NSCHARTNOTEFT_GEN_A_CORE
PACU ANESTHESIA ADMISSION NOTE      Procedure: Thrombolysis of vein of extremity: Right lower limb Deep vein thrombectomy, thrombolysis and tPA instillation.    Post op diagnosis:  Acute deep vein thrombosis (DVT) of femoral vein of right lower extremity      ____  Intubated  TV:______       Rate: ______      FiO2: ______    ___x_  Patent Airway    ___x_  Full return of protective reflexes    ___x_  Full recovery from anesthesia / back to baseline status    Vitals: see anesth record    Mental Status:  __x__ Awake   _____ Alert   _____ Drowsy   _____ Sedated    Nausea/Vomiting:  __x__ NO  ______Yes,   See Post - Op Orders          Pain Scale (0-10):  ___0__    Treatment: ____ None    ____x See Post - Op/PCA Orders    Post - Operative Fluids:   ____ Oral   ___x_ See Post - Op Orders    Plan: Discharge:   ____Home       ___x__Floor     _____Critical Care    _____  Other:_________________    Comments: no complications. d/c when criteria met.

## 2018-09-20 NOTE — BRIEF OPERATIVE NOTE - PRE-OP DX
Acute deep vein thrombosis (DVT) of iliac vein of right lower extremity  09/18/2018    Active  Leon Choi
Acute deep vein thrombosis (DVT) of iliac vein of right lower extremity  09/18/2018    Active  Leon hCoi

## 2018-09-20 NOTE — PROGRESS NOTE ADULT - ASSESSMENT
Neurologic: Pain control Oxy 5mg 1 tab q4 mild, 2 tabs q4 moderate, dilaudid 0.5 severe. Q1 neurovascular check  Con't anti-anxiety and pain meds. pain management consulted: results pending    Respiratory:  Extubated, Sating well on RA. Hx of COPD - use inhaler prn  Cardiovascular: Keep normotensive.  Trop (-)x3  2D echo ordered - f/u results   spoke with private cardiologist Dr Altman ( last saw pt in March 2018): no severe cardiac issues from last visit  Gastrointestinal/Nutrition: Carb consistent diet, PPI.  NPO after midnight or OR on Thurs  Renal/Genitourinary: no escamilla in place. monitor lytes, monitor I/O's  Hematologic: Hg stable. con't Heparin at 500 through peripheral IV's for 24hrs, per vascular team, not making therapeutic. TPA at .5mg through R leg catheter for 24hrs.  Infectious Disease: no abx  Lines/Tubes: 2PIV's   Endocrine: Hx of DM on Insulin. monitor FS  Disposition: Con't SICU Neurologic: Intact; Pain control: home dose Morphne 60mg q12... Oxy 5mg 1 tab q4 mild, 2 tabs q4 moderate, ....q1 neurovascular check  Con't anti-anxiety meds..  Respiratory: Sating well on RA. Hx of COPD-- use inhaler prn   Cardiovascular: Sinus bradycardia when sleeping, resolves upon awakening. will monitor; trop (-)3.  2D echo ordered - results pending  Cardiologist; spoke with private cardiologist Dr Altman  ( last saw pt in March 2018): no severe cardiac issues from last visit  Gastrointestinal/Nutrition: DM on Carb consistent diet, PPI. NPO after midnight for OR on Thurs for catheter removal  Renal/Genitourinary: no escamilla .replete lytes as needed, monitor I/O's.  Hematologic:p: Hg stable....on pt specific Heparin at 500 through peripheral IV's, per vascular team, not making therapeutic. TPA at .5mg through R leg catheter.  Infectious Disease: no abx...  Lines/Tubes: 2PIV's   Endocrine: DM, on Insulin. monitor FS  Disposition: Con't SICU care

## 2018-09-20 NOTE — BRIEF OPERATIVE NOTE - POST-OP DX
Acute deep vein thrombosis (DVT) of femoral vein of right lower extremity  09/18/2018    Active  Leon Choi
Acute deep vein thrombosis (DVT) of femoral vein of right lower extremity  09/18/2018    Active  Leon Choi

## 2018-09-20 NOTE — PROGRESS NOTE ADULT - SUBJECTIVE AND OBJECTIVE BOX
SANA JOE  63y  Male  HPI:  Patient is a 63 year old male with PMH significant for COPD, Asthma, chronic back pain, HTN, DM, DLD, Anxiety presents complaining of pain and swelling of RLE for 3 days.  Patient states that he noticed this swelling 3 days ago and it has been gradually getting worse, patient is sedentary at home due to chronic pack pain.  He states the pain was so bad this morning he came to the ED.  Patient denies chest pain, SOB, fever, chills, nausea, vomiting.  States that he has never had a DVT in the past, his last colonoscopy was 3 years ago and he states that he was told it was "ok".  Patient was found to have DVT in femoral, popliteal, gastroc veins on venous duplex and CTA showed bilateral segmental and subsegmental pulmonary emboli.  Patient is a current smoker, has smoked 1/2 PPD for 44 years. (17 Sep 2018 14:22)    MEDICATIONS  (STANDING):  ALBUTerol    0.083%. 2.5 milliGRAM(s) Nebulizer once  ALPRAZolam 1 milliGRAM(s) Oral three times a day  apixaban 10 milliGRAM(s) Oral every 12 hours  baclofen 10 milliGRAM(s) Oral three times a day  DULoxetine 60 milliGRAM(s) Oral daily  furosemide    Tablet 20 milliGRAM(s) Oral daily  insulin glargine Injectable (LANTUS) 10 Unit(s) SubCutaneous at bedtime  insulin lispro Injectable (HumaLOG) 3 Unit(s) SubCutaneous three times a day before meals  lactated ringers. 1000 milliLiter(s) (125 mL/Hr) IV Continuous <Continuous>  levETIRAcetam 500 milliGRAM(s) Oral two times a day  lidocaine   Patch 1 Patch Transdermal every 24 hours  losartan 100 milliGRAM(s) Oral daily  morphine ER Tablet 60 milliGRAM(s) Oral every 12 hours  simvastatin 20 milliGRAM(s) Oral at bedtime    MEDICATIONS  (PRN):  ALBUTerol    90 MICROgram(s) HFA Inhaler 2 Puff(s) Inhalation every 6 hours PRN Bronchospasm  cyclobenzaprine 10 milliGRAM(s) Oral three times a day PRN Muscle Spasm  dexamethasone  IVPB 8 milliGRAM(s) IV Intermittent once PRN Nausea and/or Vomiting  enalaprilat Injectable 1.25 milliGRAM(s) IV Push every 6 hours PRN HTN: SBP > 180 mmHg  HYDROmorphone  Injectable 0.5 milliGRAM(s) IV Push every 10 minutes PRN Moderate Pain (4 - 6)  HYDROmorphone  Injectable 1 milliGRAM(s) IV Push every 10 minutes PRN Severe Pain (7 - 10)  meperidine     Injectable 12.5 milliGRAM(s) IV Push every 10 minutes PRN Shivering  metoclopramide Injectable 10 milliGRAM(s) IV Push once PRN Nausea and/or Vomiting  mometasone 100 MICROgram(s) HFA Inhaler 2 Puff(s) Inhalation daily PRN Bronchospasm  ondansetron Injectable 4 milliGRAM(s) IV Push once PRN Nausea and/or Vomiting  oxyCODONE    5 mG/acetaminophen 325 mG 1 Tablet(s) Oral every 4 hours PRN Mild Pain (1 - 3)  oxyCODONE    5 mG/acetaminophen 325 mG 2 Tablet(s) Oral every 4 hours PRN Moderate Pain (4 - 6)  zolpidem 5 milliGRAM(s) Oral at bedtime PRN Insomnia    INTERVAL EVENTS: Patient seen earlier today in good spirits, NPO for procedure. Patient denied pain.    T(C): 36.1 (09-20-18 @ 18:41), Max: 36.9 (09-20-18 @ 14:26)  HR: 66 (09-20-18 @ 18:41) (40 - 74)  BP: 137/83 (09-20-18 @ 18:41) (122/66 - 205/111)  RR: 19 (09-20-18 @ 18:45) (12 - 35)  SpO2: 94% (09-20-18 @ 18:45) (89% - 97%)  Wt(kg): --Vital Signs Last 24 Hrs  T(C): 36.1 (20 Sep 2018 18:41), Max: 36.9 (20 Sep 2018 14:26)  T(F): 97 (20 Sep 2018 18:41), Max: 98.4 (20 Sep 2018 14:26)  HR: 66 (20 Sep 2018 18:41) (40 - 74)  BP: 137/83 (20 Sep 2018 18:41) (122/66 - 205/111)  BP(mean): 91 (20 Sep 2018 12:50) (80 - 134)  RR: 19 (20 Sep 2018 18:45) (12 - 35)  SpO2: 94% (20 Sep 2018 18:45) (89% - 97%)    PHYSICAL EXAM:  GENERAL: NAD  NECK: Supple, No JVD, Normal thyroid  CHEST/LUNG: Clear; No crackles or wheezing  HEART: S1, S2, Regular rate and rhythm;   ABDOMEN: Soft, Nontender, Nondistended; Bowel sounds present  EXTREMITIES: No clubbing, cyanosis, or edema  SKIN: No rashes or lesions    LABS:  Labs:                        14.8   9.81  )-----------( 194      ( 20 Sep 2018 05:17 )             43.5             09-20    142  |  105  |  15  ----------------------------<  107<H>  3.7   |  25  |  0.9    Ca    8.9      20 Sep 2018 01:41  Phos  3.2     09-20  Mg     2.2     09-20                PT/INR - ( 20 Sep 2018 01:41 )   PT: 13.70 sec;   INR: 1.27 ratio         PTT - ( 20 Sep 2018 01:41 )  PTT:39.2 sec  CARDIAC MARKERS ( 19 Sep 2018 04:35 )  x     / <0.01 ng/mL / x     / x     / <1.0 ng/mL            RADIOLOGY & ADDITIONAL TESTS:  < from: Xray Chest 1 View- PORTABLE-Routine (09.20.18 @ 05:25) >  Diminishing reticular opacity right midlung field.     < end of copied text >

## 2018-09-21 ENCOUNTER — TRANSCRIPTION ENCOUNTER (OUTPATIENT)
Age: 63
End: 2018-09-21

## 2018-09-21 VITALS — WEIGHT: 237 LBS

## 2018-09-21 PROBLEM — R55 SYNCOPE AND COLLAPSE: Chronic | Status: ACTIVE | Noted: 2018-09-17

## 2018-09-21 LAB
ANION GAP SERPL CALC-SCNC: 17 MMOL/L — HIGH (ref 7–14)
BUN SERPL-MCNC: 19 MG/DL — SIGNIFICANT CHANGE UP (ref 10–20)
CALCIUM SERPL-MCNC: 8.6 MG/DL — SIGNIFICANT CHANGE UP (ref 8.5–10.1)
CHLORIDE SERPL-SCNC: 102 MMOL/L — SIGNIFICANT CHANGE UP (ref 98–110)
CO2 SERPL-SCNC: 22 MMOL/L — SIGNIFICANT CHANGE UP (ref 17–32)
CREAT SERPL-MCNC: 0.9 MG/DL — SIGNIFICANT CHANGE UP (ref 0.7–1.5)
GLUCOSE BLDC GLUCOMTR-MCNC: 109 MG/DL — HIGH (ref 70–99)
GLUCOSE BLDC GLUCOMTR-MCNC: 94 MG/DL — SIGNIFICANT CHANGE UP (ref 70–99)
GLUCOSE SERPL-MCNC: 91 MG/DL — SIGNIFICANT CHANGE UP (ref 70–99)
HCT VFR BLD CALC: 42.1 % — SIGNIFICANT CHANGE UP (ref 42–52)
HGB BLD-MCNC: 14.5 G/DL — SIGNIFICANT CHANGE UP (ref 14–18)
MAGNESIUM SERPL-MCNC: 2.1 MG/DL — SIGNIFICANT CHANGE UP (ref 1.8–2.4)
MCHC RBC-ENTMCNC: 30 PG — SIGNIFICANT CHANGE UP (ref 27–31)
MCHC RBC-ENTMCNC: 34.4 G/DL — SIGNIFICANT CHANGE UP (ref 32–37)
MCV RBC AUTO: 87.2 FL — SIGNIFICANT CHANGE UP (ref 80–94)
NRBC # BLD: 0 /100 WBCS — SIGNIFICANT CHANGE UP (ref 0–0)
PLATELET # BLD AUTO: 205 K/UL — SIGNIFICANT CHANGE UP (ref 130–400)
POTASSIUM SERPL-MCNC: 3.9 MMOL/L — SIGNIFICANT CHANGE UP (ref 3.5–5)
POTASSIUM SERPL-SCNC: 3.9 MMOL/L — SIGNIFICANT CHANGE UP (ref 3.5–5)
RBC # BLD: 4.83 M/UL — SIGNIFICANT CHANGE UP (ref 4.7–6.1)
RBC # FLD: 13.6 % — SIGNIFICANT CHANGE UP (ref 11.5–14.5)
SODIUM SERPL-SCNC: 141 MMOL/L — SIGNIFICANT CHANGE UP (ref 135–146)
WBC # BLD: 9.39 K/UL — SIGNIFICANT CHANGE UP (ref 4.8–10.8)
WBC # FLD AUTO: 9.39 K/UL — SIGNIFICANT CHANGE UP (ref 4.8–10.8)

## 2018-09-21 RX ORDER — APIXABAN 2.5 MG/1
1 TABLET, FILM COATED ORAL
Qty: 60 | Refills: 3
Start: 2018-09-21

## 2018-09-21 RX ADMIN — MOMETASONE FUROATE 1 PUFF(S): 220 INHALANT RESPIRATORY (INHALATION) at 08:25

## 2018-09-21 RX ADMIN — Medication 20 MILLIGRAM(S): at 05:19

## 2018-09-21 RX ADMIN — OXYCODONE AND ACETAMINOPHEN 2 TABLET(S): 5; 325 TABLET ORAL at 00:30

## 2018-09-21 RX ADMIN — Medication 1 MILLIGRAM(S): at 05:19

## 2018-09-21 RX ADMIN — MORPHINE SULFATE 60 MILLIGRAM(S): 50 CAPSULE, EXTENDED RELEASE ORAL at 05:19

## 2018-09-21 RX ADMIN — LOSARTAN POTASSIUM 100 MILLIGRAM(S): 100 TABLET, FILM COATED ORAL at 05:19

## 2018-09-21 RX ADMIN — LIDOCAINE 1 PATCH: 4 CREAM TOPICAL at 05:41

## 2018-09-21 RX ADMIN — ALBUTEROL 2 PUFF(S): 90 AEROSOL, METERED ORAL at 08:51

## 2018-09-21 RX ADMIN — Medication 3 UNIT(S): at 08:25

## 2018-09-21 RX ADMIN — LEVETIRACETAM 500 MILLIGRAM(S): 250 TABLET, FILM COATED ORAL at 05:19

## 2018-09-21 RX ADMIN — MORPHINE SULFATE 60 MILLIGRAM(S): 50 CAPSULE, EXTENDED RELEASE ORAL at 05:39

## 2018-09-21 RX ADMIN — Medication 10 MILLIGRAM(S): at 05:19

## 2018-09-21 RX ADMIN — APIXABAN 10 MILLIGRAM(S): 2.5 TABLET, FILM COATED ORAL at 05:19

## 2018-09-21 RX ADMIN — OXYCODONE AND ACETAMINOPHEN 1 TABLET(S): 5; 325 TABLET ORAL at 03:20

## 2018-09-21 RX ADMIN — OXYCODONE AND ACETAMINOPHEN 2 TABLET(S): 5; 325 TABLET ORAL at 00:58

## 2018-09-21 NOTE — PROGRESS NOTE ADULT - REASON FOR ADMISSION
RLE extensive DVT
RLE extensive DVT + PE

## 2018-09-21 NOTE — DISCHARGE NOTE ADULT - HOSPITAL COURSE
3 year old male with PMH significant for COPD, Asthma, chronic back pain, HTN, DM, DLD, Anxiety presents complaining of pain and swelling of RLE for 3 days.  Patient states that he noticed this swelling 3 days ago and it has been gradually getting worse, patient is sedentary at home due to chronic pack pain.  He states the pain was so bad this morning he came to the ED.  Patient denies chest pain, SOB, fever, chills, nausea, vomiting.  States that he has never had a DVT in the past, his last colonoscopy was 3 years ago and he states that he was told it was "ok".  Patient was found to have DVT in femoral, popliteal, gastroc veins on venous duplex and CTA showed bilateral segmental and subsegmental pulmonary emboli.  Patient is a current smoker, has smoked 1/2 PPD for 44 years. Vascular consult called, pt evaluated and went to OR for Right Leg venogram, thrombolysis & thrombectomy. The TPA and Heparin ran peripherally for 24hrs. Pt was taken back to OR to undergo repeat venogram due to extensive clots found. Pt was downgraded from ICU and did well overnight. Started on Eliquis prior discharge. 3 year old male with PMH significant for COPD, Asthma, chronic back pain, HTN, DM, DLD, Anxiety presents complaining of pain and swelling of RLE for 3 days.  Patient states that he noticed this swelling 3 days ago and it has been gradually getting worse, patient is sedentary at home due to chronic pack pain.  He states the pain was so bad this morning he came to the ED.  Patient denies chest pain, SOB, fever, chills, nausea, vomiting.  States that he has never had a DVT in the past, his last colonoscopy was 3 years ago and he states that he was told it was "ok".  Patient was found to have DVT in femoral, popliteal, gastroc veins on venous duplex and CTA showed bilateral segmental and subsegmental pulmonary emboli.  Patient is a current smoker, has smoked 1/2 PPD for 44 years. Vascular consult called, pt evaluated and went to OR for Right Leg venogram, thrombolysis & thrombectomy. The TPA and Heparin ran peripherally for 24hrs. Pt was taken back to OR to undergo repeat venogram due to extensive clots found. Pt was downgraded from ICU and did well overnight. Started on Eliquis prior discharge.  Pt was also on a 5 day course of Prednisone 40 mg po daily for asthma/copd exacerbation.

## 2018-09-21 NOTE — DISCHARGE NOTE ADULT - PATIENT PORTAL LINK FT
You can access the [facility] Patient Portal, offered by [facility], by registering with the following website: [email or web address]

## 2018-09-21 NOTE — DISCHARGE NOTE ADULT - MEDICATION SUMMARY - MEDICATIONS TO TAKE
I will START or STAY ON the medications listed below when I get home from the hospital:    Percocet 10/325  -- Indication: For Pain    morphine 60 mg/12 hours oral tablet, extended release  -- 1 tab(s) by mouth every 12 hours  -- Indication: For Pain    losartan 100 mg oral tablet  -- 1 tab(s) by mouth once a day  -- Indication: For Hypertension    tamsulosin 0.4 mg oral capsule  -- 1 cap(s) by mouth once a day  -- Indication: For Prostate    apixaban 5 mg oral tablet  -- 1 tab(s) by mouth every 12 hours  -- Indication: For blood clot    levETIRAcetam 500 mg oral tablet, extended release  -- 2 tab(s) by mouth once a day  -- Indication: For Hypothyroidism    DULoxetine 60 mg oral delayed release capsule  -- 1 cap(s) by mouth once a day  -- Indication: For mood    metFORMIN 500 mg oral tablet  -- 1 tab(s) by mouth 2 times a day  -- Indication: For Diabetes    fenofibrate 160 mg oral tablet  -- 1 tab(s) by mouth once a day  -- Indication: For Triglycerides    simvastatin 20 mg oral tablet  -- 1 tab(s) by mouth once a day (at bedtime)  -- Indication: For cholesterol    zolpidem 10 mg oral tablet  -- 1 tab(s) by mouth once a day (at bedtime)  -- Indication: For Sleep    ALPRAZolam 1 mg oral tablet  -- 1 tab(s) by mouth 3 times a day  -- Indication: For mood    Atrovent HFA  -- Indication: For lungs/copd    Ventolin HFA 90 mcg/inh inhalation aerosol  -- Indication: For lungs/copd    Silvadene 1% topical cream  -- Apply on skin to affected area once a day   -- For external use only.    -- Indication: For Skin    furosemide 20 mg oral tablet  -- 1 tab(s) by mouth once a day  -- Indication: For lungs/fluid    cyclobenzaprine 10 mg oral tablet  -- 1 tab(s) by mouth 3 times a day  -- Indication: For muscle relaxant    baclofen 10 mg oral tablet  -- 1 tab(s) by mouth 3 times a day  -- Indication: For muscle relaxant    Asmanex  mcg/inh inhalation aerosol  -- Indication: For asthma

## 2018-09-21 NOTE — DISCHARGE NOTE ADULT - CARE PROVIDERS DIRECT ADDRESSES
[email or web address]

## 2018-09-21 NOTE — PROGRESS NOTE ADULT - ASSESSMENT
63y Male with PMH significant for COPD not on O2, Asthma, chronic back pain s/p back Sx and on opioids, HTN, DM II on metformin, DLD, Anxiety on medications, seizure disorder on keppra, presents complaining of pain and swelling of RLE for  days, found to have DVT in femoral, popliteal, PT veins on venous duplex and bilateral segmental and subsegmental pulmonary emboli on CTA Chest.       RLE extensive DVT (femoral/popliteal veins), B/L segmental and subsegmental PE  - followed by vascular  - post thrombolysis, TPA cath, and removal of cath yesterday with minimal residual clot  - patient received Eliquis this am  - to be discharged on Eliquis?  - f/u with Dr Norton in the community    Asthma/COPD  - continue respiratory treatments    DM II: Uncomplicated  - on lispro and lantus  - Metformin held    Anxiety disorder:  -continue home medications including Alprazolam and Zolpidem    Chronic Back pain:   - post multiple procedure  - opioid dependent  - on Baclofen, cyclobenzaprine  - Percocet for pain control    Seizure disorder:   - continue Keppra.    Full code  DVT PPX: AC  OOB  Diet: NCS, DASH diet  D/C home today, f/u with Dr Leblanc in the office next week

## 2018-09-21 NOTE — DISCHARGE NOTE ADULT - CARE PROVIDER_API CALL
Shannan Rios), Internal Medicine  [address] [place] [address]  Phone: [phone]  Fax: [phone]    Eric Norton), Surgery; Vascular Surgery  [address] [place] [address]  Phone: [phone]  Fax: [phone]

## 2018-09-21 NOTE — PROGRESS NOTE ADULT - SUBJECTIVE AND OBJECTIVE BOX
SANA JOE  63y  Male  HPI:  Patient is a 63 year old male with PMH significant for COPD, Asthma, chronic back pain, HTN, DM, DLD, Anxiety presents complaining of pain and swelling of RLE for 3 days.  Patient states that he noticed this swelling 3 days ago and it has been gradually getting worse, patient is sedentary at home due to chronic pack pain.  He states the pain was so bad this morning he came to the ED.  Patient denies chest pain, SOB, fever, chills, nausea, vomiting.  States that he has never had a DVT in the past, his last colonoscopy was 3 years ago and he states that he was told it was "ok".  Patient was found to have DVT in femoral, popliteal, gastroc veins on venous duplex and CTA showed bilateral segmental and subsegmental pulmonary emboli.  Patient is a current smoker, has smoked 1/2 PPD for 44 years. (17 Sep 2018 14:22)    MEDICATIONS  (STANDING):  ALBUTerol    0.083%. 2.5 milliGRAM(s) Nebulizer once  ALPRAZolam 1 milliGRAM(s) Oral three times a day  baclofen 10 milliGRAM(s) Oral three times a day  DULoxetine 60 milliGRAM(s) Oral daily  furosemide    Tablet 20 milliGRAM(s) Oral daily  insulin glargine Injectable (LANTUS) 10 Unit(s) SubCutaneous at bedtime  insulin lispro Injectable (HumaLOG) 3 Unit(s) SubCutaneous three times a day before meals  levETIRAcetam 500 milliGRAM(s) Oral two times a day  lidocaine   Patch 1 Patch Transdermal every 24 hours  losartan 100 milliGRAM(s) Oral daily  mometasone 220 MICROgram(s) Inhaler 1 Puff(s) Inhalation daily  morphine ER Tablet 60 milliGRAM(s) Oral every 12 hours  simvastatin 20 milliGRAM(s) Oral at bedtime    MEDICATIONS  (PRN):  ALBUTerol    90 MICROgram(s) HFA Inhaler 2 Puff(s) Inhalation every 6 hours PRN Bronchospasm  cyclobenzaprine 10 milliGRAM(s) Oral three times a day PRN Muscle Spasm  enalaprilat Injectable 1.25 milliGRAM(s) IV Push every 6 hours PRN HTN: SBP > 180 mmHg  oxyCODONE    5 mG/acetaminophen 325 mG 1 Tablet(s) Oral every 4 hours PRN Mild Pain (1 - 3)  oxyCODONE    5 mG/acetaminophen 325 mG 2 Tablet(s) Oral every 4 hours PRN Moderate Pain (4 - 6)  zolpidem 5 milliGRAM(s) Oral at bedtime PRN Insomnia    INTERVAL EVENTS: Patient seen today without distress. Patient states Dr Norton is discharging him home today.    T(C): 35.9 (09-21-18 @ 07:27), Max: 36.9 (09-20-18 @ 14:26)  HR: 48 (09-21-18 @ 07:27) (40 - 69)  BP: 103/55 (09-21-18 @ 07:27) (103/55 - 205/111)  RR: 18 (09-21-18 @ 07:27) (12 - 25)  SpO2: 94% (09-20-18 @ 18:45) (89% - 97%)  Wt(kg): --Vital Signs Last 24 Hrs  T(C): 35.9 (21 Sep 2018 07:27), Max: 36.9 (20 Sep 2018 14:26)  T(F): 96.6 (21 Sep 2018 07:27), Max: 98.4 (20 Sep 2018 14:26)  HR: 48 (21 Sep 2018 07:27) (40 - 69)  BP: 103/55 (21 Sep 2018 07:27) (103/55 - 205/111)  BP(mean): 91 (20 Sep 2018 12:50) (91 - 116)  RR: 18 (21 Sep 2018 07:27) (12 - 25)  SpO2: 94% (20 Sep 2018 18:45) (89% - 97%)    PHYSICAL EXAM:  GENERAL: NAD  NECK: Supple, No JVD  CHEST/LUNG: Clear; No wheezing  HEART: S1, S2, Regular rate and rhythm;   ABDOMEN: Soft, Nontender, Nondistended; Bowel sounds present  EXTREMITIES: No edema    LABS:  Labs:                        14.5   9.39  )-----------( 205      ( 21 Sep 2018 06:42 )             42.1             09-21    141  |  102  |  19  ----------------------------<  91  3.9   |  22  |  0.9    Ca    8.6      21 Sep 2018 06:42  Phos  3.2     09-20  Mg     2.1     09-21                PT/INR - ( 20 Sep 2018 01:41 )   PT: 13.70 sec;   INR: 1.27 ratio      PTT - ( 20 Sep 2018 01:41 )  PTT:39.2 sec                  RADIOLOGY & ADDITIONAL TESTS:

## 2018-09-21 NOTE — DISCHARGE NOTE ADULT - CARE PLAN
Principal Discharge DX:	Deep vein thrombosis (DVT) of other vein of right lower extremity  Goal:	s/p thrombolysis  Assessment and plan of treatment:	Remove dressing and ace wrap in 3 days, wear compression stocking during day time, off at night  Secondary Diagnosis:	Moderate asthma with acute exacerbation, unspecified whether persistent  Goal:	medical optimization  Assessment and plan of treatment:	finished short course of steroids in the hospital. Follow up with PMD next week or come back to ED with acute shortness of breath, wheezing or chest tightness

## 2018-09-21 NOTE — DISCHARGE NOTE ADULT - PLAN OF CARE
s/p thrombolysis Remove dressing and ace wrap in 3 days, wear compression stocking during day time, off at night medical optimization finished short course of steroids in the hospital. Follow up with PMD next week or come back to ED with acute shortness of breath, wheezing or chest tightness

## 2018-09-21 NOTE — PROVIDER CONTACT NOTE (OTHER) - SITUATION
Pt c/o pain in leg that radiates from back of right ankle to upper leg. says that this pain is new. pt wants for doctor to come and see pt

## 2018-09-25 DIAGNOSIS — Z79.84 LONG TERM (CURRENT) USE OF ORAL HYPOGLYCEMIC DRUGS: ICD-10-CM

## 2018-09-25 DIAGNOSIS — I10 ESSENTIAL (PRIMARY) HYPERTENSION: ICD-10-CM

## 2018-09-25 DIAGNOSIS — J44.1 CHRONIC OBSTRUCTIVE PULMONARY DISEASE WITH (ACUTE) EXACERBATION: ICD-10-CM

## 2018-09-25 DIAGNOSIS — F41.9 ANXIETY DISORDER, UNSPECIFIED: ICD-10-CM

## 2018-09-25 DIAGNOSIS — G89.29 OTHER CHRONIC PAIN: ICD-10-CM

## 2018-09-25 DIAGNOSIS — Z88.8 ALLERGY STATUS TO OTHER DRUGS, MEDICAMENTS AND BIOLOGICAL SUBSTANCES: ICD-10-CM

## 2018-09-25 DIAGNOSIS — I82.411 ACUTE EMBOLISM AND THROMBOSIS OF RIGHT FEMORAL VEIN: ICD-10-CM

## 2018-09-25 DIAGNOSIS — I82.431 ACUTE EMBOLISM AND THROMBOSIS OF RIGHT POPLITEAL VEIN: ICD-10-CM

## 2018-09-25 DIAGNOSIS — I26.99 OTHER PULMONARY EMBOLISM WITHOUT ACUTE COR PULMONALE: ICD-10-CM

## 2018-09-25 DIAGNOSIS — G40.909 EPILEPSY, UNSPECIFIED, NOT INTRACTABLE, WITHOUT STATUS EPILEPTICUS: ICD-10-CM

## 2018-09-25 DIAGNOSIS — F17.210 NICOTINE DEPENDENCE, CIGARETTES, UNCOMPLICATED: ICD-10-CM

## 2018-09-25 DIAGNOSIS — E78.5 HYPERLIPIDEMIA, UNSPECIFIED: ICD-10-CM

## 2018-09-25 DIAGNOSIS — F11.21 OPIOID DEPENDENCE, IN REMISSION: ICD-10-CM

## 2018-09-25 DIAGNOSIS — M54.9 DORSALGIA, UNSPECIFIED: ICD-10-CM

## 2018-09-25 DIAGNOSIS — E11.9 TYPE 2 DIABETES MELLITUS WITHOUT COMPLICATIONS: ICD-10-CM

## 2018-09-25 DIAGNOSIS — Z91.018 ALLERGY TO OTHER FOODS: ICD-10-CM

## 2018-09-25 DIAGNOSIS — R09.02 HYPOXEMIA: ICD-10-CM

## 2018-09-25 DIAGNOSIS — Z88.0 ALLERGY STATUS TO PENICILLIN: ICD-10-CM

## 2018-10-09 ENCOUNTER — EMERGENCY (EMERGENCY)
Facility: HOSPITAL | Age: 63
LOS: 0 days | Discharge: HOME | End: 2018-10-09
Attending: EMERGENCY MEDICINE | Admitting: EMERGENCY MEDICINE
Payer: MEDICARE

## 2018-10-09 VITALS
OXYGEN SATURATION: 97 % | RESPIRATION RATE: 20 BRPM | HEART RATE: 50 BPM | SYSTOLIC BLOOD PRESSURE: 135 MMHG | TEMPERATURE: 98 F | DIASTOLIC BLOOD PRESSURE: 73 MMHG

## 2018-10-09 VITALS
SYSTOLIC BLOOD PRESSURE: 140 MMHG | HEART RATE: 55 BPM | OXYGEN SATURATION: 95 % | RESPIRATION RATE: 18 BRPM | DIASTOLIC BLOOD PRESSURE: 74 MMHG

## 2018-10-09 DIAGNOSIS — E11.9 TYPE 2 DIABETES MELLITUS WITHOUT COMPLICATIONS: ICD-10-CM

## 2018-10-09 DIAGNOSIS — Z79.891 LONG TERM (CURRENT) USE OF OPIATE ANALGESIC: ICD-10-CM

## 2018-10-09 DIAGNOSIS — I10 ESSENTIAL (PRIMARY) HYPERTENSION: ICD-10-CM

## 2018-10-09 DIAGNOSIS — Z79.51 LONG TERM (CURRENT) USE OF INHALED STEROIDS: ICD-10-CM

## 2018-10-09 DIAGNOSIS — Z91.018 ALLERGY TO OTHER FOODS: ICD-10-CM

## 2018-10-09 DIAGNOSIS — R60.0 LOCALIZED EDEMA: ICD-10-CM

## 2018-10-09 DIAGNOSIS — Z79.84 LONG TERM (CURRENT) USE OF ORAL HYPOGLYCEMIC DRUGS: ICD-10-CM

## 2018-10-09 DIAGNOSIS — Z88.0 ALLERGY STATUS TO PENICILLIN: ICD-10-CM

## 2018-10-09 DIAGNOSIS — E78.00 PURE HYPERCHOLESTEROLEMIA, UNSPECIFIED: ICD-10-CM

## 2018-10-09 DIAGNOSIS — J45.909 UNSPECIFIED ASTHMA, UNCOMPLICATED: ICD-10-CM

## 2018-10-09 DIAGNOSIS — M79.669 PAIN IN UNSPECIFIED LOWER LEG: ICD-10-CM

## 2018-10-09 DIAGNOSIS — Z88.6 ALLERGY STATUS TO ANALGESIC AGENT: ICD-10-CM

## 2018-10-09 DIAGNOSIS — Z98.890 OTHER SPECIFIED POSTPROCEDURAL STATES: Chronic | ICD-10-CM

## 2018-10-09 DIAGNOSIS — Z79.899 OTHER LONG TERM (CURRENT) DRUG THERAPY: ICD-10-CM

## 2018-10-09 LAB
ANION GAP SERPL CALC-SCNC: 13 MMOL/L — SIGNIFICANT CHANGE UP (ref 7–14)
APTT BLD: 53.5 SEC — HIGH (ref 27–39.2)
BASOPHILS # BLD AUTO: 0.08 K/UL — SIGNIFICANT CHANGE UP (ref 0–0.2)
BASOPHILS NFR BLD AUTO: 1.3 % — HIGH (ref 0–1)
BUN SERPL-MCNC: 15 MG/DL — SIGNIFICANT CHANGE UP (ref 10–20)
CALCIUM SERPL-MCNC: 9.3 MG/DL — SIGNIFICANT CHANGE UP (ref 8.5–10.1)
CHLORIDE SERPL-SCNC: 105 MMOL/L — SIGNIFICANT CHANGE UP (ref 98–110)
CO2 SERPL-SCNC: 28 MMOL/L — SIGNIFICANT CHANGE UP (ref 17–32)
CREAT SERPL-MCNC: 0.9 MG/DL — SIGNIFICANT CHANGE UP (ref 0.7–1.5)
D DIMER BLD IA.RAPID-MCNC: 201 NG/ML DDU — SIGNIFICANT CHANGE UP (ref 0–230)
EOSINOPHIL # BLD AUTO: 0.37 K/UL — SIGNIFICANT CHANGE UP (ref 0–0.7)
EOSINOPHIL NFR BLD AUTO: 5.9 % — SIGNIFICANT CHANGE UP (ref 0–8)
GLUCOSE SERPL-MCNC: 109 MG/DL — HIGH (ref 70–99)
HCT VFR BLD CALC: 43.7 % — SIGNIFICANT CHANGE UP (ref 42–52)
HGB BLD-MCNC: 14.4 G/DL — SIGNIFICANT CHANGE UP (ref 14–18)
IMM GRANULOCYTES NFR BLD AUTO: 0.3 % — SIGNIFICANT CHANGE UP (ref 0.1–0.3)
INR BLD: 1.36 RATIO — HIGH (ref 0.65–1.3)
LYMPHOCYTES # BLD AUTO: 3.01 K/UL — SIGNIFICANT CHANGE UP (ref 1.2–3.4)
LYMPHOCYTES # BLD AUTO: 48.2 % — SIGNIFICANT CHANGE UP (ref 20.5–51.1)
MCHC RBC-ENTMCNC: 29.7 PG — SIGNIFICANT CHANGE UP (ref 27–31)
MCHC RBC-ENTMCNC: 33 G/DL — SIGNIFICANT CHANGE UP (ref 32–37)
MCV RBC AUTO: 90.1 FL — SIGNIFICANT CHANGE UP (ref 80–94)
MONOCYTES # BLD AUTO: 0.37 K/UL — SIGNIFICANT CHANGE UP (ref 0.1–0.6)
MONOCYTES NFR BLD AUTO: 5.9 % — SIGNIFICANT CHANGE UP (ref 1.7–9.3)
NEUTROPHILS # BLD AUTO: 2.39 K/UL — SIGNIFICANT CHANGE UP (ref 1.4–6.5)
NEUTROPHILS NFR BLD AUTO: 38.4 % — LOW (ref 42.2–75.2)
NRBC # BLD: 0 /100 WBCS — SIGNIFICANT CHANGE UP (ref 0–0)
NT-PROBNP SERPL-SCNC: 79 PG/ML — SIGNIFICANT CHANGE UP (ref 0–300)
PLATELET # BLD AUTO: 209 K/UL — SIGNIFICANT CHANGE UP (ref 130–400)
POTASSIUM SERPL-MCNC: 4.1 MMOL/L — SIGNIFICANT CHANGE UP (ref 3.5–5)
POTASSIUM SERPL-SCNC: 4.1 MMOL/L — SIGNIFICANT CHANGE UP (ref 3.5–5)
PROTHROM AB SERPL-ACNC: 14.6 SEC — HIGH (ref 9.95–12.87)
RBC # BLD: 4.85 M/UL — SIGNIFICANT CHANGE UP (ref 4.7–6.1)
RBC # FLD: 14.6 % — HIGH (ref 11.5–14.5)
SODIUM SERPL-SCNC: 146 MMOL/L — SIGNIFICANT CHANGE UP (ref 135–146)
WBC # BLD: 6.24 K/UL — SIGNIFICANT CHANGE UP (ref 4.8–10.8)
WBC # FLD AUTO: 6.24 K/UL — SIGNIFICANT CHANGE UP (ref 4.8–10.8)

## 2018-10-09 PROCEDURE — 93971 EXTREMITY STUDY: CPT | Mod: 26

## 2018-10-09 RX ORDER — OXYCODONE AND ACETAMINOPHEN 5; 325 MG/1; MG/1
2 TABLET ORAL ONCE
Qty: 0 | Refills: 0 | Status: DISCONTINUED | OUTPATIENT
Start: 2018-10-09 | End: 2018-10-09

## 2018-10-09 RX ADMIN — OXYCODONE AND ACETAMINOPHEN 2 TABLET(S): 5; 325 TABLET ORAL at 06:28

## 2018-10-09 NOTE — CONSULT NOTE ADULT - ASSESSMENT
63y Male with PMH significant for COPD, Asthma, chronic back pain, HTN, DM, DLD, Anxiety, in September was admitted with bilateral PE and extensive Right lower extremity DVT, underwent thrombolysis 9/18-9/20/18, pt was discharged on Eliquis and followed up with Dr. Norton. Pt presents to ED c/o Right leg swelling. Pt noted bilateral lower extremity swelling Right >Left. He is compliant with Eliquis.   V dplx shows chronic Right pop and PTV vein clots.     Rec:  --leg swelling likely from fluid overload  -- follow up with cardiology for diuresing  - leg elevation  - follow up with Dr. Norton as recommended from his last office visit [phone]  - continue Eliquis      Pt seen with Dr. Norton

## 2018-10-09 NOTE — ED PROVIDER NOTE - OBJECTIVE STATEMENT
64 y/o M here with R lower calf pain and swelling. Pt with known DVT in the R leg and is on Eliquis.  Pt was recently in the ICU and treated for this DVT.  Pt was d/c home and has a home nurse who stated that the leg was looking more swollen. No fever.  No trauma. No erythema. 64 y/o M htn hchol, dm, on clindamycin for recent dental infection, recent DVT is here with R lower calf pain and swelling. Pt with known DVT in the R leg and is on Eliquis.  Pt was recently in the ICU and treated for this DVT.  Pt was d/c home and has a home nurse who stated that the leg was looking more swollen. No fever.  No trauma. No erythema.

## 2018-10-09 NOTE — ED PROVIDER NOTE - NS ED ROS FT
Review of Systems:  · CONSTITUTIONAL: no fever and no chills.  · EYES: no discharge, no irritation, no pain, no redness, and no visual changes.  · ENMT: Ears: no ear pain and no hearing problems. Nose: no nasal congestion and no nasal drainage. Mouth/Throat: no dysphagia, no hoarseness and no throat pain.  · CARDIOVASCULAR: no chest pain  · RESPIRATORY: no cough, and no shortness of breath.  · GASTROINTESTINAL: no abdominal pain, no diarrhea, no nausea and no vomiting.  · GENITOURINARY: no dysuria  · MUSCULOSKELETAL: no back pain, no weakness.  +R lower leg pain.  · SKIN: no rashes.  · NEURO: no loss of consciousness, no headache.  · ROS STATEMENT: all other ROS negative except as per HPI

## 2018-10-09 NOTE — CONSULT NOTE ADULT - SUBJECTIVE AND OBJECTIVE BOX
VASCULAR SURGERY CONSULT NOTE      HPI:  63y Male with PMH significant for COPD, Asthma, chronic back pain, HTN, DM, DLD, Anxiety, in September was admitted with bilateral PE and extensive Right lower extremity DVT, underwent thrombolysis 9/18-9/20/18, pt was discharged on Eliquis and followed up with Dr. Norton. Pt presents to ED c/o Right leg swelling. Pt noted bilateral lower extremity swelling Right >Left. He is compliant with Eliquis.       PAST MEDICAL & SURGICAL HISTORY:  Syncopal episodes  Uncomplicated opioid dependence  Chronic low back pain with sciatica, sciatica laterality unspecified, unspecified back pain laterality  Asthma  High cholesterol  Diabetes  HTN (hypertension)  Anxiety  History of back surgery: x 3    aspirin (Stomach Upset)  Originally Entered as [ANGIOEDEMA] reaction to [pineapple] (Unknown)  penicillins (Other)  pineapples (Anaphylaxis)    Home Medications:  ALPRAZolam 1 mg oral tablet: 1 tab(s) orally 3 times a day (17 Sep 2018 14:35)  Asmanex  mcg/inh inhalation aerosol:  (17 Sep 2018 14:35)  Atrovent HFA:  (17 Sep 2018 14:35)  baclofen 10 mg oral tablet: 1 tab(s) orally 3 times a day (17 Sep 2018 14:35)  cyclobenzaprine 10 mg oral tablet: 1 tab(s) orally 3 times a day (17 Sep 2018 14:35)  DULoxetine 60 mg oral delayed release capsule: 1 cap(s) orally once a day (17 Sep 2018 14:35)  fenofibrate 160 mg oral tablet: 1 tab(s) orally once a day (17 Sep 2018 14:35)  furosemide 20 mg oral tablet: 1 tab(s) orally once a day (07 Mar 2018 05:27)  levETIRAcetam 500 mg oral tablet, extended release: 2 tab(s) orally once a day (07 Mar 2018 05:27)  losartan 100 mg oral tablet: 1 tab(s) orally once a day (07 Mar 2018 05:27)  metFORMIN 500 mg oral tablet: 1 tab(s) orally 2 times a day (07 Mar 2018 05:27)  morphine 60 mg/12 hours oral tablet, extended release: 1 tab(s) orally every 12 hours (17 Sep 2018 14:35)  Percocet 10/325:  (07 Mar 2018 05:27)  simvastatin 20 mg oral tablet: 1 tab(s) orally once a day (at bedtime) (07 Mar 2018 05:27)  tamsulosin 0.4 mg oral capsule: 1 cap(s) orally once a day (17 Sep 2018 14:35)  Ventolin HFA 90 mcg/inh inhalation aerosol:  (07 Mar 2018 05:27)  zolpidem 10 mg oral tablet: 1 tab(s) orally once a day (at bedtime) (17 Sep 2018 14:35)    No permtinent family history of PVD    REVIEW OF SYSTEMS:  GENERAL:                                         negative  SKIN:                                                 negative  OPTHALMOLOGIC:                          negative  ENMT:                                               negative  RESPIRATORY AND THORAX:        see HPI  CARDIOVASCULAR:                         see HPI  MUSCULOSKELETAL:                       negative  NEUROLOGIC:                                   negative  PSYCHIATRIC:                                    negative  HEMATOLOGY/LYMPHATICS:         negative  ENDOCRINE:                                    see HPI  ALLERGIC/IMMUNOLOGIC:            negative    12 point ROS otherwise normal except as stated in HPI    PHYSICAL EXAM  Vital Signs Last 24 Hrs  T(C): 35.8 (09 Oct 2018 07:26), Max: 35.8 (09 Oct 2018 07:26)  T(F): 96.5 (09 Oct 2018 07:26), Max: 96.5 (09 Oct 2018 07:26)  HR: 53 (09 Oct 2018 07:26) (53 - 55)  BP: 110/65 (09 Oct 2018 07:26) (110/65 - 140/74)  BP(mean): --  RR: 20 (09 Oct 2018 07:26) (18 - 20)  SpO2: 95% (09 Oct 2018 07:26) (95% - 95%)    Appearance: Normal	  HEENT:   Normal oral mucosa, PERRL, EOMI	  Neck: Supple, - JVD; Carotid Bruit   Cardiovascular: Normal S1 S2, No JVD, No murmurs,   Respiratory: Lungs clear to auscultation, No Rales, Rhonchi, Wheezing	  Gastrointestinal:  Soft, Non-tender, positive BS	  Skin: No rashes, No ecchymoses, No cyanosis  Extremities: Normal range of motion, No clubbing, cyanosis. Pt with bilateral severe pitting 2+ edema Right >Left   Neurologic: Non-focal  Psychiatry: A & O x 3, Mood & affect appropriate        MEDICATIONS:   MEDICATIONS  (STANDING):    MEDICATIONS  (PRN):      LAB/STUDIES:                        14.4   6.24  )-----------( 209      ( 09 Oct 2018 03:09 )             43.7     10-09    146  |  105  |  15  ----------------------------<  109<H>  4.1   |  28  |  0.9    Ca    9.3      09 Oct 2018 03:09      PT/INR - ( 09 Oct 2018 03:09 )   PT: 14.60 sec;   INR: 1.36 ratio         PTT - ( 09 Oct 2018 03:09 )  PTT:53.5 sec      IMAGING:      Venous duplex: chronic right popliteal and posterior tibial DVT

## 2018-10-09 NOTE — ED PROVIDER NOTE - PROGRESS NOTE DETAILS
Pt requesting his AM pain meds.  Takes 10/325 of percocet in the AM.  Will give AM dose. signed out to Dr. Owen. pt signed out to me by dr gonzales, vascular consulted (Alley) given recent surgery 9/18 and 9/20 to RLE venogram/thrombolysis/thrombectomy, pt reports compliance w [email or web address], has not seen Dr Norton as outpt since dc from hospital yet. Calling vascular tech for prelim read on sono, busy tone x 2 pt seen by dr payne, states pt has pitting edema bilat, recommends work up for fluid overload, states RLE swelling is his postop appearance, prelim doppler neg per tech TTE: EF 60% 9/19/18 with normal systolic fxn TTE: EF 60% 9/19/18 with normal systolic fxn. pt has no CP/SOB. CXR no sign of pulm vasc congestion. will d/w pmd TTE: EF 60% 9/19/18 with normal systolic fxn. pt has no CP/SOB. CXR no sign of pulm vasc congestion. will d/w pmd. pt already on lasix 20 mg daily TTE: EF 60% 9/19/18 with normal systolic fxn. pt has no CP/SOB. CXR no sign of pulm vasc congestion. will d/w pmd. pt already on lasix 20 mg daily. pt has compression socks at home. leg elevation reinforced.

## 2018-10-09 NOTE — ED PROVIDER NOTE - PHYSICAL EXAMINATION
Physical Examination:   VITAL SIGNS: I have reviewed vital signs.  CONSTITUTIONAL: well appearing, NAD.   SKIN: Skin exam is warm and dry.  No rashes  HEAD: Normocephalic; atraumatic.  EYES: PERRL, EOM intact; conjunctiva and sclera clear.  ENT: No nasal discharge; airway clear.  CARD: S1, S2 reg  RESP: CTAB. No wheezes, rales or rhonchi.  ABD: soft; non-distended; non-tender  EXT: Normal ROM. +RLE swelling and edema. no erythema.  2+ DP.  + calf ttp and pain with palp.     NEURO: Alert, oriented. Grossly unremarkable. No focal deficits. Ambulatory with steady gait using cane.    PSYCH: Cooperative, appropriate.

## 2018-10-09 NOTE — ED ADULT NURSE NOTE - OBJECTIVE STATEMENT
PT states he has a blood clot in RLE on Eliquis. Pt states his visiting nurse instructed him to go to urgent care because the leg looked "worse". Pt stated he didn't go to urgent care, he wanted to watch the baseball game tonight. So he came to the ED after the game.

## 2018-10-09 NOTE — ED ADULT NURSE NOTE - NSIMPLEMENTINTERV_GEN_ALL_ED
Implemented All Universal Safety Interventions:  [place] to call system. Call bell, personal items and telephone within reach. Instruct patient to call for assistance. Room bathroom lighting operational. Non-slip footwear when patient is off stretcher. Physically safe environment: no spills, clutter or unnecessary equipment. Stretcher in lowest position, wheels locked, appropriate side rails in place.

## 2018-10-09 NOTE — ED PROVIDER NOTE - MEDICAL DECISION MAKING DETAILS
discussed w dr gage, states do not adjust lasix at this time, can continue same dose, stockings, leg elevation and f/u as outpt in clinic 1 week, strict return precautions provided

## 2018-11-03 NOTE — DISCHARGE NOTE ADULT - NS MD DC PLAN IMMU FLU REF OTH
IV infusion completed. Tolerated well. Vitals stable, denies needs or concerns. Ambulatory to exit with no problems. Not indicated for this patient

## 2019-09-09 ENCOUNTER — OUTPATIENT (OUTPATIENT)
Dept: OUTPATIENT SERVICES | Facility: HOSPITAL | Age: 64
LOS: 1 days | Discharge: HOME | End: 2019-09-09

## 2019-09-09 DIAGNOSIS — Z98.890 OTHER SPECIFIED POSTPROCEDURAL STATES: Chronic | ICD-10-CM

## 2019-09-09 DIAGNOSIS — E11.9 TYPE 2 DIABETES MELLITUS WITHOUT COMPLICATIONS: ICD-10-CM

## 2019-09-09 DIAGNOSIS — E78.00 PURE HYPERCHOLESTEROLEMIA, UNSPECIFIED: ICD-10-CM

## 2019-09-09 DIAGNOSIS — Z00.00 ENCOUNTER FOR GENERAL ADULT MEDICAL EXAMINATION WITHOUT ABNORMAL FINDINGS: ICD-10-CM

## 2019-09-27 NOTE — H&P ADULT - NSHPPHYSICALEXAM_GEN_ALL_CORE
forehead GENERAL APPEARANCE:  alert and cooperative, and appears to be in no acute distress.  HEENT: unremarkable  THROAT: Oral cavity and pharynx normal. No inflammation, swelling, exudate, or lesions. Teeth and gingiva in good general condition.  NECK: Neck supple, non-tender without lymphadenopathy, masses or thyromegaly.  CARDIAC: RRR, Normal S1 and S2. No S3, S4 or murmurs  LUNGS: diffuse wheezing.  ABDOMEN: Positive bowel sounds. Soft, nondistended, nontender. No guarding or rebound. No HSM.  MUSKULOSKELETAL: No joint erythema or tenderness. Normal gait.  EXTREMITIES: pitting edema in both legs, 2 +. Peripheral pulses intact, normal capillary filling. No varicosities. blanching erythema in both legs and feet. TTP.   NEUROLOGICAL: CN II-XII intact. Strength and sensation symmetric and intact throughout. Reflexes 2+ throughout. Cerebellar testing normal.  SKIN: Skin normal color, texture and turgor with no lesions or eruptions. GENERAL APPEARANCE:  alert and cooperative, and appears to be in no acute distress. Obese; moves very slowly;  HEENT: unremarkable  THROAT: Oral cavity and pharynx normal. No inflammation, swelling, exudate, or lesions. Teeth and gingiva in good general condition.  NECK: Neck supple, non-tender without lymphadenopathy, masses or thyromegaly.  CARDIAC: RR, Normal S1 and S2. No S3, S4 or murmurs  LUNGS: diffuse wheezing.  ABDOMEN: Positive bowel sounds. Soft, nondistended, nontender. No guarding or rebound. No HSM. Vertical L abd. wall scar  MUSKULOSKELETAL: No joint erythema or tenderness. Normal gait.  EXTREMITIES: pitting edema in both legs, +1, L>R. Peripheral pulses intact, normal capillary filling. No varicosities. blanching erythema in both legs and feet. TTP-very mild  NEUROLOGICAL: CN II-XII intact. Strength and sensation symmetric and intact throughout. Reflexes 2+ throughout. Cerebellar testing normal. slow and in pain to stand  SKIN: Skin normal color, texture and turgor with no lesions or eruptions. see EXT.

## 2020-06-05 DIAGNOSIS — R51 HEADACHE: ICD-10-CM

## 2020-06-05 DIAGNOSIS — M54.9 DORSALGIA, UNSPECIFIED: ICD-10-CM

## 2020-06-05 DIAGNOSIS — G47.00 INSOMNIA, UNSPECIFIED: ICD-10-CM

## 2020-06-05 DIAGNOSIS — R53.1 WEAKNESS: ICD-10-CM

## 2020-06-05 DIAGNOSIS — Z88.6 ALLERGY STATUS TO ANALGESIC AGENT: ICD-10-CM

## 2020-06-05 DIAGNOSIS — R42 DIZZINESS AND GIDDINESS: ICD-10-CM

## 2020-06-05 DIAGNOSIS — Z88.0 ALLERGY STATUS TO PENICILLIN: ICD-10-CM

## 2020-06-05 DIAGNOSIS — J44.9 CHRONIC OBSTRUCTIVE PULMONARY DISEASE, UNSPECIFIED: ICD-10-CM

## 2020-06-05 DIAGNOSIS — E11.9 TYPE 2 DIABETES MELLITUS WITHOUT COMPLICATIONS: ICD-10-CM

## 2020-06-05 DIAGNOSIS — Z91.018 ALLERGY TO OTHER FOODS: ICD-10-CM

## 2021-03-24 NOTE — ED ADULT NURSE NOTE - IS THE PATIENT ABLE TO BE SCREENED?
Yes Double O-Z Plasty Text: The defect edges were debeveled with a #15 scalpel blade.  Given the location of the defect, shape of the defect and the proximity to free margins a Double O-Z plasty (double transposition flap) was deemed most appropriate.  Using a sterile surgical marker, the appropriate transposition flaps were drawn incorporating the defect and placing the expected incisions within the relaxed skin tension lines where possible. The area thus outlined was incised deep to adipose tissue with a #15 scalpel blade.  The skin margins were undermined to an appropriate distance in all directions utilizing iris scissors.  Hemostasis was achieved with electrocautery.  The flaps were then transposed into place, one clockwise and the other counterclockwise, and anchored with interrupted buried subcutaneous sutures.

## 2021-04-17 ENCOUNTER — INPATIENT (INPATIENT)
Facility: HOSPITAL | Age: 66
LOS: 4 days | Discharge: HOME HEALTH | End: 2021-04-22
Attending: INTERNAL MEDICINE | Admitting: INTERNAL MEDICINE
Payer: MEDICARE

## 2021-04-17 VITALS
RESPIRATION RATE: 17 BRPM | TEMPERATURE: 101 F | WEIGHT: 220.46 LBS | DIASTOLIC BLOOD PRESSURE: 77 MMHG | OXYGEN SATURATION: 87 % | HEIGHT: 71 IN | SYSTOLIC BLOOD PRESSURE: 141 MMHG | HEART RATE: 74 BPM

## 2021-04-17 DIAGNOSIS — Z98.890 OTHER SPECIFIED POSTPROCEDURAL STATES: Chronic | ICD-10-CM

## 2021-04-17 PROCEDURE — 71045 X-RAY EXAM CHEST 1 VIEW: CPT | Mod: 26

## 2021-04-17 PROCEDURE — 93010 ELECTROCARDIOGRAM REPORT: CPT

## 2021-04-17 PROCEDURE — 99053 MED SERV 10PM-8AM 24 HR FAC: CPT

## 2021-04-17 PROCEDURE — 99285 EMERGENCY DEPT VISIT HI MDM: CPT

## 2021-04-17 RX ORDER — ACETAMINOPHEN 500 MG
650 TABLET ORAL ONCE
Refills: 0 | Status: COMPLETED | OUTPATIENT
Start: 2021-04-17 | End: 2021-04-17

## 2021-04-17 NOTE — ED PROVIDER NOTE - PHYSICAL EXAMINATION
CONSTITUTIONAL: Well-appearing; well-nourished; in no apparent distress.   EYES: PERRL; EOM intact.   ENT: normal nose; no rhinorrhea; normal pharynx with no tonsillar hypertrophy.   NECK: Supple; non-tender; no cervical lymphadenopathy.   CARDIOVASCULAR: Normal S1, S2; no murmurs, rubs, or gallops.   RESPIRATORY: Pt speaking full sentences. No tachypnea. No retractions. Normal chest excursion with respiration; breath sounds clear and equal bilaterally; no wheezes, rhonchi, or rales.  GI/: Normal bowel sounds; non-distended; non-tender; no palpable organomegaly.   MS: No evidence of trauma or deformity. Normal ROM in all four extremities; non-tender to palpation; distal pulses are normal.   Extrem. + b/l LE edema R> L. Pedal pulses intact  SKIN: Normal for age and race; warm; dry; good turgor; no apparent lesions or exudate.   NEURO/PSYCH: A & O x 4; grossly unremarkable. mood and manner are appropriate. Grooming and personal hygiene are appropriate. No apparent thoughts of harm to self or others.

## 2021-04-17 NOTE — ED ADULT TRIAGE NOTE - CHIEF COMPLAINT QUOTE
Patient BIBA from home with complaints of midsternal chest pain for 2 days that started after he was moving furniture. Pain is reproducible, worse with movement, and better with rest. Patient also reports productive cough and mild shortness of breath, denies fever. Hypoxic on RA and febrile in triage.

## 2021-04-17 NOTE — ED PROVIDER NOTE - PROGRESS NOTE DETAILS
I called Dr. Benji Schwartz service and I spoke with the answering service and requested for call back from on call physician for admission. MAR is aware about the patient and will admit after communicating with Dr. Benji Schwartz/on call physician. I called Dr. Benji Schwartz service myself again, and I confirmed this was Dr. Benji Schwartz service, and as per answering service, Dr. Benji Schwartz is on call tonight. I requested for call back from the on call physician for admission. Dr. Sotelo called ED and recommended to have patient admitted to their service, I communicated the information to medical admitting resident.

## 2021-04-17 NOTE — ED PROVIDER NOTE - OBJECTIVE STATEMENT
65 year old M with hx of HLD, HTN, DM, DVT x 3 years ago no longer on AC (unkown cause) c/o cp x 1 day. Sts since 3 am has had mid sternal pressure like cp that started after lifting heavy boxes. Pain is constant, non radiating. Pt also notes sob and cough. + fever in ed. + b/l lower extrem swelling R> L x last few days. + former smoker (quit x 1 mo ago). Otherwise denies any sick contacts, recent travel, nausea, vomiting, diarrhea, abd pain, chills, body aches.

## 2021-04-17 NOTE — ED PROVIDER NOTE - NS ED ROS FT
Constitutional: No chills, no recent weight loss, change in appetite or malaise  Eyes: no redness/discharge/pain/vision changes  ENT: no rhinorrhea/ear pain/sore throat  Cardiac: + cp/sob  Respiratory: + cough. No respiratory distress  GI: No nausea, vomiting, diarrhea or abdominal pain.  : No dysuria, frequency, urgency or hematuria  MS: no pain to back or extremities, no loss of ROM, no weakness  Neuro: No headache or weakness. No LOC.  Extrem: + b/l LE swelling  Skin: No skin rash.  Endocrine: + hx of diabetes.  Except as documented in the HPI, all other systems are negative.

## 2021-04-17 NOTE — ED PROVIDER NOTE - CLINICAL SUMMARY MEDICAL DECISION MAKING FREE TEXT BOX
patient presented to ED for sob, remained hemodynamically stable, results of the labs, imaging findings reviewed and discussed with patient, discussed with admitting physician and MAR, patient is admitted to Medicine for further evaluation and care.

## 2021-04-17 NOTE — ED PROVIDER NOTE - ATTENDING CONTRIBUTION TO CARE
Patient presents to ED for evaluation of chest pain, pressure like, associated with sob and cough, +B/L lower ext swelling, R>>L, denies n/v/abd pain.   Vitals reviewed.   +dyspneic,   Lungs: RLL crackles  abd: +BS, NT, ND, soft  A/P: Dyspnea,   labs, EKG, CXR,   reevaluation.

## 2021-04-18 ENCOUNTER — TRANSCRIPTION ENCOUNTER (OUTPATIENT)
Age: 66
End: 2021-04-18

## 2021-04-18 DIAGNOSIS — R09.89 OTHER SPECIFIED SYMPTOMS AND SIGNS INVOLVING THE CIRCULATORY AND RESPIRATORY SYSTEMS: ICD-10-CM

## 2021-04-18 LAB
ALBUMIN SERPL ELPH-MCNC: 4.6 G/DL — SIGNIFICANT CHANGE UP (ref 3.5–5.2)
ALP SERPL-CCNC: 83 U/L — SIGNIFICANT CHANGE UP (ref 30–115)
ALT FLD-CCNC: 10 U/L — SIGNIFICANT CHANGE UP (ref 0–41)
ANION GAP SERPL CALC-SCNC: 15 MMOL/L — HIGH (ref 7–14)
ANION GAP SERPL CALC-SCNC: 16 MMOL/L — HIGH (ref 7–14)
APTT BLD: 44.6 SEC — HIGH (ref 27–39.2)
AST SERPL-CCNC: 12 U/L — SIGNIFICANT CHANGE UP (ref 0–41)
BASOPHILS # BLD AUTO: 0.05 K/UL — SIGNIFICANT CHANGE UP (ref 0–0.2)
BASOPHILS # BLD AUTO: 0.05 K/UL — SIGNIFICANT CHANGE UP (ref 0–0.2)
BASOPHILS NFR BLD AUTO: 0.5 % — SIGNIFICANT CHANGE UP (ref 0–1)
BASOPHILS NFR BLD AUTO: 0.5 % — SIGNIFICANT CHANGE UP (ref 0–1)
BILIRUB SERPL-MCNC: 0.7 MG/DL — SIGNIFICANT CHANGE UP (ref 0.2–1.2)
BUN SERPL-MCNC: 16 MG/DL — SIGNIFICANT CHANGE UP (ref 10–20)
BUN SERPL-MCNC: 18 MG/DL — SIGNIFICANT CHANGE UP (ref 10–20)
CALCIUM SERPL-MCNC: 9.7 MG/DL — SIGNIFICANT CHANGE UP (ref 8.5–10.1)
CALCIUM SERPL-MCNC: 9.7 MG/DL — SIGNIFICANT CHANGE UP (ref 8.5–10.1)
CHLORIDE SERPL-SCNC: 101 MMOL/L — SIGNIFICANT CHANGE UP (ref 98–110)
CHLORIDE SERPL-SCNC: 99 MMOL/L — SIGNIFICANT CHANGE UP (ref 98–110)
CO2 SERPL-SCNC: 24 MMOL/L — SIGNIFICANT CHANGE UP (ref 17–32)
CO2 SERPL-SCNC: 26 MMOL/L — SIGNIFICANT CHANGE UP (ref 17–32)
CREAT SERPL-MCNC: 0.9 MG/DL — SIGNIFICANT CHANGE UP (ref 0.7–1.5)
CREAT SERPL-MCNC: 0.9 MG/DL — SIGNIFICANT CHANGE UP (ref 0.7–1.5)
D DIMER BLD IA.RAPID-MCNC: 162 NG/ML DDU — SIGNIFICANT CHANGE UP (ref 0–230)
EOSINOPHIL # BLD AUTO: 0.04 K/UL — SIGNIFICANT CHANGE UP (ref 0–0.7)
EOSINOPHIL # BLD AUTO: 0.09 K/UL — SIGNIFICANT CHANGE UP (ref 0–0.7)
EOSINOPHIL NFR BLD AUTO: 0.4 % — SIGNIFICANT CHANGE UP (ref 0–8)
EOSINOPHIL NFR BLD AUTO: 0.9 % — SIGNIFICANT CHANGE UP (ref 0–8)
GLUCOSE BLDC GLUCOMTR-MCNC: 118 MG/DL — HIGH (ref 70–99)
GLUCOSE BLDC GLUCOMTR-MCNC: 134 MG/DL — HIGH (ref 70–99)
GLUCOSE BLDC GLUCOMTR-MCNC: 144 MG/DL — HIGH (ref 70–99)
GLUCOSE BLDC GLUCOMTR-MCNC: 161 MG/DL — HIGH (ref 70–99)
GLUCOSE SERPL-MCNC: 108 MG/DL — HIGH (ref 70–99)
GLUCOSE SERPL-MCNC: 166 MG/DL — HIGH (ref 70–99)
HCT VFR BLD CALC: 48.3 % — SIGNIFICANT CHANGE UP (ref 42–52)
HCT VFR BLD CALC: 49 % — SIGNIFICANT CHANGE UP (ref 42–52)
HGB BLD-MCNC: 15.7 G/DL — SIGNIFICANT CHANGE UP (ref 14–18)
HGB BLD-MCNC: 15.7 G/DL — SIGNIFICANT CHANGE UP (ref 14–18)
IMM GRANULOCYTES NFR BLD AUTO: 0.2 % — SIGNIFICANT CHANGE UP (ref 0.1–0.3)
IMM GRANULOCYTES NFR BLD AUTO: 0.3 % — SIGNIFICANT CHANGE UP (ref 0.1–0.3)
INR BLD: 1.11 RATIO — SIGNIFICANT CHANGE UP (ref 0.65–1.3)
LACTATE SERPL-SCNC: 1 MMOL/L — SIGNIFICANT CHANGE UP (ref 0.7–2)
LYMPHOCYTES # BLD AUTO: 0.92 K/UL — LOW (ref 1.2–3.4)
LYMPHOCYTES # BLD AUTO: 1.47 K/UL — SIGNIFICANT CHANGE UP (ref 1.2–3.4)
LYMPHOCYTES # BLD AUTO: 14.7 % — LOW (ref 20.5–51.1)
LYMPHOCYTES # BLD AUTO: 8.5 % — LOW (ref 20.5–51.1)
MAGNESIUM SERPL-MCNC: 1.9 MG/DL — SIGNIFICANT CHANGE UP (ref 1.8–2.4)
MCHC RBC-ENTMCNC: 28.9 PG — SIGNIFICANT CHANGE UP (ref 27–31)
MCHC RBC-ENTMCNC: 28.9 PG — SIGNIFICANT CHANGE UP (ref 27–31)
MCHC RBC-ENTMCNC: 32 G/DL — SIGNIFICANT CHANGE UP (ref 32–37)
MCHC RBC-ENTMCNC: 32.5 G/DL — SIGNIFICANT CHANGE UP (ref 32–37)
MCV RBC AUTO: 89 FL — SIGNIFICANT CHANGE UP (ref 80–94)
MCV RBC AUTO: 90.2 FL — SIGNIFICANT CHANGE UP (ref 80–94)
MONOCYTES # BLD AUTO: 0.39 K/UL — SIGNIFICANT CHANGE UP (ref 0.1–0.6)
MONOCYTES # BLD AUTO: 0.69 K/UL — HIGH (ref 0.1–0.6)
MONOCYTES NFR BLD AUTO: 3.6 % — SIGNIFICANT CHANGE UP (ref 1.7–9.3)
MONOCYTES NFR BLD AUTO: 6.9 % — SIGNIFICANT CHANGE UP (ref 1.7–9.3)
NEUTROPHILS # BLD AUTO: 7.68 K/UL — HIGH (ref 1.4–6.5)
NEUTROPHILS # BLD AUTO: 9.34 K/UL — HIGH (ref 1.4–6.5)
NEUTROPHILS NFR BLD AUTO: 76.8 % — HIGH (ref 42.2–75.2)
NEUTROPHILS NFR BLD AUTO: 86.7 % — HIGH (ref 42.2–75.2)
NRBC # BLD: 0 /100 WBCS — SIGNIFICANT CHANGE UP (ref 0–0)
NRBC # BLD: 0 /100 WBCS — SIGNIFICANT CHANGE UP (ref 0–0)
NT-PROBNP SERPL-SCNC: 127 PG/ML — SIGNIFICANT CHANGE UP (ref 0–300)
PLATELET # BLD AUTO: 185 K/UL — SIGNIFICANT CHANGE UP (ref 130–400)
PLATELET # BLD AUTO: 197 K/UL — SIGNIFICANT CHANGE UP (ref 130–400)
POTASSIUM SERPL-MCNC: 3.9 MMOL/L — SIGNIFICANT CHANGE UP (ref 3.5–5)
POTASSIUM SERPL-MCNC: 4.4 MMOL/L — SIGNIFICANT CHANGE UP (ref 3.5–5)
POTASSIUM SERPL-SCNC: 3.9 MMOL/L — SIGNIFICANT CHANGE UP (ref 3.5–5)
POTASSIUM SERPL-SCNC: 4.4 MMOL/L — SIGNIFICANT CHANGE UP (ref 3.5–5)
PROT SERPL-MCNC: 7.6 G/DL — SIGNIFICANT CHANGE UP (ref 6–8)
PROTHROM AB SERPL-ACNC: 12.8 SEC — SIGNIFICANT CHANGE UP (ref 9.95–12.87)
RAPID RVP RESULT: SIGNIFICANT CHANGE UP
RBC # BLD: 5.43 M/UL — SIGNIFICANT CHANGE UP (ref 4.7–6.1)
RBC # BLD: 5.43 M/UL — SIGNIFICANT CHANGE UP (ref 4.7–6.1)
RBC # FLD: 14.6 % — HIGH (ref 11.5–14.5)
RBC # FLD: 14.6 % — HIGH (ref 11.5–14.5)
SARS-COV-2 RNA SPEC QL NAA+PROBE: SIGNIFICANT CHANGE UP
SARS-COV-2 RNA SPEC QL NAA+PROBE: SIGNIFICANT CHANGE UP
SODIUM SERPL-SCNC: 140 MMOL/L — SIGNIFICANT CHANGE UP (ref 135–146)
SODIUM SERPL-SCNC: 141 MMOL/L — SIGNIFICANT CHANGE UP (ref 135–146)
TROPONIN T SERPL-MCNC: <0.01 NG/ML — SIGNIFICANT CHANGE UP
TROPONIN T SERPL-MCNC: <0.01 NG/ML — SIGNIFICANT CHANGE UP
WBC # BLD: 10 K/UL — SIGNIFICANT CHANGE UP (ref 4.8–10.8)
WBC # BLD: 10.77 K/UL — SIGNIFICANT CHANGE UP (ref 4.8–10.8)
WBC # FLD AUTO: 10 K/UL — SIGNIFICANT CHANGE UP (ref 4.8–10.8)
WBC # FLD AUTO: 10.77 K/UL — SIGNIFICANT CHANGE UP (ref 4.8–10.8)

## 2021-04-18 PROCEDURE — 99222 1ST HOSP IP/OBS MODERATE 55: CPT

## 2021-04-18 PROCEDURE — 93010 ELECTROCARDIOGRAM REPORT: CPT

## 2021-04-18 PROCEDURE — 71250 CT THORAX DX C-: CPT | Mod: 26

## 2021-04-18 PROCEDURE — 93970 EXTREMITY STUDY: CPT | Mod: 26

## 2021-04-18 RX ORDER — LOSARTAN POTASSIUM 100 MG/1
1 TABLET, FILM COATED ORAL
Qty: 0 | Refills: 0 | DISCHARGE

## 2021-04-18 RX ORDER — MORPHINE SULFATE 50 MG/1
2 CAPSULE, EXTENDED RELEASE ORAL ONCE
Refills: 0 | Status: DISCONTINUED | OUTPATIENT
Start: 2021-04-18 | End: 2021-04-18

## 2021-04-18 RX ORDER — CHLORHEXIDINE GLUCONATE 213 G/1000ML
1 SOLUTION TOPICAL
Refills: 0 | Status: DISCONTINUED | OUTPATIENT
Start: 2021-04-18 | End: 2021-04-22

## 2021-04-18 RX ORDER — TAMSULOSIN HYDROCHLORIDE 0.4 MG/1
1 CAPSULE ORAL
Qty: 0 | Refills: 0 | DISCHARGE

## 2021-04-18 RX ORDER — MORPHINE SULFATE 50 MG/1
60 CAPSULE, EXTENDED RELEASE ORAL EVERY 12 HOURS
Refills: 0 | Status: DISCONTINUED | OUTPATIENT
Start: 2021-04-18 | End: 2021-04-22

## 2021-04-18 RX ORDER — OXYCODONE AND ACETAMINOPHEN 5; 325 MG/1; MG/1
2 TABLET ORAL EVERY 8 HOURS
Refills: 0 | Status: DISCONTINUED | OUTPATIENT
Start: 2021-04-18 | End: 2021-04-22

## 2021-04-18 RX ORDER — ALPRAZOLAM 0.25 MG
1 TABLET ORAL
Qty: 0 | Refills: 0 | DISCHARGE

## 2021-04-18 RX ORDER — BACLOFEN 100 %
1 POWDER (GRAM) MISCELLANEOUS
Qty: 0 | Refills: 0 | DISCHARGE

## 2021-04-18 RX ORDER — ALPRAZOLAM 0.25 MG
1 TABLET ORAL THREE TIMES A DAY
Refills: 0 | Status: DISCONTINUED | OUTPATIENT
Start: 2021-04-18 | End: 2021-04-22

## 2021-04-18 RX ORDER — IPRATROPIUM/ALBUTEROL SULFATE 18-103MCG
3 AEROSOL WITH ADAPTER (GRAM) INHALATION EVERY 6 HOURS
Refills: 0 | Status: DISCONTINUED | OUTPATIENT
Start: 2021-04-18 | End: 2021-04-19

## 2021-04-18 RX ORDER — AZITHROMYCIN 500 MG/1
500 TABLET, FILM COATED ORAL DAILY
Refills: 0 | Status: DISCONTINUED | OUTPATIENT
Start: 2021-04-18 | End: 2021-04-19

## 2021-04-18 RX ORDER — PANTOPRAZOLE SODIUM 20 MG/1
40 TABLET, DELAYED RELEASE ORAL
Refills: 0 | Status: DISCONTINUED | OUTPATIENT
Start: 2021-04-18 | End: 2021-04-22

## 2021-04-18 RX ORDER — ENOXAPARIN SODIUM 100 MG/ML
40 INJECTION SUBCUTANEOUS DAILY
Refills: 0 | Status: DISCONTINUED | OUTPATIENT
Start: 2021-04-18 | End: 2021-04-22

## 2021-04-18 RX ORDER — ALBUTEROL 90 UG/1
2 AEROSOL, METERED ORAL EVERY 6 HOURS
Refills: 0 | Status: DISCONTINUED | OUTPATIENT
Start: 2021-04-18 | End: 2021-04-22

## 2021-04-18 RX ORDER — LEVETIRACETAM 250 MG/1
500 TABLET, FILM COATED ORAL
Refills: 0 | Status: DISCONTINUED | OUTPATIENT
Start: 2021-04-18 | End: 2021-04-22

## 2021-04-18 RX ORDER — DULOXETINE HYDROCHLORIDE 30 MG/1
60 CAPSULE, DELAYED RELEASE ORAL DAILY
Refills: 0 | Status: DISCONTINUED | OUTPATIENT
Start: 2021-04-18 | End: 2021-04-22

## 2021-04-18 RX ORDER — FUROSEMIDE 40 MG
20 TABLET ORAL DAILY
Refills: 0 | Status: DISCONTINUED | OUTPATIENT
Start: 2021-04-18 | End: 2021-04-22

## 2021-04-18 RX ORDER — MOMETASONE FUROATE 220 UG/1
0 INHALANT RESPIRATORY (INHALATION)
Qty: 0 | Refills: 0 | DISCHARGE

## 2021-04-18 RX ORDER — ZOLPIDEM TARTRATE 10 MG/1
1 TABLET ORAL
Qty: 0 | Refills: 0 | DISCHARGE

## 2021-04-18 RX ORDER — CYCLOBENZAPRINE HYDROCHLORIDE 10 MG/1
1 TABLET, FILM COATED ORAL
Qty: 0 | Refills: 0 | DISCHARGE

## 2021-04-18 RX ORDER — FENOFIBRATE,MICRONIZED 130 MG
145 CAPSULE ORAL DAILY
Refills: 0 | Status: DISCONTINUED | OUTPATIENT
Start: 2021-04-18 | End: 2021-04-22

## 2021-04-18 RX ORDER — SIMVASTATIN 20 MG/1
20 TABLET, FILM COATED ORAL AT BEDTIME
Refills: 0 | Status: DISCONTINUED | OUTPATIENT
Start: 2021-04-18 | End: 2021-04-22

## 2021-04-18 RX ORDER — IPRATROPIUM BROMIDE 0.2 MG/ML
0 SOLUTION, NON-ORAL INHALATION
Qty: 0 | Refills: 0 | DISCHARGE

## 2021-04-18 RX ORDER — SIMVASTATIN 20 MG/1
1 TABLET, FILM COATED ORAL
Qty: 0 | Refills: 0 | DISCHARGE

## 2021-04-18 RX ORDER — TAMSULOSIN HYDROCHLORIDE 0.4 MG/1
0.4 CAPSULE ORAL AT BEDTIME
Refills: 0 | Status: DISCONTINUED | OUTPATIENT
Start: 2021-04-18 | End: 2021-04-22

## 2021-04-18 RX ORDER — LOSARTAN POTASSIUM 100 MG/1
100 TABLET, FILM COATED ORAL DAILY
Refills: 0 | Status: DISCONTINUED | OUTPATIENT
Start: 2021-04-18 | End: 2021-04-22

## 2021-04-18 RX ADMIN — OXYCODONE AND ACETAMINOPHEN 2 TABLET(S): 5; 325 TABLET ORAL at 11:38

## 2021-04-18 RX ADMIN — MORPHINE SULFATE 60 MILLIGRAM(S): 50 CAPSULE, EXTENDED RELEASE ORAL at 05:43

## 2021-04-18 RX ADMIN — CHLORHEXIDINE GLUCONATE 1 APPLICATION(S): 213 SOLUTION TOPICAL at 05:40

## 2021-04-18 RX ADMIN — MORPHINE SULFATE 60 MILLIGRAM(S): 50 CAPSULE, EXTENDED RELEASE ORAL at 17:13

## 2021-04-18 RX ADMIN — Medication 20 MILLIGRAM(S): at 06:15

## 2021-04-18 RX ADMIN — TAMSULOSIN HYDROCHLORIDE 0.4 MILLIGRAM(S): 0.4 CAPSULE ORAL at 21:20

## 2021-04-18 RX ADMIN — SIMVASTATIN 20 MILLIGRAM(S): 20 TABLET, FILM COATED ORAL at 22:15

## 2021-04-18 RX ADMIN — Medication 650 MILLIGRAM(S): at 00:29

## 2021-04-18 RX ADMIN — DULOXETINE HYDROCHLORIDE 60 MILLIGRAM(S): 30 CAPSULE, DELAYED RELEASE ORAL at 11:39

## 2021-04-18 RX ADMIN — LEVETIRACETAM 500 MILLIGRAM(S): 250 TABLET, FILM COATED ORAL at 17:13

## 2021-04-18 RX ADMIN — LOSARTAN POTASSIUM 100 MILLIGRAM(S): 100 TABLET, FILM COATED ORAL at 06:15

## 2021-04-18 RX ADMIN — OXYCODONE AND ACETAMINOPHEN 2 TABLET(S): 5; 325 TABLET ORAL at 22:49

## 2021-04-18 RX ADMIN — Medication 3 MILLILITER(S): at 21:23

## 2021-04-18 RX ADMIN — Medication 1 MILLIGRAM(S): at 05:43

## 2021-04-18 RX ADMIN — MORPHINE SULFATE 60 MILLIGRAM(S): 50 CAPSULE, EXTENDED RELEASE ORAL at 17:51

## 2021-04-18 RX ADMIN — OXYCODONE AND ACETAMINOPHEN 2 TABLET(S): 5; 325 TABLET ORAL at 19:41

## 2021-04-18 RX ADMIN — Medication 1 MILLIGRAM(S): at 21:21

## 2021-04-18 RX ADMIN — ENOXAPARIN SODIUM 40 MILLIGRAM(S): 100 INJECTION SUBCUTANEOUS at 05:43

## 2021-04-18 RX ADMIN — OXYCODONE AND ACETAMINOPHEN 2 TABLET(S): 5; 325 TABLET ORAL at 12:55

## 2021-04-18 RX ADMIN — LEVETIRACETAM 500 MILLIGRAM(S): 250 TABLET, FILM COATED ORAL at 05:45

## 2021-04-18 RX ADMIN — Medication 145 MILLIGRAM(S): at 11:38

## 2021-04-18 RX ADMIN — Medication 40 MILLIGRAM(S): at 17:13

## 2021-04-18 RX ADMIN — ALBUTEROL 2 PUFF(S): 90 AEROSOL, METERED ORAL at 20:50

## 2021-04-18 RX ADMIN — AZITHROMYCIN 500 MILLIGRAM(S): 500 TABLET, FILM COATED ORAL at 11:38

## 2021-04-18 RX ADMIN — PANTOPRAZOLE SODIUM 40 MILLIGRAM(S): 20 TABLET, DELAYED RELEASE ORAL at 05:43

## 2021-04-18 RX ADMIN — Medication 1 MILLIGRAM(S): at 13:00

## 2021-04-18 RX ADMIN — Medication 40 MILLIGRAM(S): at 05:45

## 2021-04-18 RX ADMIN — MORPHINE SULFATE 2 MILLIGRAM(S): 50 CAPSULE, EXTENDED RELEASE ORAL at 01:33

## 2021-04-18 NOTE — H&P ADULT - ASSESSMENT
64y/o M with PMH of HTN, HLD, DM, DVT now off eliquis presents for SOB.     # 64y/o M with PMH of HTN, HLD, DM, DVT, chronic back pain now off eliquis presents for SOB.     #Suspected COPD with possible viral URI vs bronchitis  - likely COPD based on smoking hx and physical exam, no dyspnea at baseline but significantly hypoxic here  - D-dimer neg, BNP neg  - solumedrol 40 BID, duonebs q6h  - f/u RVP panel  - f/u Duplex  - c/w azithro for 5d  - may need home O2  - Pulm consult    #Chest pain, atypical  - likely due to MSK strain with lifting boxes vs COPD 'tightness'  - trop x1 neg, EKG non-ischemic  - f/u trop in AM    #DM- monitor FS, start basal-bolus >180    #HTN- c/w losartan 100  #HLD- c/w simva 20  #BPH- c/w flomax  #Chronic back pain- c/w morphine 60 BID, percocet    #Anxiety  - c/w xanax 1 TID, duloxetine 60  - unclear why on keppra? no hx of seizures, pt confirms he takes   - please f/u with Dr. Gardiner as to why he's on this med    #MISC  - DVT ppx: lovenox  - GI ppx: PPI  - Activity: As tolerated  - Diet: DASH, CC  - Dispo: acute

## 2021-04-18 NOTE — H&P ADULT - NSHPPHYSICALEXAM_GEN_ALL_CORE
GENERAL: NAD, well-developed  HEENT:  Atraumatic, Normocephalic; EOMI, PERRLA, conjunctiva pink, sclera white, Supple, No JVD, thyromegally or LYAD appreciated  CHEST/LUNG: Clear to auscultation bilaterally; No wheeze, ronchi or rales  HEART: Regular rate and rhythm; No murmurs, rubs, or gallops  ABDOMEN: Soft, Nontender, Nondistended; Bowel sounds present  EXTREMITIES:  2+ Peripheral Pulses, No peripheral pitting edema  PSYCH: AAOx3  NEUROLOGY: non-focal  SKIN: No rashes/lesions appreciated GENERAL: NAD, well-developed M older than his age, 92% at rest on 4L > 90% on 4L when ambulatory  HEENT:  Atraumatic, Normocephalic; EOMI, PERRLA, conjunctiva pink, sclera white, Supple, No JVD  CHEST/LUNG: fine wheezes, dec air movement throughout  HEART: Regular rate and rhythm; No murmurs  ABDOMEN: Soft, Nontender, Nondistended; Bowel sounds present  EXTREMITIES:  1+ peripheral pitting edema  PSYCH: AAOx3  NEUROLOGY: non-focal  SKIN: No rashes/lesions appreciated

## 2021-04-18 NOTE — PROGRESS NOTE ADULT - SUBJECTIVE AND OBJECTIVE BOX
Patient is a 65y old  Male who presents with a chief complaint of SOB, chest pain (18 Apr 2021 03:07)      HPI:  66y/o M with PMH of HTN, HLD, DM, chronic back pain, hx of DVT now off eliquis (pt doesn't know what provoked, >3yrs ago) presents to ED with 2d of SOB, cough and midsternal CP. CP started after lifting heavy boxes, mid chest, constant, non-radiating, tightness/achy. Also assoc w/ fever, SOB, cough of 1d. Former smoker, has baseline leg swelling, no home O2. Otherwise no sick contacts, travel, palpitations, abd pain, N/V/D/C, chills, myalgias.     In ED: T100.7, HR74, /77, RR17, SpO2 87% on RA now on 4L NC.  CXR - mild changes at the bases;. Labs including D-dimer neg. COVID neg.  (18 Apr 2021 03:07)      PAST MEDICAL & SURGICAL HISTORY:  Anxiety    HTN (hypertension)    Diabetes    High cholesterol    Asthma    Chronic low back pain with sciatica, sciatica laterality unspecified, unspecified back pain laterality    Uncomplicated opioid dependence    Syncopal episodes    History of back surgery  x 3  COPD / Bronchitis & has seen Pulmonary in the past-Dr Brizuela     (remote- DVT/PE on AC for a few years)        SOCIAL HX:   Smoking  former smoker                       ETOH       denies                     Other  denies    FAMILY HISTORY:  No pertinent family history in first degree relatives    .  No cardiovascular or pulmonary family history     REVIEW OF SYSTEMS:    All ROS are negative exept per HPI       Allergies    aspirin (Stomach Upset)  Originally Entered as [ANGIOEDEMA] reaction to [pineapple] (Unknown)  penicillins (Other)  pineapples (Anaphylaxis)    Intolerances          PHYSICAL EXAM  Vital Signs Last 24 Hrs  T(C): 37.1 (18 Apr 2021 05:31), Max: 38.2 (17 Apr 2021 22:19)  T(F): 98.7 (18 Apr 2021 05:31), Max: 100.7 (17 Apr 2021 22:19)  HR: 64 (18 Apr 2021 05:31) (64 - 74)  BP: 163/85 (18 Apr 2021 05:31) (141/77 - 163/85)  BP(mean): --  RR: 17 (17 Apr 2021 22:19) (17 - 17)  SpO2: 93% (18 Apr 2021 12:16) (87% - 94%)      CONSTITUTIONAL:  Well nourished.  NAD; obese    ENT:   Airway patent, MMM    EYES: PEERLA; conj- pink    CARDIAC: + ant Ch wall TTP; RRR, no MRG   Min edema in ankles B/L    RESPIRATORY: Diffuse mild Wheezing ; + R base rhonchi  Normal chest expansion; easy respirations    GASTROINTESTINAL: Obese; Abdomen soft, non-tender,   No guarding, + BS      MUSCULOSKELETAL:   Range of motion is not limited,  No clubbing, cyanosis    NEUROLOGICAL:   Alert and oriented   No motor deficits.    SKIN:   Skin normal color for race,   No evidence of rash.          LABS:                          15.7   10.00 )-----------( 185      ( 18 Apr 2021 00:04 )             48.3                                               04-18    140  |  101  |  18  ----------------------------<  108<H>  3.9   |  24  |  0.9    Ca    9.7      18 Apr 2021 00:04    TPro  7.6  /  Alb  4.6  /  TBili  0.7  /  DBili  x   /  AST  12  /  ALT  10  /  AlkPhos  83  04-18      PT/INR - ( 18 Apr 2021 00:04 )   PT: 12.80 sec;   INR: 1.11 ratio         PTT - ( 18 Apr 2021 00:04 )  PTT:44.6 sec                                           CARDIAC MARKERS ( 18 Apr 2021 00:04 )  x     / <0.01 ng/mL / x     / x     / x                                                LIVER FUNCTIONS - ( 18 Apr 2021 00:04 )  Alb: 4.6 g/dL / Pro: 7.6 g/dL / ALK PHOS: 83 U/L / ALT: 10 U/L / AST: 12 U/L / GGT: x                                                                                                MEDICATIONS  (STANDING):  albuterol/ipratropium for Nebulization 3 milliLiter(s) Nebulizer every 6 hours  ALPRAZolam 1 milliGRAM(s) Oral three times a day  azithromycin   Tablet 500 milliGRAM(s) Oral daily  chlorhexidine 4% Liquid 1 Application(s) Topical <User Schedule>  DULoxetine 60 milliGRAM(s) Oral daily  enoxaparin Injectable 40 milliGRAM(s) SubCutaneous daily  fenofibrate Tablet 145 milliGRAM(s) Oral daily  furosemide    Tablet 20 milliGRAM(s) Oral daily  levETIRAcetam 500 milliGRAM(s) Oral two times a day  losartan 100 milliGRAM(s) Oral daily  methylPREDNISolone sodium succinate Injectable 40 milliGRAM(s) IV Push every 12 hours  morphine ER Tablet 60 milliGRAM(s) Oral every 12 hours  pantoprazole    Tablet 40 milliGRAM(s) Oral before breakfast  simvastatin 20 milliGRAM(s) Oral at bedtime  tamsulosin 0.4 milliGRAM(s) Oral at bedtime    MEDICATIONS  (PRN):  ALBUTerol    90 MICROgram(s) HFA Inhaler 2 Puff(s) Inhalation every 6 hours PRN Shortness of Breath and/or Wheezing  oxycodone    5 mG/acetaminophen 325 mG 2 Tablet(s) Oral every 8 hours PRN Moderate Pain (4 - 6)      X-Rays reviewed:    CXR interpreted by PULM: right lower lobe opacity/ effusion     AND RAD READ:    < from: Xray Chest 1 View-PORTABLE IMMEDIATE (04.17.21 @ 23:58) >    EXAM:  XR CHEST PORTABLE IMMED 1V            PROCEDURE DATE:  04/17/2021        INTERPRETATION:  Clinical History / Reason for exam: Shortness of breath, cough and fever    Comparison : Chest radiograph 10/9/2018.    Technique/Positioning: Frontal, portable.    Findings:    Support devices: None.    Cardiac/mediastinum/hilum: Tortuous aorta no cardiomegaly    Lung parenchyma/Pleura: Right basilar opacity. No effusion or pneumothorax.    Skeleton/soft tissues: Unremarkable    Impression:    Right basilar opacity without definite effusion or pneumothorax      SHIRLEY HERNANDEZ MD; Attending Radiologist  This document has been electronically signed. Apr 18 2021 10:49AM    < end of copied text >

## 2021-04-18 NOTE — H&P ADULT - ATTENDING COMMENTS
Chart reviewed, patient examined. Pertinent results reviewed.  Case discussed with HO, and pulmonary;  HD#1    Agree w A&P; Pulmonary recommends Levaquin, steroids, nebulizer and NC Chest CT    See Progress note.

## 2021-04-18 NOTE — CONSULT NOTE ADULT - ATTENDING COMMENTS
patient seen and examined agree above note   do ct chest no contrast to evaluate the RLL opacity / effusion   change abx to levaquine 750    continue steroid   nebulizer Q 4 hrs and prn   follow procal   urine legionella and strep   do echo   lower ext edema do doppler

## 2021-04-18 NOTE — H&P ADULT - NSICDXPASTMEDICALHX_GEN_ALL_CORE_FT
PAST MEDICAL HISTORY:  Anxiety     Asthma     Chronic low back pain with sciatica, sciatica laterality unspecified, unspecified back pain laterality     Diabetes     High cholesterol     HTN (hypertension)     Syncopal episodes     Uncomplicated opioid dependence

## 2021-04-18 NOTE — ED ADULT NURSE NOTE - NSIMPLEMENTINTERV_GEN_ALL_ED
Implemented All Fall Risk Interventions:  [place] to call system. Call bell, personal items and telephone within reach. Instruct patient to call for assistance. Room bathroom lighting operational. Non-slip footwear when patient is off stretcher. Physically safe environment: no spills, clutter or unnecessary equipment. Stretcher in lowest position, wheels locked, appropriate side rails in place. Provide visual cue, wrist band, yellow gown, etc. Monitor gait and stability. Monitor for mental status changes and reorient to person, place, and time. Review medications for side effects contributing to fall risk. Reinforce activity limits and safety measures with patient and family.

## 2021-04-18 NOTE — H&P ADULT - HISTORY OF PRESENT ILLNESS
patient arrives via ambulance - states she has been complaining of hand and arm pain from her arthritis and is now complaining of neck pain at this time. emilia is hard of hearing 64y/o M with PMH of HTN, HLD, DM, hx of DVT now off eliquis (pt doesn't know what provoked, >3yrs ago) presents to ED with 1d of SOB, cough and midsternal CP. CP started after lifting heavy boxes, mid chest, constant, non-radiating. Also assoc w/ fever, SOB, cough of 1d, along with bilat LE swelling. Former smoker. Otherwise no sick contacts, travel, palpitations, abd pain, N/V/D/C, chills, myalgias.     In ED: T100.7, HR74, /77, RR17, SpO2 87% on RA now on 4L NC.  CXR b/l infiltrates. Labs including D-dimer neg. COVID neg.  66y/o M with PMH of HTN, HLD, DM, chronic back pain, hx of DVT now off eliquis (pt doesn't know what provoked, >3yrs ago) presents to ED with 2d of SOB, cough and midsternal CP. CP started after lifting heavy boxes, mid chest, constant, non-radiating, tightness/achy. Also assoc w/ fever, SOB, cough of 1d. Former smoker, has baseline leg swelling, no home O2. Otherwise no sick contacts, travel, palpitations, abd pain, N/V/D/C, chills, myalgias.     In ED: T100.7, HR74, /77, RR17, SpO2 87% on RA now on 4L NC.  CXR b/l infiltrates. Labs including D-dimer neg. COVID neg.

## 2021-04-18 NOTE — H&P ADULT - NSHPSOCIALHISTORY_GEN_ALL_CORE
Former smoker Former smoker - quit 1mo ago smoker for years - heavy early on but has been smoking 1pk/month for some time.   No active EtOH use. No drug hx.

## 2021-04-18 NOTE — CONSULT NOTE ADULT - ASSESSMENT
Impression  SOB, likely multifactorial, possibly underlying COPD or asthma/ JESSICA/ pulmonary hypertension  Possible Asthma/ COPD  Probable JESSICA  HO DVT/ PE in 09/2018, unprovoked, now off AC    Recommendations  Procal  ECHO  FU VDUS  Duonebs q4h and PRN  Solumedrol 60 q8h for now  Continue with azithromycin  Target SpO2 92-96%, wean oxygen as tolerated  HOB at 45  Aspiration precautions  DVT ppx  OP pulm FU for CT Chest, PFT's, and sleep study Impression  SOB, likely multifactorial, possibly underlying COPD or asthma/ JESSICA/ pulmonary hypertension  RLL PNA   Possible Asthma/ COPD  Probable JESSICA  HO DVT/ PE in 09/2018, unprovoked, now off AC    Recommendations  Procal  ECHO  FU VDUS  Duonebs q4h and PRN  Solumedrol 60 q8h for now  Continue with azithromycin  Target SpO2 92-96%, wean oxygen as tolerated  HOB at 45  Aspiration precautions  DVT ppx  OP pulm FU for CT Chest, PFT's, and sleep study  see attestation note

## 2021-04-18 NOTE — DISCHARGE NOTE NURSING/CASE MANAGEMENT/SOCIAL WORK - PATIENT PORTAL LINK FT
You can access the FollowMyHealth Patient Portal offered by [facility] by registering at the following website: [email or web address]. By joining [employer]’s FollowMyHealth portal, you will also be able to view your health information using other applications (apps) compatible with our system.

## 2021-04-18 NOTE — CONSULT NOTE ADULT - SUBJECTIVE AND OBJECTIVE BOX
Patient is a 65y old  Male who presents with a chief complaint of SOB, chest pain (18 Apr 2021 03:07)      HPI:  66y/o M with PMH of HTN, HLD, DM, chronic back pain, hx of DVT now off eliquis (pt doesn't know what provoked, >3yrs ago) presents to ED with 2d of SOB, cough and midsternal CP. CP started after lifting heavy boxes, mid chest, constant, non-radiating, tightness/achy. Also assoc w/ fever, SOB, cough of 1d. Former smoker, has baseline leg swelling, no home O2. Otherwise no sick contacts, travel, palpitations, abd pain, N/V/D/C, chills, myalgias.     In ED: T100.7, HR74, /77, RR17, SpO2 87% on RA now on 4L NC.  CXR b/l infiltrates. Labs including D-dimer neg. COVID neg.  (18 Apr 2021 03:07)      PAST MEDICAL & SURGICAL HISTORY:  Anxiety    HTN (hypertension)    Diabetes    High cholesterol    Asthma    Chronic low back pain with sciatica, sciatica laterality unspecified, unspecified back pain laterality    Uncomplicated opioid dependence    Syncopal episodes    History of back surgery  x 3        SOCIAL HX:   Smoking  former smoker                       ETOH       denies                     Other  denies    FAMILY HISTORY:  No pertinent family history in first degree relatives    .  No cardiovascular or pulmonary family history     REVIEW OF SYSTEMS:    All ROS are negative exept per HPI       Allergies    aspirin (Stomach Upset)  Originally Entered as [ANGIOEDEMA] reaction to [pineapple] (Unknown)  penicillins (Other)  pineapples (Anaphylaxis)    Intolerances          PHYSICAL EXAM  Vital Signs Last 24 Hrs  T(C): 37.1 (18 Apr 2021 05:31), Max: 38.2 (17 Apr 2021 22:19)  T(F): 98.7 (18 Apr 2021 05:31), Max: 100.7 (17 Apr 2021 22:19)  HR: 64 (18 Apr 2021 05:31) (64 - 74)  BP: 163/85 (18 Apr 2021 05:31) (141/77 - 163/85)  RR: 17 (17 Apr 2021 22:19) (17 - 17)  SpO2: 94% (18 Apr 2021 05:31) (87% - 94%)    CONSTITUTIONAL:  Well nourished.  NAD    ENT:   Airway patent,   No thrush    EYES:   Clear bilaterally,   pupils equal,   round and reactive to light.    CARDIAC:   Normal rate,   regular rhythm.    1+ edema    RESPIRATORY:   Wheezing   Normal chest expansion  Not tachypneic,  No use of accessory muscles    GASTROINTESTINAL:  Abdomen soft, non-tender,   No guarding,   Positive BS    MUSCULOSKELETAL:   Range of motion is not limited,  No clubbing, cyanosis    NEUROLOGICAL:   Alert and oriented   No motor deficits.    SKIN:   Skin normal color for race,   No evidence of rash.          LABS:                          15.7   10.00 )-----------( 185      ( 18 Apr 2021 00:04 )             48.3                                               04-18    140  |  101  |  18  ----------------------------<  108<H>  3.9   |  24  |  0.9    Ca    9.7      18 Apr 2021 00:04    TPro  7.6  /  Alb  4.6  /  TBili  0.7  /  DBili  x   /  AST  12  /  ALT  10  /  AlkPhos  83  04-18      PT/INR - ( 18 Apr 2021 00:04 )   PT: 12.80 sec;   INR: 1.11 ratio         PTT - ( 18 Apr 2021 00:04 )  PTT:44.6 sec                                           CARDIAC MARKERS ( 18 Apr 2021 00:04 )  x     / <0.01 ng/mL / x     / x     / x                                                LIVER FUNCTIONS - ( 18 Apr 2021 00:04 )  Alb: 4.6 g/dL / Pro: 7.6 g/dL / ALK PHOS: 83 U/L / ALT: 10 U/L / AST: 12 U/L / GGT: x                                                                                                MEDICATIONS  (STANDING):  albuterol/ipratropium for Nebulization 3 milliLiter(s) Nebulizer every 6 hours  ALPRAZolam 1 milliGRAM(s) Oral three times a day  azithromycin   Tablet 500 milliGRAM(s) Oral daily  chlorhexidine 4% Liquid 1 Application(s) Topical <User Schedule>  DULoxetine 60 milliGRAM(s) Oral daily  enoxaparin Injectable 40 milliGRAM(s) SubCutaneous daily  fenofibrate Tablet 145 milliGRAM(s) Oral daily  furosemide    Tablet 20 milliGRAM(s) Oral daily  levETIRAcetam 500 milliGRAM(s) Oral two times a day  losartan 100 milliGRAM(s) Oral daily  methylPREDNISolone sodium succinate Injectable 40 milliGRAM(s) IV Push every 12 hours  morphine ER Tablet 60 milliGRAM(s) Oral every 12 hours  pantoprazole    Tablet 40 milliGRAM(s) Oral before breakfast  simvastatin 20 milliGRAM(s) Oral at bedtime  tamsulosin 0.4 milliGRAM(s) Oral at bedtime    MEDICATIONS  (PRN):  ALBUTerol    90 MICROgram(s) HFA Inhaler 2 Puff(s) Inhalation every 6 hours PRN Shortness of Breath and/or Wheezing  oxycodone    5 mG/acetaminophen 325 mG 2 Tablet(s) Oral every 8 hours PRN Moderate Pain (4 - 6)      X-Rays reviewed:    CXR interpreted by me: right lower lobe opacity/ effusion

## 2021-04-18 NOTE — ED ADULT NURSE NOTE - OBJECTIVE STATEMENT
Pt is a 65yr male BIBA for chest pain that is described as crushing x2days, pt. states chest pain is associated with SOB.

## 2021-04-19 LAB
ANION GAP SERPL CALC-SCNC: 13 MMOL/L — SIGNIFICANT CHANGE UP (ref 7–14)
BASOPHILS # BLD AUTO: 0.03 K/UL — SIGNIFICANT CHANGE UP (ref 0–0.2)
BASOPHILS NFR BLD AUTO: 0.3 % — SIGNIFICANT CHANGE UP (ref 0–1)
BUN SERPL-MCNC: 19 MG/DL — SIGNIFICANT CHANGE UP (ref 10–20)
CALCIUM SERPL-MCNC: 9.3 MG/DL — SIGNIFICANT CHANGE UP (ref 8.5–10.1)
CHLORIDE SERPL-SCNC: 102 MMOL/L — SIGNIFICANT CHANGE UP (ref 98–110)
CO2 SERPL-SCNC: 26 MMOL/L — SIGNIFICANT CHANGE UP (ref 17–32)
CREAT SERPL-MCNC: 0.7 MG/DL — SIGNIFICANT CHANGE UP (ref 0.7–1.5)
CRP SERPL-MCNC: 82 MG/L — HIGH
EOSINOPHIL # BLD AUTO: 0.02 K/UL — SIGNIFICANT CHANGE UP (ref 0–0.7)
EOSINOPHIL NFR BLD AUTO: 0.2 % — SIGNIFICANT CHANGE UP (ref 0–8)
FERRITIN SERPL-MCNC: 168 NG/ML — SIGNIFICANT CHANGE UP (ref 30–400)
GLUCOSE BLDC GLUCOMTR-MCNC: 117 MG/DL — HIGH (ref 70–99)
GLUCOSE BLDC GLUCOMTR-MCNC: 165 MG/DL — HIGH (ref 70–99)
GLUCOSE BLDC GLUCOMTR-MCNC: 189 MG/DL — HIGH (ref 70–99)
GLUCOSE BLDC GLUCOMTR-MCNC: 98 MG/DL — SIGNIFICANT CHANGE UP (ref 70–99)
GLUCOSE SERPL-MCNC: 124 MG/DL — HIGH (ref 70–99)
GRAM STN FLD: SIGNIFICANT CHANGE UP
HCT VFR BLD CALC: 45.6 % — SIGNIFICANT CHANGE UP (ref 42–52)
HCV AB S/CO SERPL IA: 0.04 COI — SIGNIFICANT CHANGE UP
HCV AB SERPL-IMP: SIGNIFICANT CHANGE UP
HGB BLD-MCNC: 14.8 G/DL — SIGNIFICANT CHANGE UP (ref 14–18)
IMM GRANULOCYTES NFR BLD AUTO: 0.2 % — SIGNIFICANT CHANGE UP (ref 0.1–0.3)
LYMPHOCYTES # BLD AUTO: 1.34 K/UL — SIGNIFICANT CHANGE UP (ref 1.2–3.4)
LYMPHOCYTES # BLD AUTO: 14.9 % — LOW (ref 20.5–51.1)
MAGNESIUM SERPL-MCNC: 2 MG/DL — SIGNIFICANT CHANGE UP (ref 1.8–2.4)
MCHC RBC-ENTMCNC: 29.3 PG — SIGNIFICANT CHANGE UP (ref 27–31)
MCHC RBC-ENTMCNC: 32.5 G/DL — SIGNIFICANT CHANGE UP (ref 32–37)
MCV RBC AUTO: 90.3 FL — SIGNIFICANT CHANGE UP (ref 80–94)
MONOCYTES # BLD AUTO: 0.55 K/UL — SIGNIFICANT CHANGE UP (ref 0.1–0.6)
MONOCYTES NFR BLD AUTO: 6.1 % — SIGNIFICANT CHANGE UP (ref 1.7–9.3)
NEUTROPHILS # BLD AUTO: 7.03 K/UL — HIGH (ref 1.4–6.5)
NEUTROPHILS NFR BLD AUTO: 78.3 % — HIGH (ref 42.2–75.2)
NRBC # BLD: 0 /100 WBCS — SIGNIFICANT CHANGE UP (ref 0–0)
PLATELET # BLD AUTO: 198 K/UL — SIGNIFICANT CHANGE UP (ref 130–400)
POTASSIUM SERPL-MCNC: 4.6 MMOL/L — SIGNIFICANT CHANGE UP (ref 3.5–5)
POTASSIUM SERPL-SCNC: 4.6 MMOL/L — SIGNIFICANT CHANGE UP (ref 3.5–5)
PROCALCITONIN SERPL-MCNC: 0.09 NG/ML — SIGNIFICANT CHANGE UP (ref 0.02–0.1)
RBC # BLD: 5.05 M/UL — SIGNIFICANT CHANGE UP (ref 4.7–6.1)
RBC # FLD: 14.7 % — HIGH (ref 11.5–14.5)
SODIUM SERPL-SCNC: 141 MMOL/L — SIGNIFICANT CHANGE UP (ref 135–146)
SPECIMEN SOURCE: SIGNIFICANT CHANGE UP
WBC # BLD: 8.99 K/UL — SIGNIFICANT CHANGE UP (ref 4.8–10.8)
WBC # FLD AUTO: 8.99 K/UL — SIGNIFICANT CHANGE UP (ref 4.8–10.8)

## 2021-04-19 PROCEDURE — 99232 SBSQ HOSP IP/OBS MODERATE 35: CPT

## 2021-04-19 RX ORDER — GABAPENTIN 400 MG/1
300 CAPSULE ORAL
Refills: 0 | Status: DISCONTINUED | OUTPATIENT
Start: 2021-04-19 | End: 2021-04-22

## 2021-04-19 RX ORDER — ALBUTEROL 90 UG/1
2.5 AEROSOL, METERED ORAL EVERY 4 HOURS
Refills: 0 | Status: DISCONTINUED | OUTPATIENT
Start: 2021-04-19 | End: 2021-04-22

## 2021-04-19 RX ADMIN — PANTOPRAZOLE SODIUM 40 MILLIGRAM(S): 20 TABLET, DELAYED RELEASE ORAL at 05:06

## 2021-04-19 RX ADMIN — Medication 145 MILLIGRAM(S): at 11:37

## 2021-04-19 RX ADMIN — ENOXAPARIN SODIUM 40 MILLIGRAM(S): 100 INJECTION SUBCUTANEOUS at 11:37

## 2021-04-19 RX ADMIN — Medication 3 MILLILITER(S): at 09:40

## 2021-04-19 RX ADMIN — Medication 1 MILLIGRAM(S): at 22:24

## 2021-04-19 RX ADMIN — LOSARTAN POTASSIUM 100 MILLIGRAM(S): 100 TABLET, FILM COATED ORAL at 05:50

## 2021-04-19 RX ADMIN — CHLORHEXIDINE GLUCONATE 1 APPLICATION(S): 213 SOLUTION TOPICAL at 05:07

## 2021-04-19 RX ADMIN — Medication 40 MILLIGRAM(S): at 05:07

## 2021-04-19 RX ADMIN — AZITHROMYCIN 500 MILLIGRAM(S): 500 TABLET, FILM COATED ORAL at 11:37

## 2021-04-19 RX ADMIN — SIMVASTATIN 20 MILLIGRAM(S): 20 TABLET, FILM COATED ORAL at 22:04

## 2021-04-19 RX ADMIN — MORPHINE SULFATE 60 MILLIGRAM(S): 50 CAPSULE, EXTENDED RELEASE ORAL at 05:12

## 2021-04-19 RX ADMIN — LEVETIRACETAM 500 MILLIGRAM(S): 250 TABLET, FILM COATED ORAL at 05:06

## 2021-04-19 RX ADMIN — ALBUTEROL 2 PUFF(S): 90 AEROSOL, METERED ORAL at 20:47

## 2021-04-19 RX ADMIN — OXYCODONE AND ACETAMINOPHEN 2 TABLET(S): 5; 325 TABLET ORAL at 11:37

## 2021-04-19 RX ADMIN — Medication 3 MILLILITER(S): at 14:45

## 2021-04-19 RX ADMIN — Medication 40 MILLIGRAM(S): at 17:18

## 2021-04-19 RX ADMIN — GABAPENTIN 300 MILLIGRAM(S): 400 CAPSULE ORAL at 17:18

## 2021-04-19 RX ADMIN — LEVETIRACETAM 500 MILLIGRAM(S): 250 TABLET, FILM COATED ORAL at 17:18

## 2021-04-19 RX ADMIN — Medication 1 MILLIGRAM(S): at 05:13

## 2021-04-19 RX ADMIN — OXYCODONE AND ACETAMINOPHEN 2 TABLET(S): 5; 325 TABLET ORAL at 22:53

## 2021-04-19 RX ADMIN — Medication 20 MILLIGRAM(S): at 05:06

## 2021-04-19 RX ADMIN — Medication 1 MILLIGRAM(S): at 13:09

## 2021-04-19 RX ADMIN — TAMSULOSIN HYDROCHLORIDE 0.4 MILLIGRAM(S): 0.4 CAPSULE ORAL at 22:02

## 2021-04-19 RX ADMIN — OXYCODONE AND ACETAMINOPHEN 2 TABLET(S): 5; 325 TABLET ORAL at 14:48

## 2021-04-19 RX ADMIN — MORPHINE SULFATE 60 MILLIGRAM(S): 50 CAPSULE, EXTENDED RELEASE ORAL at 17:48

## 2021-04-19 RX ADMIN — DULOXETINE HYDROCHLORIDE 60 MILLIGRAM(S): 30 CAPSULE, DELAYED RELEASE ORAL at 11:37

## 2021-04-19 NOTE — PROGRESS NOTE ADULT - SUBJECTIVE AND OBJECTIVE BOX
Chart reviewed, patient examined. Pertinent results reviewed.  Case discussed with HO; specialist f/u reviewed  HD#2    Patient is a 65y old  Male who presents with a chief complaint of SOB, chest pain (18 Apr 2021 03:07)      HPI:  66y/o M with PMH of HTN, HLD, DM, chronic back pain, hx of DVT now off eliquis (pt doesn't know what provoked, >3yrs ago) presents to ED with 2d of SOB, cough and midsternal CP. CP started after lifting heavy boxes, mid chest, constant, non-radiating, tightness/achy. Also assoc w/ fever, SOB, cough of 1d. Former smoker, has baseline leg swelling, no home O2. Otherwise no sick contacts, travel, palpitations, abd pain, N/V/D/C, chills, myalgias.     In ED: T100.7, HR74, /77, RR17, SpO2 87% on RA now on 4L NC.  CXR - mild changes at the bases;. Labs including D-dimer neg. COVID neg.  (18 Apr 2021 03:07)  Interval events; pt has < CP and SOB, but is still coughing up white sputum; His chest CT is done & charted      PAST MEDICAL & SURGICAL HISTORY:  Anxiety    HTN (hypertension)    Diabetes    High cholesterol    Asthma    Chronic low back pain with sciatica, sciatica laterality unspecified, unspecified back pain laterality    Uncomplicated opioid dependence    Syncopal episodes    History of back surgery  x 3  COPD / Bronchitis & has seen Pulmonary in the past-Dr Brizuela     (remote- DVT/PE with R:LE Thrombectomy in 9/18-on AC for a few years)        SOCIAL HX:   Smoking  former smoker                       ETOH       denies                     Other  denies    FAMILY HISTORY:  No pertinent family history in first degree relatives    .  No cardiovascular or pulmonary family history     REVIEW OF SYSTEMS:    All ROS are negative exept per HPI       Allergies    aspirin (Stomach Upset)  Originally Entered as [ANGIOEDEMA] reaction to [pineapple] (Unknown)  penicillins (Other)  pineapples (Anaphylaxis)    Intolerances    MEDICATIONS  (STANDING):  albuterol/ipratropium for Nebulization 3 milliLiter(s) Nebulizer every 6 hours--& MDI  ALPRAZolam 1 milliGRAM(s) Oral three times a day  azithromycin   Tablet 500 milliGRAM(s) Oral daily  chlorhexidine 4% Liquid 1 Application(s) Topical <User Schedule>  DULoxetine 60 milliGRAM(s) Oral daily  enoxaparin Injectable 40 milliGRAM(s) SubCutaneous daily  fenofibrate Tablet 145 milliGRAM(s) Oral daily  furosemide    Tablet 20 milliGRAM(s) Oral daily  levETIRAcetam 500 milliGRAM(s) Oral two times a day  losartan 100 milliGRAM(s) Oral daily  methylPREDNISolone sodium succinate Injectable 40 milliGRAM(s) IV Push every 12 hours  morphine ER Tablet 60 milliGRAM(s) Oral every 12 hours  pantoprazole    Tablet 40 milliGRAM(s) Oral before breakfast  simvastatin 20 milliGRAM(s) Oral at bedtime  tamsulosin 0.4 milliGRAM(s) Oral at bedtime    MEDICATIONS  (PRN):  ALBUTerol    90 MICROgram(s) HFA Inhaler 2 Puff(s) Inhalation every 6 hours PRN Shortness of Breath and/or Wheezing  oxycodone    5 mG/acetaminophen 325 mG 2 Tablet(s) Oral every 8 hours PRN Moderate Pain (4 - 6)        PHYSICAL EXAM  Vital Signs Last 24 Hrs  T(C): 36 (19 Apr 2021 05:13), Max: 36.7 (18 Apr 2021 13:00)  T(F): 96.8 (19 Apr 2021 05:13), Max: 98 (18 Apr 2021 13:00)  HR: 64 (19 Apr 2021 05:13) (63 - 65)  BP: 124/64 (19 Apr 2021 05:13) (124/64 - 137/63)  BP(mean): --  RR: 18 (19 Apr 2021 05:13) (18 - 18)  SpO2: 92% (18 Apr 2021 19:45) (92% - 93%)    CONSTITUTIONAL:  Well nourished.  NAD; obese    ENT:   Airway patent, MMM    EYES: PEERLA; conj- pink    CARDIAC: + Min ant Ch wall TTP; RRR, no MRG   Min edema in ankles B/L    RESPIRATORY: Diffuse mild Wheezing ; + R base rhonchi; easy breathing      GASTROINTESTINAL: Obese; Abdomen soft, non-tender,   No guarding, + BS      MUSCULOSKELETAL:   Range of motion is not limited,  No clubbing, cyanosis    NEUROLOGICAL:   Alert and oriented   No motor deficits.    SKIN:   Skin normal color for race,   No evidence of rash.          LABS:                          15.7   10.00 )-----------( 185      ( 18 Apr 2021 00:04 )             48.3                                               04-18    140  |  101  |  18  ----------------------------<  108<H>  3.9   |  24  |  0.9    Ca    9.7      18 Apr 2021 00:04    TPro  7.6  /  Alb  4.6  /  TBili  0.7  /  DBili  x   /  AST  12  /  ALT  10  /  AlkPhos  83  04-18      PT/INR - ( 18 Apr 2021 00:04 )   PT: 12.80 sec;   INR: 1.11 ratio         PTT - ( 18 Apr 2021 00:04 )  PTT:44.6 sec                                           CARDIAC MARKERS ( 18 Apr 2021 00:04 )  x     / <0.01 ng/mL / x     / x     / x                                                LIVER FUNCTIONS - ( 18 Apr 2021 00:04 )  Alb: 4.6 g/dL / Pro: 7.6 g/dL / ALK PHOS: 83 U/L / ALT: 10 U/L / AST: 12 U/L / GGT: x                                                                                                  X-Rays reviewed:    CXR interpreted by PULM: right lower lobe opacity/ effusion     AND RAD READ:    < from: Xray Chest 1 View-PORTABLE IMMEDIATE (04.17.21 @ 23:58) >    EXAM:  XR CHEST PORTABLE IMMED 1V            PROCEDURE DATE:  04/17/2021        INTERPRETATION:  Clinical History / Reason for exam: Shortness of breath, cough and fever    Comparison : Chest radiograph 10/9/2018.    Technique/Positioning: Frontal, portable.    Findings:    Support devices: None.    Cardiac/mediastinum/hilum: Tortuous aorta no cardiomegaly    Lung parenchyma/Pleura: Right basilar opacity. No effusion or pneumothorax.    Skeleton/soft tissues: Unremarkable    Impression:    Right basilar opacity without definite effusion or pneumothorax      SHIRLEY HERNANDEZ MD; Attending Radiologist  This document has been electronically signed. Apr 18 2021 10:49AM    < end of copied text >    < from: CT Chest No Cont (04.18.21 @ 14:11) >    EXAM:  CT CHEST            PROCEDURE DATE:  04/18/2021          INTERPRETATION:  CLINICAL INDICATION: Shortness of breath    TECHNIQUE:  A noncontrast CT of the thorax was performed. Sagittal and coronal reformats were performed as well as MIP reconstructions.    COMPARISON: Multiple prior chest CTs, the most recent dated 9/17/2018    INTERPRETATION:    AIRWAYS, LUNGS AND PLEURA: There are secretions within the trachea and central bronchi with associated thickening of the right mainstem bronchus. Occlusion proximal right lower and middle lobe airways with worsening consolidation/atelectasis of the right middle and lower lobes. There is peribronchial wall thickening and mucoid impaction elsewhere within the lungs. Stable peripheral left upper lobe micronodules. There is no pleural effusion. There is no pneumothorax.    MEDIASTINUM: There are stable mildly enlarged mediastinal lymph nodes.  There are no enlarged axillary lymph nodes.    HEART AND VESSELS: The heart is normal in size. There are aortic and coronary artery calcifications. The thoracic aorta has a normal caliber. There is no pericardial effusion.    UPPER ABDOMEN: Images of the upper abdomen demonstrate hepatic steatosis.    BONES AND SOFT TISSUES: There are degenerative changes of the spine.  The soft tissues are unremarkable.    TUBES AND LINES: None.    IMPRESSION:    Secretions in the central airways with unchanged occlusion of the proximal right lower and middle lobe airways.    Worsening consolidation/atelectasis of the right middle and lower lobes.        KELL KLINE MD; Attending Radiologist  This document has been electronically signed. Apr 19 2021  8:02AM    < end of copied text >

## 2021-04-19 NOTE — PROGRESS NOTE ADULT - SUBJECTIVE AND OBJECTIVE BOX
SANA JOE 65y Male  MRN#: [id number]   Hospital Day: 1d    SUBJECTIVE  Patient is a 65y old Male who presents with a chief complaint of SOB, chest pain (19 Apr 2021 12:23)  Currently admitted to medicine with the primary diagnosis of Hypoxia      INTERVAL HPI AND OVERNIGHT EVENTS:  Patient was examined and seen at bedside. This morning he is resting comfortably in bed.    REVIEW OF SYMPTOMS:  +chest pain with coughing. denies any cp at rest or palpitaitons. denies any pain. denies any sob at rest    OBJECTIVE  PAST MEDICAL & SURGICAL HISTORY  Anxiety    HTN (hypertension)    Diabetes    High cholesterol    Asthma    Chronic low back pain with sciatica, sciatica laterality unspecified, unspecified back pain laterality    Uncomplicated opioid dependence    Syncopal episodes    History of back surgery  x 3      ALLERGIES:  aspirin (Stomach Upset)  Originally Entered as [ANGIOEDEMA] reaction to [pineapple] (Unknown)  penicillins (Other)  pineapples (Anaphylaxis)    MEDICATIONS:  STANDING MEDICATIONS  albuterol/ipratropium for Nebulization 3 milliLiter(s) Nebulizer every 6 hours  ALPRAZolam 1 milliGRAM(s) Oral three times a day  chlorhexidine 4% Liquid 1 Application(s) Topical <User Schedule>  DULoxetine 60 milliGRAM(s) Oral daily  enoxaparin Injectable 40 milliGRAM(s) SubCutaneous daily  fenofibrate Tablet 145 milliGRAM(s) Oral daily  furosemide    Tablet 20 milliGRAM(s) Oral daily  levETIRAcetam 500 milliGRAM(s) Oral two times a day  levoFLOXacin IVPB      levoFLOXacin IVPB 750 milliGRAM(s) IV Intermittent once  losartan 100 milliGRAM(s) Oral daily  methylPREDNISolone sodium succinate Injectable 40 milliGRAM(s) IV Push every 12 hours  morphine ER Tablet 60 milliGRAM(s) Oral every 12 hours  pantoprazole    Tablet 40 milliGRAM(s) Oral before breakfast  simvastatin 20 milliGRAM(s) Oral at bedtime  tamsulosin 0.4 milliGRAM(s) Oral at bedtime    PRN MEDICATIONS  ALBUTerol    90 MICROgram(s) HFA Inhaler 2 Puff(s) Inhalation every 6 hours PRN  oxycodone    5 mG/acetaminophen 325 mG 2 Tablet(s) Oral every 8 hours PRN      VITAL SIGNS: Last 24 Hours  T(C): 36 (19 Apr 2021 05:13), Max: 36.7 (18 Apr 2021 13:00)  T(F): 96.8 (19 Apr 2021 05:13), Max: 98 (18 Apr 2021 13:00)  HR: 64 (19 Apr 2021 05:13) (63 - 65)  BP: 124/64 (19 Apr 2021 05:13) (124/64 - 137/63)  BP(mean): --  RR: 18 (19 Apr 2021 05:13) (18 - 18)  SpO2: 92% (18 Apr 2021 19:45) (92% - 92%)    LABS:                        14.8   8.99  )-----------( 198      ( 19 Apr 2021 06:52 )             45.6     04-19    141  |  102  |  19  ----------------------------<  124<H>  4.6   |  26  |  0.7    Ca    9.3      19 Apr 2021 06:52  Mg     2.0     04-19    TPro  7.6  /  Alb  4.6  /  TBili  0.7  /  DBili  x   /  AST  12  /  ALT  10  /  AlkPhos  83  04-18    PT/INR - ( 18 Apr 2021 00:04 )   PT: 12.80 sec;   INR: 1.11 ratio         PTT - ( 18 Apr 2021 00:04 )  PTT:44.6 sec          Culture - Blood (collected 18 Apr 2021 00:25)  Source: .Blood Blood-Venous  Preliminary Report (19 Apr 2021 09:01):    No growth to date.    Culture - Blood (collected 18 Apr 2021 00:25)  Source: .Blood Blood-Venous  Preliminary Report (19 Apr 2021 09:01):    No growth to date.      CARDIAC MARKERS ( 18 Apr 2021 07:39 )  x     / <0.01 ng/mL / x     / x     / x      CARDIAC MARKERS ( 18 Apr 2021 00:04 )  x     / <0.01 ng/mL / x     / x     / x          RADIOLOGY:  < from: CT Chest No Cont (04.18.21 @ 14:11) >  INTERPRETATION:    AIRWAYS, LUNGS AND PLEURA: There are secretions within the trachea and central bronchi with associated thickening of the right mainstem bronchus. Occlusion proximal right lower and middle lobe airways with worsening consolidation/atelectasis of the right middle and lower lobes. There is peribronchial wall thickening and mucoid impaction elsewhere within the lungs. Stable peripheral left upper lobe micronodules. There is no pleural effusion. There is no pneumothorax.    MEDIASTINUM: There are stable mildly enlarged mediastinal lymph nodes.  There are no enlarged axillary lymph nodes.    HEART AND VESSELS: The heart is normal in size. There are aortic and coronary artery calcifications. The thoracic aorta has a normal caliber. There is no pericardial effusion.    UPPER ABDOMEN: Images of the upper abdomen demonstrate hepatic steatosis.    BONES AND SOFT TISSUES: There are degenerative changes of the spine.  The soft tissues are unremarkable.    TUBES AND LINES: None.    IMPRESSION:    Secretions in the central airways with unchanged occlusion of the proximal right lower and middle lobe airways.    Worsening consolidation/atelectasis of the right middle and lower lobes.    < end of copied text >      PHYSICAL EXAM:  CONSTITUTIONAL: No acute distress, well-developed, well-groomed, AAOx3  HEAD: Atraumatic, normocephalic  EYES: EOMI  ENT: Supple, No JVD  PULMONARY: Decreased Rt sided breath sounds, no wheezing. breathing non labored  CARDIOVASCULAR: Regular rate and rhythm  GASTROINTESTINAL: Soft, non-tender, non-distended  MUSCULOSKELETAL: Moves 4/4 extremities  NEUROLOGY: non-focal  SKIN: intact    ASSESSMENT & PLAN  66y/o M with PMH of HTN, HLD, DM, DVT, chronic back pain now off eliquis presents for SOB.     #SOB secondary to suspected CAP  #Suspected COPD  - D-dimer neg, BNP neg  - CT showed RML/RLL consolidation  - Decreased BS on Rt side  - LE Duplex -ve 4/18  - Pulmonary following--recs appreciated  - levaquin 750 qd  - c/w IV steroids  - Trial of NIV tonight  - Nebs q4  - ECHO ordered--f/u results  - Trend procal    #Chest pain, atypical  - likely due to MSK strain with lifting boxes vs COPD 'tightness'  - trop x2 neg, EKG non-ischemic  - Likely MSK or pleuritic as exacerbated by coughing but brought on with lifting heavy objects    #DM- monitor FS, start basal-bolus >180  - beware pt on systemic steroids    #HTN- c/w losartan 100  #HLD- c/w simva 20 and fibrate  #BPH- c/w flomax  #Chronic back pain- c/w morphine 60 BID, percocet    #Anxiety  - c/w xanax 1 TID, duloxetine 60  - unclear why on keppra? no hx of seizures, pt confirms he takes   - will f/u with Dr. Gardiner as to why he's on this med    #MISC  - DVT ppx: lovenox  - GI ppx: PPI  - Activity: As tolerated  - Diet: DASH, CC    PAST MEDICAL & SURGICAL HISTORY:  Anxiety    HTN (hypertension)    Diabetes    High cholesterol    Asthma    Chronic low back pain with sciatica, sciatica laterality unspecified, unspecified back pain laterality    Uncomplicated opioid dependence    Syncopal episodes    History of back surgery  x 3

## 2021-04-19 NOTE — PROGRESS NOTE ADULT - SUBJECTIVE AND OBJECTIVE BOX
Patient is a 65y old  Male who presents with a chief complaint of SOB, chest pain (19 Apr 2021 10:07)        SUBJECTIVE:    Feels better today but still complaining of orthopnea and inability to sleep due to breathing. on 4L NC currently    REVIEW OF SYSTEMS:  See HPI    PHYSICAL EXAM  Vital Signs Last 24 Hrs  T(C): 36 (19 Apr 2021 05:13), Max: 36.7 (18 Apr 2021 13:00)  T(F): 96.8 (19 Apr 2021 05:13), Max: 98 (18 Apr 2021 13:00)  HR: 64 (19 Apr 2021 05:13) (63 - 65)  BP: 124/64 (19 Apr 2021 05:13) (124/64 - 137/63)  BP(mean): --  RR: 18 (19 Apr 2021 05:13) (18 - 18)  SpO2: 92% (18 Apr 2021 19:45) (92% - 92%)    General: In NAD  HEENT: RAHEEM               Lymphatic system: No Cervical LN    Respiratory: faint bilateral wheezing. no crackles  Cardiovascular: Regular  Gastrointestinal: Soft. + BS  Musculoskeletal: No clubbing.  moves all extremities.  Full range of motion    Skin: Warm.  Intact  Neurological: No motor or sensory deficit        LABS:                          14.8   8.99  )-----------( 198      ( 19 Apr 2021 06:52 )             45.6                                               04-19    141  |  102  |  19  ----------------------------<  124<H>  4.6   |  26  |  0.7    Ca    9.3      19 Apr 2021 06:52  Mg     2.0     04-19    TPro  7.6  /  Alb  4.6  /  TBili  0.7  /  DBili  x   /  AST  12  /  ALT  10  /  AlkPhos  83  04-18      PT/INR - ( 18 Apr 2021 00:04 )   PT: 12.80 sec;   INR: 1.11 ratio         PTT - ( 18 Apr 2021 00:04 )  PTT:44.6 sec                                           CARDIAC MARKERS ( 18 Apr 2021 07:39 )  x     / <0.01 ng/mL / x     / x     / x      CARDIAC MARKERS ( 18 Apr 2021 00:04 )  x     / <0.01 ng/mL / x     / x     / x                                                LIVER FUNCTIONS - ( 18 Apr 2021 00:04 )  Alb: 4.6 g/dL / Pro: 7.6 g/dL / ALK PHOS: 83 U/L / ALT: 10 U/L / AST: 12 U/L / GGT: x                                                  Culture - Blood (collected 18 Apr 2021 00:25)  Source: .Blood Blood-Venous  Preliminary Report (19 Apr 2021 09:01):    No growth to date.    Culture - Blood (collected 18 Apr 2021 00:25)  Source: .Blood Blood-Venous  Preliminary Report (19 Apr 2021 09:01):    No growth to date.                                                    MEDICATIONS  (STANDING):  albuterol/ipratropium for Nebulization 3 milliLiter(s) Nebulizer every 6 hours  ALPRAZolam 1 milliGRAM(s) Oral three times a day  chlorhexidine 4% Liquid 1 Application(s) Topical <User Schedule>  DULoxetine 60 milliGRAM(s) Oral daily  enoxaparin Injectable 40 milliGRAM(s) SubCutaneous daily  fenofibrate Tablet 145 milliGRAM(s) Oral daily  furosemide    Tablet 20 milliGRAM(s) Oral daily  levETIRAcetam 500 milliGRAM(s) Oral two times a day  levoFLOXacin IVPB      levoFLOXacin IVPB 750 milliGRAM(s) IV Intermittent once  losartan 100 milliGRAM(s) Oral daily  methylPREDNISolone sodium succinate Injectable 40 milliGRAM(s) IV Push every 12 hours  morphine ER Tablet 60 milliGRAM(s) Oral every 12 hours  pantoprazole    Tablet 40 milliGRAM(s) Oral before breakfast  simvastatin 20 milliGRAM(s) Oral at bedtime  tamsulosin 0.4 milliGRAM(s) Oral at bedtime    MEDICATIONS  (PRN):  ALBUTerol    90 MICROgram(s) HFA Inhaler 2 Puff(s) Inhalation every 6 hours PRN Shortness of Breath and/or Wheezing  oxycodone    5 mG/acetaminophen 325 mG 2 Tablet(s) Oral every 8 hours PRN Moderate Pain (4 - 6)

## 2021-04-20 LAB
ALBUMIN SERPL ELPH-MCNC: 4.3 G/DL — SIGNIFICANT CHANGE UP (ref 3.5–5.2)
ALP SERPL-CCNC: 66 U/L — SIGNIFICANT CHANGE UP (ref 30–115)
ALT FLD-CCNC: 7 U/L — SIGNIFICANT CHANGE UP (ref 0–41)
ANION GAP SERPL CALC-SCNC: 10 MMOL/L — SIGNIFICANT CHANGE UP (ref 7–14)
AST SERPL-CCNC: 7 U/L — SIGNIFICANT CHANGE UP (ref 0–41)
BASOPHILS # BLD AUTO: 0.03 K/UL — SIGNIFICANT CHANGE UP (ref 0–0.2)
BASOPHILS NFR BLD AUTO: 0.3 % — SIGNIFICANT CHANGE UP (ref 0–1)
BILIRUB SERPL-MCNC: 0.5 MG/DL — SIGNIFICANT CHANGE UP (ref 0.2–1.2)
BUN SERPL-MCNC: 20 MG/DL — SIGNIFICANT CHANGE UP (ref 10–20)
CALCIUM SERPL-MCNC: 9.4 MG/DL — SIGNIFICANT CHANGE UP (ref 8.5–10.1)
CHLORIDE SERPL-SCNC: 103 MMOL/L — SIGNIFICANT CHANGE UP (ref 98–110)
CO2 SERPL-SCNC: 31 MMOL/L — SIGNIFICANT CHANGE UP (ref 17–32)
COVID-19 SPIKE DOMAIN AB INTERP: POSITIVE
COVID-19 SPIKE DOMAIN ANTIBODY RESULT: 44.6 U/ML — HIGH
CREAT SERPL-MCNC: 0.8 MG/DL — SIGNIFICANT CHANGE UP (ref 0.7–1.5)
EOSINOPHIL # BLD AUTO: 0.02 K/UL — SIGNIFICANT CHANGE UP (ref 0–0.7)
EOSINOPHIL NFR BLD AUTO: 0.2 % — SIGNIFICANT CHANGE UP (ref 0–8)
GLUCOSE BLDC GLUCOMTR-MCNC: 102 MG/DL — HIGH (ref 70–99)
GLUCOSE BLDC GLUCOMTR-MCNC: 121 MG/DL — HIGH (ref 70–99)
GLUCOSE BLDC GLUCOMTR-MCNC: 140 MG/DL — HIGH (ref 70–99)
GLUCOSE BLDC GLUCOMTR-MCNC: 197 MG/DL — HIGH (ref 70–99)
GLUCOSE SERPL-MCNC: 116 MG/DL — HIGH (ref 70–99)
HCT VFR BLD CALC: 46.4 % — SIGNIFICANT CHANGE UP (ref 42–52)
HGB BLD-MCNC: 14.7 G/DL — SIGNIFICANT CHANGE UP (ref 14–18)
IMM GRANULOCYTES NFR BLD AUTO: 0.3 % — SIGNIFICANT CHANGE UP (ref 0.1–0.3)
LYMPHOCYTES # BLD AUTO: 19.1 % — LOW (ref 20.5–51.1)
LYMPHOCYTES # BLD AUTO: 2 K/UL — SIGNIFICANT CHANGE UP (ref 1.2–3.4)
MAGNESIUM SERPL-MCNC: 1.9 MG/DL — SIGNIFICANT CHANGE UP (ref 1.8–2.4)
MCHC RBC-ENTMCNC: 29.2 PG — SIGNIFICANT CHANGE UP (ref 27–31)
MCHC RBC-ENTMCNC: 31.7 G/DL — LOW (ref 32–37)
MCV RBC AUTO: 92.2 FL — SIGNIFICANT CHANGE UP (ref 80–94)
MONOCYTES # BLD AUTO: 0.56 K/UL — SIGNIFICANT CHANGE UP (ref 0.1–0.6)
MONOCYTES NFR BLD AUTO: 5.3 % — SIGNIFICANT CHANGE UP (ref 1.7–9.3)
NEUTROPHILS # BLD AUTO: 7.85 K/UL — HIGH (ref 1.4–6.5)
NEUTROPHILS NFR BLD AUTO: 74.8 % — SIGNIFICANT CHANGE UP (ref 42.2–75.2)
NRBC # BLD: 0 /100 WBCS — SIGNIFICANT CHANGE UP (ref 0–0)
PLATELET # BLD AUTO: 224 K/UL — SIGNIFICANT CHANGE UP (ref 130–400)
POTASSIUM SERPL-MCNC: 4.9 MMOL/L — SIGNIFICANT CHANGE UP (ref 3.5–5)
POTASSIUM SERPL-SCNC: 4.9 MMOL/L — SIGNIFICANT CHANGE UP (ref 3.5–5)
PROCALCITONIN SERPL-MCNC: 0.06 NG/ML — SIGNIFICANT CHANGE UP (ref 0.02–0.1)
PROT SERPL-MCNC: 7.2 G/DL — SIGNIFICANT CHANGE UP (ref 6–8)
RBC # BLD: 5.03 M/UL — SIGNIFICANT CHANGE UP (ref 4.7–6.1)
RBC # FLD: 14.9 % — HIGH (ref 11.5–14.5)
SARS-COV-2 IGG+IGM SERPL QL IA: 44.6 U/ML — HIGH
SARS-COV-2 IGG+IGM SERPL QL IA: POSITIVE
SODIUM SERPL-SCNC: 144 MMOL/L — SIGNIFICANT CHANGE UP (ref 135–146)
WBC # BLD: 10.49 K/UL — SIGNIFICANT CHANGE UP (ref 4.8–10.8)
WBC # FLD AUTO: 10.49 K/UL — SIGNIFICANT CHANGE UP (ref 4.8–10.8)

## 2021-04-20 PROCEDURE — 99232 SBSQ HOSP IP/OBS MODERATE 35: CPT

## 2021-04-20 PROCEDURE — 93306 TTE W/DOPPLER COMPLETE: CPT | Mod: 26

## 2021-04-20 RX ORDER — FUROSEMIDE 40 MG
40 TABLET ORAL ONCE
Refills: 0 | Status: COMPLETED | OUTPATIENT
Start: 2021-04-20 | End: 2021-04-20

## 2021-04-20 RX ORDER — SENNA PLUS 8.6 MG/1
2 TABLET ORAL AT BEDTIME
Refills: 0 | Status: DISCONTINUED | OUTPATIENT
Start: 2021-04-20 | End: 2021-04-22

## 2021-04-20 RX ORDER — ALBUTEROL 90 UG/1
2.5 AEROSOL, METERED ORAL EVERY 4 HOURS
Refills: 0 | Status: DISCONTINUED | OUTPATIENT
Start: 2021-04-20 | End: 2021-04-22

## 2021-04-20 RX ADMIN — MORPHINE SULFATE 60 MILLIGRAM(S): 50 CAPSULE, EXTENDED RELEASE ORAL at 05:23

## 2021-04-20 RX ADMIN — LOSARTAN POTASSIUM 100 MILLIGRAM(S): 100 TABLET, FILM COATED ORAL at 05:18

## 2021-04-20 RX ADMIN — SENNA PLUS 2 TABLET(S): 8.6 TABLET ORAL at 23:41

## 2021-04-20 RX ADMIN — Medication 20 MILLIGRAM(S): at 05:19

## 2021-04-20 RX ADMIN — ENOXAPARIN SODIUM 40 MILLIGRAM(S): 100 INJECTION SUBCUTANEOUS at 11:53

## 2021-04-20 RX ADMIN — LEVETIRACETAM 500 MILLIGRAM(S): 250 TABLET, FILM COATED ORAL at 17:31

## 2021-04-20 RX ADMIN — LEVETIRACETAM 500 MILLIGRAM(S): 250 TABLET, FILM COATED ORAL at 05:19

## 2021-04-20 RX ADMIN — Medication 40 MILLIGRAM(S): at 11:52

## 2021-04-20 RX ADMIN — ALBUTEROL 2.5 MILLIGRAM(S): 90 AEROSOL, METERED ORAL at 23:37

## 2021-04-20 RX ADMIN — TAMSULOSIN HYDROCHLORIDE 0.4 MILLIGRAM(S): 0.4 CAPSULE ORAL at 21:17

## 2021-04-20 RX ADMIN — SIMVASTATIN 20 MILLIGRAM(S): 20 TABLET, FILM COATED ORAL at 21:17

## 2021-04-20 RX ADMIN — PANTOPRAZOLE SODIUM 40 MILLIGRAM(S): 20 TABLET, DELAYED RELEASE ORAL at 05:23

## 2021-04-20 RX ADMIN — Medication 40 MILLIGRAM(S): at 17:31

## 2021-04-20 RX ADMIN — OXYCODONE AND ACETAMINOPHEN 2 TABLET(S): 5; 325 TABLET ORAL at 21:25

## 2021-04-20 RX ADMIN — MORPHINE SULFATE 60 MILLIGRAM(S): 50 CAPSULE, EXTENDED RELEASE ORAL at 17:30

## 2021-04-20 RX ADMIN — OXYCODONE AND ACETAMINOPHEN 2 TABLET(S): 5; 325 TABLET ORAL at 11:45

## 2021-04-20 RX ADMIN — Medication 1 MILLIGRAM(S): at 13:12

## 2021-04-20 RX ADMIN — Medication 1 MILLIGRAM(S): at 21:25

## 2021-04-20 RX ADMIN — Medication 1 MILLIGRAM(S): at 05:23

## 2021-04-20 RX ADMIN — GABAPENTIN 300 MILLIGRAM(S): 400 CAPSULE ORAL at 05:19

## 2021-04-20 RX ADMIN — DULOXETINE HYDROCHLORIDE 60 MILLIGRAM(S): 30 CAPSULE, DELAYED RELEASE ORAL at 11:52

## 2021-04-20 RX ADMIN — ALBUTEROL 2.5 MILLIGRAM(S): 90 AEROSOL, METERED ORAL at 16:34

## 2021-04-20 RX ADMIN — GABAPENTIN 300 MILLIGRAM(S): 400 CAPSULE ORAL at 17:31

## 2021-04-20 RX ADMIN — OXYCODONE AND ACETAMINOPHEN 2 TABLET(S): 5; 325 TABLET ORAL at 10:54

## 2021-04-20 RX ADMIN — CHLORHEXIDINE GLUCONATE 1 APPLICATION(S): 213 SOLUTION TOPICAL at 05:18

## 2021-04-20 RX ADMIN — Medication 145 MILLIGRAM(S): at 11:52

## 2021-04-20 RX ADMIN — ALBUTEROL 2.5 MILLIGRAM(S): 90 AEROSOL, METERED ORAL at 20:35

## 2021-04-20 RX ADMIN — ALBUTEROL 2.5 MILLIGRAM(S): 90 AEROSOL, METERED ORAL at 01:38

## 2021-04-20 RX ADMIN — Medication 40 MILLIGRAM(S): at 05:19

## 2021-04-20 NOTE — SWALLOW BEDSIDE ASSESSMENT ADULT - SLP PERTINENT HISTORY OF CURRENT PROBLEM
Pt admitted w/ SOB and CP, fever. Found to have R sided PNA CXR: b/l infiltrates. COVID (-) PMH: HTN, chronic back pain, back sx in 2010, DVT,

## 2021-04-20 NOTE — PROGRESS NOTE ADULT - SUBJECTIVE AND OBJECTIVE BOX
Patient is a 65y old  Male who presents with a chief complaint of SOB, chest pain (20 Apr 2021 13:48)        SUBJECTIVE:    Feels better. tolerating 2L NC.     REVIEW OF SYSTEMS:  See HPI    PHYSICAL EXAM  Vital Signs Last 24 Hrs  T(C): 36.2 (20 Apr 2021 14:31), Max: 36.6 (19 Apr 2021 20:12)  T(F): 97.1 (20 Apr 2021 14:31), Max: 97.8 (19 Apr 2021 20:12)  HR: 57 (20 Apr 2021 05:44) (56 - 57)  BP: 144/64 (20 Apr 2021 14:31) (138/86 - 145/92)  BP(mean): --  RR: 19 (20 Apr 2021 14:31) (18 - 19)  SpO2: 99% (20 Apr 2021 09:24) (93% - 99%)    General: In NAD  HEENT: RAHEEM               Lymphatic system: No Cervical LN    Respiratory: clear to auscultation bilaterally  Cardiovascular: Regular  Gastrointestinal: Soft. + BS  Musculoskeletal: No clubbing.  moves all extremities.  Full range of motion    Skin: Warm.  Intact  Neurological: No motor or sensory deficit        LABS:                          14.7   10.49 )-----------( 224      ( 20 Apr 2021 07:14 )             46.4                                               04-20    144  |  103  |  20  ----------------------------<  116<H>  4.9   |  31  |  0.8    Ca    9.4      20 Apr 2021 07:14  Mg     1.9     04-20    TPro  7.2  /  Alb  4.3  /  TBili  0.5  /  DBili  x   /  AST  7   /  ALT  7   /  AlkPhos  66  04-20                                                                                           LIVER FUNCTIONS - ( 20 Apr 2021 07:14 )  Alb: 4.3 g/dL / Pro: 7.2 g/dL / ALK PHOS: 66 U/L / ALT: 7 U/L / AST: 7 U/L / GGT: x                                                  Culture - Sputum (collected 19 Apr 2021 08:24)  Source: .Sputum Sputum  Gram Stain (19 Apr 2021 21:25):    Moderate polymorphonuclear leukocytes per low power field    No Squamous epithelial cells per low power field    Few Gram Positive Cocci in Clusters per oil power field    Few Gram Negative Rods per oil power field    Culture - Blood (collected 18 Apr 2021 00:25)  Source: .Blood Blood-Venous  Preliminary Report (19 Apr 2021 09:01):    No growth to date.    Culture - Blood (collected 18 Apr 2021 00:25)  Source: .Blood Blood-Venous  Preliminary Report (19 Apr 2021 09:01):    No growth to date.                                                    MEDICATIONS  (STANDING):  ALBUTerol    0.083% 2.5 milliGRAM(s) Nebulizer every 4 hours  ALBUTerol   0.5% 2.5 milliGRAM(s) Nebulizer every 4 hours  ALPRAZolam 1 milliGRAM(s) Oral three times a day  chlorhexidine 4% Liquid 1 Application(s) Topical <User Schedule>  DULoxetine 60 milliGRAM(s) Oral daily  enoxaparin Injectable 40 milliGRAM(s) SubCutaneous daily  fenofibrate Tablet 145 milliGRAM(s) Oral daily  furosemide    Tablet 20 milliGRAM(s) Oral daily  gabapentin 300 milliGRAM(s) Oral two times a day  levETIRAcetam 500 milliGRAM(s) Oral two times a day  levoFLOXacin IVPB      levoFLOXacin IVPB 750 milliGRAM(s) IV Intermittent every 24 hours  losartan 100 milliGRAM(s) Oral daily  methylPREDNISolone sodium succinate Injectable 40 milliGRAM(s) IV Push every 12 hours  morphine ER Tablet 60 milliGRAM(s) Oral every 12 hours  pantoprazole    Tablet 40 milliGRAM(s) Oral before breakfast  simvastatin 20 milliGRAM(s) Oral at bedtime  tamsulosin 0.4 milliGRAM(s) Oral at bedtime    MEDICATIONS  (PRN):  ALBUTerol    90 MICROgram(s) HFA Inhaler 2 Puff(s) Inhalation every 6 hours PRN Shortness of Breath and/or Wheezing  oxycodone    5 mG/acetaminophen 325 mG 2 Tablet(s) Oral every 8 hours PRN Moderate Pain (4 - 6)

## 2021-04-20 NOTE — PROGRESS NOTE ADULT - ATTENDING COMMENTS
patient seen and examined agree above note   do ct chest no contrast to evaluate the RLL opacity / effusion   change abx to levaquine 750    continue steroid   nebulizer Q 4 hrs and prn   follow procal   urine legionella and strep   do echo   lower ext edema do doppler
patient seen and examined agree above note   do ct chest no contrast to evaluate the RLL opacity / effusion   change abx to levaquine 750    continue steroid   nebulizer Q 4 hrs and prn   follow procal   urine legionella and strep   do echo   lower ext edema do doppler
patient seen and examined agree above note   check pox on RA rest and exertion   levaquine for 7 days   prednisone taper   symbicort Q 12 hrs   follow up in 3-4 weeks to repeat ct scan patient was informed to follow
patient seen and examined agree above note   continue abx   speech and swallow eval   need outpatient follow up to repeat ct scan in 6-8 weeks to confirm resolution

## 2021-04-20 NOTE — PROGRESS NOTE ADULT - SUBJECTIVE AND OBJECTIVE BOX
SANA JOE 65y Male  MRN#: [id number]   Hospital Day: 2d    SUBJECTIVE  Patient is a 65y old Male who presents with a chief complaint of SOB, chest pain (19 Apr 2021 12:34)  Currently admitted to medicine with the primary diagnosis of Hypoxia      INTERVAL HPI AND OVERNIGHT EVENTS:  Patient was examined and seen at bedside. This morning he is resting comfortably in bed.    REVIEW OF SYMPTOMS:  +chest pain with coughing but improved from yesterday. +Cough.  Denies any CP, N/V/D/C, headache, dizziness, lightheadedness      OBJECTIVE  PAST MEDICAL & SURGICAL HISTORY  Anxiety    HTN (hypertension)    Diabetes    High cholesterol    Asthma    Chronic low back pain with sciatica, sciatica laterality unspecified, unspecified back pain laterality    Uncomplicated opioid dependence    Syncopal episodes    History of back surgery  x 3      ALLERGIES:  aspirin (Stomach Upset)  Originally Entered as [ANGIOEDEMA] reaction to [pineapple] (Unknown)  penicillins (Other)  pineapples (Anaphylaxis)    MEDICATIONS:  STANDING MEDICATIONS  ALBUTerol   0.5% 2.5 milliGRAM(s) Nebulizer every 4 hours  ALPRAZolam 1 milliGRAM(s) Oral three times a day  chlorhexidine 4% Liquid 1 Application(s) Topical <User Schedule>  DULoxetine 60 milliGRAM(s) Oral daily  enoxaparin Injectable 40 milliGRAM(s) SubCutaneous daily  fenofibrate Tablet 145 milliGRAM(s) Oral daily  furosemide    Tablet 20 milliGRAM(s) Oral daily  gabapentin 300 milliGRAM(s) Oral two times a day  levETIRAcetam 500 milliGRAM(s) Oral two times a day  levoFLOXacin IVPB      levoFLOXacin IVPB 750 milliGRAM(s) IV Intermittent every 24 hours  losartan 100 milliGRAM(s) Oral daily  methylPREDNISolone sodium succinate Injectable 40 milliGRAM(s) IV Push every 12 hours  morphine ER Tablet 60 milliGRAM(s) Oral every 12 hours  pantoprazole    Tablet 40 milliGRAM(s) Oral before breakfast  simvastatin 20 milliGRAM(s) Oral at bedtime  tamsulosin 0.4 milliGRAM(s) Oral at bedtime    PRN MEDICATIONS  ALBUTerol    90 MICROgram(s) HFA Inhaler 2 Puff(s) Inhalation every 6 hours PRN  oxycodone    5 mG/acetaminophen 325 mG 2 Tablet(s) Oral every 8 hours PRN      VITAL SIGNS: Last 24 Hours  T(C): 35.9 (20 Apr 2021 05:44), Max: 36.6 (19 Apr 2021 20:12)  T(F): 96.6 (20 Apr 2021 05:44), Max: 97.8 (19 Apr 2021 20:12)  HR: 57 (20 Apr 2021 05:44) (56 - 62)  BP: 138/86 (20 Apr 2021 05:44) (131/60 - 145/92)  BP(mean): --  RR: 18 (20 Apr 2021 05:44) (18 - 18)  SpO2: 99% (20 Apr 2021 09:24) (92% - 99%)    LABS:                        14.7   10.49 )-----------( 224      ( 20 Apr 2021 07:14 )             46.4     04-20    144  |  103  |  20  ----------------------------<  116<H>  4.9   |  31  |  0.8    Ca    9.4      20 Apr 2021 07:14  Mg     1.9     04-20    TPro  7.2  /  Alb  4.3  /  TBili  0.5  /  DBili  x   /  AST  7   /  ALT  7   /  AlkPhos  66  04-20              Culture - Sputum (collected 19 Apr 2021 08:24)  Source: .Sputum Sputum  Gram Stain (19 Apr 2021 21:25):    Moderate polymorphonuclear leukocytes per low power field    No Squamous epithelial cells per low power field    Few Gram Positive Cocci in Clusters per oil power field    Few Gram Negative Rods per oil power field    Culture - Blood (collected 18 Apr 2021 00:25)  Source: .Blood Blood-Venous  Preliminary Report (19 Apr 2021 09:01):    No growth to date.    Culture - Blood (collected 18 Apr 2021 00:25)  Source: .Blood Blood-Venous  Preliminary Report (19 Apr 2021 09:01):    No growth to date.          RADIOLOGY:      PHYSICAL EXAM:  CONSTITUTIONAL: No acute distress, well-developed, well-groomed, AAOx3  HEAD: Atraumatic, normocephalic  EYES: EOMI  ENT: Supple, No JVD  PULMONARY: Decreased Rt sided breath sounds, no wheezing. breathing non labored  CARDIOVASCULAR: Regular rate and rhythm  GASTROINTESTINAL: Soft, non-tender, non-distended  MUSCULOSKELETAL: Moves 4/4 extremities  NEUROLOGY: non-focal  SKIN: intact    ASSESSMENT & PLAN  66y/o M with PMH of HTN, HLD, DM, DVT, chronic back pain now off eliquis presents for SOB.     #SOB secondary to suspected CAP  #Suspected COPD  - D-dimer neg, BNP neg  - CT showed RML/RLL consolidation  - Decreased BS on Rt side  - LE Duplex -ve 4/18  - Pulmonary following--recs appreciated  - levaquin 750 qd  - c/w IV steroids  - c/w NIV  - Nebs q4  - ECHO ordered--s/w lab, will be done today  - Procal 0.6  - S+S eval -ve for any dysphagia  - c/w NIV qHS as tolerated    #Chest pain, atypical  - likely due to MSK strain with lifting boxes vs COPD 'tightness'  - trop x2 neg, EKG non-ischemic  - Likely MSK or pleuritic as exacerbated by coughing but brought on with lifting heavy objects    #DM- monitor FS, start basal-bolus >180  - beware pt on systemic steroids    #HTN- c/w losartan 100  #HLD- c/w simva 20 and fibrate  #BPH- c/w flomax  #Chronic back pain- c/w morphine 60 BID, percocet, gabapentin BID added (home med regimen confirmed with pharmacy)    #Anxiety  - c/w xanax 1 TID, duloxetine 60  - c/w home keppra regimen, likely d/t chronic back pain/neuro pain    #MISC  - DVT ppx: lovenox  - GI ppx: PPI  - Activity: As tolerated  - Diet: DASH, CC        PAST MEDICAL & SURGICAL HISTORY:  Anxiety    HTN (hypertension)    Diabetes    High cholesterol    Asthma    Chronic low back pain with sciatica, sciatica laterality unspecified, unspecified back pain laterality    Uncomplicated opioid dependence    Syncopal episodes    History of back surgery  x 3

## 2021-04-20 NOTE — SWALLOW BEDSIDE ASSESSMENT ADULT - SWALLOW EVAL: DIAGNOSIS
omega-pharyngeal swallow us WFL for all tested consistencies w/o overt s/s of penetration/aspiration

## 2021-04-20 NOTE — SWALLOW BEDSIDE ASSESSMENT ADULT - NS SPL SWALLOW CLINIC TRIAL FT
omega-pharyngeal swallow is WFL w/o overt s/s of penetration/aspiration for all tested consistencies

## 2021-04-21 LAB
ANION GAP SERPL CALC-SCNC: 12 MMOL/L — SIGNIFICANT CHANGE UP (ref 7–14)
BASOPHILS # BLD AUTO: 0.03 K/UL — SIGNIFICANT CHANGE UP (ref 0–0.2)
BASOPHILS NFR BLD AUTO: 0.4 % — SIGNIFICANT CHANGE UP (ref 0–1)
BUN SERPL-MCNC: 23 MG/DL — HIGH (ref 10–20)
CALCIUM SERPL-MCNC: 9.2 MG/DL — SIGNIFICANT CHANGE UP (ref 8.5–10.1)
CHLORIDE SERPL-SCNC: 100 MMOL/L — SIGNIFICANT CHANGE UP (ref 98–110)
CO2 SERPL-SCNC: 28 MMOL/L — SIGNIFICANT CHANGE UP (ref 17–32)
CREAT SERPL-MCNC: 0.8 MG/DL — SIGNIFICANT CHANGE UP (ref 0.7–1.5)
CULTURE RESULTS: SIGNIFICANT CHANGE UP
EOSINOPHIL # BLD AUTO: 0 K/UL — SIGNIFICANT CHANGE UP (ref 0–0.7)
EOSINOPHIL NFR BLD AUTO: 0 % — SIGNIFICANT CHANGE UP (ref 0–8)
GLUCOSE BLDC GLUCOMTR-MCNC: 111 MG/DL — HIGH (ref 70–99)
GLUCOSE BLDC GLUCOMTR-MCNC: 131 MG/DL — HIGH (ref 70–99)
GLUCOSE BLDC GLUCOMTR-MCNC: 146 MG/DL — HIGH (ref 70–99)
GLUCOSE BLDC GLUCOMTR-MCNC: 169 MG/DL — HIGH (ref 70–99)
GLUCOSE SERPL-MCNC: 125 MG/DL — HIGH (ref 70–99)
HCT VFR BLD CALC: 45.4 % — SIGNIFICANT CHANGE UP (ref 42–52)
HGB BLD-MCNC: 14.7 G/DL — SIGNIFICANT CHANGE UP (ref 14–18)
IMM GRANULOCYTES NFR BLD AUTO: 0.5 % — HIGH (ref 0.1–0.3)
LYMPHOCYTES # BLD AUTO: 1.46 K/UL — SIGNIFICANT CHANGE UP (ref 1.2–3.4)
LYMPHOCYTES # BLD AUTO: 18.5 % — LOW (ref 20.5–51.1)
MAGNESIUM SERPL-MCNC: 1.9 MG/DL — SIGNIFICANT CHANGE UP (ref 1.8–2.4)
MCHC RBC-ENTMCNC: 29.4 PG — SIGNIFICANT CHANGE UP (ref 27–31)
MCHC RBC-ENTMCNC: 32.4 G/DL — SIGNIFICANT CHANGE UP (ref 32–37)
MCV RBC AUTO: 90.8 FL — SIGNIFICANT CHANGE UP (ref 80–94)
MONOCYTES # BLD AUTO: 0.44 K/UL — SIGNIFICANT CHANGE UP (ref 0.1–0.6)
MONOCYTES NFR BLD AUTO: 5.6 % — SIGNIFICANT CHANGE UP (ref 1.7–9.3)
NEUTROPHILS # BLD AUTO: 5.92 K/UL — SIGNIFICANT CHANGE UP (ref 1.4–6.5)
NEUTROPHILS NFR BLD AUTO: 75 % — SIGNIFICANT CHANGE UP (ref 42.2–75.2)
NRBC # BLD: 0 /100 WBCS — SIGNIFICANT CHANGE UP (ref 0–0)
PLATELET # BLD AUTO: 242 K/UL — SIGNIFICANT CHANGE UP (ref 130–400)
POTASSIUM SERPL-MCNC: 4.4 MMOL/L — SIGNIFICANT CHANGE UP (ref 3.5–5)
POTASSIUM SERPL-SCNC: 4.4 MMOL/L — SIGNIFICANT CHANGE UP (ref 3.5–5)
PROCALCITONIN SERPL-MCNC: 0.06 NG/ML — SIGNIFICANT CHANGE UP (ref 0.02–0.1)
RBC # BLD: 5 M/UL — SIGNIFICANT CHANGE UP (ref 4.7–6.1)
RBC # FLD: 14.6 % — HIGH (ref 11.5–14.5)
SODIUM SERPL-SCNC: 140 MMOL/L — SIGNIFICANT CHANGE UP (ref 135–146)
SPECIMEN SOURCE: SIGNIFICANT CHANGE UP
WBC # BLD: 7.89 K/UL — SIGNIFICANT CHANGE UP (ref 4.8–10.8)
WBC # FLD AUTO: 7.89 K/UL — SIGNIFICANT CHANGE UP (ref 4.8–10.8)

## 2021-04-21 PROCEDURE — 71045 X-RAY EXAM CHEST 1 VIEW: CPT | Mod: 26

## 2021-04-21 RX ORDER — BUDESONIDE AND FORMOTEROL FUMARATE DIHYDRATE 160; 4.5 UG/1; UG/1
2 AEROSOL RESPIRATORY (INHALATION)
Refills: 0 | Status: DISCONTINUED | OUTPATIENT
Start: 2021-04-21 | End: 2021-04-22

## 2021-04-21 RX ORDER — OXYCODONE HYDROCHLORIDE 5 MG/1
5 TABLET ORAL ONCE
Refills: 0 | Status: DISCONTINUED | OUTPATIENT
Start: 2021-04-21 | End: 2021-04-21

## 2021-04-21 RX ORDER — LIDOCAINE 4 G/100G
1 CREAM TOPICAL DAILY
Refills: 0 | Status: DISCONTINUED | OUTPATIENT
Start: 2021-04-21 | End: 2021-04-22

## 2021-04-21 RX ADMIN — Medication 20 MILLIGRAM(S): at 05:07

## 2021-04-21 RX ADMIN — ALBUTEROL 2.5 MILLIGRAM(S): 90 AEROSOL, METERED ORAL at 08:37

## 2021-04-21 RX ADMIN — SIMVASTATIN 20 MILLIGRAM(S): 20 TABLET, FILM COATED ORAL at 21:23

## 2021-04-21 RX ADMIN — SENNA PLUS 2 TABLET(S): 8.6 TABLET ORAL at 21:23

## 2021-04-21 RX ADMIN — MORPHINE SULFATE 60 MILLIGRAM(S): 50 CAPSULE, EXTENDED RELEASE ORAL at 06:06

## 2021-04-21 RX ADMIN — CHLORHEXIDINE GLUCONATE 1 APPLICATION(S): 213 SOLUTION TOPICAL at 05:07

## 2021-04-21 RX ADMIN — Medication 40 MILLIGRAM(S): at 05:08

## 2021-04-21 RX ADMIN — ALBUTEROL 2.5 MILLIGRAM(S): 90 AEROSOL, METERED ORAL at 19:50

## 2021-04-21 RX ADMIN — OXYCODONE AND ACETAMINOPHEN 2 TABLET(S): 5; 325 TABLET ORAL at 20:55

## 2021-04-21 RX ADMIN — ALBUTEROL 2.5 MILLIGRAM(S): 90 AEROSOL, METERED ORAL at 23:11

## 2021-04-21 RX ADMIN — LIDOCAINE 1 PATCH: 4 CREAM TOPICAL at 16:58

## 2021-04-21 RX ADMIN — Medication 1 MILLIGRAM(S): at 13:24

## 2021-04-21 RX ADMIN — Medication 145 MILLIGRAM(S): at 12:17

## 2021-04-21 RX ADMIN — Medication 40 MILLIGRAM(S): at 17:05

## 2021-04-21 RX ADMIN — TAMSULOSIN HYDROCHLORIDE 0.4 MILLIGRAM(S): 0.4 CAPSULE ORAL at 21:23

## 2021-04-21 RX ADMIN — Medication 1 MILLIGRAM(S): at 21:23

## 2021-04-21 RX ADMIN — GABAPENTIN 300 MILLIGRAM(S): 400 CAPSULE ORAL at 05:07

## 2021-04-21 RX ADMIN — ALBUTEROL 2.5 MILLIGRAM(S): 90 AEROSOL, METERED ORAL at 15:13

## 2021-04-21 RX ADMIN — GABAPENTIN 300 MILLIGRAM(S): 400 CAPSULE ORAL at 17:04

## 2021-04-21 RX ADMIN — Medication 1 MILLIGRAM(S): at 05:11

## 2021-04-21 RX ADMIN — LIDOCAINE 1 PATCH: 4 CREAM TOPICAL at 20:08

## 2021-04-21 RX ADMIN — MORPHINE SULFATE 60 MILLIGRAM(S): 50 CAPSULE, EXTENDED RELEASE ORAL at 18:52

## 2021-04-21 RX ADMIN — LEVETIRACETAM 500 MILLIGRAM(S): 250 TABLET, FILM COATED ORAL at 17:03

## 2021-04-21 RX ADMIN — OXYCODONE AND ACETAMINOPHEN 2 TABLET(S): 5; 325 TABLET ORAL at 09:49

## 2021-04-21 RX ADMIN — DULOXETINE HYDROCHLORIDE 60 MILLIGRAM(S): 30 CAPSULE, DELAYED RELEASE ORAL at 12:17

## 2021-04-21 RX ADMIN — OXYCODONE HYDROCHLORIDE 5 MILLIGRAM(S): 5 TABLET ORAL at 16:05

## 2021-04-21 RX ADMIN — MORPHINE SULFATE 60 MILLIGRAM(S): 50 CAPSULE, EXTENDED RELEASE ORAL at 17:07

## 2021-04-21 RX ADMIN — OXYCODONE AND ACETAMINOPHEN 2 TABLET(S): 5; 325 TABLET ORAL at 09:19

## 2021-04-21 RX ADMIN — LOSARTAN POTASSIUM 100 MILLIGRAM(S): 100 TABLET, FILM COATED ORAL at 05:07

## 2021-04-21 RX ADMIN — PANTOPRAZOLE SODIUM 40 MILLIGRAM(S): 20 TABLET, DELAYED RELEASE ORAL at 06:06

## 2021-04-21 RX ADMIN — LEVETIRACETAM 500 MILLIGRAM(S): 250 TABLET, FILM COATED ORAL at 05:07

## 2021-04-21 RX ADMIN — MORPHINE SULFATE 60 MILLIGRAM(S): 50 CAPSULE, EXTENDED RELEASE ORAL at 05:12

## 2021-04-21 RX ADMIN — OXYCODONE AND ACETAMINOPHEN 2 TABLET(S): 5; 325 TABLET ORAL at 20:07

## 2021-04-21 RX ADMIN — ENOXAPARIN SODIUM 40 MILLIGRAM(S): 100 INJECTION SUBCUTANEOUS at 12:17

## 2021-04-21 RX ADMIN — OXYCODONE HYDROCHLORIDE 5 MILLIGRAM(S): 5 TABLET ORAL at 15:36

## 2021-04-21 NOTE — PROGRESS NOTE ADULT - SUBJECTIVE AND OBJECTIVE BOX
Chart reviewed, patient examined. Pertinent results reviewed.  Case discussed with HO; specialist f/u reviewed  HD#4; Pt is improved    Patient is a 65y old  Male who presents with a chief complaint of SOB, chest pain (18 Apr 2021 03:07)      HPI:  64y/o M with PMH of HTN, HLD, DM, chronic back pain, hx of DVT now off eliquis (pt doesn't know what provoked, >3yrs ago) presents to ED with 2d of SOB, cough and midsternal CP. CP started after lifting heavy boxes, mid chest, constant, non-radiating, tightness/achy. Also assoc w/ fever, SOB, cough of 1d. Former smoker, has baseline leg swelling, no home O2. Otherwise no sick contacts, travel, palpitations, abd pain, N/V/D/C, chills, myalgias.     In ED: T100.7, HR74, /77, RR17, SpO2 87% on RA now on 4L NC.  CXR - mild changes at the bases;. Labs including D-dimer neg. COVID neg.  (18 Apr 2021 03:07)  Interval events; pt has < CP and SOB, & is  coughing up less white sputum; His chest CT is done & charted; he is able to slowly walk around w/o clinical STEPHENS      PAST MEDICAL & SURGICAL HISTORY:  Anxiety    HTN (hypertension)    Diabetes    High cholesterol    Asthma    Chronic low back pain with sciatica, sciatica laterality unspecified, unspecified back pain laterality    Uncomplicated opioid dependence    Syncopal episodes    History of back surgery  x 3  COPD / Bronchitis & has seen Pulmonary in the past-Dr Brizuela     (remote- DVT/PE with R:LE Thrombectomy in 9/18-on AC for a few years)        SOCIAL HX:   Smoking-  former smoker; taper then none x 1month;                        ETOH-  denies                     Other  denies    FAMILY HISTORY:  No pertinent family history in first degree relatives    .  No cardiovascular or pulmonary family history     REVIEW OF SYSTEMS:    All ROS are negative except per HPI       Allergies    aspirin (Stomach Upset)  Originally Entered as [ANGIOEDEMA] reaction to [pineapple] (Unknown)  penicillins (Other)  pineapples (Anaphylaxis)    Intolerances        PHYSICAL EXAM  Vital Signs Last 24 Hrs  T(C): 36.7 (21 Apr 2021 05:00), Max: 36.7 (21 Apr 2021 05:00)  T(F): 98.1 (21 Apr 2021 05:00), Max: 98.1 (21 Apr 2021 05:00)  HR: 63 (21 Apr 2021 05:00) (60 - 63)  BP: 125/61 (21 Apr 2021 05:00) (125/61 - 158/74)  BP(mean): --  RR: 18 (21 Apr 2021 05:00) (18 - 19)  SpO2: 95% (21 Apr 2021 09:15) (95% - 97%)  CONSTITUTIONAL:  Well nourished.  NAD; obese    ENT:   Airway patent, MMM    EYES: PEERLA; conj- pink    CARDIAC: + Min ant Ch wall TTP; RRR, no MRG   Min edema in ankles B/L    RESPIRATORY: L lung clear; ; + R base- EXR wheezing & rhonchi; easy breathing      GASTROINTESTINAL: Obese; Abdomen soft, non-tender,   No guarding, + BS      MUSCULOSKELETAL:   Range of motion is not limited,  No clubbing, cyanosis  Walks slowly w a cane/ stooped     NEUROLOGICAL:   Alert and oriented   No motor deficits.    SKIN:   Skin normal color for race,   No evidence of rash.          LABS:                           14.7   7.89  )-----------( 242      ( 21 Apr 2021 07:27 )             45.4                        15.7   10.00 )-----------( 185      ( 18 Apr 2021 00:04 )             48.3     04-21    140  |  100  |  23<H>  ----------------------------<  125<H>  4.4   |  28  |  0.8    Ca    9.2      21 Apr 2021 07:27  Mg     1.9     04-21    TPro  7.2  /  Alb  4.3  /  TBili  0.5  /  DBili  x   /  AST  7   /  ALT  7   /  AlkPhos  66  04-20                                                04-18    140  |  101  |  18  ----------------------------<  108<H>  3.9   |  24  |  0.9    Ca    9.7      18 Apr 2021 00:04    TPro  7.6  /  Alb  4.6  /  TBili  0.7  /  DBili  x   /  AST  12  /  ALT  10  /  AlkPhos  83  04-18      PT/INR - ( 18 Apr 2021 00:04 )   PT: 12.80 sec;   INR: 1.11 ratio         PTT - ( 18 Apr 2021 00:04 )  PTT:44.6 sec                                           CARDIAC MARKERS ( 18 Apr 2021 00:04 )  x     / <0.01 ng/mL / x     / x     / x                                                LIVER FUNCTIONS - ( 18 Apr 2021 00:04 )  Alb: 4.6 g/dL / Pro: 7.6 g/dL / ALK PHOS: 83 U/L / ALT: 10 U/L / AST: 12 U/L / GGT: x                                                                                                  X-Rays reviewed:    CXR interpreted by PULM: right lower lobe opacity/ effusion     AND RAD READ:    < from: Xray Chest 1 View-PORTABLE IMMEDIATE (04.17.21 @ 23:58) >    EXAM:  XR CHEST PORTABLE IMMED 1V            PROCEDURE DATE:  04/17/2021        INTERPRETATION:  Clinical History / Reason for exam: Shortness of breath, cough and fever    Comparison : Chest radiograph 10/9/2018.    Technique/Positioning: Frontal, portable.    Findings:    Support devices: None.    Cardiac/mediastinum/hilum: Tortuous aorta no cardiomegaly    Lung parenchyma/Pleura: Right basilar opacity. No effusion or pneumothorax.    Skeleton/soft tissues: Unremarkable    Impression:    Right basilar opacity without definite effusion or pneumothorax      SHIRLEY HERNANDEZ MD; Attending Radiologist  This document has been electronically signed. Apr 18 2021 10:49AM    < end of copied text >    < from: CT Chest No Cont (04.18.21 @ 14:11) >    EXAM:  CT CHEST            PROCEDURE DATE:  04/18/2021          INTERPRETATION:  CLINICAL INDICATION: Shortness of breath    TECHNIQUE:  A noncontrast CT of the thorax was performed. Sagittal and coronal reformats were performed as well as MIP reconstructions.    COMPARISON: Multiple prior chest CTs, the most recent dated 9/17/2018    INTERPRETATION:    AIRWAYS, LUNGS AND PLEURA: There are secretions within the trachea and central bronchi with associated thickening of the right mainstem bronchus. Occlusion proximal right lower and middle lobe airways with worsening consolidation/atelectasis of the right middle and lower lobes. There is peribronchial wall thickening and mucoid impaction elsewhere within the lungs. Stable peripheral left upper lobe micronodules. There is no pleural effusion. There is no pneumothorax.    MEDIASTINUM: There are stable mildly enlarged mediastinal lymph nodes.  There are no enlarged axillary lymph nodes.    HEART AND VESSELS: The heart is normal in size. There are aortic and coronary artery calcifications. The thoracic aorta has a normal caliber. There is no pericardial effusion.    UPPER ABDOMEN: Images of the upper abdomen demonstrate hepatic steatosis.    BONES AND SOFT TISSUES: There are degenerative changes of the spine.  The soft tissues are unremarkable.    TUBES AND LINES: None.    IMPRESSION:    Secretions in the central airways with unchanged occlusion of the proximal right lower and middle lobe airways.    Worsening consolidation/atelectasis of the right middle and lower lobes.        KELL KLINE MD; Attending Radiologist  This document has been electronically signed. Apr 19 2021  8:02AM    < end of copied text >

## 2021-04-21 NOTE — PROGRESS NOTE ADULT - SUBJECTIVE AND OBJECTIVE BOX
SANA JOE 65y Male  MRN#: [id number]   Hospital Day: 3d    SUBJECTIVE  Patient is a 65y old Male who presents with a chief complaint of SOB, chest pain (20 Apr 2021 18:35)  Currently admitted to medicine with the primary diagnosis of Hypoxia      INTERVAL HPI AND OVERNIGHT EVENTS:  Patient was examined and seen at bedside. This morning he is resting comfortably in bed.    REVIEW OF SYMPTOMS:  Denies any CP, sob at rest, palpitaitons, lightheadedness or dizziness, no dysuria or bowel complaints    OBJECTIVE  PAST MEDICAL & SURGICAL HISTORY  Anxiety    HTN (hypertension)    Diabetes    High cholesterol    Asthma    Chronic low back pain with sciatica, sciatica laterality unspecified, unspecified back pain laterality    Uncomplicated opioid dependence    Syncopal episodes    History of back surgery  x 3      ALLERGIES:  aspirin (Stomach Upset)  Originally Entered as [ANGIOEDEMA] reaction to [pineapple] (Unknown)  penicillins (Other)  pineapples (Anaphylaxis)    MEDICATIONS:  STANDING MEDICATIONS  ALBUTerol    0.083% 2.5 milliGRAM(s) Nebulizer every 4 hours  ALBUTerol   0.5% 2.5 milliGRAM(s) Nebulizer every 4 hours  ALPRAZolam 1 milliGRAM(s) Oral three times a day  budesonide 160 MICROgram(s)/formoterol 4.5 MICROgram(s) Inhaler 2 Puff(s) Inhalation two times a day  chlorhexidine 4% Liquid 1 Application(s) Topical <User Schedule>  DULoxetine 60 milliGRAM(s) Oral daily  enoxaparin Injectable 40 milliGRAM(s) SubCutaneous daily  fenofibrate Tablet 145 milliGRAM(s) Oral daily  furosemide    Tablet 20 milliGRAM(s) Oral daily  gabapentin 300 milliGRAM(s) Oral two times a day  levETIRAcetam 500 milliGRAM(s) Oral two times a day  levoFLOXacin IVPB      levoFLOXacin IVPB 750 milliGRAM(s) IV Intermittent every 24 hours  losartan 100 milliGRAM(s) Oral daily  methylPREDNISolone sodium succinate Injectable 40 milliGRAM(s) IV Push every 12 hours  morphine ER Tablet 60 milliGRAM(s) Oral every 12 hours  pantoprazole    Tablet 40 milliGRAM(s) Oral before breakfast  simvastatin 20 milliGRAM(s) Oral at bedtime  tamsulosin 0.4 milliGRAM(s) Oral at bedtime    PRN MEDICATIONS  ALBUTerol    90 MICROgram(s) HFA Inhaler 2 Puff(s) Inhalation every 6 hours PRN  lidocaine   Patch 1 Patch Transdermal daily PRN  oxycodone    5 mG/acetaminophen 325 mG 2 Tablet(s) Oral every 8 hours PRN  senna 2 Tablet(s) Oral at bedtime PRN      VITAL SIGNS: Last 24 Hours  T(C): 36.7 (21 Apr 2021 05:00), Max: 36.7 (21 Apr 2021 05:00)  T(F): 98.1 (21 Apr 2021 05:00), Max: 98.1 (21 Apr 2021 05:00)  HR: 63 (21 Apr 2021 05:00) (60 - 63)  BP: 125/61 (21 Apr 2021 05:00) (125/61 - 158/74)  BP(mean): --  RR: 18 (21 Apr 2021 05:00) (18 - 19)  SpO2: 95% (21 Apr 2021 09:15) (95% - 97%)    LABS:                        14.7   7.89  )-----------( 242      ( 21 Apr 2021 07:27 )             45.4     04-21    140  |  100  |  23<H>  ----------------------------<  125<H>  4.4   |  28  |  0.8    Ca    9.2      21 Apr 2021 07:27  Mg     1.9     04-21    TPro  7.2  /  Alb  4.3  /  TBili  0.5  /  DBili  x   /  AST  7   /  ALT  7   /  AlkPhos  66  04-20              Culture - Sputum (collected 19 Apr 2021 08:24)  Source: .Sputum Sputum  Gram Stain (19 Apr 2021 21:25):    Moderate polymorphonuclear leukocytes per low power field    No Squamous epithelial cells per low power field    Few Gram Positive Cocci in Clusters per oil power field    Few Gram Negative Rods per oil power field  Preliminary Report (20 Apr 2021 19:11):    Normal Respiratory Lori present          RADIOLOGY:      PHYSICAL EXAM:  CONSTITUTIONAL: No acute distress, well-developed, well-groomed, AAOx3  HEAD: Atraumatic, normocephalic  EYES: EOMI  ENT: Supple, No JVD  PULMONARY: Decreased Rt sided breath sounds, no wheezing. breathing non labored  CARDIOVASCULAR: Regular rate and rhythm  GASTROINTESTINAL: Soft, non-tender, non-distended  MUSCULOSKELETAL: Moves 4/4 extremities  NEUROLOGY: non-focal  SKIN: intact    ASSESSMENT & PLAN  64y/o M with PMH of HTN, HLD, DM, DVT, chronic back pain now off eliquis presents for SOB.     #SOB secondary to suspected CAP  #Suspected COPD  - D-dimer neg, BNP neg  - CT showed RML/RLL consolidation  - Decreased BS on Rt side  - LE Duplex -ve 4/18  - ECHO showed pHTN but no wall motion abnormalities. EF 66%  - Procal 0.6 x2  - S+S eval -ve for any dysphagia  - SpO2 stable at ~94% on RA at rest. Drops to 89-90% on ambulation with HR<100 (pace of ambulation limited by mobility d/t dependence on cane and chronic back pain)  - Pulmonary following--recs appreciated  - levaquin 750 qd for total of 7dd (today D3)  - transition to prednisone, already got solumedrol in AM so will continue and switch to prednisone 40 po tomorrow with plan for taper  - c/w NIV qhs  - Nebs q4  - Repeat imging as outpt    #Chest pain, atypical--resolved  - likely due to MSK strain with lifting boxes vs COPD 'tightness'  - trop x2 neg, EKG non-ischemic  - Likely MSK or pleuritic as exacerbated by coughing but brought on with lifting heavy objects    #DM- monitor FS, start basal-bolus >180  - beware pt on systemic steroids    #HTN- c/w losartan 100  #HLD- c/w simva 20 and fibrate  #BPH- c/w flomax  #Chronic back pain- c/w morphine 60 BID, percocet, gabapentin BID added (home med regimen confirmed with pharmacy)    #Anxiety  - c/w xanax 1 TID, duloxetine 60  - c/w home keppra regimen, likely d/t chronic back pain/neuro pain    #MISC  - DVT ppx: lovenox  - GI ppx: PPI  - Activity: As tolerated  - Diet: DASH, CC          PAST MEDICAL & SURGICAL HISTORY:  Anxiety    HTN (hypertension)    Diabetes    High cholesterol    Asthma    Chronic low back pain with sciatica, sciatica laterality unspecified, unspecified back pain laterality    Uncomplicated opioid dependence    Syncopal episodes    History of back surgery  x 3     SANA JOE 65y Male  MRN#: [id number]   Hospital Day: 3d    SUBJECTIVE  Patient is a 65y old Male who presents with a chief complaint of SOB, chest pain (20 Apr 2021 18:35)  Currently admitted to medicine with the primary diagnosis of Hypoxia      INTERVAL HPI AND OVERNIGHT EVENTS:  Patient was examined and seen at bedside. This morning he is resting comfortably in bed.    REVIEW OF SYMPTOMS:  Denies any CP, sob at rest, palpitaitons, lightheadedness or dizziness, no dysuria or bowel complaints    OBJECTIVE  PAST MEDICAL & SURGICAL HISTORY  Anxiety    HTN (hypertension)    Diabetes    High cholesterol    Asthma    Chronic low back pain with sciatica, sciatica laterality unspecified, unspecified back pain laterality    Uncomplicated opioid dependence    Syncopal episodes    History of back surgery  x 3      ALLERGIES:  aspirin (Stomach Upset)  Originally Entered as [ANGIOEDEMA] reaction to [pineapple] (Unknown)  penicillins (Other)  pineapples (Anaphylaxis)    MEDICATIONS:  STANDING MEDICATIONS  ALBUTerol    0.083% 2.5 milliGRAM(s) Nebulizer every 4 hours  ALBUTerol   0.5% 2.5 milliGRAM(s) Nebulizer every 4 hours  ALPRAZolam 1 milliGRAM(s) Oral three times a day  budesonide 160 MICROgram(s)/formoterol 4.5 MICROgram(s) Inhaler 2 Puff(s) Inhalation two times a day  chlorhexidine 4% Liquid 1 Application(s) Topical <User Schedule>  DULoxetine 60 milliGRAM(s) Oral daily  enoxaparin Injectable 40 milliGRAM(s) SubCutaneous daily  fenofibrate Tablet 145 milliGRAM(s) Oral daily  furosemide    Tablet 20 milliGRAM(s) Oral daily  gabapentin 300 milliGRAM(s) Oral two times a day  levETIRAcetam 500 milliGRAM(s) Oral two times a day  levoFLOXacin IVPB      levoFLOXacin IVPB 750 milliGRAM(s) IV Intermittent every 24 hours  losartan 100 milliGRAM(s) Oral daily  methylPREDNISolone sodium succinate Injectable 40 milliGRAM(s) IV Push every 12 hours  morphine ER Tablet 60 milliGRAM(s) Oral every 12 hours  pantoprazole    Tablet 40 milliGRAM(s) Oral before breakfast  simvastatin 20 milliGRAM(s) Oral at bedtime  tamsulosin 0.4 milliGRAM(s) Oral at bedtime    PRN MEDICATIONS  ALBUTerol    90 MICROgram(s) HFA Inhaler 2 Puff(s) Inhalation every 6 hours PRN  lidocaine   Patch 1 Patch Transdermal daily PRN  oxycodone    5 mG/acetaminophen 325 mG 2 Tablet(s) Oral every 8 hours PRN  senna 2 Tablet(s) Oral at bedtime PRN      VITAL SIGNS: Last 24 Hours  T(C): 36.7 (21 Apr 2021 05:00), Max: 36.7 (21 Apr 2021 05:00)  T(F): 98.1 (21 Apr 2021 05:00), Max: 98.1 (21 Apr 2021 05:00)  HR: 63 (21 Apr 2021 05:00) (60 - 63)  BP: 125/61 (21 Apr 2021 05:00) (125/61 - 158/74)  BP(mean): --  RR: 18 (21 Apr 2021 05:00) (18 - 19)  SpO2: 95% (21 Apr 2021 09:15) (95% - 97%)    LABS:                        14.7   7.89  )-----------( 242      ( 21 Apr 2021 07:27 )             45.4     04-21    140  |  100  |  23<H>  ----------------------------<  125<H>  4.4   |  28  |  0.8    Ca    9.2      21 Apr 2021 07:27  Mg     1.9     04-21    TPro  7.2  /  Alb  4.3  /  TBili  0.5  /  DBili  x   /  AST  7   /  ALT  7   /  AlkPhos  66  04-20              Culture - Sputum (collected 19 Apr 2021 08:24)  Source: .Sputum Sputum  Gram Stain (19 Apr 2021 21:25):    Moderate polymorphonuclear leukocytes per low power field    No Squamous epithelial cells per low power field    Few Gram Positive Cocci in Clusters per oil power field    Few Gram Negative Rods per oil power field  Preliminary Report (20 Apr 2021 19:11):    Normal Respiratory Lori present          RADIOLOGY:      PHYSICAL EXAM:  CONSTITUTIONAL: No acute distress, well-developed, well-groomed, AAOx3  HEAD: Atraumatic, normocephalic  EYES: EOMI  ENT: Supple, No JVD  PULMONARY: Decreased Rt sided breath sounds, no wheezing. breathing non labored  CARDIOVASCULAR: Regular rate and rhythm  GASTROINTESTINAL: Soft, non-tender, non-distended  MUSCULOSKELETAL: Moves 4/4 extremities  NEUROLOGY: non-focal  SKIN: intact    ASSESSMENT & PLAN  66y/o M with PMH of HTN, HLD, DM, DVT, chronic back pain now off eliquis presents for SOB.     #SOB secondary to suspected CAP  #Suspected COPD  - D-dimer neg, BNP neg  - CT showed RML/RLL consolidation  - Decreased BS on Rt side  - LE Duplex -ve 4/18  - ECHO showed pHTN but no wall motion abnormalities. EF 66%  - Procal 0.6 x2  - S+S eval -ve for any dysphagia  - SpO2 stable at ~94% on RA at rest. Drops to 89-90% on ambulation with HR<100 (pace of ambulation limited by mobility d/t dependence on cane and chronic back pain)  - Pulmonary following--recs appreciated  - levaquin 750 qd for total of 7dd (today D3)  - transition to prednisone, already got solumedrol in AM so will continue and switch to prednisone 40 po tomorrow with plan for taper  - c/w NIV qhs  - Nebs q4  - Symbicort q12 added  - Repeat imging as outpt    #Chest pain, atypical--resolved  - likely due to MSK strain with lifting boxes vs COPD 'tightness'  - trop x2 neg, EKG non-ischemic  - Likely MSK or pleuritic as exacerbated by coughing but brought on with lifting heavy objects    #DM- monitor FS, start basal-bolus >180  - beware pt on systemic steroids    #HTN- c/w losartan 100  #HLD- c/w simva 20 and fibrate  #BPH- c/w flomax  #Chronic back pain- c/w morphine 60 BID, percocet, gabapentin BID added (home med regimen confirmed with pharmacy)    #Anxiety  - c/w xanax 1 TID, duloxetine 60  - c/w home keppra regimen, likely d/t chronic back pain/neuro pain    #MISC  - DVT ppx: lovenox  - GI ppx: PPI  - Activity: As tolerated  - Diet: DASH, CC          PAST MEDICAL & SURGICAL HISTORY:  Anxiety    HTN (hypertension)    Diabetes    High cholesterol    Asthma    Chronic low back pain with sciatica, sciatica laterality unspecified, unspecified back pain laterality    Uncomplicated opioid dependence    Syncopal episodes    History of back surgery  x 3     SANA JOE 65y Male  MRN#: [id number]   Hospital Day: 3d    SUBJECTIVE  Patient is a 65y old Male who presents with a chief complaint of SOB, chest pain (20 Apr 2021 18:35)  Currently admitted to medicine with the primary diagnosis of Hypoxia      INTERVAL HPI AND OVERNIGHT EVENTS:  Patient was examined and seen at bedside. This morning he is resting comfortably in bed.    REVIEW OF SYMPTOMS:  Denies any CP, sob at rest, palpitaitons, lightheadedness or dizziness, no dysuria or bowel complaints    OBJECTIVE  PAST MEDICAL & SURGICAL HISTORY  Anxiety    HTN (hypertension)    Diabetes    High cholesterol    Asthma    Chronic low back pain with sciatica, sciatica laterality unspecified, unspecified back pain laterality    Uncomplicated opioid dependence    Syncopal episodes    History of back surgery  x 3      ALLERGIES:  aspirin (Stomach Upset)  Originally Entered as [ANGIOEDEMA] reaction to [pineapple] (Unknown)  penicillins (Other)  pineapples (Anaphylaxis)    MEDICATIONS:  STANDING MEDICATIONS  ALBUTerol    0.083% 2.5 milliGRAM(s) Nebulizer every 4 hours  ALBUTerol   0.5% 2.5 milliGRAM(s) Nebulizer every 4 hours  ALPRAZolam 1 milliGRAM(s) Oral three times a day  budesonide 160 MICROgram(s)/formoterol 4.5 MICROgram(s) Inhaler 2 Puff(s) Inhalation two times a day  chlorhexidine 4% Liquid 1 Application(s) Topical <User Schedule>  DULoxetine 60 milliGRAM(s) Oral daily  enoxaparin Injectable 40 milliGRAM(s) SubCutaneous daily  fenofibrate Tablet 145 milliGRAM(s) Oral daily  furosemide    Tablet 20 milliGRAM(s) Oral daily  gabapentin 300 milliGRAM(s) Oral two times a day  levETIRAcetam 500 milliGRAM(s) Oral two times a day  levoFLOXacin IVPB      levoFLOXacin IVPB 750 milliGRAM(s) IV Intermittent every 24 hours  losartan 100 milliGRAM(s) Oral daily  methylPREDNISolone sodium succinate Injectable 40 milliGRAM(s) IV Push every 12 hours  morphine ER Tablet 60 milliGRAM(s) Oral every 12 hours  pantoprazole    Tablet 40 milliGRAM(s) Oral before breakfast  simvastatin 20 milliGRAM(s) Oral at bedtime  tamsulosin 0.4 milliGRAM(s) Oral at bedtime    PRN MEDICATIONS  ALBUTerol    90 MICROgram(s) HFA Inhaler 2 Puff(s) Inhalation every 6 hours PRN  lidocaine   Patch 1 Patch Transdermal daily PRN  oxycodone    5 mG/acetaminophen 325 mG 2 Tablet(s) Oral every 8 hours PRN  senna 2 Tablet(s) Oral at bedtime PRN      VITAL SIGNS: Last 24 Hours  T(C): 36.7 (21 Apr 2021 05:00), Max: 36.7 (21 Apr 2021 05:00)  T(F): 98.1 (21 Apr 2021 05:00), Max: 98.1 (21 Apr 2021 05:00)  HR: 63 (21 Apr 2021 05:00) (60 - 63)  BP: 125/61 (21 Apr 2021 05:00) (125/61 - 158/74)  BP(mean): --  RR: 18 (21 Apr 2021 05:00) (18 - 19)  SpO2: 95% (21 Apr 2021 09:15) (95% - 97%)    LABS:                        14.7   7.89  )-----------( 242      ( 21 Apr 2021 07:27 )             45.4     04-21    140  |  100  |  23<H>  ----------------------------<  125<H>  4.4   |  28  |  0.8    Ca    9.2      21 Apr 2021 07:27  Mg     1.9     04-21    TPro  7.2  /  Alb  4.3  /  TBili  0.5  /  DBili  x   /  AST  7   /  ALT  7   /  AlkPhos  66  04-20              Culture - Sputum (collected 19 Apr 2021 08:24)  Source: .Sputum Sputum  Gram Stain (19 Apr 2021 21:25):    Moderate polymorphonuclear leukocytes per low power field    No Squamous epithelial cells per low power field    Few Gram Positive Cocci in Clusters per oil power field    Few Gram Negative Rods per oil power field  Preliminary Report (20 Apr 2021 19:11):    Normal Respiratory Lori present          RADIOLOGY:  < from: Xray Chest 1 View-PORTABLE IMMEDIATE (04.17.21 @ 23:58) >  Comparison : Chest radiograph 10/9/2018.    Technique/Positioning: Frontal, portable.    Findings:    Support devices: None.    Cardiac/mediastinum/hilum: Tortuous aorta no cardiomegaly    Lung parenchyma/Pleura: Right basilar opacity. No effusion or pneumothorax.    Skeleton/soft tissues: Unremarkable    Impression:    Right basilar opacity without definite effusion or pneumothorax    < end of copied text >      PHYSICAL EXAM:  CONSTITUTIONAL: No acute distress, well-developed, well-groomed, AAOx3  HEAD: Atraumatic, normocephalic  EYES: EOMI  ENT: Supple, No JVD  PULMONARY: Decreased Rt sided breath sounds, no wheezing. breathing non labored  CARDIOVASCULAR: Regular rate and rhythm  GASTROINTESTINAL: Soft, non-tender, non-distended  MUSCULOSKELETAL: Moves 4/4 extremities  NEUROLOGY: non-focal  SKIN: intact    ASSESSMENT & PLAN  64y/o M with PMH of HTN, HLD, DM, DVT, chronic back pain now off eliquis presents for SOB.     #SOB secondary to suspected CAP  #Suspected COPD  - D-dimer neg, BNP neg  - CT showed RML/RLL consolidation  - Decreased BS on Rt side  - LE Duplex -ve 4/18  - ECHO showed pHTN but no wall motion abnormalities. EF 66%  - Procal 0.6 x2  - S+S eval -ve for any dysphagia  - SpO2 stable at ~94% on RA at rest. Drops to 89-90% on ambulation with HR<100 (pace of ambulation limited by mobility d/t dependence on cane and chronic back pain)  - Pulmonary following--recs appreciated  - levaquin 750 qd for total of 7dd (today D3)  - transition to prednisone, already got solumedrol in AM so will continue and switch to prednisone 40 po tomorrow with plan for taper  - c/w NIV qhs  - Nebs q4  - Symbicort q12 added  - Repeat imging as outpt    #Chest pain, atypical--resolved  - likely due to MSK strain with lifting boxes vs COPD 'tightness'  - trop x2 neg, EKG non-ischemic  - Likely MSK or pleuritic as exacerbated by coughing but brought on with lifting heavy objects    #DM- monitor FS, start basal-bolus >180  - beware pt on systemic steroids    #HTN- c/w losartan 100  #HLD- c/w simva 20 and fibrate  #BPH- c/w flomax  #Chronic back pain- c/w morphine 60 BID, percocet, gabapentin BID added (home med regimen confirmed with pharmacy)    #Anxiety  - c/w xanax 1 TID, duloxetine 60  - c/w home keppra regimen, likely d/t chronic back pain/neuro pain    #MISC  - DVT ppx: lovenox  - GI ppx: PPI  - Activity: As tolerated  - Diet: DASH, CC          PAST MEDICAL & SURGICAL HISTORY:  Anxiety    HTN (hypertension)    Diabetes    High cholesterol    Asthma    Chronic low back pain with sciatica, sciatica laterality unspecified, unspecified back pain laterality    Uncomplicated opioid dependence    Syncopal episodes    History of back surgery  x 3

## 2021-04-22 ENCOUNTER — TRANSCRIPTION ENCOUNTER (OUTPATIENT)
Age: 66
End: 2021-04-22

## 2021-04-22 VITALS — OXYGEN SATURATION: 94 %

## 2021-04-22 DIAGNOSIS — J18.9 PNEUMONIA, UNSPECIFIED ORGANISM: ICD-10-CM

## 2021-04-22 DIAGNOSIS — R50.9 FEVER, UNSPECIFIED: ICD-10-CM

## 2021-04-22 LAB
ANION GAP SERPL CALC-SCNC: 9 MMOL/L — SIGNIFICANT CHANGE UP (ref 7–14)
BASOPHILS # BLD AUTO: 0.03 K/UL — SIGNIFICANT CHANGE UP (ref 0–0.2)
BASOPHILS NFR BLD AUTO: 0.3 % — SIGNIFICANT CHANGE UP (ref 0–1)
BUN SERPL-MCNC: 23 MG/DL — HIGH (ref 10–20)
CALCIUM SERPL-MCNC: 9.5 MG/DL — SIGNIFICANT CHANGE UP (ref 8.5–10.1)
CHLORIDE SERPL-SCNC: 101 MMOL/L — SIGNIFICANT CHANGE UP (ref 98–110)
CO2 SERPL-SCNC: 32 MMOL/L — SIGNIFICANT CHANGE UP (ref 17–32)
CREAT SERPL-MCNC: 0.9 MG/DL — SIGNIFICANT CHANGE UP (ref 0.7–1.5)
EOSINOPHIL # BLD AUTO: 0.01 K/UL — SIGNIFICANT CHANGE UP (ref 0–0.7)
EOSINOPHIL NFR BLD AUTO: 0.1 % — SIGNIFICANT CHANGE UP (ref 0–8)
GLUCOSE BLDC GLUCOMTR-MCNC: 123 MG/DL — HIGH (ref 70–99)
GLUCOSE BLDC GLUCOMTR-MCNC: 151 MG/DL — HIGH (ref 70–99)
GLUCOSE BLDC GLUCOMTR-MCNC: 194 MG/DL — HIGH (ref 70–99)
GLUCOSE SERPL-MCNC: 113 MG/DL — HIGH (ref 70–99)
HCT VFR BLD CALC: 44.6 % — SIGNIFICANT CHANGE UP (ref 42–52)
HGB BLD-MCNC: 14.3 G/DL — SIGNIFICANT CHANGE UP (ref 14–18)
IMM GRANULOCYTES NFR BLD AUTO: 1.1 % — HIGH (ref 0.1–0.3)
LYMPHOCYTES # BLD AUTO: 2.68 K/UL — SIGNIFICANT CHANGE UP (ref 1.2–3.4)
LYMPHOCYTES # BLD AUTO: 30.6 % — SIGNIFICANT CHANGE UP (ref 20.5–51.1)
MCHC RBC-ENTMCNC: 29.3 PG — SIGNIFICANT CHANGE UP (ref 27–31)
MCHC RBC-ENTMCNC: 32.1 G/DL — SIGNIFICANT CHANGE UP (ref 32–37)
MCV RBC AUTO: 91.4 FL — SIGNIFICANT CHANGE UP (ref 80–94)
MONOCYTES # BLD AUTO: 0.71 K/UL — HIGH (ref 0.1–0.6)
MONOCYTES NFR BLD AUTO: 8.1 % — SIGNIFICANT CHANGE UP (ref 1.7–9.3)
NEUTROPHILS # BLD AUTO: 5.22 K/UL — SIGNIFICANT CHANGE UP (ref 1.4–6.5)
NEUTROPHILS NFR BLD AUTO: 59.8 % — SIGNIFICANT CHANGE UP (ref 42.2–75.2)
NRBC # BLD: 0 /100 WBCS — SIGNIFICANT CHANGE UP (ref 0–0)
PLATELET # BLD AUTO: 249 K/UL — SIGNIFICANT CHANGE UP (ref 130–400)
POTASSIUM SERPL-MCNC: 5.1 MMOL/L — HIGH (ref 3.5–5)
POTASSIUM SERPL-SCNC: 5.1 MMOL/L — HIGH (ref 3.5–5)
PROCALCITONIN SERPL-MCNC: 0.03 NG/ML — SIGNIFICANT CHANGE UP (ref 0.02–0.1)
RBC # BLD: 4.88 M/UL — SIGNIFICANT CHANGE UP (ref 4.7–6.1)
RBC # FLD: 14.5 % — SIGNIFICANT CHANGE UP (ref 11.5–14.5)
SODIUM SERPL-SCNC: 142 MMOL/L — SIGNIFICANT CHANGE UP (ref 135–146)
WBC # BLD: 8.75 K/UL — SIGNIFICANT CHANGE UP (ref 4.8–10.8)
WBC # FLD AUTO: 8.75 K/UL — SIGNIFICANT CHANGE UP (ref 4.8–10.8)

## 2021-04-22 RX ORDER — GABAPENTIN 400 MG/1
1 CAPSULE ORAL
Qty: 0 | Refills: 0 | DISCHARGE
Start: 2021-04-22

## 2021-04-22 RX ORDER — BUDESONIDE AND FORMOTEROL FUMARATE DIHYDRATE 160; 4.5 UG/1; UG/1
1 AEROSOL RESPIRATORY (INHALATION)
Qty: 1 | Refills: 0
Start: 2021-04-22

## 2021-04-22 RX ADMIN — PANTOPRAZOLE SODIUM 40 MILLIGRAM(S): 20 TABLET, DELAYED RELEASE ORAL at 06:11

## 2021-04-22 RX ADMIN — Medication 1 MILLIGRAM(S): at 13:21

## 2021-04-22 RX ADMIN — Medication 20 MILLIGRAM(S): at 05:39

## 2021-04-22 RX ADMIN — ALBUTEROL 2.5 MILLIGRAM(S): 90 AEROSOL, METERED ORAL at 08:35

## 2021-04-22 RX ADMIN — Medication 40 MILLIGRAM(S): at 05:41

## 2021-04-22 RX ADMIN — LIDOCAINE 1 PATCH: 4 CREAM TOPICAL at 04:13

## 2021-04-22 RX ADMIN — LOSARTAN POTASSIUM 100 MILLIGRAM(S): 100 TABLET, FILM COATED ORAL at 05:39

## 2021-04-22 RX ADMIN — LEVETIRACETAM 500 MILLIGRAM(S): 250 TABLET, FILM COATED ORAL at 17:51

## 2021-04-22 RX ADMIN — Medication 145 MILLIGRAM(S): at 12:31

## 2021-04-22 RX ADMIN — OXYCODONE AND ACETAMINOPHEN 2 TABLET(S): 5; 325 TABLET ORAL at 12:29

## 2021-04-22 RX ADMIN — GABAPENTIN 300 MILLIGRAM(S): 400 CAPSULE ORAL at 17:51

## 2021-04-22 RX ADMIN — DULOXETINE HYDROCHLORIDE 60 MILLIGRAM(S): 30 CAPSULE, DELAYED RELEASE ORAL at 12:31

## 2021-04-22 RX ADMIN — OXYCODONE AND ACETAMINOPHEN 2 TABLET(S): 5; 325 TABLET ORAL at 12:59

## 2021-04-22 RX ADMIN — LEVETIRACETAM 500 MILLIGRAM(S): 250 TABLET, FILM COATED ORAL at 05:39

## 2021-04-22 RX ADMIN — ENOXAPARIN SODIUM 40 MILLIGRAM(S): 100 INJECTION SUBCUTANEOUS at 12:32

## 2021-04-22 RX ADMIN — OXYCODONE AND ACETAMINOPHEN 2 TABLET(S): 5; 325 TABLET ORAL at 03:19

## 2021-04-22 RX ADMIN — MORPHINE SULFATE 60 MILLIGRAM(S): 50 CAPSULE, EXTENDED RELEASE ORAL at 06:10

## 2021-04-22 RX ADMIN — MORPHINE SULFATE 60 MILLIGRAM(S): 50 CAPSULE, EXTENDED RELEASE ORAL at 05:39

## 2021-04-22 RX ADMIN — MORPHINE SULFATE 60 MILLIGRAM(S): 50 CAPSULE, EXTENDED RELEASE ORAL at 17:55

## 2021-04-22 RX ADMIN — ALBUTEROL 2.5 MILLIGRAM(S): 90 AEROSOL, METERED ORAL at 12:11

## 2021-04-22 RX ADMIN — BUDESONIDE AND FORMOTEROL FUMARATE DIHYDRATE 2 PUFF(S): 160; 4.5 AEROSOL RESPIRATORY (INHALATION) at 08:47

## 2021-04-22 RX ADMIN — LIDOCAINE 1 PATCH: 4 CREAM TOPICAL at 12:32

## 2021-04-22 RX ADMIN — GABAPENTIN 300 MILLIGRAM(S): 400 CAPSULE ORAL at 05:39

## 2021-04-22 RX ADMIN — SENNA PLUS 2 TABLET(S): 8.6 TABLET ORAL at 12:54

## 2021-04-22 RX ADMIN — OXYCODONE AND ACETAMINOPHEN 2 TABLET(S): 5; 325 TABLET ORAL at 04:14

## 2021-04-22 RX ADMIN — Medication 1 MILLIGRAM(S): at 05:39

## 2021-04-22 NOTE — DISCHARGE NOTE PROVIDER - NSDCMRMEDTOKEN_GEN_ALL_CORE_FT
ALPRAZolam 1 mg oral tablet: 1 tab(s) orally 3 times a day  DULoxetine 60 mg oral delayed release capsule: 1 cap(s) orally once a day  fenofibrate 160 mg oral tablet: 1 tab(s) orally once a day  furosemide 20 mg oral tablet: 1 tab(s) orally once a day  levETIRAcetam 500 mg oral tablet, extended release: 2 tab(s) orally once a day  losartan 100 mg oral tablet: 1 tab(s) orally once a day  metFORMIN 500 mg oral tablet: 1 tab(s) orally 2 times a day  morphine 60 mg/12 hours oral tablet, extended release: 1 tab(s) orally every 12 hours  Percocet 10/325: 1 tab orally 3 times a day, As Needed  simvastatin 20 mg oral tablet: 1 tab(s) orally once a day (at bedtime)  tamsulosin 0.4 mg oral capsule: 1 cap(s) orally once a day  Ventolin HFA 90 mcg/inh inhalation aerosol:    ALPRAZolam 1 mg oral tablet: 1 tab(s) orally 3 times a day  DULoxetine 60 mg oral delayed release capsule: 1 cap(s) orally once a day  fenofibrate 160 mg oral tablet: 1 tab(s) orally once a day  furosemide 20 mg oral tablet: 1 tab(s) orally once a day  gabapentin 300 mg oral capsule: 1 cap(s) orally 2 times a day  levETIRAcetam 500 mg oral tablet, extended release: 2 tab(s) orally once a day  losartan 100 mg oral tablet: 1 tab(s) orally once a day  metFORMIN 500 mg oral tablet: 1 tab(s) orally 2 times a day  morphine 60 mg/12 hours oral tablet, extended release: 1 tab(s) orally every 12 hours  Percocet 10/325: 1 tab orally 3 times a day, As Needed  simvastatin 20 mg oral tablet: 1 tab(s) orally once a day (at bedtime)  tamsulosin 0.4 mg oral capsule: 1 cap(s) orally once a day  Ventolin HFA 90 mcg/inh inhalation aerosol:    ALPRAZolam 1 mg oral tablet: 1 tab(s) orally 3 times a day  budesonide-formoterol 160 mcg-4.5 mcg/inh inhalation aerosol: 1 puff(s) inhaled 2 times a day   DULoxetine 60 mg oral delayed release capsule: 1 cap(s) orally once a day  fenofibrate 160 mg oral tablet: 1 tab(s) orally once a day  furosemide 20 mg oral tablet: 1 tab(s) orally once a day  gabapentin 300 mg oral capsule: 1 cap(s) orally 2 times a day  levETIRAcetam 500 mg oral tablet, extended release: 2 tab(s) orally once a day  levoFLOXacin 750 mg oral tablet: 1 tab(s) orally every 24 hours  losartan 100 mg oral tablet: 1 tab(s) orally once a day  metFORMIN 500 mg oral tablet: 1 tab(s) orally 2 times a day  morphine 60 mg/12 hours oral tablet, extended release: 1 tab(s) orally every 12 hours  Percocet 10/325: 1 tab orally 3 times a day, As Needed  predniSONE 10 mg oral tablet: 4 tab(s) orally once a day x 2 days  3 tab(s) orally once a day x 2 days  2 tab(s) orally once a day x 2 days  1 tab(s) orally once a day x 2 days  simvastatin 20 mg oral tablet: 1 tab(s) orally once a day (at bedtime)  tamsulosin 0.4 mg oral capsule: 1 cap(s) orally once a day  Ventolin HFA 90 mcg/inh inhalation aerosol:

## 2021-04-22 NOTE — DISCHARGE NOTE PROVIDER - NSDCCPCAREPLAN_GEN_ALL_CORE_FT
PRINCIPAL DISCHARGE DIAGNOSIS  Diagnosis: Pneumonia  Assessment and Plan of Treatment: Pneumonia is an infection of the lung. For this condition, we gave you antibiotics (levofloxacin) through an IV. We will send you a prescription to your pharmacy to continue this medication by mouth to complete the rest of the course. Make sure to take your medication as prescribed and to follow up with the pulmonologist and Dr. Adame      SECONDARY DISCHARGE DIAGNOSES  Diagnosis: Chronic obstructive pulmonary disease (COPD)  Assessment and Plan of Treatment: COPD is a chronic lung condition marked by decreased ability to get air out of the lungs. The pulmonologists who saw you would like you to follow up with them so that they can do further testing/imaging as needed

## 2021-04-22 NOTE — DISCHARGE NOTE PROVIDER - HOSPITAL COURSE
66y/o M with PMH of HTN, HLD, DM, chronic back pain, hx of DVT now off eliquis (pt doesn't know what provoked, >3yrs ago) presents to ED with 2d of SOB, cough and midsternal CP. CP started after lifting heavy boxes, mid chest, constant, non-radiating, tightness/achy. Also assoc w/ fever, SOB, cough of 1d. Former smoker, has baseline leg swelling, no home O2. Otherwise no sick contacts, travel, palpitations, abd pain, N/V/D/C, chills, myalgias.     In ED: T100.7, HR74, /77, RR17, SpO2 87% on RA now on 4L NC.  CXR b/l infiltrates. Labs including D-dimer neg. COVID neg.     Pt admitted to medicine for further management. While on floor, pt seen by medicine and pulmonary teams.  Pt underwent CT showing RML/RLL opacity and pt was treated for CAP with IV ABX with transition to oral for dc home  Seen by pulmonary who suspect underlying COPD exacerbation and recommended nebs, steroids, and trial of NIV.  Oxygen was successfully weaned down and pt eventually stable on room air.

## 2021-04-22 NOTE — PROGRESS NOTE ADULT - PROVIDER SPECIALTY LIST ADULT
Pulmonology
Pulmonology
Internal Medicine

## 2021-04-22 NOTE — PROGRESS NOTE ADULT - REASON FOR ADMISSION
SOB, chest pain

## 2021-04-22 NOTE — PROGRESS NOTE ADULT - SUBJECTIVE AND OBJECTIVE BOX
SANA JOE  65y  Male  HPI:  64y/o M with PMH of HTN, HLD, DM, chronic back pain, hx of DVT now off eliquis (pt doesn't know what provoked, >3yrs ago) presents to ED with 2d of SOB, cough and midsternal CP. CP started after lifting heavy boxes, mid chest, constant, non-radiating, tightness/achy. Also assoc w/ fever, SOB, cough of 1d. Former smoker, has baseline leg swelling, no home O2. Otherwise no sick contacts, travel, palpitations, abd pain, N/V/D/C, chills, myalgias.     In ED: T100.7, HR74, /77, RR17, SpO2 87% on RA now on 4L NC.  CXR b/l infiltrates. Labs including D-dimer neg. COVID neg.  (18 Apr 2021 03:07)    MEDICATIONS  (STANDING):  ALBUTerol    0.083% 2.5 milliGRAM(s) Nebulizer every 4 hours  ALBUTerol   0.5% 2.5 milliGRAM(s) Nebulizer every 4 hours  ALPRAZolam 1 milliGRAM(s) Oral three times a day  budesonide 160 MICROgram(s)/formoterol 4.5 MICROgram(s) Inhaler 2 Puff(s) Inhalation two times a day  chlorhexidine 4% Liquid 1 Application(s) Topical <User Schedule>  DULoxetine 60 milliGRAM(s) Oral daily  enoxaparin Injectable 40 milliGRAM(s) SubCutaneous daily  fenofibrate Tablet 145 milliGRAM(s) Oral daily  furosemide    Tablet 20 milliGRAM(s) Oral daily  gabapentin 300 milliGRAM(s) Oral two times a day  levETIRAcetam 500 milliGRAM(s) Oral two times a day  levoFLOXacin IVPB      levoFLOXacin IVPB 750 milliGRAM(s) IV Intermittent every 24 hours  losartan 100 milliGRAM(s) Oral daily  morphine ER Tablet 60 milliGRAM(s) Oral every 12 hours  pantoprazole    Tablet 40 milliGRAM(s) Oral before breakfast  predniSONE   Tablet 40 milliGRAM(s) Oral daily  simvastatin 20 milliGRAM(s) Oral at bedtime  tamsulosin 0.4 milliGRAM(s) Oral at bedtime    MEDICATIONS  (PRN):  ALBUTerol    90 MICROgram(s) HFA Inhaler 2 Puff(s) Inhalation every 6 hours PRN Shortness of Breath and/or Wheezing  lidocaine   Patch 1 Patch Transdermal daily PRN Back pain  oxycodone    5 mG/acetaminophen 325 mG 2 Tablet(s) Oral every 8 hours PRN Moderate Pain (4 - 6)  senna 2 Tablet(s) Oral at bedtime PRN Constipation    INTERVAL EVENTS: Patient seen today OOB in chair without distress. Feels better, walked in the [place] without O2 on ... POX 93%    T(C): 36.7 (04-22-21 @ 14:00), Max: 36.7 (04-22-21 @ 14:00)  HR: 63 (04-22-21 @ 14:00) (57 - 64)  BP: 132/73 (04-22-21 @ 14:00) (132/73 - 157/96)  RR: 18 (04-22-21 @ 14:00) (18 - 18)  SpO2: 94% (04-22-21 @ 14:27) (94% - 96%)  Wt(kg): --Vital Signs Last 24 Hrs  T(C): 36.7 (22 Apr 2021 14:00), Max: 36.7 (22 Apr 2021 14:00)  T(F): 98.1 (22 Apr 2021 14:00), Max: 98.1 (22 Apr 2021 14:00)  HR: 63 (22 Apr 2021 14:00) (57 - 64)  BP: 132/73 (22 Apr 2021 14:00) (132/73 - 157/96)  BP(mean): --  RR: 18 (22 Apr 2021 14:00) (18 - 18)  SpO2: 94% (22 Apr 2021 14:27) (94% - 96%)    PHYSICAL EXAM:  GENERAL: Pale, NAD  NECK: Supple, No JVD  CHEST/LUNG: Clear, no wheeze  HEART: S1, S2, Regular rate and rhythm  ABDOMEN: Soft, Nontender, Bowel sounds present  EXTREMITIES: No edema      LABS:                        14.3   8.75  )-----------( 249      ( 22 Apr 2021 06:45 )             44.6             04-22    142  |  101  |  23<H>  ----------------------------<  113<H>  5.1<H>   |  32  |  0.9    Ca    9.5      22 Apr 2021 06:45  Mg     1.9     04-21        RADIOLOGY & ADDITIONAL TESTS:  < from: Xray Chest 1 View- PORTABLE-Routine (Xray Chest 1 View- PORTABLE-Routine in AM.) (04.21.21 @ 08:35) >          INTERPRETATION:  Clinical History / Reason for exam: Congestive heart failure.    Comparison : Chest radiograph 4/17/2021.    Technique/Positioning: Portable frontal radiograph of the chest.    Findings:    Support devices: None.    Cardiac/mediastinum/hilum: Unchanged.    Lung parenchyma/Pleura: Decreased right basilar opacity. No pneumothorax.    Skeleton/soft tissues: Unchanged.    Impression:    Decreased right basilar opacity. No pneumothorax.          < end of copied text >

## 2021-04-22 NOTE — DISCHARGE NOTE PROVIDER - CARE PROVIDER_API CALL
Kyrie Landin)  Critical Care Medicine; Internal Medicine; Pulmonary Disease  [address] [place] [address], [facility] [address]  [place] [address]  Phone: [phone]  Fax: [phone]  Follow Up Time: 1 month    Shannan Rios  INTERNAL MEDICINE  [address] [place] [address]  Phone: [phone]  Fax: [phone]  Established Patient  Follow Up Time: 2 weeks

## 2021-04-22 NOTE — PROGRESS NOTE ADULT - ASSESSMENT
Impression  SOB, likely multifactorial, possibly underlying COPD or asthma/ JESSICA/ pulmonary hypertension  RLL PNA --see CT; need Pulm F/U  Possible Asthma/ COPD  Probable JESSICA  HO DVT/ PE in 09/2018, unprovoked, now off AC  + evidence of Chest Wall pain- ? strain &/or costochondritis    Recommendations  Procal  ECHO--BNP OK; will check if done as OP recently--last 2018-here; Reorder  FU VDUS--NEG  Duonebs q4h and PRN  Solumedrol 60 q8h for now  Pulm recommends Levaquin. chest CT- NC;  LE Duplex done- NEG  Target SpO2 92-96%, wean oxygen as tolerated  HOB at 45  Aspiration precautions  DVT ppx  OP pulm FU for PFT's, and sleep study  see attestation note       #Chest pain, atypical  - likely due to MSK strain with lifting boxes vs COPD 'tightness'  - trop x2 neg, EKG non-ischemic  -will consider ECHO    #DM- monitor FS, start basal-bolus >180    #HTN- c/w losartan 100  #HLD- c/w simva 20  #BPH- c/w flomax  #Chronic back pain- c/w morphine 60 BID, percocet    nxiety  - c/w xanax 1 TID, duloxetine 60  - unclear why on keppra? no hx of seizures, pt confirms he takes   - please f/u with Dr. Gardiner as to why he's on this med- will check OP chart  - OP chart says + SZ, but has no knowledge of Dx    #MISC  - DVT ppx: lovenox  - GI ppx: PPI  - Activity: As tolerated  - Diet: DASH, CC  - Dispo: acute      
  ASSESSMENT & PLAN  64y/o M with PMH of HTN, HLD, DM, DVT, chronic back pain now off eliquis presents for SOB.     Acute Hypoxic respiratory failure secondary to CAP, Asthma/COPD, Pulmonary HTN  - Os supplement  - continues on Levaquin, resp treatment and IV steroids  - LE Duplex negative  - Pulmonary follow up noted  -     Chest pain, atypical  - likely due to MSK strain with lifting boxes vs coughing  - trop x2 neg, EKG non-ischemic  - echo with normal LVF    DM  - monitor FS, start basal-bolus >180    HTN  - continue Losartan 100    HLD  - remains on Simvastatin 20 and fibrate    BPH  - continue Flomax    Chronic back pain  - Opioid dependence  - on Morphine 60 BID, percocet, gabapentin BID added (home med regimen confirmed with pharmacy)    Anxiety  - on Xanax 1 TID, Duloxetine      DVT ppx: Lovenox  GI ppx: PPI  Activity: As tolerated  Diet: DASH, CC      
  ASSESSMENT & PLAN  64y/o M with PMH of HTN, HLD, DM, DVT, chronic back pain now off eliquis presents for SOB.     Acute Hypoxic respiratory failure secondary to CAP, Asthma/COPD, Pulmonary HTN  - O2 supplement no longer needed  - continues on Levaquin, resp treatment   - IV steroids transitioned to PO Prednisone    Chest pain, atypical  - likely due to MSK strain with lifting boxes vs coughing  - trop x2 neg, EKG non-ischemic  - echo with normal LVF    DM  - monitor FS, start basal-bolus >180    HTN  - continue Losartan 100    HLD  - remains on Simvastatin 20 and fibrate    BPH  - continue Flomax    Chronic back pain  - Opioid dependence  - on Morphine 60 BID, percocet, gabapentin BID added (home med regimen confirmed with pharmacy)    Anxiety  - on Xanax 1 TID, Duloxetine    DVT ppx: Lovenox  GI ppx: PPI  Activity: As tolerated  Diet: DASH, CC    D/C home today, f/u with Dr Leblanc in 1 week  
Impression  SOB, likely multifactorial, possibly underlying COPD or asthma/ JESSICA/ pulmonary hypertension  RML/RLL PNA   Asthma/ COPD exacerbation  Probable JESSICA  HO DVT/ PE in 09/2018, unprovoked, now off AC    Recommendations    Continue levaquine 750    Continue solumedrol  Nebulizer Q 4 hrs and prn   Check procal   urine legionella and strep   2D echo   O2 to maintain spo2>92%  DVT prophylaxis  Trail of NIV at bedtime
Impression  SOB, likely multifactorial, possibly underlying COPD or asthma/ JESSICA/ pulmonary hypertension  RLL PNA   Possible Asthma/ COPD  Probable JESSICA  HO DVT/ PE in 09/2018, unprovoked, now off AC  + evidence of Chest Wall pain- ? strain &/or costochondritis    Recommendations  Procal  ECHO--BNP OK; will check if done as OP recently  FU VDUS  Duonebs q4h and PRN  Solumedrol 60 q8h for now  Pulm recommends Levaquin. chest CT- NC;  LE Duplex done- NEG  Target SpO2 92-96%, wean oxygen as tolerated  HOB at 45  Aspiration precautions  DVT ppx  OP pulm FU for PFT's, and sleep study  see attestation note       #Chest pain, atypical  - likely due to MSK strain with lifting boxes vs COPD 'tightness'  - trop x2 neg, EKG non-ischemic  -will consider ECHO    #DM- monitor FS, start basal-bolus >180    #HTN- c/w losartan 100  #HLD- c/w simva 20  #BPH- c/w flomax  #Chronic back pain- c/w morphine 60 BID, percocet    nxiety  - c/w xanax 1 TID, duloxetine 60  - unclear why on keppra? no hx of seizures, pt confirms he takes   - please f/u with Dr. Gardiner as to why he's on this med- will check OP chart    #MISC  - DVT ppx: lovenox  - GI ppx: PPI  - Activity: As tolerated  - Diet: DASH, CC  - Dispo: acute      
Impression  SOB, likely multifactorial, possibly underlying COPD or asthma/ JESSICA/ pulmonary hypertension  RLL PNA --see CT; need Pulm F/U  Possible Asthma/ COPD  Probable JESSICA  HO DVT/ PE in 09/2018, unprovoked, now off AC  + evidence of Chest Wall pain- ? strain &/or costochondritis- much improved    Recommendations  Procal  ECHO--BNP OK;  noted- c/w pulm HTN  FU VDUS--NEG  Duonebs q4h and PRN  Solumedrol 60 q8h for now  Pulm recommends Levaquin 750 mg- today D4/7  LE Duplex done- NEG  Target SpO2 92-96%, wean oxygen as tolerated  HOB at 45  Aspiration precautions  DVT ppx  OP pulm FU for PFT's, and sleep study  - continue counseling to not restart smoking      #Chest pain, atypical  - likely due to MSK strain with lifting boxes vs COPD 'tightness'  - trop x2 neg, EKG non-ischemic  -will consider ECHO    #DM- monitor FS, start basal-bolus >180    #HTN- c/w losartan 100  #HLD- c/w simva 20  #BPH- c/w flomax  #Chronic back pain- c/w morphine 60 BID, percocet    nxiety  - c/w xanax 1 TID, duloxetine 60  - unclear why on keppra? no hx of seizures, pt confirms he takes   - please f/u with Dr. Gardiner as to why he's on this med-   - OP chart says + SZ, but has no knowledge of Dx    #MISC  - DVT ppx: lovenox  - GI ppx: PPI  - Activity: As tolerated  - Diet: DASH, CC  - Dispo: acute; anticipate for DC tomorrow      
Impression    SOB, likely multifactorial, possibly underlying COPD or asthma/ JESSICA/ pulmonary hypertension; improving  RML/RLL PNA   Asthma/ COPD exacerbation'improved  Probable JESSICA  HO DVT/ PE in 09/2018, unprovoked, now off AC    Recommendations    Continue levaquine 750 total 7 days   Continue solumedrol, switch to prednisone 40mg po qd in am  Nebulizer Q 4 hrs and prn   Follow up 2D echo result  O2 to maintain spo2>92%  DVT prophylaxis  OP pulm follow up for PFT and repeat CT chest in 6-8 weeks to check for resolution

## 2021-04-22 NOTE — DISCHARGE NOTE PROVIDER - PROVIDER TOKENS
PROVIDER:[TOKEN:[[id number]],FOLLOWUP:[1 month]],PROVIDER:[TOKEN:[[id number]],FOLLOWUP:[2 weeks],ESTABLISHEDPATIENT:[T]]

## 2021-04-23 LAB
CULTURE RESULTS: SIGNIFICANT CHANGE UP
CULTURE RESULTS: SIGNIFICANT CHANGE UP
SPECIMEN SOURCE: SIGNIFICANT CHANGE UP
SPECIMEN SOURCE: SIGNIFICANT CHANGE UP

## 2021-04-23 RX ORDER — LEVOFLOXACIN 5 MG/ML
1 INJECTION, SOLUTION INTRAVENOUS
Qty: 3 | Refills: 0
Start: 2021-04-23 | End: 2021-04-25

## 2021-04-28 DIAGNOSIS — I27.20 PULMONARY HYPERTENSION, UNSPECIFIED: ICD-10-CM

## 2021-04-28 DIAGNOSIS — J44.0 CHRONIC OBSTRUCTIVE PULMONARY DISEASE WITH (ACUTE) LOWER RESPIRATORY INFECTION: ICD-10-CM

## 2021-04-28 DIAGNOSIS — F11.20 OPIOID DEPENDENCE, UNCOMPLICATED: ICD-10-CM

## 2021-04-28 DIAGNOSIS — J45.909 UNSPECIFIED ASTHMA, UNCOMPLICATED: ICD-10-CM

## 2021-04-28 DIAGNOSIS — N40.0 BENIGN PROSTATIC HYPERPLASIA WITHOUT LOWER URINARY TRACT SYMPTOMS: ICD-10-CM

## 2021-04-28 DIAGNOSIS — Z86.718 PERSONAL HISTORY OF OTHER VENOUS THROMBOSIS AND EMBOLISM: ICD-10-CM

## 2021-04-28 DIAGNOSIS — G89.29 OTHER CHRONIC PAIN: ICD-10-CM

## 2021-04-28 DIAGNOSIS — Z79.84 LONG TERM (CURRENT) USE OF ORAL HYPOGLYCEMIC DRUGS: ICD-10-CM

## 2021-04-28 DIAGNOSIS — E11.9 TYPE 2 DIABETES MELLITUS WITHOUT COMPLICATIONS: ICD-10-CM

## 2021-04-28 DIAGNOSIS — Z91.018 ALLERGY TO OTHER FOODS: ICD-10-CM

## 2021-04-28 DIAGNOSIS — J96.01 ACUTE RESPIRATORY FAILURE WITH HYPOXIA: ICD-10-CM

## 2021-04-28 DIAGNOSIS — I10 ESSENTIAL (PRIMARY) HYPERTENSION: ICD-10-CM

## 2021-04-28 DIAGNOSIS — Z86.711 PERSONAL HISTORY OF PULMONARY EMBOLISM: ICD-10-CM

## 2021-04-28 DIAGNOSIS — J44.1 CHRONIC OBSTRUCTIVE PULMONARY DISEASE WITH (ACUTE) EXACERBATION: ICD-10-CM

## 2021-04-28 DIAGNOSIS — F41.9 ANXIETY DISORDER, UNSPECIFIED: ICD-10-CM

## 2021-04-28 DIAGNOSIS — R07.89 OTHER CHEST PAIN: ICD-10-CM

## 2021-04-28 DIAGNOSIS — M54.9 DORSALGIA, UNSPECIFIED: ICD-10-CM

## 2021-04-28 DIAGNOSIS — Z88.6 ALLERGY STATUS TO ANALGESIC AGENT: ICD-10-CM

## 2021-04-28 DIAGNOSIS — J18.9 PNEUMONIA, UNSPECIFIED ORGANISM: ICD-10-CM

## 2021-04-28 DIAGNOSIS — E78.00 PURE HYPERCHOLESTEROLEMIA, UNSPECIFIED: ICD-10-CM

## 2021-04-28 DIAGNOSIS — Z87.891 PERSONAL HISTORY OF NICOTINE DEPENDENCE: ICD-10-CM

## 2021-04-28 DIAGNOSIS — Z88.0 ALLERGY STATUS TO PENICILLIN: ICD-10-CM

## 2021-06-16 NOTE — ED ADULT NURSE NOTE - NSSISCREENINGQ4_ED_A_ED
6/16/2021       RE: Monster Olmedo  [address]  [place] [address]     Dear Colleague,    Thank you for referring your patient, Monster Olmedo, to the [facility] [place] at [facility]. Please see a copy of my visit note below.    *PT CAN HAVE APPT AT 2:00*  *SEND LINK TO CELL PHONE*    OLD [facility] PT.  PT HAD UTI.  PT FEELS BETTER.  PT HAS URGENCY.  PT HAD FREQ WITH UTI.  FREQ BETTER NOW WITH OUT UTI.  PT UP 3 TIMES A NT.  PT NERVOUS ABOUT TRAVEL DUE TO FREQ AND URG.  KEGAL EXERCISES???  ALFUZOSIN... WHEN SHOULD HE TAKE IT?  I MENTIONED IT. :/    Monster is a 75 year old who is being evaluated via a billable video visit.      How would you like to obtain your AVS? MyChart  If the video visit is dropped, the invitation should be resent by: Text to cell phone: [phone]  Will anyone else be joining your video visit? No      Video Start Time: 2:22 PM  Video-Visit Details    Type of service:  Video Visit    Video End Time:2:33 PM    Originating Location (pt. Location): Home    Distant Location (provider location):  [facility] [place]     Platform used for Video Visit: [employer]     CC: follow-up     HPI:  Monster is a pleasant 75-year-old male with no family history of prostate cancer normal digital rectal exam in August 2020 with Dr. Mtz.  His CT scan for GI evaluation in 2018 was reported as having a mildly enlarged prostate.  Because of this his primary care physician did a PSA which was 7.58.  This compares to 3.082 years ago, 9.01 in 2015 and 8.02 in 2014.  The patient had a normal MRI scan of the prostate in 2015 and had normal biopsies of the prostate previous to this.  No further PSAs were recommended as long as HUYEN was without palpable abnormality.    In 2017 came off of finasteride (due to libido effects) and continued with Flomax. He is back on farxiga and is urinating with more frequency as before.  In the past  his postvoid residuals were very small    He had dysuria, urgency, freq and on 6/3/21 UC noted 50-100k e coli UTI. Treated for UTI with Cipro.     Has had urgency, frequency, nocturia x 3-4 x 6 months.     Past Medical History:   Diagnosis Date     Diabetes (H)      Paroxysmal atrial fibrillation (H) 9/23/16     Sleep apnea      Current Outpatient Medications   Medication     alfuzosin ER (UROXATRAL) 10 MG 24 hr tablet     atorvastatin (LIPITOR) 20 MG tablet     betamethasone dipropionate (DIPROSONE) 0.05 % cream     clobetasol (TEMOVATE) 0.05 % ointment     fish oil-omega-3 fatty acids 1000 MG capsule     glipiZIDE (GLUCOTROL) 5 MG tablet     metFORMIN (GLUCOPHAGE) 1000 MG tablet     Multiple vitamin TABS     order for DME     Probiotic Product (PROBIOTIC DAILY PO)     ramipril (ALTACE) 5 MG capsule     sildenafil (REVATIO) 20 MG tablet     Simethicone (GAS-X PO)     simvastatin (ZOCOR) 10 MG tablet     Turmeric (QC TUMERIC COMPLEX PO)     aspirin EC 81 MG tablet     azelastine (ASTELIN) 0.1 % nasal spray     dapagliflozin (FARXIGA) 10 MG TABS tablet     etanercept (ENBREL) 25 MG vial injection kit     finasteride (PROSCAR) 5 MG tablet     fluticasone (FLONASE) 50 MCG/ACT spray     ondansetron (ZOFRAN ODT) 4 MG ODT tab     oxyCODONE IR (ROXICODONE) 5 MG tablet     ramipril (ALTACE) 2.5 MG capsule     tamsulosin (FLOMAX) 0.4 MG capsule     vardenafil (LEVITRA) 20 MG tablet     No current facility-administered medications for this visit.      Social History     Socioeconomic History     Marital status:      Spouse name: Not on file     Number of children: Not on file     Years of education: Not on file     Highest education level: Not on file   Occupational History     Not on file   Social Needs     Financial resource strain: Not on file     Food insecurity     Worry: Not on file     Inability: Not on file     Transportation needs     Medical: Not on file     Non-medical: Not on file   Tobacco Use     Smoking  status: Never Smoker     Smokeless tobacco: Never Used   Substance and Sexual Activity     Alcohol use: Yes     Comment: occ     Drug use: No     Sexual activity: Not on file   Lifestyle     Physical activity     Days per week: Not on file     Minutes per session: Not on file     Stress: Not on file   Relationships     Social connections     Talks on phone: Not on file     Gets together: Not on file     Attends [employer] service: Not on file     Active member of club or organization: Not on file     Attends meetings of clubs or organizations: Not on file     Relationship status: Not on file     Intimate partner violence     Fear of current or ex partner: Not on file     Emotionally abused: Not on file     Physically abused: Not on file     Forced sexual activity: Not on file   Other Topics Concern     Parent/sibling w/ CABG, MI or angioplasty before 65F 55M? Not Asked      Service Not Asked     Blood Transfusions Not Asked     Caffeine Concern No     Comment: coffee: 3 cups a day     Occupational Exposure Not Asked     Hobby Hazards Not Asked     Sleep Concern Yes     Comment: CPAP     Stress Concern No     Weight Concern No     Special Diet No     Back Care Not Asked     Exercise Yes     Comment: walking x3 a week     Bike Helmet Not Asked     Seat Belt Yes     Self-Exams Not Asked   Social History Narrative     Not on file     ROS: 10-pt ROS neg other than that noted in the HPI.  PSYCH: NAD  EYES: EOMI  NEURO: AAO x3    A/P: BPH w/ LUTS, UTI  -Cysto to eval for MARSHALL  -CT urogram    Jazzy Barron PA-C  [facility]  27 minutes spent on the date of the encounter doing chart review, review of outside records, review of test results, interpretation of tests, patient visit and documentation        No

## 2022-01-10 NOTE — DISCHARGE NOTE PROVIDER - NSDCHHCONTACT_GEN_ALL_CORE_FT
As certified below, I, or a nurse practitioner or physician assistant working with me, had a face-to-face encounter that meets the physician face-to-face encounter requirements.
denies

## 2022-06-16 NOTE — PROGRESS NOTE ADULT - PROVIDER SPECIALTY LIST ADULT
Internal Medicine
Pain Medicine
Pulmonology
SICU
SICU
Vascular Surgery
Vascular Surgery
No Vaccines Administered.

## 2022-06-20 ENCOUNTER — APPOINTMENT (OUTPATIENT)
Dept: PAIN MANAGEMENT | Facility: CLINIC | Age: 67
End: 2022-06-20
Payer: MEDICARE

## 2022-06-20 VITALS
HEIGHT: 71 IN | HEART RATE: 76 BPM | WEIGHT: 217 LBS | BODY MASS INDEX: 30.38 KG/M2 | SYSTOLIC BLOOD PRESSURE: 104 MMHG | DIASTOLIC BLOOD PRESSURE: 63 MMHG

## 2022-06-20 DIAGNOSIS — Z60.2 PROBLEMS RELATED TO LIVING ALONE: ICD-10-CM

## 2022-06-20 DIAGNOSIS — Z87.09 PERSONAL HISTORY OF OTHER DISEASES OF THE RESPIRATORY SYSTEM: ICD-10-CM

## 2022-06-20 DIAGNOSIS — Z86.39 PERSONAL HISTORY OF OTHER ENDOCRINE, NUTRITIONAL AND METABOLIC DISEASE: ICD-10-CM

## 2022-06-20 PROCEDURE — 99213 OFFICE O/P EST LOW 20 MIN: CPT

## 2022-06-20 RX ORDER — MORPHINE SULFATE 60 MG/1
60 CAPSULE, EXTENDED RELEASE ORAL
Refills: 0 | Status: ACTIVE | COMMUNITY

## 2022-06-20 RX ORDER — GABAPENTIN 300 MG
300 TABLET ORAL
Refills: 0 | Status: ACTIVE | COMMUNITY

## 2022-06-20 SDOH — SOCIAL STABILITY - SOCIAL INSECURITY: PROBLEMS RELATED TO LIVING ALONE: Z60.2

## 2022-06-20 NOTE — REVIEW OF SYSTEMS
[Arthralgia] : arthralgia [Feeling Weak] : feeling weak [Muscle Weakness] : muscle weakness [Chills] : no chills [Discharge] : no discharge [Decrease Hearing] : no decrease in hearing [Lower Ext Edema] : no lower extremity edema [SOB at rest] : no shortness of breath at rest [Abdominal Pain] : no abdominal pain [Urinary Frequency] : no urinary frequency [Breast Pain] : no pain ~T in breast [Headache] : no headache [Hot Flashes] : no hot flashes

## 2022-06-20 NOTE — HISTORY OF PRESENT ILLNESS
[FreeTextEntry1] : A continuing active encounter took place at this time, previous history and exam reviewed.\par \par HISTORY OF PRESENT ILLNESS: This is a 66 year old man complaining of lower back and bilateral leg and foot pain. He also complains of upper back and neck pain into the upper extremities. The pain started after an injury at work on December 21, 2006 when he was moving a dresser and he fell down the steps with the dresser landing on top of him. Patient describes pain as severe. During the last month the pain has been no typical pattern. Pain described as burning, shooting, sharp, cutting, pressure-like, cramping, tall/aching, throbbing. Pain is associated with numbness and pins and needles into the upper and lower extremities. Patient has weakness in the upper and lower extremities Pain is not changed with lying down, standing, sitting, walking, exercise, relaxation, coughing sneezing or bowel movements.\par \par ACTIVITES: Can walk less than 1 block, uses scooter or rolling walker. He can sit for several hours, stand for 30-45 minutes. He sometimes lies down due to pain. He uses a cane, walker or scooter for mobility. HE cannot go to work, perform household chores, run local errands, participate in recreational activities or exercise.\par \par PRIOR PAIN TREATMENTS: No relief with surgery, traction, nerve block/injections, physical therapy, exercise, TENS unit, heat/cold treatment, psychotherapy, acupuncture, hypnosis, biofeedback, chiropractic manipulation, spinal cord manipulation.\par \par PRIOR PAIN MEDICATIONS: MSContin, Oxycodone, Baclofen, Cymbalta, Xanax\par \par TODAY: ongoing chronic low back pain noted with referred features as of late. He notes that his chronic pain complaints have intensified as of late, worse into the left leg. He notes continued falls. Since his last encounter with our office he has fallen six times. He notes that his pain intensifies when ambulating and this can lead to falls or weakness within the legs/feet. This causes his legs to buckle and he can fall. He broke his walker recent as he attempted to hold onto the break when falling.\par \par At his last encounter an updated MRI L Spine w w/o contrast warranted for my review- he are pending authorization.\par \par The use of medication provides relief at 40% or higher pending his level of activity. Relief allows slight activity during the day although he warrants assistance with most tasks.

## 2022-06-20 NOTE — DISCUSSION/SUMMARY
[de-identified] : This is a 67 yo M who suffers from chronic lumbar pain with radicular features. Due to his recurrent and frrequent falls an updated MRI L spine w w /o contrast warranted for my review consideirng the patients reports of acute on chronic pain radiating into the lower extremities. BMP warranted consider contrast need and age. In the interim, the use of medication is to continue without change. \par \par MSContin 60mg PO q12\par Percocet 10/325mg PO TID

## 2022-06-20 NOTE — DATA REVIEWED
[FreeTextEntry1] : IMAGING: MRI in January 2017, prior L3 laminectomies and posterior fusions with right-sided pedicle screws at L3 and L4, L3 through L4 interbody fusion cage with a widely patent spinal canal and foramina, multilevel lumbar spondylosis, grade 1 mild retrolisthesis at L2, and presence of multiple prevertebral bridging osteophyte throughout the lumbar region especially at L2-L3. There is a large right renal cyst. Bulging at L4-L5 disc with subligamentous shallow enhancing disc protrusion. \par \par Flexion and extension x-ray of the lumbar spine in 2017, instability appreciated at L2-L3 with retrolisthesis appreciated. It does appear to worse on flexion views. EMG revealed a left L4 nerve root dysfunction.

## 2022-06-23 NOTE — DISCHARGE NOTE ADULT - ADMISSION DATE +STARTOFVISITDATE
Statement Selected O-T Advancement Flap Text: The defect edges were debeveled with a #15 scalpel blade.  Given the location of the defect, shape of the defect and the proximity to free margins an O-T advancement flap was deemed most appropriate.  Using a sterile surgical marker, an appropriate advancement flap was drawn incorporating the defect and placing the expected incisions within the relaxed skin tension lines where possible.    The area thus outlined was incised deep to adipose tissue with a #15 scalpel blade.  The skin margins were undermined to an appropriate distance in all directions utilizing iris scissors.

## 2022-06-30 LAB
ANION GAP SERPL CALC-SCNC: 15 MMOL/L
BUN SERPL-MCNC: 20 MG/DL
CALCIUM SERPL-MCNC: 10.1 MG/DL
CHLORIDE SERPL-SCNC: 102 MMOL/L
CO2 SERPL-SCNC: 29 MMOL/L
CREAT SERPL-MCNC: 1.04 MG/DL
EGFR: 80 ML/MIN/1.73M2
GLUCOSE SERPL-MCNC: 102 MG/DL
POTASSIUM SERPL-SCNC: 4.3 MMOL/L
SODIUM SERPL-SCNC: 145 MMOL/L

## 2022-07-22 ENCOUNTER — APPOINTMENT (OUTPATIENT)
Dept: PAIN MANAGEMENT | Facility: CLINIC | Age: 67
End: 2022-07-22

## 2022-07-22 VITALS — HEART RATE: 80 BPM | DIASTOLIC BLOOD PRESSURE: 74 MMHG | SYSTOLIC BLOOD PRESSURE: 120 MMHG

## 2022-07-22 VITALS — BODY MASS INDEX: 30.38 KG/M2 | WEIGHT: 217 LBS | HEIGHT: 71 IN

## 2022-07-22 PROCEDURE — 99213 OFFICE O/P EST LOW 20 MIN: CPT | Mod: PA

## 2022-07-22 PROCEDURE — 99072 ADDL SUPL MATRL&STAF TM PHE: CPT

## 2022-07-22 NOTE — REASON FOR VISIT
[Follow-Up Visit] : a follow-up pain management visit [FreeTextEntry2] : FOLLOW UP FOR CHRONIC LOW BACK PAIN

## 2022-07-22 NOTE — PHYSICAL EXAM
[Normal Coordination] : normal coordination [Normal Sensation] : normal sensation [Orientated] : orientated [Normal Skin] : normal skin [No obvious lymphadenopathy in areas examined] : no obvious lymphadenopathy in areas examined [Well Developed] : well developed [Peripheral vascular exam is grossly normal] : peripheral vascular exam is grossly normal [] : no sciatic nerve tenderness

## 2022-07-22 NOTE — DISCUSSION/SUMMARY
[de-identified] : This is a 65 yo M who suffers from chronic lumbar pain with radicular features. Due to his recurrent and frrequent falls an updated MRI L spine w w /o contrast warranted for my review consideirng the patients reports of acute on chronic pain radiating into the lower extremities. He waited in the office for 1 hour with the results not being sent from [facility] despite frequent requests. We will contact him once the result returns.\par \par MSContin 60mg PO q12\par Percocet 10/325mg PO TID\par \par The patient is stable on current pain medication with analgesia and without notable side effects or any obvious aberrant behaviors exhibited. Will renew medication today.\par \par f/u 4 weeks\par \par I personally reviewed with the PA, this patient's history and physical exam findings, as documented above. I have discussed the relevant areas of concern, having direct implications to the presenting problems and illnesses, and I have personally examined all pertinent and positive and negative findings, which impact on the prior pain management treatment. \par \par ASHLEY Alston MD\par \par

## 2022-07-22 NOTE — HISTORY OF PRESENT ILLNESS
[FreeTextEntry1] : A continuing active encounter took place at this time, previous history and exam reviewed.\par \par HISTORY OF PRESENT ILLNESS: This is a 66 year old man complaining of lower back and bilateral leg and foot pain. He also complains of upper back and neck pain into the upper extremities. The pain started after an injury at work on December 21, 2006 when he was moving a dresser and he fell down the steps with the dresser landing on top of him. Patient describes pain as severe. During the last month the pain has been no typical pattern. Pain described as burning, shooting, sharp, cutting, pressure-like, cramping, tall/aching, throbbing. Pain is associated with numbness and pins and needles into the upper and lower extremities. Patient has weakness in the upper and lower extremities Pain is not changed with lying down, standing, sitting, walking, exercise, relaxation, coughing sneezing or bowel movements.\par \par ACTIVITES: Can walk less than 1 block, uses scooter or rolling walker. He can sit for several hours, stand for 30-45 minutes. He sometimes lies down due to pain. He uses a cane, walker or scooter for mobility. HE cannot go to work, perform household chores, run local errands, participate in recreational activities or exercise.\par \par PRIOR PAIN TREATMENTS: No relief with surgery, traction, nerve block/injections, physical therapy, exercise, TENS unit, heat/cold treatment, psychotherapy, acupuncture, hypnosis, biofeedback, chiropractic manipulation, spinal cord manipulation.\par \par PRIOR PAIN MEDICATIONS: MSContin, Oxycodone, Baclofen, Cymbalta, Xanax\par \par TODAY: he presents for a revisit encounter. He had undergone a MRI L Spine with [facility] but the study was not sent to us for review; staff calling at this time to obtain the report.\par \par He notes ongoing low back pain, shooting pain into the left leg. He notes symptoms present daily; with bouts of heightened pain which is unpredictable. He is prone to frequent falls; he has excoriations to the left forearm as of late after a recent fall. He did not present to a hospital or urgent care setting.\par \par The use of medication continues to provide satisfactory relief with benefit at 50% or higher. He remains content with his reduced pain and he wishes to continue without change. He has obtained a motorized wheelchair and has been more mobile as of late which we hope would reduce his fall risk.

## 2022-09-08 NOTE — ED PROVIDER NOTE - NS_EDPROVIDERDISPOUSERTYPE_ED_A_ED
Detail Level: Generalized Detail Level: Detailed Quality 137: Melanoma: Continuity Of Care - Recall System: Patient information entered into a recall system that includes: target date for the next exam specified AND a process to follow up with patients regarding missed or unscheduled appointments Attending Attestation (For Attendings USE Only)...

## 2022-09-15 ENCOUNTER — RX RENEWAL (OUTPATIENT)
Age: 67
End: 2022-09-15

## 2022-09-23 ENCOUNTER — RX RENEWAL (OUTPATIENT)
Age: 67
End: 2022-09-23

## 2022-09-28 ENCOUNTER — APPOINTMENT (OUTPATIENT)
Dept: PAIN MANAGEMENT | Facility: CLINIC | Age: 67
End: 2022-09-28

## 2022-09-28 ENCOUNTER — RX RENEWAL (OUTPATIENT)
Age: 67
End: 2022-09-28

## 2022-10-28 ENCOUNTER — APPOINTMENT (OUTPATIENT)
Dept: PAIN MANAGEMENT | Facility: CLINIC | Age: 67
End: 2022-10-28

## 2022-10-28 PROCEDURE — 99213 OFFICE O/P EST LOW 20 MIN: CPT | Mod: ACP

## 2022-10-28 PROCEDURE — 99072 ADDL SUPL MATRL&STAF TM PHE: CPT | Mod: ACP

## 2022-10-28 NOTE — HISTORY OF PRESENT ILLNESS
[FreeTextEntry1] : ORIGINAL PRESENTATION:  This is a 67 year old man complaining of lower back and bilateral leg and foot pain. He also complains of upper back and neck pain into the upper extremities. The pain started after an injury at work on December 21, 2006 when he was moving a dresser and he fell down the steps with the dresser landing on top of him. Patient describes pain as severe. During the last month the pain has been no typical pattern. Pain described as burning, shooting, sharp, cutting, pressure-like, cramping, tall/aching, throbbing. Pain is associated with numbness and pins and needles into the upper and lower extremities. Patient has weakness in the upper and lower extremities Pain is not changed with lying down, standing, sitting, walking, exercise, relaxation, coughing sneezing or bowel movements.\par \par ACTIVITES: Can walk less than 1 block, uses scooter or rolling walker. He can sit for several hours, stand for 30-45 minutes. He sometimes lies down due to pain. He uses a cane, walker or scooter for mobility. HE cannot go to work, perform household chores, run local errands, participate in recreational activities or exercise.\par \par PRIOR PAIN TREATMENTS: No relief with surgery, traction, nerve block/injections, physical therapy, exercise, TENS unit, heat/cold treatment, psychotherapy, acupuncture, hypnosis, biofeedback, chiropractic manipulation, spinal cord manipulation.\par \par PRIOR PAIN MEDICATIONS: MSContin, Oxycodone, Baclofen, Cymbalta, Xanax\par \par TODAY: I had the pleasure of seeing Mr. Stanley today in follow up.  His previous history and physical findings have been reviewed.\par \par He is under our care for chronic lumbar pain with radiculopathy which he is receiving continuing active treatment for.  He continues to experience low back pain radiating into the left leg. He is prone to frequent falls and recently burned his left arm as a result of falling while cooking.  He did undergo MRI lumbar spine but we have not received despite multiple attempts to call for it.  We will try again today and I advised patient I would contact him once we receive it.  In the interim he continues to be managed on a current regimen of MS Contin 60 mg bid, Percocet 10/325mg tid prn, and cymbalta qd providing him with 50% relief giving him quality of life without side effects.  He is accepting the degree of relief he is getting.  We will therefore continue as is without change.

## 2022-10-28 NOTE — ASSESSMENT
[FreeTextEntry1] : 67 year old male with chronic lumbar pain with radiculopathy. We will continue to prescribe the above medication and try and obtain MRI results.  He will f/u in 8 weeks for re evaluation and is aware if there are any issues he will contact the office.\par \par I personally reviewed with the PA, this patient's history and physical exam findings, as documented above. I have discussed the relevant areas of concern, having direct implications to the presenting problems and illnesses, and I have personally examined all pertinent and positive and negative findings, which impact on the prior pain management treatment. \par \par \par Check of the  registry reveals compliance in regards to narcotic medication management use\par \par \par Isabel Donnelly, MS, PA-C\par Brina Espinoza MD\par

## 2022-11-29 ENCOUNTER — RX RENEWAL (OUTPATIENT)
Age: 67
End: 2022-11-29

## 2022-12-28 ENCOUNTER — LABORATORY RESULT (OUTPATIENT)
Age: 67
End: 2022-12-28

## 2022-12-28 ENCOUNTER — APPOINTMENT (OUTPATIENT)
Dept: PAIN MANAGEMENT | Facility: CLINIC | Age: 67
End: 2022-12-28

## 2022-12-28 VITALS
HEART RATE: 56 BPM | HEIGHT: 71 IN | DIASTOLIC BLOOD PRESSURE: 89 MMHG | SYSTOLIC BLOOD PRESSURE: 183 MMHG | BODY MASS INDEX: 30.38 KG/M2 | WEIGHT: 217 LBS

## 2022-12-28 LAB — PM COCAINE: NEGATIVE

## 2022-12-28 PROCEDURE — 99072 ADDL SUPL MATRL&STAF TM PHE: CPT

## 2022-12-28 PROCEDURE — 99214 OFFICE O/P EST MOD 30 MIN: CPT

## 2022-12-28 PROCEDURE — 96102W: CUSTOM

## 2022-12-28 PROCEDURE — 80305 DRUG TEST PRSMV DIR OPT OBS: CPT | Mod: QW

## 2022-12-28 NOTE — PHYSICAL EXAM
[Normal Coordination] : normal coordination [Normal DTR UE/LE] : normal DTR UE/LE  [Normal Sensation] : normal sensation [Normal Mood and Affect] : normal mood and affect [Orientated] : orientated [Able to Communicate] : able to communicate [Normal Skin] : normal skin [No obvious lymphadenopathy in areas examined] : no obvious lymphadenopathy in areas examined [Well Developed] : well developed [Well Nourished] : well nourished [Peripheral vascular exam is grossly normal] : peripheral vascular exam is grossly normal [] : no sciatic nerve tenderness

## 2022-12-28 NOTE — HISTORY OF PRESENT ILLNESS
[FreeTextEntry1] : ORIGINAL PRESENTATION: This is a 67 year old man complaining of lower back and bilateral leg and foot pain. He also complains of upper back and neck pain into the upper extremities. The pain started after an injury at work on December 21, 2006 when he was moving a dresser and he fell down the steps with the dresser landing on top of him. Patient describes pain as severe. During the last month the pain has been no typical pattern. Pain described as burning, shooting, sharp, cutting, pressure-like, cramping, tall/aching, throbbing. Pain is associated with numbness and pins and needles into the upper and lower extremities. Patient has weakness in the upper and lower extremities Pain is not changed with lying down, standing, sitting, walking, exercise, relaxation, coughing sneezing or bowel movements.\par \par ACTIVITES: Can walk less than 1 block, uses scooter or rolling walker. He can sit for several hours, stand for 30-45 minutes. He sometimes lies down due to pain. He uses a cane, walker or scooter for mobility. HE cannot go to work, perform household chores, run local errands, participate in recreational activities or exercise.\par \par PRIOR PAIN TREATMENTS: No relief with surgery, traction, nerve block/injections, physical therapy, exercise, TENS unit, heat/cold treatment, psychotherapy, acupuncture, hypnosis, biofeedback, chiropractic manipulation, spinal cord manipulation.\par \par PRIOR PAIN MEDICATIONS: MSContin, Oxycodone, Baclofen, Cymbalta, Xanax\par \par PATIENT PRESENTS FOR FOLLOW UP: He is under our care for chronic lumbar pain with radiculopathy which he is receiving continuing active treatment for.  He continues to experience low back pain radiating into the left leg. He is prone to frequent falls and recently burned his left arm as a result of falling while cooking. MRI of the lumbar spine  was reviewed today. He states that previous injections did not provide him with good relief and he does not wish to proceed with more at this time. He states that he previously underwent a SCS trial under another provider which provided him with no relief. We discussed the option of a pain pump was discussed today however he would like to follow up with his PCP for a second opinion. \par \par He continues to be managed on a current regimen of MS Contin 60 mg bid, Percocet 10/325mg tid prn, and Cymbalta qd providing him with 50% relief giving him quality of life without side effects. Decreasing the Percocet from TID to BD was discussed today and he has agreed to the decrease.

## 2022-12-28 NOTE — DATA REVIEWED
[FreeTextEntry1] : MRI of the lumbar spine dated 7/14/22 finds at L3-L4 postsurgical changes from laminectomy with the canal decompressed. No significant canal enhancement to suggest granulation tissue. Despite the disc spacer, there is still advanced disc space Narrowing.  There is an associated disc bulge with disc edema.  Evaluation of the neural foramina is limited due to the susceptibility artifact from the hardware.  Combined with facet arthropathy there appears to be moderate bilateral neural foraminal stenosis with significant crowding of the exiting left L3 nerve root and mild crowding of the exiting right L3 nerve root.  At L2-3 5 mm of retrolisthesis with associated broad-based disc bulge and facet arthropathy with moderate left and mild to moderate right-sided neural foraminal stenosis.  Combined with buckling of the ligamentum flavum and prominent dorsal epidural fat there is moderate canal stenosis.  At L4-L5 trace 1 mm of retrolisthesis with associated broad-based disc bulge demonstrating minimal annular fissuring contacts the exiting right L4 nerve root.  Combined with facet arthropathy there is moderate right-sided neural foraminal external stenosis.  At L1-L2 left-sided facet arthropathy abuts the exiting left exiting L1 nerve root with mild left-sided neural foraminal stenosis.  Trace 1 mm of retrolisthesis with tiny disc bulge without significant canal stenosis.  At T12- L1 there is left-sided facet arthropathy likely with a tiny synovial cyst abutting the exiting left T12 nerve root.\par \par IMAGING: MRI in January 2017, prior L3 laminectomies and posterior fusions with right-sided pedicle screws at L3 and L4, L3 through L4 interbody fusion cage with a widely patent spinal canal and foramina, multilevel lumbar spondylosis, grade 1 mild retrolisthesis at L2, and presence of multiple prevertebral bridging osteophyte throughout the lumbar region especially at L2-L3. There is a large right renal cyst. Bulging at L4-L5 disc with subligamentous shallow enhancing disc protrusion. \par \par Flexion and extension x-ray of the lumbar spine in 2017, instability appreciated at L2-L3 with retrolisthesis appreciated. It does appear to worse on flexion views. EMG revealed a left L4 nerve root dysfunction. \par \par SOAPP: low on 12/28/22\par Low risk: Patient has combination of a low risk SOAP and no risk factors. UDS would be repeated randomly every quarter. \par \par UDS: 12/28/22 + MOP, OXY\par \par [place] REGISTRY: Checked\par

## 2022-12-28 NOTE — ASSESSMENT
[FreeTextEntry1] : This is a 67 year old male with chronic lumbar pain with radiculopathy. MRI of the lumbar spine was reviewed and documented above. I will decrease the Percocet 10/325 from TID to BID at this time. He will follow up in 4 weeks for medication refill and is aware if there are any issues he will contact the office. He was provided more information to review regarding a pain pump. A UDS was performed today. All this patients questions were answered and the conversation was understood well.\par \par I explained the risk of addiction, tolerance and withdrawals. UDS will be done randomly and a drug agreement was signed.\par \par I advised the patient they must keep their medication under a lock and key, or in a safe place away from children or other individuals. This medication given is solely for the patient and under no circumstances to be shared. Patient verbalized this and is in agreement with the aforementioned. I explained the risk of addiction, tolerance, and withdrawals. UDS will be done randomly and a drug agreement was signed.\par \par \par I, Armida Carbajal, attest that this documentation has been prepared under the direction and in the presence of Provider Brina Espinoza MD.\par \par \par Thank you for allowing me to assist in the management of this patient. \par \par \par Best Regards, \par \par \par Brina Espinoza M.D., [facility]\par \par \par Diplomate, American Board of Physical Medicine and Rehabilitation\par Diplomate, American Board of Pain Medicine \par Diplomate, American Board of Pain Management\par \par \par

## 2023-01-26 ENCOUNTER — APPOINTMENT (OUTPATIENT)
Dept: PAIN MANAGEMENT | Facility: CLINIC | Age: 68
End: 2023-01-26
Payer: OTHER MISCELLANEOUS

## 2023-01-26 PROCEDURE — 99072 ADDL SUPL MATRL&STAF TM PHE: CPT

## 2023-01-26 PROCEDURE — 99214 OFFICE O/P EST MOD 30 MIN: CPT

## 2023-01-26 NOTE — HISTORY OF PRESENT ILLNESS
[FreeTextEntry1] : ORIGINAL PRESENTATION: This is a 67 year old man complaining of lower back and bilateral leg and foot pain. He also complains of upper back and neck pain into the upper extremities. The pain started after an injury at work on December 21, 2006 when he was moving a dresser and he fell down the steps with the dresser landing on top of him. Patient describes pain as severe. During the last month the pain has been no typical pattern. Pain described as burning, shooting, sharp, cutting, pressure-like, cramping, tall/aching, throbbing. Pain is associated with numbness and pins and needles into the upper and lower extremities. Patient has weakness in the upper and lower extremities Pain is not changed with lying down, standing, sitting, walking, exercise, relaxation, coughing sneezing or bowel movements.\par \par ACTIVITES: Can walk less than 1 block, uses scooter or rolling walker. He can sit for several hours, stand for 30-45 minutes. He sometimes lies down due to pain. He uses a cane, walker or scooter for mobility. HE cannot go to work, perform household chores, run local errands, participate in recreational activities or exercise.\par \par PRIOR PAIN TREATMENTS: No relief with surgery, traction, nerve block/injections, physical therapy, exercise, TENS unit, heat/cold treatment, psychotherapy, acupuncture, hypnosis, biofeedback, chiropractic manipulation, spinal cord manipulation.\par \par PRIOR PAIN MEDICATIONS: MSContin, Oxycodone, Baclofen, Cymbalta, Xanax\par \par PATIENT PRESENTS FOR FOLLOW UP: He is under our care for chronic lumbar pain with radiculopathy which he is receiving continuing active treatment for.  He continues to experience low back pain radiating into the left leg. He is prone to frequent falls and recently burned his left arm as a result of falling while cooking. He states that he is going through withdrawals from the previous decrease in medication. He was advised not trial a pain pump through his PCP. He is coughing excessively and is advised to follow up with his PCP for further evaluation. \par \par He continues to be managed on a current regimen of MS Contin 60 mg bid, Percocet 10/325mg BID  prn, and Cymbalta qd providing him with 50% relief giving him quality of life without side effects. I will decrease the Percocet further today from BID dosing to once a day at this time. He is advised to take over the counter anti-inflammatories for additional relief. \par \par

## 2023-01-26 NOTE — ASSESSMENT
[FreeTextEntry1] : This is a 67 year old male with chronic lumbar pain with radiculopathy. MRI of the lumbar spine was reviewed and documented above. I will decrease the Percocet from BID to daily dosing at this time. He will follow up in 4 weeks for medication refill and is aware if there are any issues he will contact the office. All this patients questions were answered and the conversation was understood well.\par \par I explained the risk of addiction, tolerance and withdrawals. UDS will be done randomly and a drug agreement was signed.\par \par I advised the patient they must keep their medication under a lock and key, or in a safe place away from children or other individuals. This medication given is solely for the patient and under no circumstances to be shared. Patient verbalized this and is in agreement with the aforementioned. I explained the risk of addiction, tolerance, and withdrawals. UDS will be done randomly and a drug agreement was signed.\par \par \par I, Armida Carbajal, attest that this documentation has been prepared under the direction and in the presence of Provider Brina Espinoza MD.\par \par \par Thank you for allowing me to assist in the management of this patient. \par \par \par Best Regards, \par \par \par Brina Espinoza M.D., [facility]\par \par \par Diplomate, American Board of Physical Medicine and Rehabilitation\par Diplomate, American Board of Pain Medicine \par Diplomate, American Board of Pain Management\par \par \par

## 2023-02-27 ENCOUNTER — APPOINTMENT (OUTPATIENT)
Dept: PAIN MANAGEMENT | Facility: CLINIC | Age: 68
End: 2023-02-27
Payer: OTHER MISCELLANEOUS

## 2023-02-27 VITALS
HEIGHT: 71 IN | DIASTOLIC BLOOD PRESSURE: 89 MMHG | WEIGHT: 217 LBS | BODY MASS INDEX: 30.38 KG/M2 | SYSTOLIC BLOOD PRESSURE: 183 MMHG | HEART RATE: 56 BPM

## 2023-02-27 PROCEDURE — 99072 ADDL SUPL MATRL&STAF TM PHE: CPT

## 2023-02-27 PROCEDURE — 99213 OFFICE O/P EST LOW 20 MIN: CPT | Mod: ACP

## 2023-02-27 NOTE — HISTORY OF PRESENT ILLNESS
[FreeTextEntry1] : ORIGINAL PRESENTATION: This is a 67 year old man complaining of lower back and bilateral leg and foot pain. He also complains of upper back and neck pain into the upper extremities. The pain started after an injury at work on December 21, 2006 when he was moving a dresser and he fell down the steps with the dresser landing on top of him. Patient describes pain as severe. During the last month the pain has been no typical pattern. Pain described as burning, shooting, sharp, cutting, pressure-like, cramping, tall/aching, throbbing. Pain is associated with numbness and pins and needles into the upper and lower extremities. Patient has weakness in the upper and lower extremities Pain is not changed with lying down, standing, sitting, walking, exercise, relaxation, coughing sneezing or bowel movements.\par \par ACTIVITES: Can walk less than 1 block, uses scooter or rolling walker. He can sit for several hours, stand for 30-45 minutes. He sometimes lies down due to pain. He uses a cane, walker or scooter for mobility. HE cannot go to work, perform household chores, run local errands, participate in recreational activities or exercise.\par \par PRIOR PAIN TREATMENTS: No relief with surgery, traction, nerve block/injections, physical therapy, exercise, TENS unit, heat/cold treatment, psychotherapy, acupuncture, hypnosis, biofeedback, chiropractic manipulation, spinal cord manipulation.\par \par PRIOR PAIN MEDICATIONS: MSContin, Oxycodone, Baclofen, Cymbalta, Xanax\par \par PATIENT PRESENTS FOR FOLLOW UP: He is under our care for chronic lumbar pain with radiculopathy which he is receiving continuing active treatment for.  He continues to experience low back pain radiating into the left leg. As the patient was entering our facility today he fell while using his walker as he says "my nerves gave out". The fall was witnessed by the  and he reported no head trauma as he fell sideways on his shoulder. He continues to explain that he is going through withdrawals from the previous decrease in medication. Patient reports experiencing shaking, inability to sleep, and mood swings from the prior medication decrease. He was advised not trial a pain pump through his PCP. \par \par He continues to be managed on a current regimen of MS Contin 60 mg bid, Percocet 10/325mg once daily prn, and Cymbalta qd providing him with 50% relief giving him quality of life without side effects. A conversation was initiated about decreasing his Percocet dosage going forward, however, patient expressed concern and would like to hold off until his next follow up. \par \par \par UDS to be completed next visit (pt.unable to give urine due to penile infection), previous UDS inconsistent (+ benzo)\par

## 2023-02-27 NOTE — ASSESSMENT
[FreeTextEntry1] : This is a 67 year old male with chronic lumbar pain with radiculopathy. Patient experienced a fall in the office today that was atraumatic.  He will remain on the above regimen and was made aware of our plan to continue decreasing his medication dosage. He will be prescribed clonidine patches to help with the withdrawal symptoms. He will follow up in 4 weeks for medication refill and is aware if there are any issues he will contact the office. All this patients questions were answered and the conversation was understood well. \par \par Patient was unable to give urine today and will schedule an appointment to complete UDS. Medications will not be refilled during this visit and patient was made aware.\par \par I explained the risk of addiction, tolerance and withdrawals. UDS will be done randomly and a drug agreement was signed.\par \par I advised the patient they must keep their medication under a lock and key, or in a safe place away from children or other individuals. This medication given is solely for the patient and under no circumstances to be shared. Patient verbalized this and is in agreement with the aforementioned. I explained the risk of addiction, tolerance, and withdrawals. UDS will be done randomly and a drug agreement was signed.\par \par Armond Snyder PA-C\par Brina Espinoza MD\par \par

## 2023-03-01 ENCOUNTER — RESULT CHARGE (OUTPATIENT)
Age: 68
End: 2023-03-01

## 2023-03-01 ENCOUNTER — LABORATORY RESULT (OUTPATIENT)
Age: 68
End: 2023-03-01

## 2023-03-01 ENCOUNTER — APPOINTMENT (OUTPATIENT)
Dept: PAIN MANAGEMENT | Facility: CLINIC | Age: 68
End: 2023-03-01
Payer: OTHER MISCELLANEOUS

## 2023-03-01 LAB
AMP / AMPHETAMINE: NEGATIVE
BUP / BUPRENORPHINE: NEGATIVE
BZO / OXAZEPAM: POSITIVE
COC / COCAINE: NEGATIVE
CREATININE: 100 MG/DL
MDMA / METHYLENEDIOXYMETHAMPHETAMINE: NEGATIVE
MET / METHAMPHETAMINES: NEGATIVE
MOP / MORPHINE: POSITIVE
MTD / METHADONE: NEGATIVE
OXY / OXYCODONE: POSITIVE
PCP / PHENCYCLIDINE: NEGATIVE
PH: 7
SPECIFIC GRAVITY: 1.03
TEMPERATURE: 94 C
THC / MARIJUANA: POSITIVE

## 2023-03-01 PROCEDURE — 99072 ADDL SUPL MATRL&STAF TM PHE: CPT

## 2023-03-01 PROCEDURE — 80305 DRUG TEST PRSMV DIR OPT OBS: CPT | Mod: QW

## 2023-03-22 ENCOUNTER — APPOINTMENT (OUTPATIENT)
Dept: PAIN MANAGEMENT | Facility: CLINIC | Age: 68
End: 2023-03-22

## 2023-03-23 ENCOUNTER — APPOINTMENT (OUTPATIENT)
Dept: PAIN MANAGEMENT | Facility: CLINIC | Age: 68
End: 2023-03-23
Payer: OTHER MISCELLANEOUS

## 2023-03-23 VITALS
BODY MASS INDEX: 30.38 KG/M2 | SYSTOLIC BLOOD PRESSURE: 105 MMHG | WEIGHT: 217 LBS | HEIGHT: 71 IN | DIASTOLIC BLOOD PRESSURE: 57 MMHG | HEART RATE: 69 BPM

## 2023-03-23 PROCEDURE — 99214 OFFICE O/P EST MOD 30 MIN: CPT

## 2023-03-23 RX ORDER — NALOXONE HYDROCHLORIDE 4 MG/.1ML
4 SPRAY NASAL
Qty: 1 | Refills: 2 | Status: ACTIVE | COMMUNITY
Start: 2023-03-23 | End: 1900-01-01

## 2023-03-23 NOTE — HISTORY OF PRESENT ILLNESS
· Assessment		  81 y/o F with CKD and CAD with possible infected ulcers LEs    IMPRESSION;   Necrotic eschar RLE. no abscess/cellulitis    7/8 WCX stenotrophomonas  LLE with no cellulitis distally  Sacral ulcer not infected  Pyuria with leucocytosis. Will treat as an ascending infection for now  BCx NG 7/8    RECOMMENDATIONS;   Burn following  ADD levaquin 500mg q48h IV for stenotrophomonas  Off loading  UCx GP f/u   D/C cefepime [FreeTextEntry1] : ORIGINAL PRESENTATION: This is a 67 year old man complaining of lower back and bilateral leg and foot pain. He also complains of upper back and neck pain into the upper extremities. The pain started after an injury at work on December 21, 2006 when he was moving a dresser and he fell down the steps with the dresser landing on top of him. Patient describes pain as severe. During the last month the pain has been no typical pattern. Pain described as burning, shooting, sharp, cutting, pressure-like, cramping, tall/aching, throbbing. Pain is associated with numbness and pins and needles into the upper and lower extremities. Patient has weakness in the upper and lower extremities Pain is not changed with lying down, standing, sitting, walking, exercise, relaxation, coughing sneezing or bowel movements.\par \par ACTIVITES: Can walk less than 1 block, uses scooter or rolling walker. He can sit for several hours, stand for 30-45 minutes. He sometimes lies down due to pain. He uses a cane, walker or scooter for mobility. HE cannot go to work, perform household chores, run local errands, participate in recreational activities or exercise.\par \par PRIOR PAIN TREATMENTS: No relief with surgery, traction, nerve block/injections, physical therapy, exercise, TENS unit, heat/cold treatment, psychotherapy, acupuncture, hypnosis, biofeedback, chiropractic manipulation, spinal cord manipulation.\par \par PRIOR PAIN MEDICATIONS: MSContin, Oxycodone, Baclofen, Cymbalta, Xanax\par \par PATIENT PRESENTS FOR FOLLOW UP: He is under our care for chronic lumbar pain with radiculopathy which he is receiving continuing active treatment for.  He continues to experience low back pain radiating into the left leg. He is prone to frequent falls and recently burned his left arm as a result of falling while cooking. He states that he is going through withdrawals from the previous decrease in medication. He was advised not trial a pain pump through his PCP. He is coughing excessively and is advised to follow up with his PCP for further evaluation. \par \par He continues to be managed on a current regimen of MS Contin 60 mg bid, Percocet 10/325mg once a day and Cymbalta qd providing him with 50% relief giving him quality of life without side effects. I will discontinue the Percocet completely at this time. He is on benzodiazepines patient is opioid analgesics and I explained to him that he is at high risk for accidental overdose which is the reason we are attempting to wean down the amount of medication he takes.He also takes baclofen he also takes baclofen, and duloxetine.  He is complaining of some withdrawal symptoms therefore I will prescribe a clonidine patch and I am also sending naloxone to the pharmacy.\par \par

## 2023-03-23 NOTE — ASSESSMENT
[FreeTextEntry1] : This is a 67 year old male with chronic lumbar pain with radiculopathy.I will discontinue the Percocet completely at this time.  He will continue MS Contin 60 mg every 12 hours.  I will send clonidine patch and Narcan to the pharmacy as well.  He will follow up in 4 weeks for medication refill and is aware if there are any issues he will contact the office. All this patients questions were answered and the conversation was understood well.\par \par I explained the risk of addiction, tolerance and withdrawals. UDS will be done randomly and a drug agreement was signed.\par \par I advised the patient they must keep their medication under a lock and key, or in a safe place away from children or other individuals. This medication given is solely for the patient and under no circumstances to be shared. Patient verbalized this and is in agreement with the aforementioned. I explained the risk of addiction, tolerance, and withdrawals. UDS will be done randomly and a drug agreement was signed.\par \par \par I, Karma Lazcano, attest that this documentation has been prepared under the direction and in the presence of Provider Brina Espinoza MD.\par \par \par Thank you for allowing me to assist in the management of this patient. \par \par \par Best Regards, \par \par \par Brina Espinoza M.D., [facility]\par \par \par Diplomate, American Board of Physical Medicine and Rehabilitation\par Diplomate, American Board of Pain Medicine \par Diplomate, American Board of Pain Management\par

## 2023-04-20 ENCOUNTER — APPOINTMENT (OUTPATIENT)
Dept: PAIN MANAGEMENT | Facility: CLINIC | Age: 68
End: 2023-04-20
Payer: OTHER MISCELLANEOUS

## 2023-04-20 VITALS
SYSTOLIC BLOOD PRESSURE: 80 MMHG | HEIGHT: 71 IN | HEART RATE: 101 BPM | WEIGHT: 217 LBS | DIASTOLIC BLOOD PRESSURE: 49 MMHG | BODY MASS INDEX: 30.38 KG/M2

## 2023-04-20 VITALS
WEIGHT: 217 LBS | BODY MASS INDEX: 30.27 KG/M2 | DIASTOLIC BLOOD PRESSURE: 49 MMHG | SYSTOLIC BLOOD PRESSURE: 80 MMHG | HEART RATE: 101 BPM

## 2023-04-20 PROCEDURE — 99214 OFFICE O/P EST MOD 30 MIN: CPT

## 2023-04-20 NOTE — DATA REVIEWED
[FreeTextEntry1] : MRI of the lumbar spine dated 7/14/22 finds at L3-L4 postsurgical changes from laminectomy with the canal decompressed. No significant canal enhancement to suggest granulation tissue. Despite the disc spacer, there is still advanced disc space Narrowing.  There is an associated disc bulge with disc edema.  Evaluation of the neural foramina is limited due to the susceptibility artifact from the hardware.  Combined with facet arthropathy there appears to be moderate bilateral neural foraminal stenosis with significant crowding of the exiting left L3 nerve root and mild crowding of the exiting right L3 nerve root.  At L2-3 5 mm of retrolisthesis with associated broad-based disc bulge and facet arthropathy with moderate left and mild to moderate right-sided neural foraminal stenosis.  Combined with buckling of the ligamentum flavum and prominent dorsal epidural fat there is moderate canal stenosis.  At L4-L5 trace 1 mm of retrolisthesis with associated broad-based disc bulge demonstrating minimal annular fissuring contacts the exiting right L4 nerve root.  Combined with facet arthropathy there is moderate right-sided neural foraminal external stenosis.  At L1-L2 left-sided facet arthropathy abuts the exiting left exiting L1 nerve root with mild left-sided neural foraminal stenosis.  Trace 1 mm of retrolisthesis with tiny disc bulge without significant canal stenosis.  At T12- L1 there is left-sided facet arthropathy likely with a tiny synovial cyst abutting the exiting left T12 nerve root.\par \par IMAGING: MRI in January 2017, prior L3 laminectomies and posterior fusions with right-sided pedicle screws at L3 and L4, L3 through L4 interbody fusion cage with a widely patent spinal canal and foramina, multilevel lumbar spondylosis, grade 1 mild retrolisthesis at L2, and presence of multiple prevertebral bridging osteophyte throughout the lumbar region especially at L2-L3. There is a large right renal cyst. Bulging at L4-L5 disc with subligamentous shallow enhancing disc protrusion. \par \par Flexion and extension x-ray of the lumbar spine in 2017, instability appreciated at L2-L3 with retrolisthesis appreciated. It does appear to worse on flexion views. EMG revealed a left L4 nerve root dysfunction. \par \par SOAPP: low on 12/28/22\par Low risk: Patient has combination of a low risk SOAP and no risk factors. UDS would be repeated randomly every quarter. \par \par UDS: 3/01/23, +BZO, OXY, MOP, THC. \par \par [place] REGISTRY: Checked\par

## 2023-04-20 NOTE — HISTORY OF PRESENT ILLNESS
[FreeTextEntry1] : ORIGINAL PRESENTATION: This is a 67 year old man complaining of lower back and bilateral leg and foot pain. He also complains of upper back and neck pain into the upper extremities. The pain started after an injury at work on December 21, 2006 when he was moving a dresser and he fell down the steps with the dresser landing on top of him. Patient describes pain as severe. During the last month the pain has been no typical pattern. Pain described as burning, shooting, sharp, cutting, pressure-like, cramping, tall/aching, throbbing. Pain is associated with numbness and pins and needles into the upper and lower extremities. Patient has weakness in the upper and lower extremities Pain is not changed with lying down, standing, sitting, walking, exercise, relaxation, coughing sneezing or bowel movements.\par \par ACTIVITES: Can walk less than 1 block, uses scooter or rolling walker. He can sit for several hours, stand for 30-45 minutes. He sometimes lies down due to pain. He uses a cane, walker or scooter for mobility. HE cannot go to work, perform household chores, run local errands, participate in recreational activities or exercise.\par \par PRIOR PAIN TREATMENTS: No relief with surgery, traction, nerve block/injections, physical therapy, exercise, TENS unit, heat/cold treatment, psychotherapy, acupuncture, hypnosis, biofeedback, chiropractic manipulation, spinal cord manipulation.\par \par PRIOR PAIN MEDICATIONS: MSContin, Oxycodone, Baclofen, Cymbalta, Xanax\par \par PATIENT PRESENTS FOR FOLLOW UP: He is under our care for chronic lumbar pain with radiculopathy which he is receiving continuing active treatment for.  He continues to experience low back pain radiating into the left leg. He is prone to frequent falls. He states that he fell twice this morning and his blood pressure is low. He states he is feeling fine and does not wish to go to the hospital at this time.\par \par He continues to be managed on a current regimen of MS Contin 60 mg bid and Cymbalta qd providing him with 50% relief giving him quality of life without side effects. He also takes baclofen baclofen and duloxetine for symptoms management.  \par \par Of note, his previous UDS was positive for +THC. He states this is because he has family members who use recreational marijuana in his home. He is advised that going forward his UDS must be free of THC.

## 2023-04-20 NOTE — ASSESSMENT
[FreeTextEntry1] : This is a 67 year old male with chronic lumbar pain with radiculopathy. He will continue MS Contin 60 mg every 12 hours and his other medications unchanged. He will follow up in 4 weeks for refills and reassessment. All this patients questions were answered and the conversation was understood well.\par \par \par I explained the risk of addiction, tolerance and withdrawals. UDS will be done randomly and a drug agreement was signed.\par \par I advised the patient they must keep their medication under a lock and key, or in a safe place away from children or other individuals. This medication given is solely for the patient and under no circumstances to be shared. Patient verbalized this and is in agreement with the aforementioned. I explained the risk of addiction, tolerance, and withdrawals. UDS will be done randomly and a drug agreement was signed.\par \par \par I, Armida Carbajal, attest that this documentation has been prepared under the direction and in the presence of Provider Brina Espinoza MD.\par \par \par Thank you for allowing me to assist in the management of this patient. \par \par \par Best Regards, \par \par \par Brina Espinoza M.D., FAAPMR\par \par \par Diplomate, American Board of Physical Medicine and Rehabilitation\par Diplomate, American Board of Pain Medicine \par Diplomate, American Board of Pain Management\par

## 2023-04-21 ENCOUNTER — LABORATORY RESULT (OUTPATIENT)
Age: 68
End: 2023-04-21

## 2023-04-21 ENCOUNTER — APPOINTMENT (OUTPATIENT)
Dept: PAIN MANAGEMENT | Facility: CLINIC | Age: 68
End: 2023-04-21
Payer: OTHER MISCELLANEOUS

## 2023-04-21 PROCEDURE — 80305 DRUG TEST PRSMV DIR OPT OBS: CPT | Mod: QW

## 2023-04-25 LAB
PM 6 MAM: NEGATIVE NG/ML
PM 7-AMINO-CLONAZ: NEGATIVE NG/ML
PM ALPHA-HYDROXY-ALPRAZOLAM: 421 NG/ML
PM ALPHA-HYDROXY-MIDAZOLAM: NEGATIVE NG/ML
PM ALPRAZOLAM: 162 NG/ML
PM AMOBARBITAL: NEGATIVE NG/ML
PM AMPHETAMINE INTERP: NEGATIVE
PM AMPHETAMINE: NEGATIVE NG/ML
PM BARBURATES INTERP: NEGATIVE
PM BEG: NEGATIVE NG/ML
PM BENZODIAZEPINES INTERP: POSITIVE
PM BUPRENORPHINE INTERP: NEGATIVE
PM BUPRENORPHINE: NEGATIVE NG/ML
PM BUTALBITAL: NEGATIVE NG/ML
PM CLONAZEPAM: NEGATIVE NG/ML
PM COCAINE INTERP: NEGATIVE
PM COCAINE: NEGATIVE NG/ML
PM CODIENE: NEGATIVE NG/ML
PM COTININE: 865 NG/ML
PM DIAZEPAM: NEGATIVE NG/ML
PM DIHYROCODEINE: NEGATIVE NG/ML
PM EDDP: NEGATIVE NG/ML
PM FENTANYL INTERP: NEGATIVE
PM FENTANYL: NEGATIVE NG/ML
PM FLUNITRAZEPAM: NEGATIVE NG/ML
PM FLURAZEPAM: NEGATIVE NG/ML
PM HYDROCODONE: NEGATIVE NG/ML
PM HYDROMORPHONE: 77 NG/ML
PM LORAZEPAM: NEGATIVE NG/ML
PM MARIJUANA (DELTA-9-THC): NEGATIVE NG/ML
PM MARIJUANA INTERP: NEGATIVE
PM MDA: NEGATIVE NG/ML
PM MDEA: NEGATIVE NG/ML
PM MDMA: NEGATIVE NG/ML
PM MEPERIDINE: NEGATIVE NG/ML
PM METHADONE INTERP: NEGATIVE
PM METHADONE: NEGATIVE NG/ML
PM METHAMPHETAMINE: NEGATIVE NG/ML
PM MIDAZOLAM: NEGATIVE NG/ML
PM MORPHINE: >1000 NG/ML
PM NALOXONE: NEGATIVE NG/ML
PM NALTREXONE: NEGATIVE NG/ML
PM NICOTINE INTERP: POSITIVE
PM NORBUPRENORPHINE: NEGATIVE NG/ML
PM NORDIAZEPAM: NEGATIVE NG/ML
PM NORMEPERIDINE: NEGATIVE NG/ML
PM NOROXYCODONE: NEGATIVE NG/ML
PM OPIOID INTERP: POSITIVE
PM OXAZEPAM: NEGATIVE NG/ML
PM OXXYCODONE INTERP: NEGATIVE
PM OXYCODONE: NEGATIVE NG/ML
PM OXYMORPHONE: NEGATIVE NG/ML
PM PCP: NEGATIVE NG/ML
PM PHENCYCLIDINE INTERP: NEGATIVE
PM PHENOBARBITAL: NEGATIVE NG/ML
PM PPX: NEGATIVE NG/ML
PM PROPOXYPHENE INTERP: NEGATIVE
PM SECOBARBITAL: NEGATIVE NG/ML
PM SUFENTANIL: NEGATIVE NG/ML
PM TAPENTADOL: NEGATIVE NG/ML
PM TEMAZEPAM: NEGATIVE NG/ML
PM TRAMADOL INTERP: NEGATIVE
PM TRAMADOL: NEGATIVE NG/ML

## 2023-05-03 ENCOUNTER — INPATIENT (INPATIENT)
Facility: HOSPITAL | Age: 68
LOS: 6 days | Discharge: HOME HEALTH | DRG: 175 | End: 2023-05-10
Attending: INTERNAL MEDICINE | Admitting: INTERNAL MEDICINE
Payer: MEDICARE

## 2023-05-03 VITALS
HEART RATE: 100 BPM | TEMPERATURE: 98 F | RESPIRATION RATE: 24 BRPM | DIASTOLIC BLOOD PRESSURE: 63 MMHG | OXYGEN SATURATION: 86 % | WEIGHT: 205.03 LBS | HEIGHT: 71 IN | SYSTOLIC BLOOD PRESSURE: 92 MMHG

## 2023-05-03 DIAGNOSIS — I82.442 ACUTE EMBOLISM AND THROMBOSIS OF LEFT TIBIAL VEIN: ICD-10-CM

## 2023-05-03 DIAGNOSIS — M54.30 SCIATICA, UNSPECIFIED SIDE: ICD-10-CM

## 2023-05-03 DIAGNOSIS — Z88.0 ALLERGY STATUS TO PENICILLIN: ICD-10-CM

## 2023-05-03 DIAGNOSIS — E11.51 TYPE 2 DIABETES MELLITUS WITH DIABETIC PERIPHERAL ANGIOPATHY WITHOUT GANGRENE: ICD-10-CM

## 2023-05-03 DIAGNOSIS — J96.01 ACUTE RESPIRATORY FAILURE WITH HYPOXIA: ICD-10-CM

## 2023-05-03 DIAGNOSIS — Z91.018 ALLERGY TO OTHER FOODS: ICD-10-CM

## 2023-05-03 DIAGNOSIS — I50.31 ACUTE DIASTOLIC (CONGESTIVE) HEART FAILURE: ICD-10-CM

## 2023-05-03 DIAGNOSIS — Z98.890 OTHER SPECIFIED POSTPROCEDURAL STATES: ICD-10-CM

## 2023-05-03 DIAGNOSIS — I11.0 HYPERTENSIVE HEART DISEASE WITH HEART FAILURE: ICD-10-CM

## 2023-05-03 DIAGNOSIS — F11.20 OPIOID DEPENDENCE, UNCOMPLICATED: ICD-10-CM

## 2023-05-03 DIAGNOSIS — Z91.199 PATIENT'S NONCOMPLIANCE WITH OTHER MEDICAL TREATMENT AND REGIMEN DUE TO UNSPECIFIED REASON: ICD-10-CM

## 2023-05-03 DIAGNOSIS — I26.09 OTHER PULMONARY EMBOLISM WITH ACUTE COR PULMONALE: ICD-10-CM
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
## 2023-05-03 DIAGNOSIS — Z95.828 PRESENCE OF OTHER VASCULAR IMPLANTS AND GRAFTS: ICD-10-CM

## 2023-05-03 DIAGNOSIS — G89.29 OTHER CHRONIC PAIN: ICD-10-CM

## 2023-05-03 DIAGNOSIS — I82.432 ACUTE EMBOLISM AND THROMBOSIS OF LEFT POPLITEAL VEIN: ICD-10-CM

## 2023-05-03 DIAGNOSIS — Z98.890 OTHER SPECIFIED POSTPROCEDURAL STATES: Chronic | ICD-10-CM

## 2023-05-03 DIAGNOSIS — T40.2X1A POISONING BY OTHER OPIOIDS, ACCIDENTAL (UNINTENTIONAL), INITIAL ENCOUNTER: ICD-10-CM

## 2023-05-03 DIAGNOSIS — I82.441 ACUTE EMBOLISM AND THROMBOSIS OF RIGHT TIBIAL VEIN: ICD-10-CM

## 2023-05-03 DIAGNOSIS — F13.20 SEDATIVE, HYPNOTIC OR ANXIOLYTIC DEPENDENCE, UNCOMPLICATED: ICD-10-CM

## 2023-05-03 DIAGNOSIS — J44.1 CHRONIC OBSTRUCTIVE PULMONARY DISEASE WITH (ACUTE) EXACERBATION: ICD-10-CM

## 2023-05-03 DIAGNOSIS — Z88.6 ALLERGY STATUS TO ANALGESIC AGENT: ICD-10-CM

## 2023-05-03 DIAGNOSIS — E11.40 TYPE 2 DIABETES MELLITUS WITH DIABETIC NEUROPATHY, UNSPECIFIED: ICD-10-CM

## 2023-05-03 DIAGNOSIS — I26.99 OTHER PULMONARY EMBOLISM WITHOUT ACUTE COR PULMONALE: ICD-10-CM

## 2023-05-03 DIAGNOSIS — F17.210 NICOTINE DEPENDENCE, CIGARETTES, UNCOMPLICATED: ICD-10-CM

## 2023-05-03 DIAGNOSIS — F41.9 ANXIETY DISORDER, UNSPECIFIED: ICD-10-CM

## 2023-05-03 DIAGNOSIS — I27.20 PULMONARY HYPERTENSION, UNSPECIFIED: ICD-10-CM

## 2023-05-03 LAB
ALBUMIN SERPL ELPH-MCNC: 3.3 G/DL — LOW (ref 3.5–5.2)
ALP SERPL-CCNC: 65 U/L — SIGNIFICANT CHANGE UP (ref 30–115)
ALT FLD-CCNC: <5 U/L — SIGNIFICANT CHANGE UP (ref 0–41)
ANION GAP SERPL CALC-SCNC: 11 MMOL/L — SIGNIFICANT CHANGE UP (ref 7–14)
APTT BLD: 38.5 SEC — SIGNIFICANT CHANGE UP (ref 27–39.2)
AST SERPL-CCNC: 10 U/L — SIGNIFICANT CHANGE UP (ref 0–41)
BASOPHILS # BLD AUTO: 0.07 K/UL — SIGNIFICANT CHANGE UP (ref 0–0.2)
BASOPHILS NFR BLD AUTO: 1.2 % — HIGH (ref 0–1)
BILIRUB SERPL-MCNC: 0.3 MG/DL — SIGNIFICANT CHANGE UP (ref 0.2–1.2)
BUN SERPL-MCNC: 22 MG/DL — HIGH (ref 10–20)
CALCIUM SERPL-MCNC: 8.7 MG/DL — SIGNIFICANT CHANGE UP (ref 8.4–10.4)
CHLORIDE SERPL-SCNC: 106 MMOL/L — SIGNIFICANT CHANGE UP (ref 98–110)
CO2 SERPL-SCNC: 24 MMOL/L — SIGNIFICANT CHANGE UP (ref 17–32)
CREAT SERPL-MCNC: 1.1 MG/DL — SIGNIFICANT CHANGE UP (ref 0.7–1.5)
EGFR: 74 ML/MIN/1.73M2 — SIGNIFICANT CHANGE UP
EOSINOPHIL # BLD AUTO: 0.23 K/UL — SIGNIFICANT CHANGE UP (ref 0–0.7)
EOSINOPHIL NFR BLD AUTO: 3.8 % — SIGNIFICANT CHANGE UP (ref 0–8)
GLUCOSE SERPL-MCNC: 93 MG/DL — SIGNIFICANT CHANGE UP (ref 70–99)
HCT VFR BLD CALC: 37.2 % — LOW (ref 42–52)
HGB BLD-MCNC: 11.4 G/DL — LOW (ref 14–18)
IMM GRANULOCYTES NFR BLD AUTO: 0.2 % — SIGNIFICANT CHANGE UP (ref 0.1–0.3)
INR BLD: 1.22 RATIO — SIGNIFICANT CHANGE UP (ref 0.65–1.3)
LYMPHOCYTES # BLD AUTO: 1.57 K/UL — SIGNIFICANT CHANGE UP (ref 1.2–3.4)
LYMPHOCYTES # BLD AUTO: 26.1 % — SIGNIFICANT CHANGE UP (ref 20.5–51.1)
MCHC RBC-ENTMCNC: 27.7 PG — SIGNIFICANT CHANGE UP (ref 27–31)
MCHC RBC-ENTMCNC: 30.6 G/DL — LOW (ref 32–37)
MCV RBC AUTO: 90.5 FL — SIGNIFICANT CHANGE UP (ref 80–94)
MONOCYTES # BLD AUTO: 0.53 K/UL — SIGNIFICANT CHANGE UP (ref 0.1–0.6)
MONOCYTES NFR BLD AUTO: 8.8 % — SIGNIFICANT CHANGE UP (ref 1.7–9.3)
NEUTROPHILS # BLD AUTO: 3.61 K/UL — SIGNIFICANT CHANGE UP (ref 1.4–6.5)
NEUTROPHILS NFR BLD AUTO: 59.9 % — SIGNIFICANT CHANGE UP (ref 42.2–75.2)
NRBC # BLD: 0 /100 WBCS — SIGNIFICANT CHANGE UP (ref 0–0)
NT-PROBNP SERPL-SCNC: 6472 PG/ML — HIGH (ref 0–300)
PLATELET # BLD AUTO: 286 K/UL — SIGNIFICANT CHANGE UP (ref 130–400)
PMV BLD: 9.6 FL — SIGNIFICANT CHANGE UP (ref 7.4–10.4)
POTASSIUM SERPL-MCNC: 4.8 MMOL/L — SIGNIFICANT CHANGE UP (ref 3.5–5)
POTASSIUM SERPL-SCNC: 4.8 MMOL/L — SIGNIFICANT CHANGE UP (ref 3.5–5)
PROT SERPL-MCNC: 7.2 G/DL — SIGNIFICANT CHANGE UP (ref 6–8)
PROTHROM AB SERPL-ACNC: 14 SEC — HIGH (ref 9.95–12.87)
RBC # BLD: 4.11 M/UL — LOW (ref 4.7–6.1)
RBC # FLD: 17.1 % — HIGH (ref 11.5–14.5)
SODIUM SERPL-SCNC: 141 MMOL/L — SIGNIFICANT CHANGE UP (ref 135–146)
TROPONIN T SERPL-MCNC: 0.02 NG/ML — HIGH
WBC # BLD: 6.02 K/UL — SIGNIFICANT CHANGE UP (ref 4.8–10.8)
WBC # FLD AUTO: 6.02 K/UL — SIGNIFICANT CHANGE UP (ref 4.8–10.8)

## 2023-05-03 PROCEDURE — 82330 ASSAY OF CALCIUM: CPT

## 2023-05-03 PROCEDURE — 83036 HEMOGLOBIN GLYCOSYLATED A1C: CPT

## 2023-05-03 PROCEDURE — 85014 HEMATOCRIT: CPT

## 2023-05-03 PROCEDURE — 85730 THROMBOPLASTIN TIME PARTIAL: CPT

## 2023-05-03 PROCEDURE — 97116 GAIT TRAINING THERAPY: CPT | Mod: GP

## 2023-05-03 PROCEDURE — 85610 PROTHROMBIN TIME: CPT

## 2023-05-03 PROCEDURE — 83605 ASSAY OF LACTIC ACID: CPT

## 2023-05-03 PROCEDURE — 80053 COMPREHEN METABOLIC PANEL: CPT

## 2023-05-03 PROCEDURE — 94640 AIRWAY INHALATION TREATMENT: CPT

## 2023-05-03 PROCEDURE — 85025 COMPLETE CBC W/AUTO DIFF WBC: CPT

## 2023-05-03 PROCEDURE — 82803 BLOOD GASES ANY COMBINATION: CPT

## 2023-05-03 PROCEDURE — 84443 ASSAY THYROID STIM HORMONE: CPT

## 2023-05-03 PROCEDURE — 93010 ELECTROCARDIOGRAM REPORT: CPT

## 2023-05-03 PROCEDURE — 85018 HEMOGLOBIN: CPT

## 2023-05-03 PROCEDURE — 84484 ASSAY OF TROPONIN QUANT: CPT

## 2023-05-03 PROCEDURE — 99285 EMERGENCY DEPT VISIT HI MDM: CPT

## 2023-05-03 PROCEDURE — 97162 PT EVAL MOD COMPLEX 30 MIN: CPT | Mod: GP

## 2023-05-03 PROCEDURE — 80061 LIPID PANEL: CPT

## 2023-05-03 PROCEDURE — 85027 COMPLETE CBC AUTOMATED: CPT

## 2023-05-03 PROCEDURE — 93306 TTE W/DOPPLER COMPLETE: CPT

## 2023-05-03 PROCEDURE — 71275 CT ANGIOGRAPHY CHEST: CPT | Mod: 26,MA

## 2023-05-03 PROCEDURE — 71045 X-RAY EXAM CHEST 1 VIEW: CPT

## 2023-05-03 PROCEDURE — 99222 1ST HOSP IP/OBS MODERATE 55: CPT

## 2023-05-03 PROCEDURE — 93970 EXTREMITY STUDY: CPT | Mod: 26

## 2023-05-03 PROCEDURE — 80048 BASIC METABOLIC PNL TOTAL CA: CPT

## 2023-05-03 PROCEDURE — 84295 ASSAY OF SERUM SODIUM: CPT

## 2023-05-03 PROCEDURE — 84132 ASSAY OF SERUM POTASSIUM: CPT

## 2023-05-03 PROCEDURE — 36415 COLL VENOUS BLD VENIPUNCTURE: CPT

## 2023-05-03 PROCEDURE — 71045 X-RAY EXAM CHEST 1 VIEW: CPT | Mod: 26

## 2023-05-03 PROCEDURE — 83735 ASSAY OF MAGNESIUM: CPT

## 2023-05-03 PROCEDURE — 84100 ASSAY OF PHOSPHORUS: CPT

## 2023-05-03 PROCEDURE — 80307 DRUG TEST PRSMV CHEM ANLYZR: CPT

## 2023-05-03 PROCEDURE — 82962 GLUCOSE BLOOD TEST: CPT

## 2023-05-03 RX ORDER — HEPARIN SODIUM 5000 [USP'U]/ML
3500 INJECTION INTRAVENOUS; SUBCUTANEOUS EVERY 6 HOURS
Refills: 0 | Status: DISCONTINUED | OUTPATIENT
Start: 2023-05-03 | End: 2023-05-04

## 2023-05-03 RX ORDER — HEPARIN SODIUM 5000 [USP'U]/ML
7500 INJECTION INTRAVENOUS; SUBCUTANEOUS ONCE
Refills: 0 | Status: COMPLETED | OUTPATIENT
Start: 2023-05-03 | End: 2023-05-03

## 2023-05-03 RX ORDER — HEPARIN SODIUM 5000 [USP'U]/ML
7500 INJECTION INTRAVENOUS; SUBCUTANEOUS EVERY 6 HOURS
Refills: 0 | Status: DISCONTINUED | OUTPATIENT
Start: 2023-05-03 | End: 2023-05-04

## 2023-05-03 RX ORDER — FUROSEMIDE 40 MG
40 TABLET ORAL ONCE
Refills: 0 | Status: COMPLETED | OUTPATIENT
Start: 2023-05-03 | End: 2023-05-03

## 2023-05-03 RX ORDER — ENOXAPARIN SODIUM 100 MG/ML
90 INJECTION SUBCUTANEOUS ONCE
Refills: 0 | Status: DISCONTINUED | OUTPATIENT
Start: 2023-05-03 | End: 2023-05-03

## 2023-05-03 RX ORDER — HEPARIN SODIUM 5000 [USP'U]/ML
INJECTION INTRAVENOUS; SUBCUTANEOUS
Qty: 25000 | Refills: 0 | Status: DISCONTINUED | OUTPATIENT
Start: 2023-05-03 | End: 2023-05-04

## 2023-05-03 RX ADMIN — HEPARIN SODIUM 1700 UNIT(S)/HR: 5000 INJECTION INTRAVENOUS; SUBCUTANEOUS at 20:47

## 2023-05-03 RX ADMIN — Medication 40 MILLIGRAM(S): at 18:30

## 2023-05-03 RX ADMIN — HEPARIN SODIUM 7500 UNIT(S): 5000 INJECTION INTRAVENOUS; SUBCUTANEOUS at 20:45

## 2023-05-03 NOTE — ED PROVIDER NOTE - CLINICAL SUMMARY MEDICAL DECISION MAKING FREE TEXT BOX
67-year-old male with history of HTN, HLD, DM, pulmonary hypertension, DVT and right leg stents no longer on anticoagulation, presents with multiple complaints. His chief complaint is bilateral leg swelling, waxing/waning x1 month. He states it has been this bad in the past. Denies pain to the area. Also notes mild intermittent shortness of breath x1 month. He states he feels his symptoms began when he stopped his oxycodone and started his clonidine 0.2 mg/week patch for withdrawal symptoms at the end of February. Reports lightheadedness and syncope with BP "50/101" at the time, denies head or any other trauma. Denies change in his chronic smoker's cough, fever, vomiting, diarrhea, chest pain, and all other symptoms. Patient presents with undated Rx for PMD Dr. Benji Schwartz with diagnosis of CHF and hypoxia in the office to the high 80s. On exam, afebrile, hemodynamically stable, saturating 85 to 91% on room air, 95% on 3 L NC, NAD, nontoxic appearing, sitting comfortably in bed, no WOB/tachypnea, speaking full sentences, head NCAT, EOMI grossly, anicteric, MMM, no JVD, RRR, nml S1/S2, no m/r/g, lungs bibasilar rales, abd soft, NT, ND, nml BS, no rebound or guarding, AAO, CN's 3-12 grossly intact, SOUSA spontaneously, bilateral symmetric 2+ pitting edema, skin warm, well perfused, no rashes or hives/erythema/crepitus, 2+ DP pulses, <2 sec cap refil. Exam of lower extremities low suspicion for acute arterial compromise/ischemia to warrant arterial studies. No evidence of compartment syndrome or cellulitis/necrotizing fasciitis. Noted b/l DVT's as well as b/l segmental PE's with R heart strain. D/w Dr. Pugh of ICU, states pt not candidate for thrombectomy at this time, and requests admission to med tele and heparin at this time. Vascular consult as well. Patient nontoxic appearing, hemodynamically stable, satting well, no WOB. Admitted to internal medicine for further monitoring, w/u, and care.

## 2023-05-03 NOTE — ED PROVIDER NOTE - CARE PLAN
1 Principal Discharge DX:	Pulmonary embolism  Secondary Diagnosis:	DVT, bilateral lower limbs  Secondary Diagnosis:	Elevated troponin   Principal Discharge DX:	Pulmonary embolism  Secondary Diagnosis:	DVT, bilateral lower limbs  Secondary Diagnosis:	Elevated troponin  Secondary Diagnosis:	Hypoxia  Secondary Diagnosis:	CHF, acute

## 2023-05-03 NOTE — CONSULT NOTE ADULT - NS ATTEND AMEND GEN_ALL_CORE FT
I personally reviewed the venous duplex- there is evidence of DVT on both side.  Would recommend treatment of VTE per CHEST guidelines.  FU as out patient in 1 month upon discharge.

## 2023-05-03 NOTE — H&P ADULT - HISTORY OF PRESENT ILLNESS
67 year old patient, known to have HTN, HLD, COPD, smoker (1/2 ppd), 3 spinal operations (2010), PE (2018), DVT (2018) s/p thrombolysis and tPA instillation, sent to the ED by his PCP for workup of CHF.   Patient reports that for the past 1 week, he has been having progressive worsening of SOB upon exertion and b/l LE swelling. He also endorses orthopnea and PND. Last echo was done in 2021 with EF 66% and pulmonary HTN. He denies CP, palpitations. He denies recent travel, trauma or immobilization.   No fever, chills, URT, abdominal or urinary symptoms.     In ED, vitals significant for BP 92/63  RR 24 SpO2 86% on RA improved on 3L NC.   Laboratory workup significant for Hb 11.4 MCV 90.5  Troponin 0.02 pro BNP 6472  Venous duplex b/l LE: DVTs in b/l PT, right gastrocnemius and left popliteal.  CTA chest: Intraluminal filling defects, consistent with acute pulmonary emboli, are noted in segmental branches of the right upper and middle lobe pulmonary arteries. Intraluminal filling defects are noted in segmental and subsegmental branches of the left upper and left lowerlobe, and lingula pulmonary arteries. There is evidence of right heart strain (RV:LV ratio >1; RV=6.2 cm and LV   = 4.5 cm) There is reflux of contrast material into the IVC and hepatic veins compatible with right heart dysfunction.  He was started on heparin drip and given 40 mg IV Lasix once in ED.   Admitted to Telemetry for further workup and management.    67 year old patient, known to have HTN, HLD, DM, COPD (not on home O2), smoker (1/2 ppd), 3 spinal operations (2010), history of PE (2018) and DVT (2018) s/p thrombolysis and tPA instillation, sent to the ED by his PCP for workup of increased SOB and LE swelling.  Patient reports that for the past 2 weeks, he has been having progressive worsening of SOB upon exertion and at rest, associated with b/l LE swelling and erythema. He also endorses orthopnea and PND. Last echo was done in 2021 with EF 66% and pulmonary HTN. He denies CP, palpitations. He denies recent travel, trauma or immobilization. No fever, chills, URT or urinary symptoms.     To note, patient had an accident in 2006 s/p multiple operations and since then he has been on Percocet 90 mg. On March 2023, pain management cut down on Percocet and currently he is taking morphine 60 mg BID, Baclofen 10 mg and clonidine patch 0.2mg/day. He states that his BP has been on the low side (100's/50's) and has been vomiting after each meal with subsequent weight loss.     In ED, vitals significant for BP 92/63  RR 24 SpO2 86% on RA improved on 3L NC.   Laboratory workup significant for Hb 11.4 MCV 90.5  Troponin 0.02 pro BNP 6472  Venous duplex b/l LE: DVTs in b/l PT, right gastrocnemius and left popliteal.  CTA chest: Intraluminal filling defects, consistent with acute pulmonary emboli, are noted in segmental branches of the right upper and middle lobe pulmonary arteries. Intraluminal filling defects are noted in segmental and subsegmental branches of the left upper and left lowerlobe, and lingula pulmonary arteries. There is evidence of right heart strain (RV:LV ratio >1; RV=6.2 cm and LV   = 4.5 cm) There is reflux of contrast material into the IVC and hepatic veins compatible with right heart dysfunction.  He was started on heparin drip and given 40 mg IV Lasix once in ED.   Admitted to Telemetry for further workup and management.

## 2023-05-03 NOTE — ED PROVIDER NOTE - OBJECTIVE STATEMENT
Pt is a 67 year old male with PMH noted in chart presents to ED with complaints of sob. Pt states over last 2 weeks has been getting progressively more sob. Pt also has been having increased lower extremity edema bilat. Pt went to see PMD, concerned for NOS HF. Pt has no recent stress or echo. Denies any fever, chills, bodyaches, chest pain, abdominal pain, NVCD. Endorses orthopnea and PND

## 2023-05-03 NOTE — ED PROVIDER NOTE - NS ED ATTENDING STATEMENT MOD
This was a shared visit with the SHERRY. I reviewed and verified the documentation and independently performed the documented:

## 2023-05-03 NOTE — H&P ADULT - TIME BILLING
Patient known to our service with a long medical history of HTN, HLD, COPD, Active smoker, chronic pain, multiple failed back surgeries. Patient presented to the ER from the office with SOB and edema.  Patient has a hx of prior DVT, no longer on anticoagulation.     vs noted    NAD, using O2 via NC at 4 lpm  lungs decreased at bases  heart RR, S1 S2  abdomen BS+, soft  extremities + edema    < from: CT Angio Chest PE Protocol w/ IV Cont (05.03.23 @ 22:03) >    IMPRESSION:    Intraluminal filling defects, consistent with acute pulmonary emboli, are   noted in segmental branches of the right upper and middle lobe pulmonary   arteries. Intraluminal filling defects are noted in segmental and   subsegmental branches of the left upper and left lowerlobe, and lingula   pulmonary arteries.    There is evidence of right heart strain (RV:LV ratio >1; RV=6.2 cm and LV   = 4.5 cm)    There is reflux of contrast material into the IVC and hepatic veins   compatible with right heart dysfunction.    The findings were discussed with Dr. Gay at 10:32  5/3/2023 with read   back.    --- End of Report ---    A/P  Acute Hypoxic respiratory failure secondary to PE  PE associated with right heart strain  DVT  - started on IV Heparin  - no surgical intervention planned  - monitor on rx  - trend pulse ox    HTN, HLD, HF ?  - troponins likely elevated due to heart strain    COPD, Active smoker  - encourage smoking cessation  - continue repiratory treatments  - monitor pulse ox    DM, Diabetic neuropathy  - on oral rx  -monitor fs  - sliding scale coverage    Chronic back pain   Chronic Opioid Dependence  Chronic Benzo Dependence  - continue home medications  - on Morphine, Percocet  - on Xanax    Resume PO diet if no interventions planned

## 2023-05-03 NOTE — H&P ADULT - NSHPPHYSICALEXAM_GEN_ALL_CORE
T(C): 36.3 (05-03-23 @ 23:08), Max: 36.8 (05-03-23 @ 14:47)  HR: 68 (05-03-23 @ 23:08) (68 - 100)  BP: 121/78 (05-03-23 @ 23:08) (92/63 - 138/70)  RR: 20 (05-03-23 @ 23:08) (20 - 24)  SpO2: 95% (05-03-23 @ 23:08) (86% - 96%)    CONSTITUTIONAL: Well groomed, no apparent distress  RESP: No respiratory distress, no use of accessory muscles; B/L crackles and wheezing   CV: RRR, +S1S2; +3 peripheral edema  GI: Soft, NT, ND, no rebound, no guarding  NEURO: AAOx3; no focal neurologic deficits

## 2023-05-03 NOTE — ED PROVIDER NOTE - PHYSICAL EXAMINATION
Physical Exam    Vital Signs: I have reviewed the initial vital signs.  Constitutional: well-nourished, appears stated age, no acute distress  Eyes: Conjunctiva pink, Sclera clear, PERRLA, EOMI.  Cardiovascular: S1 and S2, regular rate, regular rhythm, well-perfused extremities, radial pulses equal and 2+  Respiratory: unlabored respiratory effort, clear to auscultation bilaterally no wheezing, rales and rhonchi  Gastrointestinal: soft, non-tender abdomen, no pulsatile mass, normal bowl sounds  Musculoskeletal: supple neck, bilat lower extremity edema 3+, no midline tenderness  Integumentary: warm, dry, no rash  Neurologic: awake, alert, cranial nerves II-XII grossly intact, extremities’ motor and sensory functions grossly intact  Psychiatric: appropriate mood, appropriate affect

## 2023-05-03 NOTE — ED PROVIDER NOTE - ATTENDING APP SHARED VISIT CONTRIBUTION OF CARE
67-year-old male with history of HTN, HLD, DM, pulmonary hypertension, DVT and right leg stents no longer on anticoagulation, presents with multiple complaints. His chief complaint is bilateral leg swelling, waxing/waning x1 month. He states it has been this bad in the past. Denies pain to the area. Also notes mild intermittent shortness of breath x1 month. He states he feels his symptoms began when he stopped his oxycodone and started his clonidine 0.2 mg/week patch for withdrawal symptoms at the end of February. Reports lightheadedness and syncope with BP "50/101" at the time, denies head or any other trauma. Denies change in his chronic smoker's cough, fever, vomiting, diarrhea, chest pain, and all other symptoms. Patient presents with undated Rx for PMD Dr. Benji Schwartz with diagnosis of CHF and hypoxia in the office to the high 80s. On exam, afebrile, hemodynamically stable, saturating 85 to 91% on room air, 95% on 3 L NC, NAD, nontoxic appearing, sitting comfortably in bed, no WOB/tachypnea, speaking full sentences, head NCAT, EOMI grossly, anicteric, MMM, no JVD, RRR, nml S1/S2, no m/r/g, lungs bibasilar rales, abd soft, NT, ND, nml BS, no rebound or guarding, AAO, CN's 3-12 grossly intact, SOUSA spontaneously, bilateral symmetric 2+ pitting edema, skin warm, well perfused, no rashes or hives/erythema/crepitus, 2+ DP pulses, <2 sec cap refil. Exam of lower extremities low suspicion for acute arterial compromise/ischemia to warrant arterial studies. No evidence of compartment syndrome or cellulitis/necrotizing fasciitis. Exam low suspicion for DVT, and ultrasound ___________. 67-year-old male with history of HTN, HLD, DM, pulmonary hypertension, DVT and right leg stents no longer on anticoagulation, presents with multiple complaints. His chief complaint is bilateral leg swelling, waxing/waning x1 month. He states it has been this bad in the past. Denies pain to the area. Also notes mild intermittent shortness of breath x1 month. He states he feels his symptoms began when he stopped his oxycodone and started his clonidine 0.2 mg/week patch for withdrawal symptoms at the end of February. Reports lightheadedness and syncope with BP "50/101" at the time, denies head or any other trauma. Denies change in his chronic smoker's cough, fever, vomiting, diarrhea, chest pain, and all other symptoms. Patient presents with undated Rx for PMD Dr. Benji Schwartz with diagnosis of CHF and hypoxia in the office to the high 80s. On exam, afebrile, hemodynamically stable, saturating 85 to 91% on room air, 95% on 3 L NC, NAD, nontoxic appearing, sitting comfortably in bed, no WOB/tachypnea, speaking full sentences, head NCAT, EOMI grossly, anicteric, MMM, no JVD, RRR, nml S1/S2, no m/r/g, lungs bibasilar rales, abd soft, NT, ND, nml BS, no rebound or guarding, AAO, CN's 3-12 grossly intact, SOUSA spontaneously, bilateral symmetric 2+ pitting edema, skin warm, well perfused, no rashes or hives/erythema/crepitus, 2+ DP pulses, <2 sec cap refil. Exam of lower extremities low suspicion for acute arterial compromise/ischemia to warrant arterial studies. No evidence of compartment syndrome or cellulitis/necrotizing fasciitis. Noted b/l DVT's as well as b/l segmental PE's with R heart strain. D/w Dr. Pugh of ICU, states pt not candidate for thrombectomy at this time, and requests admission to med tele and heparin at this time. Vascular consult as well. Patient nontoxic appearing, hemodynamically stable, satting well, no WOB. Admitted to internal medicine for further monitoring, w/u, and care.

## 2023-05-03 NOTE — ED PROVIDER NOTE - PROGRESS NOTE DETAILS
sandoval: due to pt having bilateral DVT, vasc surgery advised heparin drip, started. however given trop and BNP, with sob, will perform CTA to rule out PE, pending. case signed out to PGY 1 Leann DAVID: Received from day team, DVT study was positive bilaterally, pending CTA.  CTA shows + bilateral subsegmental pulmonary emboli with right heart strain.  ICU was consulted, shared decision making with plan for medical telemetry admission as patient is currently stable and well-appearing.  Signed out to MAR

## 2023-05-03 NOTE — CONSULT NOTE ADULT - ASSESSMENT
ASSESSMENT:  66yo male with PMHx of HTN, HLD, COPD, smoker (1/2 ppd), 3 spinal operations (2010), PE (2018), DVT (2018) s/p thrombolysis & tPA instillation sent to the ED by his PCP for workup of CHF 2/2 SOB and b/l leg swelling x 1 week. In the ED O2 sat 86, placed on 3L NC saturating well. Remainder of VS WNL. Trop .02, BNP >6000. Venous duplex b/l LE obtained appreciating DVTs in b/l PT, right gastrocnemius and left popliteal.    Vascular consulted for management of DVTs. Patient seen and evaluated. States his SOB is typically on exertion. Denies chest pain or LE pain. States the b/l leg swelling started around the same time as his SOB. Denies recent travel or trauma. Denies current AC and states he has not followed up with vascular recently. On exam, b/l LE pitting edema noted with mild erythema to LLE distal to the knee. Motor and sensation intact. Palpable DP b/l, dopplerable PT b/l.    PLAN:   - Start Heparin gtt according to nomogram  - Monitor ptt  - F/u CT PE  - Continue CHF workup  - Supplemental O2 as needed  - Plan Discussed with Fellow, Dr. Kodak ABRAMS [id number]

## 2023-05-03 NOTE — H&P ADULT - ASSESSMENT
67 year old patient, known to have HTN, HLD, COPD, smoker (1/2 ppd), 3 spinal operations (2010), PE (2018), DVT (2018) s/p thrombolysis and tPA instillation, sent to the ED by his PCP for workup of CHF. He was found to be in HF exacerbation and PE.     #PE with right heart strain - hemodynamically stable   #History of PE (2018), DVT (2018) s/p thrombolysis and tPA instillation  - In ED, vitals significant for BP 92/63  RR 24 SpO2 86% on RA improved on 3L NC.   - Laboratory workup significant for Hb 11.4 MCV 90.5  - Troponin 0.02  - Venous duplex b/l LE: DVTs in b/l PT, right gastrocnemius and left popliteal.  - CTA chest: Intraluminal filling defects, consistent with acute pulmonary emboli, are noted in segmental branches of the right upper and middle lobe pulmonary arteries. Intraluminal filling defects are noted in segmental and subsegmental branches of the left upper and left lowerlobe, and lingula pulmonary arteries. There is evidence of right heart strain (RV:LV ratio >1; RV=6.2 cm and LV   = 4.5 cm) There is reflux of contrast material into the IVC and hepatic veins compatible with right heart dysfunction.  - Started on Heparin drip  - Vascular on board   - IR consult   - Supplemental O2 as needed  - Monitor vitals     #HF exacerbation   #Troponemia likely type II  - Troponin 0.02 pro BNP 6472  - Chest X-ray: congested (unofficial read)  - Get Echo  - Follow up a1c, lipid profile, TSH  - s/p 40 mg IV Lasix once in ED  - c/w Lasix 40 mg Iv push daily   - Consider Cardiology consult in am if no improvement     #HTN  - c/w     #HLD  - Lipid profile in am   - c/w statin     #COPD - not in exacerbation   #smoker (1/2 ppd)    Activity: IAT  Diet: DASH/TLC  DVT ppx: Heparin drip  GI ppx: none 67 year old patient, known to have HTN, HLD, COPD, smoker (1/2 ppd), 3 spinal operations (2010), PE (2018), DVT (2018) s/p thrombolysis and tPA instillation, sent to the ED by his PCP for workup of CHF. He was found to be in HF exacerbation and PE.     **Please confirm MED REC in am with [facility] on [facility]**    #PE with right heart strain - hemodynamically stable   #History of PE (2018), DVT (2018) s/p thrombolysis and tPA instillation  - In ED, vitals significant for BP 92/63  RR 24 SpO2 86% on RA improved on 3L NC.   - Laboratory workup significant for Hb 11.4 MCV 90.5  - Troponin 0.02  - Venous duplex b/l LE: DVTs in b/l PT, right gastrocnemius and left popliteal.  - CTA chest: Intraluminal filling defects, consistent with acute pulmonary emboli, are noted in segmental branches of the right upper and middle lobe pulmonary arteries. Intraluminal filling defects are noted in segmental and subsegmental branches of the left upper and left lower lobe, and lingula pulmonary arteries. There is evidence of right heart strain (RV:LV ratio >1; RV=6.2 cm and LV   = 4.5 cm) There is reflux of contrast material into the IVC and hepatic veins compatible with right heart dysfunction.  - Started on Heparin drip  - Vascular on board   - IR consult   - Supplemental O2 as needed  - Monitor vitals     #HF exacerbation   #Troponemia likely type II  - Troponin 0.02 pro BNP 6472  - Chest X-ray: congested (unofficial read)  - Get Echo  - Follow up a1c, lipid profile, TSH  - s/p 40 mg IV Lasix once in ED  - c/w Lasix 40 mg Iv push daily   - Cardiology consult     #COPD - in exacerbation (mild expiratory wheezing on PE)  #smoker (1/2 ppd)  - Check MED REC in am   - Solumedrol 125 mcg   - c/w Solumedrol 40 BID   - Duoneb q6 and Albuterol PRN q4    #HTN  - Check MED REC in am; hold on anti-hypertensives in the setting of soft BP  - Patient on Clonidine for opioid withdrawal; Pain management consulted in the setting of related side effects    #HLD  - Lipid profile in am   - Check MED REC in am     Activity: IAT  Diet: DASH/TLC; Keep NPO   DVT ppx: Heparin drip  GI ppx: none 67 year old patient, known to have DM, Diabetic neuropathy, HTN, HLD, COPD, smoker (1/2 ppd), 3 spinal operations (2010), PE (2018), DVT (2018) s/p thrombolysis and tPA instillation, sent to the ED by his PCP for workup of CHF. He was found to be in HF exacerbation and PE.     **Please confirm MED REC in am with [facility] pharmacy on [facility]**    #PE with right heart strain - hemodynamically stable   #History of PE (2018), DVT (2018) s/p thrombolysis and tPA instillation  - In ED, vitals significant for BP 92/63  RR 24 SpO2 86% on RA improved on 3L NC.   - Laboratory workup significant for Hb 11.4 MCV 90.5  - Troponin 0.02  - Venous duplex b/l LE: DVTs in b/l PT, right gastrocnemius and left popliteal.  - CTA chest: Intraluminal filling defects, consistent with acute pulmonary emboli, are noted in segmental branches of the right upper and middle lobe pulmonary arteries. Intraluminal filling defects are noted in segmental and subsegmental branches of the left upper and left lower lobe, and lingula pulmonary arteries. There is evidence of right heart strain (RV:LV ratio >1; RV=6.2 cm and LV   = 4.5 cm) There is reflux of contrast material into the IVC and hepatic veins compatible with right heart dysfunction.  - Started on Heparin drip  - Vascular on board   - IR consult   - Supplemental O2 as needed  - Monitor vitals     #HF exacerbation   #Troponemia likely type II  - Troponin 0.02 pro BNP 6472  - Chest X-ray: congested (unofficial read)  - Get Echo  - Follow up a1c, lipid profile, TSH  - s/p 40 mg IV Lasix once in ED  - c/w Lasix 40 mg Iv push daily   - Cardiology consult     #COPD - in exacerbation (mild expiratory wheezing on PE)  #smoker (1/2 ppd)  - Check MED REC in am   - Solumedrol 125 mcg   - c/w Solumedrol 40 BID   - Duoneb q6 and Albuterol PRN q4    #HTN  - Check MED REC in am; hold on anti-hypertensives in the setting of soft BP  - Patient on Clonidine for opioid withdrawal; Pain management consulted in the setting of related side effects    #HLD  - Lipid profile in am   - Check MED REC in am     Activity: IAT  Diet: DASH/TLC; Keep NPO   DVT ppx: Heparin drip  GI ppx: none

## 2023-05-04 DIAGNOSIS — M54.50 LOW BACK PAIN, UNSPECIFIED: ICD-10-CM

## 2023-05-04 DIAGNOSIS — I26.99 OTHER PULMONARY EMBOLISM WITHOUT ACUTE COR PULMONALE: ICD-10-CM

## 2023-05-04 LAB
A1C WITH ESTIMATED AVERAGE GLUCOSE RESULT: 6.1 % — HIGH (ref 4–5.6)
ALBUMIN SERPL ELPH-MCNC: 3.9 G/DL — SIGNIFICANT CHANGE UP (ref 3.5–5.2)
ALP SERPL-CCNC: 82 U/L — SIGNIFICANT CHANGE UP (ref 30–115)
ALT FLD-CCNC: <5 U/L — SIGNIFICANT CHANGE UP (ref 0–41)
ANION GAP SERPL CALC-SCNC: 12 MMOL/L — SIGNIFICANT CHANGE UP (ref 7–14)
APTT BLD: 158.2 SEC — CRITICAL HIGH (ref 27–39.2)
APTT BLD: 162.5 SEC — CRITICAL HIGH (ref 27–39.2)
APTT BLD: 82.8 SEC — CRITICAL HIGH (ref 27–39.2)
AST SERPL-CCNC: 8 U/L — SIGNIFICANT CHANGE UP (ref 0–41)
BASOPHILS # BLD AUTO: 0.04 K/UL — SIGNIFICANT CHANGE UP (ref 0–0.2)
BASOPHILS NFR BLD AUTO: 0.6 % — SIGNIFICANT CHANGE UP (ref 0–1)
BILIRUB SERPL-MCNC: 0.6 MG/DL — SIGNIFICANT CHANGE UP (ref 0.2–1.2)
BUN SERPL-MCNC: 25 MG/DL — HIGH (ref 10–20)
CALCIUM SERPL-MCNC: 9 MG/DL — SIGNIFICANT CHANGE UP (ref 8.4–10.5)
CHLORIDE SERPL-SCNC: 98 MMOL/L — SIGNIFICANT CHANGE UP (ref 98–110)
CHOLEST SERPL-MCNC: 123 MG/DL — SIGNIFICANT CHANGE UP
CO2 SERPL-SCNC: 28 MMOL/L — SIGNIFICANT CHANGE UP (ref 17–32)
CREAT SERPL-MCNC: 1.3 MG/DL — SIGNIFICANT CHANGE UP (ref 0.7–1.5)
EGFR: 61 ML/MIN/1.73M2 — SIGNIFICANT CHANGE UP
EOSINOPHIL # BLD AUTO: 0.01 K/UL — SIGNIFICANT CHANGE UP (ref 0–0.7)
EOSINOPHIL NFR BLD AUTO: 0.2 % — SIGNIFICANT CHANGE UP (ref 0–8)
ESTIMATED AVERAGE GLUCOSE: 128 MG/DL — HIGH (ref 68–114)
GLUCOSE SERPL-MCNC: 157 MG/DL — HIGH (ref 70–99)
HCT VFR BLD CALC: 36.7 % — LOW (ref 42–52)
HCT VFR BLD CALC: 42.6 % — SIGNIFICANT CHANGE UP (ref 42–52)
HDLC SERPL-MCNC: 41 MG/DL — SIGNIFICANT CHANGE UP
HGB BLD-MCNC: 11.2 G/DL — LOW (ref 14–18)
HGB BLD-MCNC: 13.2 G/DL — LOW (ref 14–18)
IMM GRANULOCYTES NFR BLD AUTO: 0.3 % — SIGNIFICANT CHANGE UP (ref 0.1–0.3)
INR BLD: 1.16 RATIO — SIGNIFICANT CHANGE UP (ref 0.65–1.3)
LIPID PNL WITH DIRECT LDL SERPL: 62 MG/DL — SIGNIFICANT CHANGE UP
LYMPHOCYTES # BLD AUTO: 0.65 K/UL — LOW (ref 1.2–3.4)
LYMPHOCYTES # BLD AUTO: 10 % — LOW (ref 20.5–51.1)
MAGNESIUM SERPL-MCNC: 1.9 MG/DL — SIGNIFICANT CHANGE UP (ref 1.8–2.4)
MCHC RBC-ENTMCNC: 27.8 PG — SIGNIFICANT CHANGE UP (ref 27–31)
MCHC RBC-ENTMCNC: 27.9 PG — SIGNIFICANT CHANGE UP (ref 27–31)
MCHC RBC-ENTMCNC: 30.5 G/DL — LOW (ref 32–37)
MCHC RBC-ENTMCNC: 31 G/DL — LOW (ref 32–37)
MCV RBC AUTO: 89.7 FL — SIGNIFICANT CHANGE UP (ref 80–94)
MCV RBC AUTO: 91.3 FL — SIGNIFICANT CHANGE UP (ref 80–94)
MONOCYTES # BLD AUTO: 0.06 K/UL — LOW (ref 0.1–0.6)
MONOCYTES NFR BLD AUTO: 0.9 % — LOW (ref 1.7–9.3)
NEUTROPHILS # BLD AUTO: 5.7 K/UL — SIGNIFICANT CHANGE UP (ref 1.4–6.5)
NEUTROPHILS NFR BLD AUTO: 88 % — HIGH (ref 42.2–75.2)
NON HDL CHOLESTEROL: 82 MG/DL — SIGNIFICANT CHANGE UP
NRBC # BLD: 0 /100 WBCS — SIGNIFICANT CHANGE UP (ref 0–0)
NRBC # BLD: 0 /100 WBCS — SIGNIFICANT CHANGE UP (ref 0–0)
PHOSPHATE SERPL-MCNC: 3.4 MG/DL — SIGNIFICANT CHANGE UP (ref 2.1–4.9)
PLATELET # BLD AUTO: 282 K/UL — SIGNIFICANT CHANGE UP (ref 130–400)
PLATELET # BLD AUTO: 332 K/UL — SIGNIFICANT CHANGE UP (ref 130–400)
PMV BLD: 9.7 FL — SIGNIFICANT CHANGE UP (ref 7.4–10.4)
PMV BLD: 9.9 FL — SIGNIFICANT CHANGE UP (ref 7.4–10.4)
POTASSIUM SERPL-MCNC: 4.2 MMOL/L — SIGNIFICANT CHANGE UP (ref 3.5–5)
POTASSIUM SERPL-SCNC: 4.2 MMOL/L — SIGNIFICANT CHANGE UP (ref 3.5–5)
PROT SERPL-MCNC: 8 G/DL — SIGNIFICANT CHANGE UP (ref 6–8)
PROTHROM AB SERPL-ACNC: 13.3 SEC — HIGH (ref 9.95–12.87)
RBC # BLD: 4.02 M/UL — LOW (ref 4.7–6.1)
RBC # BLD: 4.75 M/UL — SIGNIFICANT CHANGE UP (ref 4.7–6.1)
RBC # FLD: 16.9 % — HIGH (ref 11.5–14.5)
RBC # FLD: 17.1 % — HIGH (ref 11.5–14.5)
SODIUM SERPL-SCNC: 138 MMOL/L — SIGNIFICANT CHANGE UP (ref 135–146)
TRIGL SERPL-MCNC: 100 MG/DL — SIGNIFICANT CHANGE UP
TROPONIN T SERPL-MCNC: 0.03 NG/ML — CRITICAL HIGH
TROPONIN T SERPL-MCNC: <0.01 NG/ML — SIGNIFICANT CHANGE UP
TSH SERPL-MCNC: 0.55 UIU/ML — SIGNIFICANT CHANGE UP (ref 0.27–4.2)
WBC # BLD: 6.48 K/UL — SIGNIFICANT CHANGE UP (ref 4.8–10.8)
WBC # BLD: 7.42 K/UL — SIGNIFICANT CHANGE UP (ref 4.8–10.8)
WBC # FLD AUTO: 6.48 K/UL — SIGNIFICANT CHANGE UP (ref 4.8–10.8)
WBC # FLD AUTO: 7.42 K/UL — SIGNIFICANT CHANGE UP (ref 4.8–10.8)

## 2023-05-04 PROCEDURE — 99223 1ST HOSP IP/OBS HIGH 75: CPT

## 2023-05-04 PROCEDURE — 93306 TTE W/DOPPLER COMPLETE: CPT | Mod: 26

## 2023-05-04 RX ORDER — MORPHINE SULFATE 50 MG/1
15 CAPSULE, EXTENDED RELEASE ORAL ONCE
Refills: 0 | Status: DISCONTINUED | OUTPATIENT
Start: 2023-05-04 | End: 2023-05-04

## 2023-05-04 RX ORDER — DULOXETINE HYDROCHLORIDE 30 MG/1
1 CAPSULE, DELAYED RELEASE ORAL
Refills: 0 | DISCHARGE

## 2023-05-04 RX ORDER — HEPARIN SODIUM 5000 [USP'U]/ML
1400 INJECTION INTRAVENOUS; SUBCUTANEOUS
Qty: 25000 | Refills: 0 | Status: DISCONTINUED | OUTPATIENT
Start: 2023-05-04 | End: 2023-05-04

## 2023-05-04 RX ORDER — MORPHINE SULFATE 50 MG/1
60 CAPSULE, EXTENDED RELEASE ORAL EVERY 12 HOURS
Refills: 0 | Status: DISCONTINUED | OUTPATIENT
Start: 2023-05-04 | End: 2023-05-08

## 2023-05-04 RX ORDER — OXYCODONE HYDROCHLORIDE 5 MG/1
10 TABLET ORAL ONCE
Refills: 0 | Status: DISCONTINUED | OUTPATIENT
Start: 2023-05-04 | End: 2023-05-04

## 2023-05-04 RX ORDER — LEVETIRACETAM 250 MG/1
2 TABLET, FILM COATED ORAL
Qty: 0 | Refills: 0 | DISCHARGE

## 2023-05-04 RX ORDER — LOSARTAN POTASSIUM 100 MG/1
100 TABLET, FILM COATED ORAL DAILY
Refills: 0 | Status: DISCONTINUED | OUTPATIENT
Start: 2023-05-04 | End: 2023-05-10

## 2023-05-04 RX ORDER — SIMVASTATIN 20 MG/1
20 TABLET, FILM COATED ORAL AT BEDTIME
Refills: 0 | Status: DISCONTINUED | OUTPATIENT
Start: 2023-05-04 | End: 2023-05-10

## 2023-05-04 RX ORDER — OXYCODONE AND ACETAMINOPHEN 5; 325 MG/1; MG/1
2 TABLET ORAL DAILY
Refills: 0 | Status: DISCONTINUED | OUTPATIENT
Start: 2023-05-04 | End: 2023-05-08

## 2023-05-04 RX ORDER — ALBUTEROL 90 UG/1
2 AEROSOL, METERED ORAL EVERY 4 HOURS
Refills: 0 | Status: DISCONTINUED | OUTPATIENT
Start: 2023-05-04 | End: 2023-05-10

## 2023-05-04 RX ORDER — OXYBUTYNIN CHLORIDE 5 MG
1 TABLET ORAL
Refills: 0 | DISCHARGE

## 2023-05-04 RX ORDER — FUROSEMIDE 40 MG
1 TABLET ORAL
Refills: 0 | DISCHARGE

## 2023-05-04 RX ORDER — DULOXETINE HYDROCHLORIDE 30 MG/1
30 CAPSULE, DELAYED RELEASE ORAL DAILY
Refills: 0 | Status: DISCONTINUED | OUTPATIENT
Start: 2023-05-04 | End: 2023-05-10

## 2023-05-04 RX ORDER — DULOXETINE HYDROCHLORIDE 30 MG/1
1 CAPSULE, DELAYED RELEASE ORAL
Qty: 0 | Refills: 0 | DISCHARGE

## 2023-05-04 RX ORDER — ENOXAPARIN SODIUM 100 MG/ML
90 INJECTION SUBCUTANEOUS EVERY 12 HOURS
Refills: 0 | Status: DISCONTINUED | OUTPATIENT
Start: 2023-05-04 | End: 2023-05-05

## 2023-05-04 RX ORDER — OXYBUTYNIN CHLORIDE 5 MG
10 TABLET ORAL DAILY
Refills: 0 | Status: DISCONTINUED | OUTPATIENT
Start: 2023-05-04 | End: 2023-05-10

## 2023-05-04 RX ORDER — FUROSEMIDE 40 MG
40 TABLET ORAL DAILY
Refills: 0 | Status: DISCONTINUED | OUTPATIENT
Start: 2023-05-04 | End: 2023-05-04

## 2023-05-04 RX ORDER — OXYBUTYNIN CHLORIDE 5 MG
10 TABLET ORAL DAILY
Refills: 0 | Status: DISCONTINUED | OUTPATIENT
Start: 2023-05-04 | End: 2023-05-04

## 2023-05-04 RX ORDER — BUPROPION HYDROCHLORIDE 150 MG/1
150 TABLET, EXTENDED RELEASE ORAL DAILY
Refills: 0 | Status: DISCONTINUED | OUTPATIENT
Start: 2023-05-04 | End: 2023-05-10

## 2023-05-04 RX ORDER — IPRATROPIUM/ALBUTEROL SULFATE 18-103MCG
3 AEROSOL WITH ADAPTER (GRAM) INHALATION EVERY 6 HOURS
Refills: 0 | Status: DISCONTINUED | OUTPATIENT
Start: 2023-05-04 | End: 2023-05-10

## 2023-05-04 RX ORDER — FUROSEMIDE 40 MG
20 TABLET ORAL
Refills: 0 | Status: DISCONTINUED | OUTPATIENT
Start: 2023-05-04 | End: 2023-05-10

## 2023-05-04 RX ORDER — FUROSEMIDE 40 MG
1 TABLET ORAL
Qty: 0 | Refills: 0 | DISCHARGE

## 2023-05-04 RX ORDER — BUPROPION HYDROCHLORIDE 150 MG/1
1 TABLET, EXTENDED RELEASE ORAL
Refills: 0 | DISCHARGE

## 2023-05-04 RX ORDER — ALPRAZOLAM 0.25 MG
1 TABLET ORAL THREE TIMES A DAY
Refills: 0 | Status: DISCONTINUED | OUTPATIENT
Start: 2023-05-04 | End: 2023-05-08

## 2023-05-04 RX ORDER — TAMSULOSIN HYDROCHLORIDE 0.4 MG/1
0.4 CAPSULE ORAL AT BEDTIME
Refills: 0 | Status: DISCONTINUED | OUTPATIENT
Start: 2023-05-04 | End: 2023-05-10

## 2023-05-04 RX ORDER — FENOFIBRATE,MICRONIZED 130 MG
145 CAPSULE ORAL DAILY
Refills: 0 | Status: DISCONTINUED | OUTPATIENT
Start: 2023-05-04 | End: 2023-05-10

## 2023-05-04 RX ORDER — BACLOFEN 100 %
1 POWDER (GRAM) MISCELLANEOUS
Refills: 0 | DISCHARGE

## 2023-05-04 RX ADMIN — Medication 3 MILLILITER(S): at 02:43

## 2023-05-04 RX ADMIN — HEPARIN SODIUM 1400 UNIT(S)/HR: 5000 INJECTION INTRAVENOUS; SUBCUTANEOUS at 06:12

## 2023-05-04 RX ADMIN — SIMVASTATIN 20 MILLIGRAM(S): 20 TABLET, FILM COATED ORAL at 21:11

## 2023-05-04 RX ADMIN — MORPHINE SULFATE 60 MILLIGRAM(S): 50 CAPSULE, EXTENDED RELEASE ORAL at 17:34

## 2023-05-04 RX ADMIN — MORPHINE SULFATE 15 MILLIGRAM(S): 50 CAPSULE, EXTENDED RELEASE ORAL at 07:31

## 2023-05-04 RX ADMIN — ENOXAPARIN SODIUM 90 MILLIGRAM(S): 100 INJECTION SUBCUTANEOUS at 17:34

## 2023-05-04 RX ADMIN — MORPHINE SULFATE 15 MILLIGRAM(S): 50 CAPSULE, EXTENDED RELEASE ORAL at 08:00

## 2023-05-04 RX ADMIN — Medication 40 MILLIGRAM(S): at 17:34

## 2023-05-04 RX ADMIN — Medication 1 MILLIGRAM(S): at 21:11

## 2023-05-04 RX ADMIN — Medication 40 MILLIGRAM(S): at 06:11

## 2023-05-04 RX ADMIN — Medication 3 MILLILITER(S): at 15:33

## 2023-05-04 RX ADMIN — Medication 3 MILLILITER(S): at 21:28

## 2023-05-04 RX ADMIN — TAMSULOSIN HYDROCHLORIDE 0.4 MILLIGRAM(S): 0.4 CAPSULE ORAL at 21:12

## 2023-05-04 RX ADMIN — Medication 40 MILLIGRAM(S): at 06:16

## 2023-05-04 NOTE — CONSULT NOTE ADULT - PROBLEM SELECTOR RECOMMENDATION 9
+Longstanding history of chronic opioid therapy (MED = 120) likely to have severe opioid tolerance. Low risk of opioid abuse per ORT.  1) Continue morphine ER 60mg Q12h  2) May continue percocet 5-325mg two tabs daily prn  3) Continue duloxetine  4) Continue clonidine patch - patient taking this to prevent opioid withdrawal symptoms  5) Provide a prescription for naloxone on discharge (patient takes opioids)

## 2023-05-04 NOTE — CONSULT NOTE ADULT - ATTENDING COMMENTS
Impression:    Unprovoked PE - VTE   History of DVT/PE   COPD - heavy smoker   RV strain   Acute hypoxemic respiratory failure     Impression and plan above are my own.

## 2023-05-04 NOTE — CONSULT NOTE ADULT - ASSESSMENT
Impression:    Acute intermediate risk PE with RV dysfunction  HO DVT/PE s/p tPA 2018  HO COPD    Plan:  Heparin gtt, switch to Eliquis per primary team  Check echo  O2 PRN, check ambulatory SPO2 as patient might need O2 on d/c  Pain control  Taper steroids  Smoking cessation   Impression:    Unprovoked PE - VTE   History of DVT/PE   COPD - heavy smoker   RV strain   Acute hypoxemic respiratory failure     Plan:    CTA chest reviewed with segmental PEs; RV to LV ratio is higher than 0.9   Echo with elevated RVSP and dilated RV; chronic RV dilatation present likely due to chronic lung disease (COPD)  Switch Heparin to SQ Lovenox; Switch to Eliquis is 24 hours if remains stable   Follow up on results of LE duplex; bilateral DVTs on prelim read   BNP noted; Trend cardiac enzymes  No indications for IR/vascular interventions at present   HD stable with no indications for systemic thrombolysis   Of note the patient had a PE that required thrombolysis (per the admitting note) in 2018; lifelong AC recommended   Monitor hgb and keep more than 7  Ambulate as able  Keep spo2 more than 92%; avoid hyperoxia  Albuterol as needed; Maintenance inhaler with LAMA  Outpatient age appropriate cancer screening and hypercoag workup   No indications for ICU monitoring at the moment  Will follow as needed

## 2023-05-04 NOTE — CONSULT NOTE ADULT - SUBJECTIVE AND OBJECTIVE BOX
Patient is a 67y old  Male who presents with a chief complaint of PE (04 May 2023 07:43)      HPI:  67 year old patient, known to have HTN, HLD, DM, COPD (not on home O2), smoker (1/2 ppd), 3 spinal operations (2010), history of PE (2018) and DVT (2018) s/p thrombolysis and tPA instillation, sent to the ED by his PCP for workup of increased SOB and LE swelling.  Patient reports that for the past 2 weeks, he has been having progressive worsening of SOB upon exertion and at rest, associated with b/l LE swelling and erythema. He also endorses orthopnea and PND. Last echo was done in 2021 with EF 66% and pulmonary HTN. He denies CP, palpitations. He denies recent travel, trauma or immobilization. No fever, chills, URT or urinary symptoms.     To note, patient had an accident in 2006 s/p multiple operations and since then he has been on Percocet 90 mg. On March 2023, pain management cut down on Percocet and currently he is taking morphine 60 mg BID, Baclofen 10 mg and clonidine patch 0.2mg/day. He states that his BP has been on the low side (100's/50's) and has been vomiting after each meal with subsequent weight loss.     In ED, vitals significant for BP 92/63  RR 24 SpO2 86% on RA improved on 3L NC.   Laboratory workup significant for Hb 11.4 MCV 90.5  Troponin 0.02 pro BNP 6472  Venous duplex b/l LE: DVTs in b/l PT, right gastrocnemius and left popliteal.  CTA chest: Intraluminal filling defects, consistent with acute pulmonary emboli, are noted in segmental branches of the right upper and middle lobe pulmonary arteries. Intraluminal filling defects are noted in segmental and subsegmental branches of the left upper and left lowerlobe, and lingula pulmonary arteries. There is evidence of right heart strain (RV:LV ratio >1; RV=6.2 cm and LV   = 4.5 cm) There is reflux of contrast material into the IVC and hepatic veins compatible with right heart dysfunction.  He was started on heparin drip and given 40 mg IV Lasix once in ED.   Admitted to Telemetry for further workup and management.    (03 May 2023 23:50)      PAST MEDICAL & SURGICAL HISTORY:  Anxiety      HTN (hypertension)      Diabetes      High cholesterol      Asthma      Chronic low back pain with sciatica, sciatica laterality unspecified, unspecified back pain laterality      Uncomplicated opioid dependence      Syncopal episodes      History of back surgery  x 3          SOCIAL HX:   Smoking  current 1/2 ppd                       ETOH                            Other    FAMILY HISTORY:  No pertinent family history in first degree relatives    :  No known cardiovacular family hisotry     Review Of Systems:     All ROS are negative except per HPI       Allergies    pineapples (Anaphylaxis)  Originally Entered as [ANGIOEDEMA] reaction to [pineapple] (Unknown)  penicillins (Other)  aspirin (Stomach Upset)    Intolerances          PHYSICAL EXAM    ICU Vital Signs Last 24 Hrs  T(C): 36.6 (04 May 2023 05:29), Max: 36.8 (03 May 2023 14:47)  T(F): 97.9 (04 May 2023 05:29), Max: 98.2 (03 May 2023 14:47)  HR: 89 (04 May 2023 05:29) (68 - 100)  BP: 118/74 (04 May 2023 05:29) (92/63 - 138/70)  BP(mean): --  ABP: --  ABP(mean): --  RR: 18 (04 May 2023 05:29) (18 - 24)  SpO2: 96% (04 May 2023 05:29) (86% - 96%)    O2 Parameters below as of 03 May 2023 23:08  Patient On (Oxygen Delivery Method): nasal cannula  O2 Flow (L/min): 3          CONSTITUTIONAL:  Well nourished.  NAD    ENT:   Airway patent,   Mouth with normal mucosa.   No thrush    EYES:   pupils equal,   round and reactive to light.    CARDIAC:   Normal rate,   Regular rhythm.    Heart sounds S1, S2.   No edema      Vascular:   normal systolic impulse  no bruits    RESPIRATORY:   No wheezing   Normal chest expansion  No use of accessory muscles    GASTROINTESTINAL:  Abdomen soft   Non-tender,   No guarding,   + BS    MUSCULOSKELETAL:   Range of motion is not limited,  Nno clubbing, cyanosis    NEUROLOGICAL:   Alert and oriented   No motor or sensory deficits.  Pertinent DTRs normal    SKIN:   Skin normal color for race,   Warm and dry  No evidence of rash      05-03-23 @ 07:01  -  05-04-23 @ 07:00  --------------------------------------------------------  IN:    Heparin Infusion: 17 mL  Total IN: 17 mL    OUT:  Total OUT: 0 mL    Total NET: 17 mL      LABS:                        11.2   7.42  )-----------( 282      ( 04 May 2023 02:52 )             36.7                                               05-03    141  |  106  |  22<H>  ----------------------------<  93  4.8   |  24  |  1.1    Ca    8.7      03 May 2023 16:19    TPro  7.2  /  Alb  3.3<L>  /  TBili  0.3  /  DBili  x   /  AST  10  /  ALT  <5  /  AlkPhos  65  05-03      PT/INR - ( 03 May 2023 20:10 )   PT: 14.00 sec;   INR: 1.22 ratio         PTT - ( 04 May 2023 02:52 )  PTT:158.2 sec                                           CARDIAC MARKERS ( 04 May 2023 01:05 )  x     / 0.03 ng/mL / x     / x     / x      CARDIAC MARKERS ( 03 May 2023 16:19 )  x     / 0.02 ng/mL / x     / x     / x                                                LIVER FUNCTIONS - ( 03 May 2023 16:19 )  Alb: 3.3 g/dL / Pro: 7.2 g/dL / ALK PHOS: 65 U/L / ALT: <5 U/L / AST: 10 U/L / GGT: x                                                                                                                                       X-Rays reviewed:                                                                                    ECHO    CXR interpreted by me:    MEDICATIONS  (STANDING):  albuterol/ipratropium for Nebulization 3 milliLiter(s) Nebulizer every 6 hours  furosemide   Injectable 40 milliGRAM(s) IV Push daily  heparin  Infusion. 1400 Unit(s)/Hr (14 mL/Hr) IV Continuous <Continuous>  methylPREDNISolone sodium succinate Injectable 125 milliGRAM(s) IV Push once  methylPREDNISolone sodium succinate Injectable 40 milliGRAM(s) IV Push two times a day    MEDICATIONS  (PRN):  albuterol    90 MICROgram(s) HFA Inhaler 2 Puff(s) Inhalation every 4 hours PRN Bronchospasm  heparin   Injectable 7500 Unit(s) IV Push every 6 hours PRN For aPTT less than 40  heparin   Injectable 3500 Unit(s) IV Push every 6 hours PRN For aPTT between 40 - 57        
VASCULAR SURGERY CONSULT NOTE    HPI: 68yo male with PMHx of HTN, HLD, COPD, smoker (1/2 ppd), 3 spinal operations (2010), PE (2018), DVT (2018) s/p thrombolysis & tPA instillation sent to the ED by his PCP for workup of CHF 2/2 SOB and b/l leg swelling x 1 week. In the ED O2 sat 86, placed on 3L NC saturating well. Remainder of VS WNL. Trop .02, BNP >6000. Venous duplex b/l LE obtained appreciating DVTs in b/l PT, right gastrocnemius and left popliteal.    Vascular consulted for management of DVTs. Patient seen and evaluated. States his SOB is typically on exertion. Denies chest pain or LE pain. States the b/l leg swelling started around the same time as his SOB. Denies recent travel or trauma. Denies current AC and states he has not followed up with vascular recently. On exam, b/l LE pitting edema noted with mild erythema to LLE distal to the knee. Motor and sensation intact. Palpable DP b/l, dopplerable PT b/l.    PAST MEDICAL & SURGICAL HISTORY:  Anxiety      HTN (hypertension)      Diabetes      High cholesterol      Asthma      Chronic low back pain with sciatica, sciatica laterality unspecified, unspecified back pain laterality      Uncomplicated opioid dependence      Syncopal episodes      History of back surgery  x 3        pineapples (Anaphylaxis)  Originally Entered as [ANGIOEDEMA] reaction to [pineapple] (Unknown)  penicillins (Other)  aspirin (Stomach Upset)    Home Medications:  ALPRAZolam 1 mg oral tablet: 1 tab(s) orally 3 times a day (18 Apr 2021 03:34)  DULoxetine 60 mg oral delayed release capsule: 1 cap(s) orally once a day (18 Apr 2021 03:34)  fenofibrate 160 mg oral tablet: 1 tab(s) orally once a day (18 Apr 2021 03:34)  furosemide 20 mg oral tablet: 1 tab(s) orally once a day (18 Apr 2021 03:34)  gabapentin 300 mg oral capsule: 1 cap(s) orally 2 times a day (22 Apr 2021 11:55)  levETIRAcetam 500 mg oral tablet, extended release: 2 tab(s) orally once a day (18 Apr 2021 03:34)  losartan 100 mg oral tablet: 1 tab(s) orally once a day (18 Apr 2021 03:34)  metFORMIN 500 mg oral tablet: 1 tab(s) orally 2 times a day (18 Apr 2021 03:34)  morphine 60 mg/12 hours oral tablet, extended release: 1 tab(s) orally every 12 hours (18 Apr 2021 03:34)  Percocet 10/325: 1 tab orally 3 times a day, As Needed (18 Apr 2021 03:34)  simvastatin 20 mg oral tablet: 1 tab(s) orally once a day (at bedtime) (18 Apr 2021 03:34)  tamsulosin 0.4 mg oral capsule: 1 cap(s) orally once a day (18 Apr 2021 03:34)  Ventolin HFA 90 mcg/inh inhalation aerosol:  (18 Apr 2021 03:34)    No permtinent family history of PVD    REVIEW OF SYSTEMS:  GENERAL:                                         negative  SKIN:                                                 negative  OPTHALMOLOGIC:                          negative  ENMT:                                               negative  RESPIRATORY AND THORAX:        negative  CARDIOVASCULAR:                         negative  GASTROINTESTINAL:                       negative  NEPHROLOGY:                                  negative  MUSCULOSKELETAL:                       negative  NEUROLOGIC:                                   negative  PSYCHIATRIC:                                    negative  HEMATOLOGY/LYMPHATICS:         negative  ENDOCRINE:                                     negative  ALLERGIC/IMMUNOLOGIC:            negative    12 point ROS otherwise normal except as stated in HPI    PHYSICAL EXAM  Vital Signs Last 24 Hrs  T(C): 36.8 (03 May 2023 14:47), Max: 36.8 (03 May 2023 14:47)  T(F): 98.2 (03 May 2023 14:47), Max: 98.2 (03 May 2023 14:47)  HR: 84 (03 May 2023 18:00) (84 - 100)  BP: 138/70 (03 May 2023 18:00) (92/63 - 138/70)  BP(mean): --  RR: 22 (03 May 2023 18:00) (22 - 24)  SpO2: 96% (03 May 2023 18:00) (86% - 96%)    Parameters below as of 03 May 2023 18:00  Patient On (Oxygen Delivery Method): nasal cannula  O2 Flow (L/min): 3    Appearance: Normal		  Cardiovascular: Normal S1 S2  Respiratory: No labored breathing, normal respiratory effort on 3L NC  Gastrointestinal:  Soft, Non-tender  Extremities: Normal range of motion, b/l LE pitting edema, LLE with mild erythema up to the knee. Motor and sensation intact b/l.     PULSES:  Femoral:  Popliteal:  Dorsal Pedal: palpable b/l  Posterior Tibial: dopplerable b/l  Capillary:    MEDICATIONS:   MEDICATIONS  (STANDING):  heparin  Infusion.  Unit(s)/Hr (17 mL/Hr) IV Continuous <Continuous>    MEDICATIONS  (PRN):  heparin   Injectable 7500 Unit(s) IV Push every 6 hours PRN For aPTT less than 40  heparin   Injectable 3500 Unit(s) IV Push every 6 hours PRN For aPTT between 40 - 57      LAB/STUDIES:                        11.4   6.02  )-----------( 286      ( 03 May 2023 16:19 )             37.2     05-03    141  |  106  |  22<H>  ----------------------------<  93  4.8   |  24  |  1.1    Ca    8.7      03 May 2023 16:19    TPro  7.2  /  Alb  3.3<L>  /  TBili  0.3  /  DBili  x   /  AST  10  /  ALT  <5  /  AlkPhos  65  05-03    PT/INR - ( 03 May 2023 20:10 )   PT: 14.00 sec;   INR: 1.22 ratio         PTT - ( 03 May 2023 20:10 )  PTT:38.5 sec  LIVER FUNCTIONS - ( 03 May 2023 16:19 )  Alb: 3.3 g/dL / Pro: 7.2 g/dL / ALK PHOS: 65 U/L / ALT: <5 U/L / AST: 10 U/L / GGT: x               CARDIAC MARKERS ( 03 May 2023 16:19 )  x     / 0.02 ng/mL / x     / x     / x          IMAGING:      
INTERVENTIONAL RADIOLOGY CONSULT:     HPI:  67 year old patient, known to have HTN, HLD, DM, COPD (not on home O2), smoker (1/2 ppd), 3 spinal operations (2010), history of PE (2018) and DVT (2018) s/p thrombolysis and tPA instillation, sent to the ED by his PCP for workup of increased SOB and LE swelling.  Patient reports that for the past 2 weeks, he has been having progressive worsening of SOB upon exertion and at rest, associated with b/l LE swelling and erythema. CT demonstrates pulmonary emboli noted in segmental branches of the right upper and middle lobe pulmonary arteries.       PAST MEDICAL & SURGICAL HISTORY:  Anxiety      HTN (hypertension)      Diabetes      High cholesterol      Asthma      Chronic low back pain with sciatica, sciatica laterality unspecified, unspecified back pain laterality      Uncomplicated opioid dependence      Syncopal episodes      History of back surgery  x 3          MEDICATIONS  (STANDING):  albuterol/ipratropium for Nebulization 3 milliLiter(s) Nebulizer every 6 hours  furosemide   Injectable 40 milliGRAM(s) IV Push daily  heparin  Infusion. 1400 Unit(s)/Hr (14 mL/Hr) IV Continuous <Continuous>  methylPREDNISolone sodium succinate Injectable 125 milliGRAM(s) IV Push once  methylPREDNISolone sodium succinate Injectable 40 milliGRAM(s) IV Push two times a day    MEDICATIONS  (PRN):  albuterol    90 MICROgram(s) HFA Inhaler 2 Puff(s) Inhalation every 4 hours PRN Bronchospasm  heparin   Injectable 7500 Unit(s) IV Push every 6 hours PRN For aPTT less than 40  heparin   Injectable 3500 Unit(s) IV Push every 6 hours PRN For aPTT between 40 - 57      Allergies    pineapples (Anaphylaxis)  Originally Entered as [ANGIOEDEMA] reaction to [pineapple] (Unknown)  penicillins (Other)  aspirin (Stomach Upset)        Physical Exam:   Vital Signs Last 24 Hrs  T(C): 36.6 (04 May 2023 05:29), Max: 36.8 (03 May 2023 14:47)  T(F): 97.9 (04 May 2023 05:29), Max: 98.2 (03 May 2023 14:47)  HR: 89 (04 May 2023 05:29) (68 - 100)  BP: 118/74 (04 May 2023 05:29) (92/63 - 138/70)  BP(mean): --  RR: 18 (04 May 2023 05:29) (18 - 24)  SpO2: 96% (04 May 2023 05:29) (86% - 96%)    Parameters below as of 03 May 2023 23:08  Patient On (Oxygen Delivery Method): nasal cannula  O2 Flow (L/min): 3      Pertinent labs:                      11.2   7.42  )-----------( 282      ( 04 May 2023 02:52 )             36.7       05-03    141  |  106  |  22<H>  ----------------------------<  93  4.8   |  24  |  1.1    Ca    8.7      03 May 2023 16:19    TPro  7.2  /  Alb  3.3<L>  /  TBili  0.3  /  DBili  x   /  AST  10  /  ALT  <5  /  AlkPhos  65  05-03      PT/INR - ( 03 May 2023 20:10 )   PT: 14.00 sec;   INR: 1.22 ratio         PTT - ( 04 May 2023 02:52 )  PTT:158.2 sec    Radiology & Additional Studies:     Radiology imaging reviewed.       ASSESSMENT/ PLAN:     67 year old patient, known to have HTN, HLD, DM, COPD (not on home O2), smoker (1/2 ppd), 3 spinal operations (2010), history of PE (2018) and DVT (2018) s/p thrombolysis and tPA instillation, sent to the ED by his PCP for workup of increased SOB and LE swelling.  Patient reports that for the past 2 weeks, he has been having progressive worsening of SOB upon exertion and at rest, associated with b/l LE swelling and erythema. CT demonstrates pulmonary emboli noted in segmental branches of the right upper and middle lobe pulmonary arteries.     - PE of the segmental branches of the right upper and middle lobe pulmonary arteries with right heart stain. Case discussed with critical care/pulm fellow at time of consult and decision was made for no IR intervention at this time.  
Pain Medicine Consult Note    History of Present Illness  Patient is a 66 y/o man with history of HTN, HL, DM, COPD, PE/DVT, pulmonary HTN, and chronic low back pain on chronic opioid therapy who was admitted on 5/3/2023 with dyspnea and lower extremity edema now found to have multiple PEs. The patient notes a longstanding history of chronic low back pain and has undergone multiple lumbar fusions. He has been maintained on chronic opioid therapy since 2010 and currently is prescribed morphine ER 60mg BID. The patient was recently weaned off of percocet 10-325mg in March of 2023. At this point, he endorses having an aching pain across the low back without significant radiation to the lower extremities. No bowel or bladder incontinence or saddle anesthesia.     Current Inpatient Medication Regimen:  albuterol    90 MICROgram(s) HFA Inhaler 2 Puff(s) Inhalation every 4 hours PRN  albuterol/ipratropium for Nebulization 3 milliLiter(s) Nebulizer every 6 hours  ALPRAZolam 1 milliGRAM(s) Oral three times a day  buPROPion XL (24-Hour) . 150 milliGRAM(s) Oral daily  DULoxetine 30 milliGRAM(s) Oral daily  enoxaparin Injectable 90 milliGRAM(s) SubCutaneous every 12 hours  fenofibrate Tablet 145 milliGRAM(s) Oral daily  furosemide    Tablet 20 milliGRAM(s) Oral two times a day  losartan 100 milliGRAM(s) Oral daily  methylPREDNISolone sodium succinate Injectable 40 milliGRAM(s) IV Push two times a day  morphine ER Tablet 60 milliGRAM(s) Oral every 12 hours  oxybutynin 10 milliGRAM(s) Oral daily  oxycodone    5 mG/acetaminophen 325 mG 2 Tablet(s) Oral daily PRN  simvastatin 20 milliGRAM(s) Oral at bedtime  tamsulosin 0.4 milliGRAM(s) Oral at bedtime      Home Analgesic Regimen:  morphine ER 60mg BID (MED = 120)    Allergies:  pineapples (Anaphylaxis)  Originally Entered as [ANGIOEDEMA] reaction to [pineapple] (Unknown)  penicillins (Other)  aspirin (Stomach Upset)      Past Medical History:  HTN  HL  DM  COPD  PE/DVT  Pulmonary HTN  Chronic low back pain on chronic opioid therapy  Anxiety    Past Surgical History:  Lumbar laminectomy & fusion (x3)  Exploratory laparotomy    Family History:  Denies    Social History:  Tobacco - cut down in 2020 and now smokes on occasion  EtOH - denies  Drugs - denies      Review of Systems:  General: no fevers or chills  Eyes: no diplopia or blurred vision  ENT: no rhinorrhea  CV: no chest pain  Resp: +dyspnea  GI: no abdominal pain, constipation, or diarrhea  : no urinary incontinence or dysuria  Neuro: no focal weakness or numbness in the lower extremities  Psych: +anxiety    Physical Exam:  T(C): 36.4 (05-04-23 @ 16:09), Max: 36.8 (05-04-23 @ 15:07)  HR: 86 (05-04-23 @ 16:09) (68 - 95)  BP: 114/75 (05-04-23 @ 16:09) (114/75 - 138/70)  RR: 18 (05-04-23 @ 16:09) (18 - 22)  SpO2: 91% (05-04-23 @ 16:09) (91% - 96%)  Gen: NAD  Eyes: wears glasses; no scleral icterus  Head: Normocephalic / Atraumatic  CV: no JVD  Lungs: nonlabored breathing, using nasal cannula  Abdomen: nondistended, soft  : no escamilla catheter in place  Neuro: AOx3, Cranial nerves intact  Extremities: full ROM in upper/lower extremities  Psych: normal affect      Labs:  CBC  6.48 K/uL [4.80 - 10.80] > 13.2 g/dL<L> [14.0 - 18.0] / 42.6 % [42.0 - 52.0] < 332 K/uL [130 - 400]      BMP  138 mmol/L [135 - 146] | 98 mmol/L [98 - 110] | 25 mg/dL<H> [10 - 20]  4.2 mmol/L [3.5 - 5.0] | 28 mmol/L [17 - 32] | 1.3 mg/dL [0.7 - 1.5]    157 mg/dL<H> [70 - 99]      Imaging Studies:  CT Angio Chest PE Protocol (5/3/2023)  FINDINGS:    TUBES/LINES: None.    PULMONARY ARTERIES: Intraluminal filling defects, consistent with acute   pulmonary emboli, are noted in segmental branches of the right upper lobe   pulmonary arteries. Intraluminal filling defects are noted in segmental   and subsegmental branches of the left upper and left lower lobe pulmonary   arteries. There is evidence of right heart strain (RV:LV ratio >1; RV=6.2   cm and LV = 4.5 cm)    GREAT VESSELS/CARDIAC STRUCTURES/PERICARDIUM: Cardiomegaly. No   pericardial effusion. Normal caliber aorta. Coronary artery and aortic   calcifications are noted. There is no evidence of aortic aneurysm or   dissection. The brachiocephalic vessels are unremarkable.    There is reflux of contrast material into the IVC and hepatic veins   compatible with right heart dysfunction.    The main pulmonary artery is dilated, measuring 3.2 cm in diameter, which   may be seen with pulmonary hypertension.    LUNG PARENCHYMA/CENTRAL AIRWAYS/PLEURA: The central tracheobronchial tree   is patent. There are areas of atelectasis in the right and left lower   lobes and right middle lobe.  No evidence of pleural effusion or   pneumothorax    MEDIASTINUM/HILUM: There are no suspicious mediastinal, hilar, or   axillary lymph nodes.  The visualized portion of the thyroid gland is   unremarkable.    CHEST WALL/AXILLA: Unremarkable.    UPPER ABDOMEN: The partially visualized upper abdomen shows no acute   pathology.    OSSEOUS STRUCTURES: Degenerative changes of the spine are noted.      IMPRESSION:    Intraluminal filling defects, consistent with acute pulmonary emboli, are   noted in segmental branches of the right upper and middle lobe pulmonary   arteries. Intraluminal filling defects are noted in segmental and   subsegmental branches of the left upper and left lower lobe, and lingula   pulmonary arteries.    There is evidence of right heart strain (RV:LV ratio >1; RV=6.2 cm and LV   = 4.5 cm)    There is reflux of contrast material into the IVC and hepatic veins   compatible with right heart dysfunction.      Opioid Risk Assessment Tool                                                                         Female       Male  Family History  Alcohol                                                              1                3  Illegal drugs                                                       2                3  Rx drugs                                                            4                4    Personal History   Alcohol                                                              3                3  Illegal drugs                                                       4                4  Rx drugs                                                            5                5    Age between 16—45 years                                1                1  History of preadolescent sexual abuse               3                0    Psychological disease  ADD, OCD, bipolar, schizophrenia                      2                2  Depression                                                       1                1    Total Score                                                      __              0    0 - 3 = low risk for future opioid abuse  4 - 7 = moderate risk for future opioid abuse  8+ = high risk for future opioid abuse

## 2023-05-04 NOTE — CONSULT NOTE ADULT - ASSESSMENT
Patient is a 68 y/o man with history of HTN, HL, DM, COPD, PE/DVT, pulmonary HTN, and chronic low back pain on chronic opioid therapy who was admitted on 5/3/2023 with dyspnea and lower extremity edema now found to have multiple PEs.

## 2023-05-04 NOTE — CHART NOTE - NSCHARTNOTEFT_GEN_A_CORE
Pt used to take Keppra and Gabapentin previously. He is not sure why, no hx of seizures. His pharmacy also didn't mentioned about keppra and gabapentin. Confirmed with pt, he said he stopped taking them by himself, and don't want to take them in the hospital as well.

## 2023-05-05 LAB
ALBUMIN SERPL ELPH-MCNC: 3.6 G/DL — SIGNIFICANT CHANGE UP (ref 3.5–5.2)
ALP SERPL-CCNC: 71 U/L — SIGNIFICANT CHANGE UP (ref 30–115)
ALT FLD-CCNC: <5 U/L — SIGNIFICANT CHANGE UP (ref 0–41)
ANION GAP SERPL CALC-SCNC: 12 MMOL/L — SIGNIFICANT CHANGE UP (ref 7–14)
AST SERPL-CCNC: 7 U/L — SIGNIFICANT CHANGE UP (ref 0–41)
BASOPHILS # BLD AUTO: 0.01 K/UL — SIGNIFICANT CHANGE UP (ref 0–0.2)
BASOPHILS NFR BLD AUTO: 0.1 % — SIGNIFICANT CHANGE UP (ref 0–1)
BILIRUB SERPL-MCNC: 0.5 MG/DL — SIGNIFICANT CHANGE UP (ref 0.2–1.2)
BUN SERPL-MCNC: 26 MG/DL — HIGH (ref 10–20)
CALCIUM SERPL-MCNC: 8.8 MG/DL — SIGNIFICANT CHANGE UP (ref 8.4–10.5)
CHLORIDE SERPL-SCNC: 103 MMOL/L — SIGNIFICANT CHANGE UP (ref 98–110)
CO2 SERPL-SCNC: 29 MMOL/L — SIGNIFICANT CHANGE UP (ref 17–32)
CREAT SERPL-MCNC: 1.2 MG/DL — SIGNIFICANT CHANGE UP (ref 0.7–1.5)
EGFR: 67 ML/MIN/1.73M2 — SIGNIFICANT CHANGE UP
EOSINOPHIL # BLD AUTO: 0 K/UL — SIGNIFICANT CHANGE UP (ref 0–0.7)
EOSINOPHIL NFR BLD AUTO: 0 % — SIGNIFICANT CHANGE UP (ref 0–8)
GAS PNL BLDA: SIGNIFICANT CHANGE UP
GLUCOSE SERPL-MCNC: 145 MG/DL — HIGH (ref 70–99)
HCT VFR BLD CALC: 41 % — LOW (ref 42–52)
HGB BLD-MCNC: 12.7 G/DL — LOW (ref 14–18)
IMM GRANULOCYTES NFR BLD AUTO: 0.3 % — SIGNIFICANT CHANGE UP (ref 0.1–0.3)
LACTATE SERPL-SCNC: 1.6 MMOL/L — SIGNIFICANT CHANGE UP (ref 0.7–2)
LACTATE SERPL-SCNC: 2.4 MMOL/L — HIGH (ref 0.7–2)
LYMPHOCYTES # BLD AUTO: 0.9 K/UL — LOW (ref 1.2–3.4)
LYMPHOCYTES # BLD AUTO: 12.7 % — LOW (ref 20.5–51.1)
MAGNESIUM SERPL-MCNC: 1.9 MG/DL — SIGNIFICANT CHANGE UP (ref 1.8–2.4)
MCHC RBC-ENTMCNC: 27.7 PG — SIGNIFICANT CHANGE UP (ref 27–31)
MCHC RBC-ENTMCNC: 31 G/DL — LOW (ref 32–37)
MCV RBC AUTO: 89.3 FL — SIGNIFICANT CHANGE UP (ref 80–94)
MONOCYTES # BLD AUTO: 0.55 K/UL — SIGNIFICANT CHANGE UP (ref 0.1–0.6)
MONOCYTES NFR BLD AUTO: 7.8 % — SIGNIFICANT CHANGE UP (ref 1.7–9.3)
NEUTROPHILS # BLD AUTO: 5.59 K/UL — SIGNIFICANT CHANGE UP (ref 1.4–6.5)
NEUTROPHILS NFR BLD AUTO: 79.1 % — HIGH (ref 42.2–75.2)
NRBC # BLD: 0 /100 WBCS — SIGNIFICANT CHANGE UP (ref 0–0)
PLATELET # BLD AUTO: 328 K/UL — SIGNIFICANT CHANGE UP (ref 130–400)
PMV BLD: 10.1 FL — SIGNIFICANT CHANGE UP (ref 7.4–10.4)
POTASSIUM SERPL-MCNC: 4.4 MMOL/L — SIGNIFICANT CHANGE UP (ref 3.5–5)
POTASSIUM SERPL-SCNC: 4.4 MMOL/L — SIGNIFICANT CHANGE UP (ref 3.5–5)
PROT SERPL-MCNC: 7.3 G/DL — SIGNIFICANT CHANGE UP (ref 6–8)
RBC # BLD: 4.59 M/UL — LOW (ref 4.7–6.1)
RBC # FLD: 17.2 % — HIGH (ref 11.5–14.5)
SODIUM SERPL-SCNC: 144 MMOL/L — SIGNIFICANT CHANGE UP (ref 135–146)
WBC # BLD: 7.07 K/UL — SIGNIFICANT CHANGE UP (ref 4.8–10.8)
WBC # FLD AUTO: 7.07 K/UL — SIGNIFICANT CHANGE UP (ref 4.8–10.8)

## 2023-05-05 PROCEDURE — 71045 X-RAY EXAM CHEST 1 VIEW: CPT | Mod: 26

## 2023-05-05 RX ORDER — APIXABAN 2.5 MG/1
5 TABLET, FILM COATED ORAL EVERY 12 HOURS
Refills: 0 | Status: CANCELLED | OUTPATIENT
Start: 2023-05-12 | End: 2023-05-10

## 2023-05-05 RX ORDER — APIXABAN 2.5 MG/1
1 TABLET, FILM COATED ORAL
Qty: 90 | Refills: 0
Start: 2023-05-05

## 2023-05-05 RX ORDER — ALBUTEROL 90 UG/1
2.5 AEROSOL, METERED ORAL ONCE
Refills: 0 | Status: DISCONTINUED | OUTPATIENT
Start: 2023-05-05 | End: 2023-05-05

## 2023-05-05 RX ORDER — GUAIFENESIN/DEXTROMETHORPHAN 600MG-30MG
10 TABLET, EXTENDED RELEASE 12 HR ORAL ONCE
Refills: 0 | Status: COMPLETED | OUTPATIENT
Start: 2023-05-05 | End: 2023-05-05

## 2023-05-05 RX ORDER — ALBUTEROL 90 UG/1
2.5 AEROSOL, METERED ORAL ONCE
Refills: 0 | Status: COMPLETED | OUTPATIENT
Start: 2023-05-05 | End: 2023-05-05

## 2023-05-05 RX ORDER — FUROSEMIDE 40 MG
40 TABLET ORAL ONCE
Refills: 0 | Status: COMPLETED | OUTPATIENT
Start: 2023-05-05 | End: 2023-05-05

## 2023-05-05 RX ORDER — APIXABAN 2.5 MG/1
10 TABLET, FILM COATED ORAL EVERY 12 HOURS
Refills: 0 | Status: DISCONTINUED | OUTPATIENT
Start: 2023-05-05 | End: 2023-05-10

## 2023-05-05 RX ADMIN — Medication 3 MILLILITER(S): at 13:25

## 2023-05-05 RX ADMIN — MORPHINE SULFATE 60 MILLIGRAM(S): 50 CAPSULE, EXTENDED RELEASE ORAL at 05:51

## 2023-05-05 RX ADMIN — BUPROPION HYDROCHLORIDE 150 MILLIGRAM(S): 150 TABLET, EXTENDED RELEASE ORAL at 12:46

## 2023-05-05 RX ADMIN — Medication 3 MILLILITER(S): at 20:31

## 2023-05-05 RX ADMIN — DULOXETINE HYDROCHLORIDE 30 MILLIGRAM(S): 30 CAPSULE, DELAYED RELEASE ORAL at 12:46

## 2023-05-05 RX ADMIN — Medication 40 MILLIGRAM(S): at 05:51

## 2023-05-05 RX ADMIN — Medication 3 MILLILITER(S): at 08:44

## 2023-05-05 RX ADMIN — Medication 1 PATCH: at 14:16

## 2023-05-05 RX ADMIN — ALBUTEROL 2.5 MILLIGRAM(S): 90 AEROSOL, METERED ORAL at 02:51

## 2023-05-05 RX ADMIN — APIXABAN 10 MILLIGRAM(S): 2.5 TABLET, FILM COATED ORAL at 17:36

## 2023-05-05 RX ADMIN — Medication 20 MILLIGRAM(S): at 13:25

## 2023-05-05 RX ADMIN — Medication 1 MILLIGRAM(S): at 22:10

## 2023-05-05 RX ADMIN — Medication 20 MILLIGRAM(S): at 05:50

## 2023-05-05 RX ADMIN — Medication 40 MILLIGRAM(S): at 20:28

## 2023-05-05 RX ADMIN — Medication 10 MILLILITER(S): at 03:53

## 2023-05-05 RX ADMIN — Medication 1 MILLIGRAM(S): at 13:24

## 2023-05-05 RX ADMIN — ENOXAPARIN SODIUM 90 MILLIGRAM(S): 100 INJECTION SUBCUTANEOUS at 05:50

## 2023-05-05 RX ADMIN — TAMSULOSIN HYDROCHLORIDE 0.4 MILLIGRAM(S): 0.4 CAPSULE ORAL at 22:10

## 2023-05-05 RX ADMIN — MORPHINE SULFATE 60 MILLIGRAM(S): 50 CAPSULE, EXTENDED RELEASE ORAL at 17:36

## 2023-05-05 RX ADMIN — Medication 1 MILLIGRAM(S): at 05:50

## 2023-05-05 RX ADMIN — Medication 1 PATCH: at 19:00

## 2023-05-05 RX ADMIN — Medication 10 MILLIGRAM(S): at 12:46

## 2023-05-05 RX ADMIN — Medication 145 MILLIGRAM(S): at 12:46

## 2023-05-05 RX ADMIN — Medication 40 MILLIGRAM(S): at 12:47

## 2023-05-05 RX ADMIN — SIMVASTATIN 20 MILLIGRAM(S): 20 TABLET, FILM COATED ORAL at 22:10

## 2023-05-05 NOTE — PHYSICAL THERAPY INITIAL EVALUATION ADULT - GENERAL OBSERVATIONS, REHAB EVAL
[id number] Pt received and left in bedside chair +tele +spO2 +O2 3L NC, NAD, pt agreeable to PT session

## 2023-05-05 NOTE — PHYSICAL THERAPY INITIAL EVALUATION ADULT - PERTINENT HX OF CURRENT PROBLEM, REHAB EVAL
67 year old patient, known to have HTN, HLD, DM, COPD (not on home O2), smoker (1/2 ppd), 3 spinal operations (2010), history of PE (2018) and DVT (2018) s/p thrombolysis and tPA instillation, sent to the ED by his PCP for workup of increased SOB and LE swelling.  Patient reports that for the past 2 weeks, he has been having progressive worsening of SOB upon exertion and at rest, associated with b/l LE swelling and erythema. He also endorses orthopnea and PND. Last echo was done in 2021 with EF 66% and pulmonary HTN. He denies CP, palpitations. He denies recent travel, trauma or immobilization. No fever, chills, URT or urinary symptoms.

## 2023-05-05 NOTE — PATIENT PROFILE ADULT - PUBLIC BENEFITS
Ok to RF;    Bupropion  mg take 1 tab po qd #90 w/ no RF. LOV 3-17-16. NOV due in 3/2017. Maite Redman PA-C        no

## 2023-05-05 NOTE — PROGRESS NOTE ADULT - ASSESSMENT
Acute Hypoxic respiratory failure secondary to PE  PE associated with right heart strain  DVT  - on Lovenox  - no surgical intervention planned  - patient desaturates easily, walked with PT 20 feet and had to stop  - trend pulse ox    HTN, HLD  - troponins likely elevated due to heart strain  - continue home meds    COPD, Active smoker  - encourage smoking cessation  - continue repiratory treatments  - monitor pulse ox    DM, Diabetic neuropathy  - on oral rx  -monitor fs  - sliding scale coverage    Chronic back pain   Chronic Opioid Dependence  Chronic Benzo Dependence  - continue home medications  - on Morphine, Percocet  - on Xanax    Diet: Dash / CCD  GI Prophylaxis: start on PPI give steroid and anticoagulation    Patient remains acute, hope to wean from O2

## 2023-05-05 NOTE — PROGRESS NOTE ADULT - SUBJECTIVE AND OBJECTIVE BOX
TATYANASANA  67y  Male  HPI:  67 year old patient, known to have HTN, HLD, DM, COPD (not on home O2), smoker (1/2 ppd), 3 spinal operations (2010), history of PE (2018) and DVT (2018) s/p thrombolysis and tPA instillation, sent to the ED by his PCP for workup of increased SOB and LE swelling.  Patient reports that for the past 2 weeks, he has been having progressive worsening of SOB upon exertion and at rest, associated with b/l LE swelling and erythema. He also endorses orthopnea and PND. Last echo was done in 2021 with EF 66% and pulmonary HTN. He denies CP, palpitations. He denies recent travel, trauma or immobilization. No fever, chills, URT or urinary symptoms.     To note, patient had an accident in 2006 s/p multiple operations and since then he has been on Percocet 90 mg. On March 2023, pain management cut down on Percocet and currently he is taking morphine 60 mg BID, Baclofen 10 mg and clonidine patch 0.2mg/day. He states that his BP has been on the low side (100's/50's) and has been vomiting after each meal with subsequent weight loss.     In ED, vitals significant for BP 92/63  RR 24 SpO2 86% on RA improved on 3L NC.   Laboratory workup significant for Hb 11.4 MCV 90.5  Troponin 0.02 pro BNP 6472  Venous duplex b/l LE: DVTs in b/l PT, right gastrocnemius and left popliteal.  CTA chest: Intraluminal filling defects, consistent with acute pulmonary emboli, are noted in segmental branches of the right upper and middle lobe pulmonary arteries. Intraluminal filling defects are noted in segmental and subsegmental branches of the left upper and left lowerlobe, and lingula pulmonary arteries. There is evidence of right heart strain (RV:LV ratio >1; RV=6.2 cm and LV   = 4.5 cm) There is reflux of contrast material into the IVC and hepatic veins compatible with right heart dysfunction.  He was started on heparin drip and given 40 mg IV Lasix once in ED.   Admitted to Telemetry for further workup and management.    (03 May 2023 23:50)    MEDICATIONS  (STANDING):  albuterol/ipratropium for Nebulization 3 milliLiter(s) Nebulizer every 6 hours  ALPRAZolam 1 milliGRAM(s) Oral three times a day  buPROPion XL (24-Hour) . 150 milliGRAM(s) Oral daily  cloNIDine Patch 0.2 mG/24Hr(s) 1 patch Transdermal every 7 days  DULoxetine 30 milliGRAM(s) Oral daily  enoxaparin Injectable 90 milliGRAM(s) SubCutaneous every 12 hours  fenofibrate Tablet 145 milliGRAM(s) Oral daily  furosemide    Tablet 20 milliGRAM(s) Oral two times a day  losartan 100 milliGRAM(s) Oral daily  methylPREDNISolone sodium succinate Injectable 40 milliGRAM(s) IV Push two times a day  morphine ER Tablet 60 milliGRAM(s) Oral every 12 hours  oxybutynin XL 10 milliGRAM(s) Oral daily  simvastatin 20 milliGRAM(s) Oral at bedtime  tamsulosin 0.4 milliGRAM(s) Oral at bedtime    MEDICATIONS  (PRN):  albuterol    90 MICROgram(s) HFA Inhaler 2 Puff(s) Inhalation every 4 hours PRN Bronchospasm  oxycodone    5 mG/acetaminophen 325 mG 2 Tablet(s) Oral daily PRN Severe Pain (7 - 10)    INTERVAL EVENTS: Patient seen today without distress, states O2 is fluctuating.    T(C): 36.1 (05-05-23 @ 07:30), Max: 36.8 (05-04-23 @ 15:07)  HR: 55 (05-05-23 @ 07:30) (55 - 95)  BP: 115/77 (05-05-23 @ 07:30) (109/67 - 142/86)  RR: 18 (05-05-23 @ 07:30) (18 - 18)  SpO2: 99% (05-05-23 @ 09:33) (91% - 99%)  Wt(kg): --Vital Signs Last 24 Hrs  T(C): 36.1 (05 May 2023 07:30), Max: 36.8 (04 May 2023 15:07)  T(F): 97 (05 May 2023 07:30), Max: 98.3 (04 May 2023 15:07)  HR: 55 (05 May 2023 07:30) (55 - 95)  BP: 115/77 (05 May 2023 07:30) (109/67 - 142/86)  BP(mean): 92 (05 May 2023 07:30) (84 - 108)  RR: 18 (05 May 2023 07:30) (18 - 18)  SpO2: 99% (05 May 2023 09:33) (91% - 99%)    Parameters below as of 05 May 2023 09:33  Patient On (Oxygen Delivery Method): nasal cannula  O2 Flow (L/min): 3      PHYSICAL EXAM:  GENERAL:   NECK: Supple, No JVD, .  CHEST/LUNG: Clear;  HEART: S1, S2, Regular   ABDOMEN: Soft, Nontender,  Bowel sounds present  EXTREMITIES: No clubbing, cyanosis, or edema  SKIN: No rashes or lesions    LABS:                          12.7   7.07  )-----------( 328      ( 05 May 2023 05:20 )             41.0             05-05    144  |  103  |  26<H>  ----------------------------<  145<H>  4.4   |  29  |  1.2    Ca    8.8      05 May 2023 05:20  Phos  3.4     05-04  Mg     1.9     05-05    TPro  7.3  /  Alb  3.6  /  TBili  0.5  /  DBili  x   /  AST  7   /  ALT  <5  /  AlkPhos  71  05-05    LIVER FUNCTIONS - ( 05 May 2023 05:20 )  Alb: 3.6 g/dL / Pro: 7.3 g/dL / ALK PHOS: 71 U/L / ALT: <5 U/L / AST: 7 U/L / GGT: x                 PT/INR - ( 04 May 2023 12:07 )   PT: 13.30 sec;   INR: 1.16 ratio         PTT - ( 04 May 2023 13:13 )  PTT:162.5 sec  CARDIAC MARKERS ( 04 May 2023 12:07 )  x     / <0.01 ng/mL / x     / x     / x      CARDIAC MARKERS ( 04 May 2023 01:05 )  x     / 0.03 ng/mL / x     / x     / x      CARDIAC MARKERS ( 03 May 2023 16:19 )  x     / 0.02 ng/mL / x     / x     / x          ABG - ( 05 May 2023 09:41 )  pH, Arterial: 7.46  pH, Blood: x     /  pCO2: 37    /  pO2: 66    / HCO3: 26    / Base Excess: 2.5   /  SaO2: 95.5                RADIOLOGY & ADDITIONAL TESTS:  < from: CT Angio Chest PE Protocol w/ IV Cont (05.03.23 @ 22:03) >    INTERPRETATION:  CLINICAL HISTORY / REASON FOR EXAM: Shortness of breath   and swelling to lower extremities. Pulmonary embolus evaluation. WBC 6.02     PMHx of asthma, DM, HLD, HTN, back surgery    TECHNIQUE:   Contiguous axial CT images were obtained from the thoracic   inlet to the upper abdomen with intravenous contrast.  Thin sections were   reconstructed through the pulmonary vasculature. Reformatted images in   the coronal and sagittal planes were acquired, as well as 3D, Volume   rendering, and MIP reconstructed images.    Comparison: Chest CT dated 4/18/2021      FINDINGS:    TUBES/LINES: None.    PULMONARY ARTERIES: Intraluminal filling defects, consistent with acute   pulmonary emboli, are noted in segmental branches of the right upper lobe   pulmonary arteries. Intraluminal filling defects are noted in segmental   and subsegmental branches of the left upper and left lower lobe pulmonary   arteries. There is evidence of right heart strain (RV:LV ratio >1; RV=6.2   cm and LV = 4.5 cm)    GREAT VESSELS/CARDIAC STRUCTURES/PERICARDIUM: Cardiomegaly. No   pericardial effusion. Normal caliber aorta. Coronary artery and aortic   calcifications are noted. There is no evidence of aortic aneurysm or   dissection. The brachiocephalic vessels are unremarkable.    There is reflux of contrast material into the IVC and hepatic veins   compatible with right heartdysfunction.    The main pulmonary artery is dilated, measuring 3.2 cm in diameter, which   may be seen with pulmonary hypertension.    LUNG PARENCHYMA/CENTRAL AIRWAYS/PLEURA: The central tracheobronchial tree   is patent. There are areas of atelectasis in the right and left lower   lobes and right middle lobe.  No evidence of pleural effusion or   pneumothorax    MEDIASTINUM/HILUM: There are no suspicious mediastinal, hilar, or   axillary lymph nodes.  The visualized portion of the thyroid glandis   unremarkable.    CHEST WALL/AXILLA: Unremarkable.    UPPER ABDOMEN: The partially visualized upper abdomen shows no acute   pathology.    OSSEOUS STRUCTURES: Degenerative changes of the spine are noted.      IMPRESSION:    Intraluminal filling defects, consistent with acute pulmonary emboli, are   noted in segmental branches of the right upper and middle lobe pulmonary   arteries. Intraluminal filling defects are noted in segmental and   subsegmental branches of the left upper and left lowerlobe, and lingula   pulmonary arteries.    There is evidence of right heart strain (RV:LV ratio >1; RV=6.2 cm and LV   = 4.5 cm)    There is reflux of contrast material into the IVC and hepatic veins   compatible with right heart dysfunction.    The findings were discussed with Dr. Gay at 10:32  5/3/2023 with read   back.    --- End of Report ---            < end of copied text >     TATYANASANA  67y  Male  HPI:  67 year old patient, known to have HTN, HLD, DM, COPD (not on home O2), smoker (1/2 ppd), 3 spinal operations (2010), history of PE (2018) and DVT (2018) s/p thrombolysis and tPA instillation, sent to the ED by his PCP for workup of increased SOB and LE swelling.  Patient reports that for the past 2 weeks, he has been having progressive worsening of SOB upon exertion and at rest, associated with b/l LE swelling and erythema. He also endorses orthopnea and PND. Last echo was done in 2021 with EF 66% and pulmonary HTN. He denies CP, palpitations. He denies recent travel, trauma or immobilization. No fever, chills, URT or urinary symptoms.     To note, patient had an accident in 2006 s/p multiple operations and since then he has been on Percocet 90 mg. On March 2023, pain management cut down on Percocet and currently he is taking morphine 60 mg BID, Baclofen 10 mg and clonidine patch 0.2mg/day. He states that his BP has been on the low side (100's/50's) and has been vomiting after each meal with subsequent weight loss.     In ED, vitals significant for BP 92/63  RR 24 SpO2 86% on RA improved on 3L NC.   Laboratory workup significant for Hb 11.4 MCV 90.5  Troponin 0.02 pro BNP 6472  Venous duplex b/l LE: DVTs in b/l PT, right gastrocnemius and left popliteal.  CTA chest: Intraluminal filling defects, consistent with acute pulmonary emboli, are noted in segmental branches of the right upper and middle lobe pulmonary arteries. Intraluminal filling defects are noted in segmental and subsegmental branches of the left upper and left lowerlobe, and lingula pulmonary arteries. There is evidence of right heart strain (RV:LV ratio >1; RV=6.2 cm and LV   = 4.5 cm) There is reflux of contrast material into the IVC and hepatic veins compatible with right heart dysfunction.  He was started on heparin drip and given 40 mg IV Lasix once in ED.   Admitted to Telemetry for further workup and management.    (03 May 2023 23:50)    MEDICATIONS  (STANDING):  albuterol/ipratropium for Nebulization 3 milliLiter(s) Nebulizer every 6 hours  ALPRAZolam 1 milliGRAM(s) Oral three times a day  buPROPion XL (24-Hour) . 150 milliGRAM(s) Oral daily  cloNIDine Patch 0.2 mG/24Hr(s) 1 patch Transdermal every 7 days  DULoxetine 30 milliGRAM(s) Oral daily  enoxaparin Injectable 90 milliGRAM(s) SubCutaneous every 12 hours  fenofibrate Tablet 145 milliGRAM(s) Oral daily  furosemide    Tablet 20 milliGRAM(s) Oral two times a day  losartan 100 milliGRAM(s) Oral daily  methylPREDNISolone sodium succinate Injectable 40 milliGRAM(s) IV Push two times a day  morphine ER Tablet 60 milliGRAM(s) Oral every 12 hours  oxybutynin XL 10 milliGRAM(s) Oral daily  simvastatin 20 milliGRAM(s) Oral at bedtime  tamsulosin 0.4 milliGRAM(s) Oral at bedtime    MEDICATIONS  (PRN):  albuterol    90 MICROgram(s) HFA Inhaler 2 Puff(s) Inhalation every 4 hours PRN Bronchospasm  oxycodone    5 mG/acetaminophen 325 mG 2 Tablet(s) Oral daily PRN Severe Pain (7 - 10)    INTERVAL EVENTS: Patient seen today without distress, states O2 is fluctuating.    T(C): 36.1 (05-05-23 @ 07:30), Max: 36.8 (05-04-23 @ 15:07)  HR: 55 (05-05-23 @ 07:30) (55 - 95)  BP: 115/77 (05-05-23 @ 07:30) (109/67 - 142/86)  RR: 18 (05-05-23 @ 07:30) (18 - 18)  SpO2: 99% (05-05-23 @ 09:33) (91% - 99%)  Wt(kg): --Vital Signs Last 24 Hrs  T(C): 36.1 (05 May 2023 07:30), Max: 36.8 (04 May 2023 15:07)  T(F): 97 (05 May 2023 07:30), Max: 98.3 (04 May 2023 15:07)  HR: 55 (05 May 2023 07:30) (55 - 95)  BP: 115/77 (05 May 2023 07:30) (109/67 - 142/86)  BP(mean): 92 (05 May 2023 07:30) (84 - 108)  RR: 18 (05 May 2023 07:30) (18 - 18)  SpO2: 99% (05 May 2023 09:33) (91% - 99%)    Parameters below as of 05 May 2023 09:33  Patient On (Oxygen Delivery Method): nasal cannula  O2 Flow (L/min): 3      PHYSICAL EXAM:  GENERAL: NAD in chair on O2 at rest  NECK: Supple, No JVD, .  CHEST/LUNG: scattered wheezing  HEART: S1, S2, Regular   ABDOMEN: Soft, Nontender,  Bowel sounds present  EXTREMITIES: ++ edema    LABS:                          12.7   7.07  )-----------( 328      ( 05 May 2023 05:20 )             41.0             05-05    144  |  103  |  26<H>  ----------------------------<  145<H>  4.4   |  29  |  1.2    Ca    8.8      05 May 2023 05:20  Phos  3.4     05-04  Mg     1.9     05-05    TPro  7.3  /  Alb  3.6  /  TBili  0.5  /  DBili  x   /  AST  7   /  ALT  <5  /  AlkPhos  71  05-05    LIVER FUNCTIONS - ( 05 May 2023 05:20 )  Alb: 3.6 g/dL / Pro: 7.3 g/dL / ALK PHOS: 71 U/L / ALT: <5 U/L / AST: 7 U/L / GGT: x                   PT/INR - ( 04 May 2023 12:07 )   PT: 13.30 sec;   INR: 1.16 ratio         PTT - ( 04 May 2023 13:13 )  PTT:162.5 sec  CARDIAC MARKERS ( 04 May 2023 12:07 )  x     / <0.01 ng/mL / x     / x     / x      CARDIAC MARKERS ( 04 May 2023 01:05 )  x     / 0.03 ng/mL / x     / x     / x      CARDIAC MARKERS ( 03 May 2023 16:19 )  x     / 0.02 ng/mL / x     / x     / x          ABG - ( 05 May 2023 09:41 )  pH, Arterial: 7.46  pH, Blood: x     /  pCO2: 37    /  pO2: 66    / HCO3: 26    / Base Excess: 2.5   /  SaO2: 95.5            Summary:   1. Left ventricular ejection fraction, by visual estimation, is 50 to   55%.   2. Normal global left ventricular systolic function.   3. Mildly increased LV wall thickness.   4. The mitral in-flow pattern reveals no discernable A-wave, therefore   no comment on diastolic function can be made.   5. Moderately enlargedleft atrium.   6. Normal right atrial size.   7. Mitral annular calcification.   8. No evidence of mitral valve regurgitation.   9. Mild-moderate tricuspid regurgitation.  10. Dilatation of the aortic root and ascending aorta at 4.2 cm.  11. Estimated pulmonary artery systolic pressure is 49.2 mmHg assuming a   right atrial pressure of 10 mmHg, which is consistent with mild pulmonary   hypertension.  12. Endocardial visualization was enhanced with intravenous echo contrast.  13. Sclerotic aortic valve with normal opening.  14. Mildly enlarged right ventricle.  15. Mildly reduced RV systolic function.          RADIOLOGY & ADDITIONAL TESTS:  < from: CT Angio Chest PE Protocol w/ IV Cont (05.03.23 @ 22:03) >    INTERPRETATION:  CLINICAL HISTORY / REASON FOR EXAM: Shortness of breath   and swelling to lower extremities. Pulmonary embolus evaluation. WBC 6.02     PMHx of asthma, DM, HLD, HTN, back surgery    TECHNIQUE:   Contiguous axial CT images were obtained from the thoracic   inlet to the upper abdomen with intravenous contrast.  Thin sections were   reconstructed through the pulmonary vasculature. Reformatted images in   the coronal and sagittal planes were acquired, as well as 3D, Volume   rendering, and MIP reconstructed images.    Comparison: Chest CT dated 4/18/2021      FINDINGS:    TUBES/LINES: None.    PULMONARY ARTERIES: Intraluminal filling defects, consistent with acute   pulmonary emboli, are noted in segmental branches of the right upper lobe   pulmonary arteries. Intraluminal filling defects are noted in segmental   and subsegmental branches of the left upper and left lower lobe pulmonary   arteries. There is evidence of right heart strain (RV:LV ratio >1; RV=6.2   cm and LV = 4.5 cm)    GREAT VESSELS/CARDIAC STRUCTURES/PERICARDIUM: Cardiomegaly. No   pericardial effusion. Normal caliber aorta. Coronary artery and aortic   calcifications are noted. There is no evidence of aortic aneurysm or   dissection. The brachiocephalic vessels are unremarkable.    There is reflux of contrast material into the IVC and hepatic veins   compatible with right heartdysfunction.    The main pulmonary artery is dilated, measuring 3.2 cm in diameter, which   may be seen with pulmonary hypertension.    LUNG PARENCHYMA/CENTRAL AIRWAYS/PLEURA: The central tracheobronchial tree   is patent. There are areas of atelectasis in the right and left lower   lobes and right middle lobe.  No evidence of pleural effusion or   pneumothorax    MEDIASTINUM/HILUM: There are no suspicious mediastinal, hilar, or   axillary lymph nodes.  The visualized portion of the thyroid glandis   unremarkable.    CHEST WALL/AXILLA: Unremarkable.    UPPER ABDOMEN: The partially visualized upper abdomen shows no acute   pathology.    OSSEOUS STRUCTURES: Degenerative changes of the spine are noted.      IMPRESSION:    Intraluminal filling defects, consistent with acute pulmonary emboli, are   noted in segmental branches of the right upper and middle lobe pulmonary   arteries. Intraluminal filling defects are noted in segmental and   subsegmental branches of the left upper and left lowerlobe, and lingula   pulmonary arteries.    There is evidence of right heart strain (RV:LV ratio >1; RV=6.2 cm and LV   = 4.5 cm)    There is reflux of contrast material into the IVC and hepatic veins   compatible with right heart dysfunction.    The findings were discussed with Dr. Gay at 10:32  5/3/2023 with read   back.    --- End of Report ---            < end of copied text >

## 2023-05-05 NOTE — PATIENT PROFILE ADULT - FALL HARM RISK - RISK INTERVENTIONS
[FreeTextEntry3] : Agree with NICOLAS Polanco's note. 
Assistance OOB with selected safe patient handling equipment/Communicate Fall Risk and Risk Factors to all staff, patient, and family/Discuss with provider need for PT consult/Monitor gait and stability/Provide patient with walking aids - walker, cane, crutches/Reinforce activity limits and safety measures with patient and family/Visual Cue: Yellow wristband/Bed in lowest position, wheels locked, appropriate side rails in place/Call bell, personal items and telephone in reach/Instruct patient to call for assistance before getting out of bed or chair/Non-slip footwear when patient is out of bed/[place] to call system/Physically safe environment - no spills, clutter or unnecessary equipment/Purposeful Proactive Rounding/Room/bathroom lighting operational, light cord in reach

## 2023-05-05 NOTE — PHYSICAL THERAPY INITIAL EVALUATION ADULT - ADDITIONAL COMMENTS
Pt lives alone in apartment with no stairs to enter or inside. Pt has home health aide 7days x 5hrs per day. Pt uses a rollator for ambulation and uses a electric powered scooter for longer distances.

## 2023-05-05 NOTE — PHYSICAL THERAPY INITIAL EVALUATION ADULT - IMPAIRMENTS FOUND, PT EVAL
A Stent Orsiro 3.5mm 2fr 18mm Fast Exch Dlv Sys Gw was deployed in the saphenous vein graft.   The stent was deployed at 18 marco antonio for 25 seconds at 4/21/2021 10:06 AM . Stent balloon used for 2nd inflation at 18 marco antonio for 10 seconds.   Stent balloon used for 3rd inflation at 10 marco antonio for 16 seconds.  Stent balloon used for 4th inflation at 18 marco antonio for 7 seconds.  Stent balloon used for 5th inflation at 20 marco antonio for 16 seconds.  Stent balloon used for 6th inflation at 18 marco antonio for 8 seconds.   aerobic capacity/endurance/gait, locomotion, and balance/gross motor/muscle strength

## 2023-05-06 ENCOUNTER — TRANSCRIPTION ENCOUNTER (OUTPATIENT)
Age: 68
End: 2023-05-06

## 2023-05-06 LAB
ANION GAP SERPL CALC-SCNC: 9 MMOL/L — SIGNIFICANT CHANGE UP (ref 7–14)
BUN SERPL-MCNC: 25 MG/DL — HIGH (ref 10–20)
CALCIUM SERPL-MCNC: 9.1 MG/DL — SIGNIFICANT CHANGE UP (ref 8.4–10.5)
CHLORIDE SERPL-SCNC: 101 MMOL/L — SIGNIFICANT CHANGE UP (ref 98–110)
CO2 SERPL-SCNC: 32 MMOL/L — SIGNIFICANT CHANGE UP (ref 17–32)
CREAT SERPL-MCNC: 1 MG/DL — SIGNIFICANT CHANGE UP (ref 0.7–1.5)
EGFR: 83 ML/MIN/1.73M2 — SIGNIFICANT CHANGE UP
GLUCOSE SERPL-MCNC: 128 MG/DL — HIGH (ref 70–99)
HCT VFR BLD CALC: 39.5 % — LOW (ref 42–52)
HGB BLD-MCNC: 12.2 G/DL — LOW (ref 14–18)
MAGNESIUM SERPL-MCNC: 1.8 MG/DL — SIGNIFICANT CHANGE UP (ref 1.8–2.4)
MCHC RBC-ENTMCNC: 27.9 PG — SIGNIFICANT CHANGE UP (ref 27–31)
MCHC RBC-ENTMCNC: 30.9 G/DL — LOW (ref 32–37)
MCV RBC AUTO: 90.2 FL — SIGNIFICANT CHANGE UP (ref 80–94)
NRBC # BLD: 0 /100 WBCS — SIGNIFICANT CHANGE UP (ref 0–0)
PLATELET # BLD AUTO: 284 K/UL — SIGNIFICANT CHANGE UP (ref 130–400)
PMV BLD: 9.6 FL — SIGNIFICANT CHANGE UP (ref 7.4–10.4)
POTASSIUM SERPL-MCNC: 4.6 MMOL/L — SIGNIFICANT CHANGE UP (ref 3.5–5)
POTASSIUM SERPL-SCNC: 4.6 MMOL/L — SIGNIFICANT CHANGE UP (ref 3.5–5)
RBC # BLD: 4.38 M/UL — LOW (ref 4.7–6.1)
RBC # FLD: 17.5 % — HIGH (ref 11.5–14.5)
SODIUM SERPL-SCNC: 142 MMOL/L — SIGNIFICANT CHANGE UP (ref 135–146)
WBC # BLD: 8.15 K/UL — SIGNIFICANT CHANGE UP (ref 4.8–10.8)
WBC # FLD AUTO: 8.15 K/UL — SIGNIFICANT CHANGE UP (ref 4.8–10.8)

## 2023-05-06 RX ADMIN — Medication 1 MILLIGRAM(S): at 06:43

## 2023-05-06 RX ADMIN — APIXABAN 10 MILLIGRAM(S): 2.5 TABLET, FILM COATED ORAL at 06:44

## 2023-05-06 RX ADMIN — MORPHINE SULFATE 60 MILLIGRAM(S): 50 CAPSULE, EXTENDED RELEASE ORAL at 06:44

## 2023-05-06 RX ADMIN — Medication 1 MILLIGRAM(S): at 22:24

## 2023-05-06 RX ADMIN — Medication 10 MILLIGRAM(S): at 11:13

## 2023-05-06 RX ADMIN — LOSARTAN POTASSIUM 100 MILLIGRAM(S): 100 TABLET, FILM COATED ORAL at 06:44

## 2023-05-06 RX ADMIN — Medication 20 MILLIGRAM(S): at 06:44

## 2023-05-06 RX ADMIN — Medication 145 MILLIGRAM(S): at 11:14

## 2023-05-06 RX ADMIN — Medication 40 MILLIGRAM(S): at 06:43

## 2023-05-06 RX ADMIN — SIMVASTATIN 20 MILLIGRAM(S): 20 TABLET, FILM COATED ORAL at 22:24

## 2023-05-06 RX ADMIN — Medication 20 MILLIGRAM(S): at 13:32

## 2023-05-06 RX ADMIN — Medication 3 MILLILITER(S): at 19:54

## 2023-05-06 RX ADMIN — APIXABAN 10 MILLIGRAM(S): 2.5 TABLET, FILM COATED ORAL at 18:07

## 2023-05-06 RX ADMIN — BUPROPION HYDROCHLORIDE 150 MILLIGRAM(S): 150 TABLET, EXTENDED RELEASE ORAL at 11:14

## 2023-05-06 RX ADMIN — Medication 40 MILLIGRAM(S): at 18:08

## 2023-05-06 RX ADMIN — Medication 1 PATCH: at 19:36

## 2023-05-06 RX ADMIN — MORPHINE SULFATE 60 MILLIGRAM(S): 50 CAPSULE, EXTENDED RELEASE ORAL at 18:07

## 2023-05-06 RX ADMIN — MORPHINE SULFATE 60 MILLIGRAM(S): 50 CAPSULE, EXTENDED RELEASE ORAL at 19:37

## 2023-05-06 RX ADMIN — DULOXETINE HYDROCHLORIDE 30 MILLIGRAM(S): 30 CAPSULE, DELAYED RELEASE ORAL at 11:13

## 2023-05-06 RX ADMIN — Medication 1 MILLIGRAM(S): at 13:32

## 2023-05-06 RX ADMIN — TAMSULOSIN HYDROCHLORIDE 0.4 MILLIGRAM(S): 0.4 CAPSULE ORAL at 22:24

## 2023-05-06 NOTE — DISCHARGE NOTE NURSING/CASE MANAGEMENT/SOCIAL WORK - PATIENT PORTAL LINK FT
You can access the FollowMyHealth Patient Portal offered by [facility] by registering at the following website: [email or web address]. By joining [facility]’s FollowMyHealth portal, you will also be able to view your health information using other applications (apps) compatible with our system.

## 2023-05-06 NOTE — DISCHARGE NOTE PROVIDER - NSDCCPCAREPLAN_GEN_ALL_CORE_FT
PRINCIPAL DISCHARGE DIAGNOSIS  Diagnosis: Pulmonary embolism  Assessment and Plan of Treatment: During this hospitalization, you were diagnosed with a pulmonary embolsim. In your case, you have a  deep vein thrombosis (DVT) which is a blood clot in a large vein deep in a leg, arm, or elsewhere in the body. The clot can separate from the vein, travel to the lungs and cut off blood flow. This is a pulmonary embolism (PE). Pulmonary embolism is very serious. Both the prevention and the treatment are similar for DVT and PE.   To help prevent more blood clots from forming, please see your primary care doctor within one week of discharge, and please take your medicines exactly as instructed. Don’t skip doses. You have been prescribed a medication to thin the blood, so that more clots do not form.  Have all lab tests as recommended. This is very important when you take medicines to prevent blood clots. Make sure you stay active and walk for at least 30 minutes every day. When sitting for long periods of time, move your knees, ankles, feet, and toes.    If you smoke, get help to quit. Stay at a healthy weight. Try to exercise at least 30 minutes on most days. Before starting an exercise program, talk with your primary care provider. When traveling by car, make frequent stops to get up and move around. On long airplane rides, get up and move around when possible. If you can’t get up, wiggle your toes, move your ankles and tighten your calves to keep your blood moving.  Seek immediate medical care if you have pain, swelling, and redness in your leg, arm, or other body area. These symptoms may mean another blood clot. Also call your healthcare provider if you have signs and symptoms of bleeding, like blood in your urine, bleeding with bowel movements, or bleeding from the nose, gums, a cut, or vagina. Call 911 if you have symptoms of a blood clot in the lungs including: Chest pain, trouble breathing, coughing blood, fast heartbeat, heavy or uncontrolled bleeding.        SECONDARY DISCHARGE DIAGNOSES  Diagnosis: DVT, bilateral lower limbs  Assessment and Plan of Treatment:     Diagnosis: Elevated troponin  Assessment and Plan of Treatment:     Diagnosis: Hypoxia  Assessment and Plan of Treatment:     Diagnosis: CHF, acute  Assessment and Plan of Treatment:

## 2023-05-06 NOTE — DISCHARGE NOTE PROVIDER - CARE PROVIDER_API CALL
Fernando Stein)  Hematology; Internal Medicine; Medical Oncology  [address] [place] [address]  Phone: [phone]  Fax: [phone]  Follow Up Time: 2 weeks    Baljinder Carballo)  Critical Care Medicine; Pulmonary Disease; Sleep Medicine  [address] [place] [address]  Phone: [phone]  Fax: [phone]  Follow Up Time: 2 weeks

## 2023-05-06 NOTE — DISCHARGE NOTE NURSING/CASE MANAGEMENT/SOCIAL WORK - NSDCPEFALRISK_GEN_ALL_CORE
For information on Fall & Injury Prevention, visit: [email or web address] OR  [email or web address] OR  https://www.cdc.gov/steadi/patient.html

## 2023-05-06 NOTE — PROGRESS NOTE ADULT - SUBJECTIVE AND OBJECTIVE BOX
Chart reviewed, patient examined. Pertinent results reviewed.  Case discussed with HO; specialist f/u reviewed  HD#4  TATYANA, SANA    HPI:  67 year old patient, known to have HTN, HLD, DM, COPD (not on home O2), smoker (1/2 ppd), 3 spinal operations (2010), history of PE (2018) and DVT (2018) s/p thrombolysis and tPA instillation, sent to the ED by his PCP for workup of increased SOB and LE swelling.  Patient reported that for the prior 2 weeks, he has been having progressive worsening of SOB upon exertion and at rest, associated with b/l LE swelling and erythema. He also endorses orthopnea and PND. Last echo was done in 2021 with EF 66% and pulmonary HTN. He denies CP, palpitations. He denies recent travel, trauma or immobilization. No fever, chills, URT or urinary symptoms.     To note, patient had an accident in 2006 s/p multiple operations and since then he has been on Percocet 90 mg. On March 2023, pain management cut down on Percocet and currently he is taking morphine 60 mg BID, Baclofen 10 mg and clonidine patch 0.2mg/day. He states that his BP has been on the low side (100's/50's) and has been vomiting after each meal with subsequent weight loss.     In ED, vitals significant for BP 92/63  RR 24 SpO2 86% on RA improved on 3L NC.   Laboratory workup significant for Hb 11.4 MCV 90.5  Troponin 0.02 pro BNP 6472  Venous duplex b/l LE: DVTs in b/l PT, right gastrocnemius and left popliteal.  CTA chest: Intraluminal filling defects, consistent with acute pulmonary emboli, are noted in segmental branches of the right upper and middle lobe pulmonary arteries. Intraluminal filling defects are noted in segmental and subsegmental branches of the left upper and left lowerlobe, and lingula pulmonary arteries. There is evidence of right heart strain (RV:LV ratio >1; RV=6.2 cm and LV   = 4.5 cm) There is reflux of contrast material into the IVC and hepatic veins compatible with right heart dysfunction.  He was started on heparin drip and given 40 mg IV Lasix once in ED.   Admitted to Telemetry for further workup and management of his DVT & POS PE.      INTERVAL EVENTS: Patient seen today without distress, states O2 is fluctuating.    MEDICATIONS  (STANDING):  albuterol/ipratropium for Nebulization 3 milliLiter(s) Nebulizer every 6 hours  ALPRAZolam 1 milliGRAM(s) Oral three times a day  apixaban 10 milliGRAM(s) Oral every 12 hours  buPROPion XL (24-Hour) . 150 milliGRAM(s) Oral daily  cloNIDine Patch 0.2 mG/24Hr(s) 1 patch Transdermal every 7 days  DULoxetine 30 milliGRAM(s) Oral daily  fenofibrate Tablet 145 milliGRAM(s) Oral daily  furosemide    Tablet 20 milliGRAM(s) Oral two times a day  losartan 100 milliGRAM(s) Oral daily  methylPREDNISolone sodium succinate Injectable 40 milliGRAM(s) IV Push two times a day  morphine ER Tablet 60 milliGRAM(s) Oral every 12 hours  oxybutynin XL 10 milliGRAM(s) Oral daily  simvastatin 20 milliGRAM(s) Oral at bedtime  tamsulosin 0.4 milliGRAM(s) Oral at bedtime    MEDICATIONS  (PRN):  albuterol    90 MICROgram(s) HFA Inhaler 2 Puff(s) Inhalation every 4 hours PRN Bronchospasm  oxycodone    5 mG/acetaminophen 325 mG 2 Tablet(s) Oral daily PRN Severe Pain (7 - 10)        Vital Signs Last 24 Hrs  T(C): 36.5 (06 May 2023 05:21), Max: 37 (05 May 2023 18:27)  T(F): 97.7 (06 May 2023 05:21), Max: 98.6 (05 May 2023 18:27)  HR: 60 (06 May 2023 05:21) (60 - 92)  BP: 133/77 (06 May 2023 05:21) (129/74 - 161/91)  BP(mean): --  RR: 18 (06 May 2023 05:21) (18 - 18)  SpO2: 92% (06 May 2023 05:21) (87% - 99%)    Parameters below as of 06 May 2023 05:21  Patient On (Oxygen Delivery Method): room air    PHYSICAL EXAM:  GENERAL: NAD in chair on O2 at rest  NECK: Supple, No JVD, .  CHEST/LUNG: scattered wheezing  HEART: S1, S2, Regular   ABDOMEN: Soft, Nontender,  Bowel sounds present  EXTREMITIES: ++ edema    LABS:                          12.7   7.07  )-----------( 328      ( 05 May 2023 05:20 )             41.0             05-05    144  |  103  |  26<H>  ----------------------------<  145<H>  4.4   |  29  |  1.2    Ca    8.8      05 May 2023 05:20  Phos  3.4     05-04  Mg     1.9     05-05    TPro  7.3  /  Alb  3.6  /  TBili  0.5  /  DBili  x   /  AST  7   /  ALT  <5  /  AlkPhos  71  05-05    LIVER FUNCTIONS - ( 05 May 2023 05:20 )  Alb: 3.6 g/dL / Pro: 7.3 g/dL / ALK PHOS: 71 U/L / ALT: <5 U/L / AST: 7 U/L / GGT: x                   PT/INR - ( 04 May 2023 12:07 )   PT: 13.30 sec;   INR: 1.16 ratio         PTT - ( 04 May 2023 13:13 )  PTT:162.5 sec  CARDIAC MARKERS ( 04 May 2023 12:07 )  x     / <0.01 ng/mL / x     / x     / x      CARDIAC MARKERS ( 04 May 2023 01:05 )  x     / 0.03 ng/mL / x     / x     / x      CARDIAC MARKERS ( 03 May 2023 16:19 )  x     / 0.02 ng/mL / x     / x     / x          ABG - ( 05 May 2023 09:41 )  pH, Arterial: 7.46  pH, Blood: x     /  pCO2: 37    /  pO2: 66    / HCO3: 26    / Base Excess: 2.5   /  SaO2: 95.5            Summary:   1. Left ventricular ejection fraction, by visual estimation, is 50 to   55%.   2. Normal global left ventricular systolic function.   3. Mildly increased LV wall thickness.   4. The mitral in-flow pattern reveals no discernable A-wave, therefore   no comment on diastolic function can be made.   5. Moderately enlargedleft atrium.   6. Normal right atrial size.   7. Mitral annular calcification.   8. No evidence of mitral valve regurgitation.   9. Mild-moderate tricuspid regurgitation.  10. Dilatation of the aortic root and ascending aorta at 4.2 cm.  11. Estimated pulmonary artery systolic pressure is 49.2 mmHg assuming a   right atrial pressure of 10 mmHg, which is consistent with mild pulmonary   hypertension.  12. Endocardial visualization was enhanced with intravenous echo contrast.  13. Sclerotic aortic valve with normal opening.  14. Mildly enlarged right ventricle.  15. Mildly reduced RV systolic function.          RADIOLOGY & ADDITIONAL TESTS:  < from: CT Angio Chest PE Protocol w/ IV Cont (05.03.23 @ 22:03) >    INTERPRETATION:  CLINICAL HISTORY / REASON FOR EXAM: Shortness of breath   and swelling to lower extremities. Pulmonary embolus evaluation. WBC 6.02     PMHx of asthma, DM, HLD, HTN, back surgery    TECHNIQUE:   Contiguous axial CT images were obtained from the thoracic   inlet to the upper abdomen with intravenous contrast.  Thin sections were   reconstructed through the pulmonary vasculature. Reformatted images in   the coronal and sagittal planes were acquired, as well as 3D, Volume   rendering, and MIP reconstructed images.    Comparison: Chest CT dated 4/18/2021      FINDINGS:    TUBES/LINES: None.    PULMONARY ARTERIES: Intraluminal filling defects, consistent with acute   pulmonary emboli, are noted in segmental branches of the right upper lobe   pulmonary arteries. Intraluminal filling defects are noted in segmental   and subsegmental branches of the left upper and left lower lobe pulmonary   arteries. There is evidence of right heart strain (RV:LV ratio >1; RV=6.2   cm and LV = 4.5 cm)    GREAT VESSELS/CARDIAC STRUCTURES/PERICARDIUM: Cardiomegaly. No   pericardial effusion. Normal caliber aorta. Coronary artery and aortic   calcifications are noted. There is no evidence of aortic aneurysm or   dissection. The brachiocephalic vessels are unremarkable.    There is reflux of contrast material into the IVC and hepatic veins   compatible with right heartdysfunction.    The main pulmonary artery is dilated, measuring 3.2 cm in diameter, which   may be seen with pulmonary hypertension.    LUNG PARENCHYMA/CENTRAL AIRWAYS/PLEURA: The central tracheobronchial tree   is patent. There are areas of atelectasis in the right and left lower   lobes and right middle lobe.  No evidence of pleural effusion or   pneumothorax    MEDIASTINUM/HILUM: There are no suspicious mediastinal, hilar, or   axillary lymph nodes.  The visualized portion of the thyroid glandis   unremarkable.    CHEST WALL/AXILLA: Unremarkable.    UPPER ABDOMEN: The partially visualized upper abdomen shows no acute   pathology.    OSSEOUS STRUCTURES: Degenerative changes of the spine are noted.      IMPRESSION:    Intraluminal filling defects, consistent with acute pulmonary emboli, are   noted in segmental branches of the right upper and middle lobe pulmonary   arteries. Intraluminal filling defects are noted in segmental and   subsegmental branches of the left upper and left lowerlobe, and lingula   pulmonary arteries.    There is evidence of right heart strain (RV:LV ratio >1; RV=6.2 cm and LV   = 4.5 cm)    There is reflux of contrast material into the IVC and hepatic veins   compatible with right heart dysfunction.    The findings were discussed with Dr. Gay at 10:32  5/3/2023 with read   back.    --- End of Report ---            < end of copied text >   Chart reviewed, patient examined. Pertinent results reviewed.  Case discussed with HO; specialist f/u reviewed  HD#4  TATYANA, SANA    HPI:  67 year old patient, known to have HTN, HLD, DM, COPD (not on home O2), smoker (1/2 ppd), 3 spinal operations (2010), history of PE (2018) and DVT (2018) s/p thrombolysis and tPA instillation, sent to the ED by his PCP for workup of increased SOB and LE swelling.  Patient reported that for the prior 2 weeks, he has been having progressive worsening of SOB upon exertion and at rest, associated with b/l LE swelling and erythema. He also endorses orthopnea and PND. Last echo was done in 2021 with EF 66% and pulmonary HTN. He denies CP, palpitations. He denies recent travel, trauma or immobilization. No fever, chills, URT or urinary symptoms.     To note, patient had an accident in 2006 s/p multiple operations and since then he has been on Percocet 90 mg. On March 2023, pain management cut down on Percocet and currently he is taking morphine 60 mg BID, Baclofen 10 mg and clonidine patch 0.2mg/day. He states that his BP has been on the low side (100's/50's) and has been vomiting after each meal with subsequent weight loss.     In ED, vitals significant for BP 92/63  RR 24 SpO2 86% on RA improved on 3L NC.   Laboratory workup significant for Hb 11.4 MCV 90.5  Troponin 0.02 pro BNP 6472  Venous duplex b/l LE: DVTs in b/l PT, right gastrocnemius and left popliteal.  CTA chest: Intraluminal filling defects, consistent with acute pulmonary emboli, are noted in segmental branches of the right upper and middle lobe pulmonary arteries. Intraluminal filling defects are noted in segmental and subsegmental branches of the left upper and left lowerlobe, and lingula pulmonary arteries. There is evidence of right heart strain (RV:LV ratio >1; RV=6.2 cm and LV   = 4.5 cm) There is reflux of contrast material into the IVC and hepatic veins compatible with right heart dysfunction.  He was started on heparin drip and given 40 mg IV Lasix once in ED.   Admitted to Telemetry for further workup and management of his DVT & POS PE.      INTERVAL EVENTS: Patient seen today without distress, feels OK. wants to get home.    MEDICATIONS  (STANDING):  albuterol/ipratropium for Nebulization 3 milliLiter(s) Nebulizer every 6 hours  ALPRAZolam 1 milliGRAM(s) Oral three times a day  apixaban 10 milliGRAM(s) Oral every 12 hours  buPROPion XL (24-Hour) . 150 milliGRAM(s) Oral daily  cloNIDine Patch 0.2 mG/24Hr(s) 1 patch Transdermal every 7 days  DULoxetine 30 milliGRAM(s) Oral daily  fenofibrate Tablet 145 milliGRAM(s) Oral daily  furosemide    Tablet 20 milliGRAM(s) Oral two times a day  losartan 100 milliGRAM(s) Oral daily  methylPREDNISolone sodium succinate Injectable 40 milliGRAM(s) IV Push two times a day  morphine ER Tablet 60 milliGRAM(s) Oral every 12 hours  oxybutynin XL 10 milliGRAM(s) Oral daily  simvastatin 20 milliGRAM(s) Oral at bedtime  tamsulosin 0.4 milliGRAM(s) Oral at bedtime    MEDICATIONS  (PRN):  albuterol    90 MICROgram(s) HFA Inhaler 2 Puff(s) Inhalation every 4 hours PRN Bronchospasm  oxycodone    5 mG/acetaminophen 325 mG 2 Tablet(s) Oral daily PRN Severe Pain (7 - 10)        Vital Signs Last 24 Hrs  T(C): 36.5 (06 May 2023 05:21), Max: 37 (05 May 2023 18:27)  T(F): 97.7 (06 May 2023 05:21), Max: 98.6 (05 May 2023 18:27)  HR: 60 (06 May 2023 05:21) (60 - 92)  BP: 133/77 (06 May 2023 05:21) (129/74 - 161/91)  BP(mean): --  RR: 18 (06 May 2023 05:21) (18 - 18)  SpO2: 92% (06 May 2023 05:21) (87% - 99%)    Parameters below as of 06 May 2023 05:21  Patient On (Oxygen Delivery Method): 2l nc    PHYSICAL EXAM:  GENERAL: NAD in chair on O2 at rest  NECK: Supple, No JVD, .  CHEST/LUNG: scattered wheezing  HEART: S1, S2, Regular   ABDOMEN: Soft, Nontender,  Bowel sounds present  EXTREMITIES: ++ edema    LABS:                          12.2   8.15  )-----------( 284      ( 06 May 2023 07:39 )             39.5                         12.7   7.07  )-----------( 328      ( 05 May 2023 05:20 )             41.0     05-06    142  |  101  |  25<H>  ----------------------------<  128<H>  4.6   |  32  |  1.0    Ca    9.1      06 May 2023 07:39  Mg     1.8     05-06    TPro  7.3  /  Alb  3.6  /  TBili  0.5  /  DBili  x   /  AST  7   /  ALT  <5  /  AlkPhos  71  05-05              05-05    144  |  103  |  26<H>  ----------------------------<  145<H>  4.4   |  29  |  1.2    Ca    8.8      05 May 2023 05:20  Phos  3.4     05-04  Mg     1.9     05-05    TPro  7.3  /  Alb  3.6  /  TBili  0.5  /  DBili  x   /  AST  7   /  ALT  <5  /  AlkPhos  71  05-05    LIVER FUNCTIONS - ( 05 May 2023 05:20 )  Alb: 3.6 g/dL / Pro: 7.3 g/dL / ALK PHOS: 71 U/L / ALT: <5 U/L / AST: 7 U/L / GGT: x                   PT/INR - ( 04 May 2023 12:07 )   PT: 13.30 sec;   INR: 1.16 ratio         PTT - ( 04 May 2023 13:13 )  PTT:162.5 sec  CARDIAC MARKERS ( 04 May 2023 12:07 )  x     / <0.01 ng/mL / x     / x     / x      CARDIAC MARKERS ( 04 May 2023 01:05 )  x     / 0.03 ng/mL / x     / x     / x      CARDIAC MARKERS ( 03 May 2023 16:19 )  x     / 0.02 ng/mL / x     / x     / x          ABG - ( 05 May 2023 09:41 )  pH, Arterial: 7.46  pH, Blood: x     /  pCO2: 37    /  pO2: 66    / HCO3: 26    / Base Excess: 2.5   /  SaO2: 95.5          ECHO:  Summary:   1. Left ventricular ejection fraction, by visual estimation, is 50 to   55%.   2. Normal global left ventricular systolic function.   3. Mildly increased LV wall thickness.   4. The mitral in-flow pattern reveals no discernable A-wave, therefore   no comment on diastolic function can be made.   5. Moderately enlarged left atrium.   6. Normal right atrial size.   7. Mitral annular calcification.   8. No evidence of mitral valve regurgitation.   9. Mild-moderate tricuspid regurgitation.  10. Dilatation of the aortic root and ascending aorta at 4.2 cm.  11. Estimated pulmonary artery systolic pressure is 49.2 mmHg assuming a   right atrial pressure of 10 mmHg, which is consistent with mild pulmonary   hypertension.  12. Endocardial visualization was enhanced with intravenous echo contrast.  13. Sclerotic aortic valve with normal opening.  14. Mildly enlarged right ventricle.  15. Mildly reduced RV systolic function.          RADIOLOGY & ADDITIONAL TESTS:  < from: CT Angio Chest PE Protocol w/ IV Cont (05.03.23 @ 22:03) >    INTERPRETATION:  CLINICAL HISTORY / REASON FOR EXAM: Shortness of breath   and swelling to lower extremities. Pulmonary embolus evaluation. WBC 6.02     PMHx of asthma, DM, HLD, HTN, back surgery    TECHNIQUE:   Contiguous axial CT images were obtained from the thoracic   inlet to the upper abdomen with intravenous contrast.  Thin sections were   reconstructed through the pulmonary vasculature. Reformatted images in   the coronal and sagittal planes were acquired, as well as 3D, Volume   rendering, and MIP reconstructed images.    Comparison: Chest CT dated 4/18/2021      FINDINGS:    TUBES/LINES: None.    PULMONARY ARTERIES: Intraluminal filling defects, consistent with acute   pulmonary emboli, are noted in segmental branches of the right upper lobe   pulmonary arteries. Intraluminal filling defects are noted in segmental   and subsegmental branches of the left upper and left lower lobe pulmonary   arteries. There is evidence of right heart strain (RV:LV ratio >1; RV=6.2   cm and LV = 4.5 cm)    GREAT VESSELS/CARDIAC STRUCTURES/PERICARDIUM: Cardiomegaly. No   pericardial effusion. Normal caliber aorta. Coronary artery and aortic   calcifications are noted. There is no evidence of aortic aneurysm or   dissection. The brachiocephalic vessels are unremarkable.    There is reflux of contrast material into the IVC and hepatic veins   compatible with right heartdysfunction.    The main pulmonary artery is dilated, measuring 3.2 cm in diameter, which   may be seen with pulmonary hypertension.    LUNG PARENCHYMA/CENTRAL AIRWAYS/PLEURA: The central tracheobronchial tree   is patent. There are areas of atelectasis in the right and left lower   lobes and right middle lobe.  No evidence of pleural effusion or   pneumothorax    MEDIASTINUM/HILUM: There are no suspicious mediastinal, hilar, or   axillary lymph nodes.  The visualized portion of the thyroid glandis   unremarkable.    CHEST WALL/AXILLA: Unremarkable.    UPPER ABDOMEN: The partially visualized upper abdomen shows no acute   pathology.    OSSEOUS STRUCTURES: Degenerative changes of the spine are noted.      IMPRESSION:    Intraluminal filling defects, consistent with acute pulmonary emboli, are   noted in segmental branches of the right upper and middle lobe pulmonary   arteries. Intraluminal filling defects are noted in segmental and   subsegmental branches of the left upper and left lowerlobe, and lingula   pulmonary arteries.    There is evidence of right heart strain (RV:LV ratio >1; RV=6.2 cm and LV   = 4.5 cm)    There is reflux of contrast material into the IVC and hepatic veins   compatible with right heart dysfunction.    The findings were discussed with Dr. Gay at 10:32  5/3/2023 with read   back.    --- End of Report ---            < end of copied text >

## 2023-05-06 NOTE — DISCHARGE NOTE PROVIDER - NSDCMRMEDTOKEN_GEN_ALL_CORE_FT
ALPRAZolam 1 mg oral tablet: 1 tab(s) orally 3 times a day  baclofen 10 mg oral tablet: 1 orally 3 times a day  cloNIDine 0.2 mg/24 hr transdermal film, extended release: 1 transdermally once a week  DULoxetine 30 mg oral delayed release capsule: 1 orally 3 times a day  Eliquis Starter Pack for Treatment of DVT and PE 5 mg oral tablet: 1 tab(s) orally 2 times a day 2 tabs (10mg) two times a day for 7 days  1 tab (5mg) two times a day after that  fenofibrate 160 mg oral tablet: 1 tab(s) orally once a day  furosemide 20 mg oral tablet: 1 orally 2 times a day  losartan 100 mg oral tablet: 1 tab(s) orally once a day  metFORMIN 500 mg oral tablet: 1 tab(s) orally 2 times a day  morphine 60 mg/12 hours oral tablet, extended release: 1 tab(s) orally every 12 hours  oxyBUTYnin 10 mg/24 hr oral tablet, extended release: 1 orally once a day  Percocet 10/325: 1 tab orally 3 times a day, As Needed  simvastatin 20 mg oral tablet: 1 tab(s) orally once a day (at bedtime)  tamsulosin 0.4 mg oral capsule: 1 cap(s) orally once a day  Ventolin HFA 90 mcg/inh inhalation aerosol:   Wellbutrin  mg/12 hours oral tablet, extended release: 1 orally once a day   ALPRAZolam 1 mg oral tablet: 1 tab(s) orally 3 times a day  baclofen 10 mg oral tablet: 1 orally 3 times a day  cloNIDine 0.2 mg/24 hr transdermal film, extended release: 1 transdermally once a week  DULoxetine 30 mg oral delayed release capsule: 1 orally 3 times a day  Eliquis Starter Pack for Treatment of DVT and PE 5 mg oral tablet: 1 tab(s) orally 2 times a day 2 tabs (10mg) two times a day for 7 days  1 tab (5mg) two times a day after that  fenofibrate 160 mg oral tablet: 1 tab(s) orally once a day  furosemide 20 mg oral tablet: 1 orally 2 times a day  losartan 100 mg oral tablet: 1 tab(s) orally once a day  metFORMIN 500 mg oral tablet: 1 tab(s) orally 2 times a day  morphine 30 mg/12 to 24 hr oral capsule, extended release: 1.5 cap(s) orally 2 times a day MDD: 90 mg  naloxone 8 mg/0.1 mL nasal spray: 8 milligram(s) intranasally once a day  oxyBUTYnin 10 mg/24 hr oral tablet, extended release: 1 orally once a day  Percocet 10/325: 1 tab orally 3 times a day, As Needed  simvastatin 20 mg oral tablet: 1 tab(s) orally once a day (at bedtime)  tamsulosin 0.4 mg oral capsule: 1 cap(s) orally once a day  Ventolin HFA 90 mcg/inh inhalation aerosol:   Wellbutrin  mg/12 hours oral tablet, extended release: 1 orally once a day

## 2023-05-06 NOTE — PROGRESS NOTE ADULT - ASSESSMENT
Acute Hypoxic respiratory failure secondary to PE  PE associated with right heart strain  DVT  - on Lovenox  - no surgical intervention planned  - patient desaturates easily, walked with PT 20 feet and had to stop  - trend pulse ox    HTN, HLD  - troponins likely elevated due to heart strain  - continue home meds    COPD, Active smoker  - encourage smoking cessation  - continue respiratory treatments  - monitor pulse ox    DM, Diabetic neuropathy  - on oral rx  -monitor fs  - sliding scale coverage    Chronic back pain   Chronic Opioid Dependence  Chronic Benzo Dependence  - continue home medications  - on Morphine, Percocet  - on Xanax    Diet: Dash / CCD  GI Prophylaxis: start on PPI give steroid and anticoagulation    Patient remains acute, hope to wean from O2   Acute Hypoxic respiratory failure secondary to PE  PE associated with right heart strain  DVT  - on Eliquis 10mg (PO)  - no surgical intervention planned  - patient desaturates easily, walked with PT 20 feet and had to stop  - trend pulse ox; needs O2 for home;  - needs Auth and logistics arranged for home delivery    HTN, HLD  - troponins likely elevated due to heart strain  - continue home meds    COPD, Active smoker  - encourage smoking cessation- strongly encouraged to DC ; will f/u tomorrow.  - continue respiratory treatments  - monitor pulse ox    DM, Diabetic neuropathy  - on oral rx  -monitor fs  - sliding scale coverage    Chronic back pain   Chronic Opioid Dependence  Chronic Benzo Dependence  - continue home medications  - on Morphine, Percocet  - on Xanax    Diet: Dash / CCD  GI Prophylaxis: start on PPI give steroid and anticoagulation- see pulm note;  Give PPI while on steroids.    Patient stable, will need O2 for DC; then re-evaluation in 7-10 days.

## 2023-05-06 NOTE — DISCHARGE NOTE PROVIDER - HOSPITAL COURSE
67 year old patient, known to have HTN, HLD, DM, COPD (not on home O2), smoker (1/2 ppd), 3 spinal operations (2010), history of PE (2018) and DVT (2018) s/p thrombolysis and tPA instillation, sent to the ED by his PCP for workup of increased SOB and LE swelling.  Patient reported that for the prior 2 weeks, he has been having progressive worsening of SOB upon exertion and at rest, associated with b/l LE swelling and erythema. He also endorses orthopnea and PND. Last echo was done in 2021 with EF 66% and pulmonary HTN. He denies CP, palpitations. He denies recent travel, trauma or immobilization. No fever, chills, URT or urinary symptoms.     To note, patient had an accident in 2006 s/p multiple operations and since then he has been on Percocet 90 mg. On March 2023, pain management cut down on Percocet and currently he is taking morphine 60 mg BID, Baclofen 10 mg and clonidine patch 0.2mg/day. He states that his BP has been on the low side (100's/50's) and has been vomiting after each meal with subsequent weight loss.     In ED, vitals significant for BP 92/63  RR 24 SpO2 86% on RA improved on 3L NC.   Laboratory workup significant for Hb 11.4 MCV 90.5  Troponin 0.02 pro BNP 6472  Venous duplex b/l LE: DVTs in b/l PT, right gastrocnemius and left popliteal.  CTA chest: Intraluminal filling defects, consistent with acute pulmonary emboli, are noted in segmental branches of the right upper and middle lobe pulmonary arteries. Intraluminal filling defects are noted in segmental and subsegmental branches of the left upper and left lowerlobe, and lingula pulmonary arteries. There is evidence of right heart strain (RV:LV ratio >1; RV=6.2 cm and LV   = 4.5 cm) There is reflux of contrast material into the IVC and hepatic veins compatible with right heart dysfunction.  He was started on heparin drip and given 40 mg IV Lasix once in ED.   Admitted to Telemetry for further workup and management of his DVT & POS PE.      INTERVAL EVENTS: Patient seen today without distress, states O2 is fluctuating.    Acute Hypoxic respiratory failure secondary to PE  PE associated with right heart strain  DVT  - on eliquis loading   - no surgical intervention planned  - patient desaturates easily, walked with PT 20 feet and had to stop    HTN, HLD  - troponins likely elevated due to heart strain  - continue home meds    COPD, Active smoker  - encourage smoking cessation  - continue respiratory treatments    DM, Diabetic neuropathy  - on oral rx  -monitor fs    Chronic back pain   Chronic Opioid Dependence  Chronic Benzo Dependence  - continue home medications  - on Morphine, Percocet  - on Xanax    Patient requires home oxygen. He is discharged in a stable condition.      67 year old patient, known to have HTN, HLD, DM, COPD (not on home O2), smoker (1/2 ppd), 3 spinal operations (2010), history of PE (2018) and DVT (2018) s/p thrombolysis and tPA instillation, sent to the ED by his PCP for workup of increased SOB and LE swelling.  Patient reported that for the prior 2 weeks, he has been having progressive worsening of SOB upon exertion and at rest, associated with b/l LE swelling and erythema. He also endorses orthopnea and PND. Last echo was done in 2021 with EF 66% and pulmonary HTN. He denies CP, palpitations. He denies recent travel, trauma or immobilization. No fever, chills, URT or urinary symptoms.     To note, patient had an accident in 2006 s/p multiple operations and since then he has been on Percocet 90 mg. On March 2023, pain management cut down on Percocet and currently he is taking morphine 60 mg BID, Baclofen 10 mg and clonidine patch 0.2mg/day. He states that his BP has been on the low side (100's/50's) and has been vomiting after each meal with subsequent weight loss.     In ED, vitals significant for BP 92/63  RR 24 SpO2 86% on RA improved on 3L NC.   Laboratory workup significant for Hb 11.4 MCV 90.5  Troponin 0.02 pro BNP 6472  Venous duplex b/l LE: DVTs in b/l PT, right gastrocnemius and left popliteal.  CTA chest: Intraluminal filling defects, consistent with acute pulmonary emboli, are noted in segmental branches of the right upper and middle lobe pulmonary arteries. Intraluminal filling defects are noted in segmental and subsegmental branches of the left upper and left lowerlobe, and lingula pulmonary arteries. There is evidence of right heart strain (RV:LV ratio >1; RV=6.2 cm and LV   = 4.5 cm) There is reflux of contrast material into the IVC and hepatic veins compatible with right heart dysfunction.  He was started on heparin drip and given 40 mg IV Lasix once in ED.   Admitted to Telemetry for further workup and management of his DVT & POS PE.      Acute Hypoxic respiratory failure secondary to PE  PE associated with right heart strain  DVT  - on eliquis loading   - no surgical intervention planned  - patient desaturates easily, walked with PT 20 feet and had to stop    HTN, HLD  - troponins likely elevated due to heart strain  - continue home meds    COPD, Active smoker  - encourage smoking cessation  - continue respiratory treatments    DM, Diabetic neuropathy  - on oral rx  -monitor fs    Chronic back pain   Chronic Opioid Dependence  Chronic Benzo Dependence  - continue home medications  - on Morphine, Percocet  - on Xanax    Patient requires home oxygen. He is discharged in a stable condition.

## 2023-05-06 NOTE — DISCHARGE NOTE PROVIDER - PROVIDER TOKENS
PROVIDER:[TOKEN:[[id number]],FOLLOWUP:[2 weeks]],PROVIDER:[TOKEN:[[id number]],FOLLOWUP:[2 weeks]]

## 2023-05-06 NOTE — DISCHARGE NOTE PROVIDER - NSDCFUSCHEDAPPT_GEN_ALL_CORE_FT
No
Jesús Watson  [facility] PreAdmits  Scheduled Appointment: 05/18/2023    [facility] Physician [facility]  UROLOGY  [address]  Scheduled Appointment: 05/18/2023    Brina Espinoza  [facility]  ONCPAINMGT [id number]  Scheduled Appointment: 05/25/2023

## 2023-05-07 LAB
ANION GAP SERPL CALC-SCNC: 9 MMOL/L — SIGNIFICANT CHANGE UP (ref 7–14)
BUN SERPL-MCNC: 28 MG/DL — HIGH (ref 10–20)
CALCIUM SERPL-MCNC: 8.7 MG/DL — SIGNIFICANT CHANGE UP (ref 8.4–10.5)
CHLORIDE SERPL-SCNC: 105 MMOL/L — SIGNIFICANT CHANGE UP (ref 98–110)
CO2 SERPL-SCNC: 29 MMOL/L — SIGNIFICANT CHANGE UP (ref 17–32)
CREAT SERPL-MCNC: 0.9 MG/DL — SIGNIFICANT CHANGE UP (ref 0.7–1.5)
EGFR: 94 ML/MIN/1.73M2 — SIGNIFICANT CHANGE UP
GLUCOSE SERPL-MCNC: 106 MG/DL — HIGH (ref 70–99)
HCT VFR BLD CALC: 39.6 % — LOW (ref 42–52)
HGB BLD-MCNC: 12.1 G/DL — LOW (ref 14–18)
MAGNESIUM SERPL-MCNC: 1.8 MG/DL — SIGNIFICANT CHANGE UP (ref 1.8–2.4)
MCHC RBC-ENTMCNC: 27.6 PG — SIGNIFICANT CHANGE UP (ref 27–31)
MCHC RBC-ENTMCNC: 30.6 G/DL — LOW (ref 32–37)
MCV RBC AUTO: 90.4 FL — SIGNIFICANT CHANGE UP (ref 80–94)
NRBC # BLD: 0 /100 WBCS — SIGNIFICANT CHANGE UP (ref 0–0)
PLATELET # BLD AUTO: 261 K/UL — SIGNIFICANT CHANGE UP (ref 130–400)
PMV BLD: 10 FL — SIGNIFICANT CHANGE UP (ref 7.4–10.4)
POTASSIUM SERPL-MCNC: 4.2 MMOL/L — SIGNIFICANT CHANGE UP (ref 3.5–5)
POTASSIUM SERPL-SCNC: 4.2 MMOL/L — SIGNIFICANT CHANGE UP (ref 3.5–5)
RBC # BLD: 4.38 M/UL — LOW (ref 4.7–6.1)
RBC # FLD: 17.7 % — HIGH (ref 11.5–14.5)
SODIUM SERPL-SCNC: 143 MMOL/L — SIGNIFICANT CHANGE UP (ref 135–146)
WBC # BLD: 10.12 K/UL — SIGNIFICANT CHANGE UP (ref 4.8–10.8)
WBC # FLD AUTO: 10.12 K/UL — SIGNIFICANT CHANGE UP (ref 4.8–10.8)

## 2023-05-07 RX ADMIN — MORPHINE SULFATE 60 MILLIGRAM(S): 50 CAPSULE, EXTENDED RELEASE ORAL at 17:40

## 2023-05-07 RX ADMIN — APIXABAN 10 MILLIGRAM(S): 2.5 TABLET, FILM COATED ORAL at 17:39

## 2023-05-07 RX ADMIN — Medication 3 MILLILITER(S): at 15:12

## 2023-05-07 RX ADMIN — Medication 20 MILLIGRAM(S): at 13:09

## 2023-05-07 RX ADMIN — Medication 1 PATCH: at 20:19

## 2023-05-07 RX ADMIN — DULOXETINE HYDROCHLORIDE 30 MILLIGRAM(S): 30 CAPSULE, DELAYED RELEASE ORAL at 12:34

## 2023-05-07 RX ADMIN — Medication 1 MILLIGRAM(S): at 21:14

## 2023-05-07 RX ADMIN — SIMVASTATIN 20 MILLIGRAM(S): 20 TABLET, FILM COATED ORAL at 21:14

## 2023-05-07 RX ADMIN — Medication 20 MILLIGRAM(S): at 07:04

## 2023-05-07 RX ADMIN — Medication 3 MILLILITER(S): at 02:22

## 2023-05-07 RX ADMIN — Medication 3 MILLILITER(S): at 19:46

## 2023-05-07 RX ADMIN — Medication 1 MILLIGRAM(S): at 07:03

## 2023-05-07 RX ADMIN — LOSARTAN POTASSIUM 100 MILLIGRAM(S): 100 TABLET, FILM COATED ORAL at 07:04

## 2023-05-07 RX ADMIN — MORPHINE SULFATE 60 MILLIGRAM(S): 50 CAPSULE, EXTENDED RELEASE ORAL at 07:27

## 2023-05-07 RX ADMIN — Medication 40 MILLIGRAM(S): at 07:04

## 2023-05-07 RX ADMIN — Medication 1 MILLIGRAM(S): at 13:08

## 2023-05-07 RX ADMIN — MORPHINE SULFATE 60 MILLIGRAM(S): 50 CAPSULE, EXTENDED RELEASE ORAL at 17:41

## 2023-05-07 RX ADMIN — Medication 10 MILLIGRAM(S): at 12:33

## 2023-05-07 RX ADMIN — Medication 1 PATCH: at 07:26

## 2023-05-07 RX ADMIN — Medication 145 MILLIGRAM(S): at 12:33

## 2023-05-07 RX ADMIN — MORPHINE SULFATE 60 MILLIGRAM(S): 50 CAPSULE, EXTENDED RELEASE ORAL at 07:05

## 2023-05-07 RX ADMIN — Medication 40 MILLIGRAM(S): at 17:39

## 2023-05-07 RX ADMIN — TAMSULOSIN HYDROCHLORIDE 0.4 MILLIGRAM(S): 0.4 CAPSULE ORAL at 21:14

## 2023-05-07 RX ADMIN — BUPROPION HYDROCHLORIDE 150 MILLIGRAM(S): 150 TABLET, EXTENDED RELEASE ORAL at 12:34

## 2023-05-07 RX ADMIN — Medication 3 MILLILITER(S): at 07:59

## 2023-05-07 RX ADMIN — APIXABAN 10 MILLIGRAM(S): 2.5 TABLET, FILM COATED ORAL at 07:03

## 2023-05-07 NOTE — PROGRESS NOTE ADULT - SUBJECTIVE AND OBJECTIVE BOX
Chart reviewed, patient examined. Pertinent results reviewed.  Case discussed with HO; specialist f/u reviewed  HD#5  TATYANA, SANA    HPI:  67 year old patient, known to have HTN, HLD, DM, COPD (not on home O2), smoker (1/2 ppd), 3 spinal operations (2010), history of PE (2018) and DVT (2018) s/p thrombolysis and tPA instillation, sent to the ED by his PCP for workup of increased SOB and LE swelling.  Patient reported that for the prior 2 weeks, he has been having progressive worsening of SOB upon exertion and at rest, associated with b/l LE swelling and erythema. He also endorses orthopnea and PND. Last echo was done in 2021 with EF 66% and pulmonary HTN. He denies CP, palpitations. He denies recent travel, trauma or immobilization. No fever, chills, URT or urinary symptoms.     To note, patient had an accident in 2006 s/p multiple operations and since then he has been on Percocet 90 mg. On March 2023, pain management cut down on Percocet and currently he is taking morphine 60 mg BID, Baclofen 10 mg and clonidine patch 0.2mg/day. He states that his BP has been on the low side (100's/50's) and has been vomiting after each meal with subsequent weight loss.     In ED, vitals significant for BP 92/63  RR 24 SpO2 86% on RA improved on 3L NC.   Laboratory workup significant for Hb 11.4 MCV 90.5  Troponin 0.02 pro BNP 6472  Venous duplex b/l LE: DVTs in b/l PT, right gastrocnemius and left popliteal.  CTA chest: Intraluminal filling defects, consistent with acute pulmonary emboli, are noted in segmental branches of the right upper and middle lobe pulmonary arteries. Intraluminal filling defects are noted in segmental and subsegmental branches of the left upper and left lowerlobe, and lingula pulmonary arteries. There is evidence of right heart strain (RV:LV ratio >1; RV=6.2 cm and LV   = 4.5 cm) There is reflux of contrast material into the IVC and hepatic veins compatible with right heart dysfunction.  He was started on heparin drip and given 40 mg IV Lasix once in ED.   Admitted to Telemetry for further workup and management of his DVT & POS PE.      INTERVAL EVENTS: Patient seen today without distress, feels OK. wants to get home. Delay in arranging O2 for DC to home.    MEDICATIONS  (STANDING):  albuterol/ipratropium for Nebulization 3 milliLiter(s) Nebulizer every 6 hours  ALPRAZolam 1 milliGRAM(s) Oral three times a day  apixaban 10 milliGRAM(s) Oral every 12 hours  buPROPion XL (24-Hour) . 150 milliGRAM(s) Oral daily  cloNIDine Patch 0.2 mG/24Hr(s) 1 patch Transdermal every 7 days  DULoxetine 30 milliGRAM(s) Oral daily  fenofibrate Tablet 145 milliGRAM(s) Oral daily  furosemide    Tablet 20 milliGRAM(s) Oral two times a day  losartan 100 milliGRAM(s) Oral daily  methylPREDNISolone sodium succinate Injectable 40 milliGRAM(s) IV Push two times a day  morphine ER Tablet 60 milliGRAM(s) Oral every 12 hours  oxybutynin XL 10 milliGRAM(s) Oral daily  simvastatin 20 milliGRAM(s) Oral at bedtime  tamsulosin 0.4 milliGRAM(s) Oral at bedtime    MEDICATIONS  (PRN):  albuterol    90 MICROgram(s) HFA Inhaler 2 Puff(s) Inhalation every 4 hours PRN Bronchospasm  oxycodone    5 mG/acetaminophen 325 mG 2 Tablet(s) Oral daily PRN Severe Pain (7 - 10)      Vital Signs Last 24 Hrs  T(C): 36.1 (07 May 2023 05:56), Max: 36.2 (06 May 2023 20:11)  T(F): 96.9 (07 May 2023 05:56), Max: 97.2 (06 May 2023 20:11)  HR: 59 (07 May 2023 05:56) (59 - 69)  BP: 162/70 (07 May 2023 05:56) (135/74 - 166/88)  BP(mean): --  RR: 18 (07 May 2023 05:56) (18 - 18)  SpO2: 93% (06 May 2023 11:05) (86% - 93%)  See POx: RA sitting-88, ambulating -86%    Parameters below as of 06 May 2023 11:05  Patient On (Oxygen Delivery Method): nasal cannula  O2 Flow (L/min): 3    PHYSICAL EXAM:  GENERAL: NAD in chair on O2 at rest  NECK: Supple, No JVD, .  CHEST/LUNG: scattered wheezing  HEART: S1, S2, Regular   ABDOMEN: Soft, Nontender,  Bowel sounds present  EXTREMITIES: ++ edema    LABS:                          12.2   8.15  )-----------( 284      ( 06 May 2023 07:39 )             39.5                         12.7   7.07  )-----------( 328      ( 05 May 2023 05:20 )             41.0     05-06    142  |  101  |  25<H>  ----------------------------<  128<H>  4.6   |  32  |  1.0    Ca    9.1      06 May 2023 07:39  Mg     1.8     05-06    TPro  7.3  /  Alb  3.6  /  TBili  0.5  /  DBili  x   /  AST  7   /  ALT  <5  /  AlkPhos  71  05-05              05-05    144  |  103  |  26<H>  ----------------------------<  145<H>  4.4   |  29  |  1.2    Ca    8.8      05 May 2023 05:20  Phos  3.4     05-04  Mg     1.9     05-05    TPro  7.3  /  Alb  3.6  /  TBili  0.5  /  DBili  x   /  AST  7   /  ALT  <5  /  AlkPhos  71  05-05    LIVER FUNCTIONS - ( 05 May 2023 05:20 )  Alb: 3.6 g/dL / Pro: 7.3 g/dL / ALK PHOS: 71 U/L / ALT: <5 U/L / AST: 7 U/L / GGT: x                   PT/INR - ( 04 May 2023 12:07 )   PT: 13.30 sec;   INR: 1.16 ratio         PTT - ( 04 May 2023 13:13 )  PTT:162.5 sec  CARDIAC MARKERS ( 04 May 2023 12:07 )  x     / <0.01 ng/mL / x     / x     / x      CARDIAC MARKERS ( 04 May 2023 01:05 )  x     / 0.03 ng/mL / x     / x     / x      CARDIAC MARKERS ( 03 May 2023 16:19 )  x     / 0.02 ng/mL / x     / x     / x          ABG - ( 05 May 2023 09:41 )  pH, Arterial: 7.46  pH, Blood: x     /  pCO2: 37    /  pO2: 66    / HCO3: 26    / Base Excess: 2.5   /  SaO2: 95.5          ECHO:  Summary:   1. Left ventricular ejection fraction, by visual estimation, is 50 to   55%.   2. Normal global left ventricular systolic function.   3. Mildly increased LV wall thickness.   4. The mitral in-flow pattern reveals no discernable A-wave, therefore   no comment on diastolic function can be made.   5. Moderately enlarged left atrium.   6. Normal right atrial size.   7. Mitral annular calcification.   8. No evidence of mitral valve regurgitation.   9. Mild-moderate tricuspid regurgitation.  10. Dilatation of the aortic root and ascending aorta at 4.2 cm.  11. Estimated pulmonary artery systolic pressure is 49.2 mmHg assuming a   right atrial pressure of 10 mmHg, which is consistent with mild pulmonary   hypertension.  12. Endocardial visualization was enhanced with intravenous echo contrast.  13. Sclerotic aortic valve with normal opening.  14. Mildly enlarged right ventricle.  15. Mildly reduced RV systolic function.          RADIOLOGY & ADDITIONAL TESTS:  < from: CT Angio Chest PE Protocol w/ IV Cont (05.03.23 @ 22:03) >    INTERPRETATION:  CLINICAL HISTORY / REASON FOR EXAM: Shortness of breath   and swelling to lower extremities. Pulmonary embolus evaluation. WBC 6.02     PMHx of asthma, DM, HLD, HTN, back surgery    TECHNIQUE:   Contiguous axial CT images were obtained from the thoracic   inlet to the upper abdomen with intravenous contrast.  Thin sections were   reconstructed through the pulmonary vasculature. Reformatted images in   the coronal and sagittal planes were acquired, as well as 3D, Volume   rendering, and MIP reconstructed images.    Comparison: Chest CT dated 4/18/2021      FINDINGS:    TUBES/LINES: None.    PULMONARY ARTERIES: Intraluminal filling defects, consistent with acute   pulmonary emboli, are noted in segmental branches of the right upper lobe   pulmonary arteries. Intraluminal filling defects are noted in segmental   and subsegmental branches of the left upper and left lower lobe pulmonary   arteries. There is evidence of right heart strain (RV:LV ratio >1; RV=6.2   cm and LV = 4.5 cm)    GREAT VESSELS/CARDIAC STRUCTURES/PERICARDIUM: Cardiomegaly. No   pericardial effusion. Normal caliber aorta. Coronary artery and aortic   calcifications are noted. There is no evidence of aortic aneurysm or   dissection. The brachiocephalic vessels are unremarkable.    There is reflux of contrast material into the IVC and hepatic veins   compatible with right heartdysfunction.    The main pulmonary artery is dilated, measuring 3.2 cm in diameter, which   may be seen with pulmonary hypertension.    LUNG PARENCHYMA/CENTRAL AIRWAYS/PLEURA: The central tracheobronchial tree   is patent. There are areas of atelectasis in the right and left lower   lobes and right middle lobe.  No evidence of pleural effusion or   pneumothorax    MEDIASTINUM/HILUM: There are no suspicious mediastinal, hilar, or   axillary lymph nodes.  The visualized portion of the thyroid glandis   unremarkable.    CHEST WALL/AXILLA: Unremarkable.    UPPER ABDOMEN: The partially visualized upper abdomen shows no acute   pathology.    OSSEOUS STRUCTURES: Degenerative changes of the spine are noted.      IMPRESSION:    Intraluminal filling defects, consistent with acute pulmonary emboli, are   noted in segmental branches of the right upper and middle lobe pulmonary   arteries. Intraluminal filling defects are noted in segmental and   subsegmental branches of the left upper and left lowerlobe, and lingula   pulmonary arteries.    There is evidence of right heart strain (RV:LV ratio >1; RV=6.2 cm and LV   = 4.5 cm)    There is reflux of contrast material into the IVC and hepatic veins   compatible with right heart dysfunction.    The findings were discussed with Dr. Gay at 10:32  5/3/2023 with read   back.    --- End of Report ---            < end of copied text >   Chart reviewed, patient examined. Pertinent results reviewed.  Case discussed with HO; specialist f/u reviewed  HD#5  TATYANA, SANA    HPI:  67 year old patient, known to have HTN, HLD, DM, COPD (not on home O2), smoker (1/2 ppd), 3 spinal operations (2010), history of PE (2018) and DVT (2018) s/p thrombolysis and tPA instillation, sent to the ED by his PCP for workup of increased SOB and LE swelling.  Patient reported that for the prior 2 weeks, he has been having progressive worsening of SOB upon exertion and at rest, associated with b/l LE swelling and erythema. He also endorses orthopnea and PND. Last echo was done in 2021 with EF 66% and pulmonary HTN. He denies CP, palpitations. He denies recent travel, trauma or immobilization. No fever, chills, URT or urinary symptoms.     To note, patient had an accident in 2006 s/p multiple operations and since then he has been on Percocet 90 mg. On March 2023, pain management cut down on Percocet and currently he is taking morphine 60 mg BID, Baclofen 10 mg and clonidine patch 0.2mg/day. He states that his BP has been on the low side (100's/50's) and has been vomiting after each meal with subsequent weight loss.     In ED, vitals significant for BP 92/63  RR 24 SpO2 86% on RA improved on 3L NC.   Laboratory workup significant for Hb 11.4 MCV 90.5  Troponin 0.02 pro BNP 6472  Venous duplex b/l LE: DVTs in b/l PT, right gastrocnemius and left popliteal.  CTA chest: Intraluminal filling defects, consistent with acute pulmonary emboli, are noted in segmental branches of the right upper and middle lobe pulmonary arteries. Intraluminal filling defects are noted in segmental and subsegmental branches of the left upper and left lowerlobe, and lingula pulmonary arteries. There is evidence of right heart strain (RV:LV ratio >1; RV=6.2 cm and LV   = 4.5 cm) There is reflux of contrast material into the IVC and hepatic veins compatible with right heart dysfunction.  He was started on heparin drip and given 40 mg IV Lasix once in ED.   Admitted to Telemetry for further workup and management of his DVT & POS PE.      INTERVAL EVENTS: Patient seen today without distress, feels OK. wants to get home. Delay in arranging O2 for DC to home.    MEDICATIONS  (STANDING):  albuterol/ipratropium for Nebulization 3 milliLiter(s) Nebulizer every 6 hours  ALPRAZolam 1 milliGRAM(s) Oral three times a day  apixaban 10 milliGRAM(s) Oral every 12 hours  buPROPion XL (24-Hour) . 150 milliGRAM(s) Oral daily  cloNIDine Patch 0.2 mG/24Hr(s) 1 patch Transdermal every 7 days  DULoxetine 30 milliGRAM(s) Oral daily  fenofibrate Tablet 145 milliGRAM(s) Oral daily  furosemide    Tablet 20 milliGRAM(s) Oral two times a day  losartan 100 milliGRAM(s) Oral daily  methylPREDNISolone sodium succinate Injectable 40 milliGRAM(s) IV Push two times a day  morphine ER Tablet 60 milliGRAM(s) Oral every 12 hours  oxybutynin XL 10 milliGRAM(s) Oral daily  simvastatin 20 milliGRAM(s) Oral at bedtime  tamsulosin 0.4 milliGRAM(s) Oral at bedtime    MEDICATIONS  (PRN):  albuterol    90 MICROgram(s) HFA Inhaler 2 Puff(s) Inhalation every 4 hours PRN Bronchospasm  oxycodone    5 mG/acetaminophen 325 mG 2 Tablet(s) Oral daily PRN Severe Pain (7 - 10)      Vital Signs Last 24 Hrs  T(C): 36.1 (07 May 2023 05:56), Max: 36.2 (06 May 2023 20:11)  T(F): 96.9 (07 May 2023 05:56), Max: 97.2 (06 May 2023 20:11)  HR: 59 (07 May 2023 05:56) (59 - 69)  BP: 162/70 (07 May 2023 05:56) (135/74 - 166/88)  BP(mean): --  RR: 18 (07 May 2023 05:56) (18 - 18)  SpO2: 93% (06 May 2023 11:05) (86% - 93%)  See POx: RA sitting-88, ambulating -86%    Parameters below as of 06 May 2023 11:05  Patient On (Oxygen Delivery Method): nasal cannula  O2 Flow (L/min): 3    PHYSICAL EXAM:  GENERAL: NAD in chair on O2 at rest; walks w cane.  NECK: Supple, No JVD, .  CHEST/LUNG: scattered wheezing; lalo at bases. scattered min rhonchi  HEART: RRR, no MRG  ABDOMEN: Soft, Nontender,  Bowel sounds present  EXTREMITIES: ++ edema  Spine- 10 cm vertical LS scar- well healed. LS straight, NT    LABS:                          12.2   8.15  )-----------( 284      ( 06 May 2023 07:39 )             39.5                         12.7   7.07  )-----------( 328      ( 05 May 2023 05:20 )             41.0     05-06    142  |  101  |  25<H>  ----------------------------<  128<H>  4.6   |  32  |  1.0    Ca    9.1      06 May 2023 07:39  Mg     1.8     05-06    TPro  7.3  /  Alb  3.6  /  TBili  0.5  /  DBili  x   /  AST  7   /  ALT  <5  /  AlkPhos  71  05-05              05-05    144  |  103  |  26<H>  ----------------------------<  145<H>  4.4   |  29  |  1.2    Ca    8.8      05 May 2023 05:20  Phos  3.4     05-04  Mg     1.9     05-05    TPro  7.3  /  Alb  3.6  /  TBili  0.5  /  DBili  x   /  AST  7   /  ALT  <5  /  AlkPhos  71  05-05    LIVER FUNCTIONS - ( 05 May 2023 05:20 )  Alb: 3.6 g/dL / Pro: 7.3 g/dL / ALK PHOS: 71 U/L / ALT: <5 U/L / AST: 7 U/L / GGT: x                   PT/INR - ( 04 May 2023 12:07 )   PT: 13.30 sec;   INR: 1.16 ratio         PTT - ( 04 May 2023 13:13 )  PTT:162.5 sec  CARDIAC MARKERS ( 04 May 2023 12:07 )  x     / <0.01 ng/mL / x     / x     / x      CARDIAC MARKERS ( 04 May 2023 01:05 )  x     / 0.03 ng/mL / x     / x     / x      CARDIAC MARKERS ( 03 May 2023 16:19 )  x     / 0.02 ng/mL / x     / x     / x          ABG - ( 05 May 2023 09:41 )  pH, Arterial: 7.46  pH, Blood: x     /  pCO2: 37    /  pO2: 66    / HCO3: 26    / Base Excess: 2.5   /  SaO2: 95.5          ECHO:  Summary:   1. Left ventricular ejection fraction, by visual estimation, is 50 to   55%.   2. Normal global left ventricular systolic function.   3. Mildly increased LV wall thickness.   4. The mitral in-flow pattern reveals no discernable A-wave, therefore   no comment on diastolic function can be made.   5. Moderately enlarged left atrium.   6. Normal right atrial size.   7. Mitral annular calcification.   8. No evidence of mitral valve regurgitation.   9. Mild-moderate tricuspid regurgitation.  10. Dilatation of the aortic root and ascending aorta at 4.2 cm.  11. Estimated pulmonary artery systolic pressure is 49.2 mmHg assuming a   right atrial pressure of 10 mmHg, which is consistent with mild pulmonary   hypertension.  12. Endocardial visualization was enhanced with intravenous echo contrast.  13. Sclerotic aortic valve with normal opening.  14. Mildly enlarged right ventricle.  15. Mildly reduced RV systolic function.          RADIOLOGY & ADDITIONAL TESTS:  < from: CT Angio Chest PE Protocol w/ IV Cont (05.03.23 @ 22:03) >    INTERPRETATION:  CLINICAL HISTORY / REASON FOR EXAM: Shortness of breath   and swelling to lower extremities. Pulmonary embolus evaluation. WBC 6.02     PMHx of asthma, DM, HLD, HTN, back surgery    TECHNIQUE:   Contiguous axial CT images were obtained from the thoracic   inlet to the upper abdomen with intravenous contrast.  Thin sections were   reconstructed through the pulmonary vasculature. Reformatted images in   the coronal and sagittal planes were acquired, as well as 3D, Volume   rendering, and MIP reconstructed images.    Comparison: Chest CT dated 4/18/2021      FINDINGS:    TUBES/LINES: None.    PULMONARY ARTERIES: Intraluminal filling defects, consistent with acute   pulmonary emboli, are noted in segmental branches of the right upper lobe   pulmonary arteries. Intraluminal filling defects are noted in segmental   and subsegmental branches of the left upper and left lower lobe pulmonary   arteries. There is evidence of right heart strain (RV:LV ratio >1; RV=6.2   cm and LV = 4.5 cm)    GREAT VESSELS/CARDIAC STRUCTURES/PERICARDIUM: Cardiomegaly. No   pericardial effusion. Normal caliber aorta. Coronary artery and aortic   calcifications are noted. There is no evidence of aortic aneurysm or   dissection. The brachiocephalic vessels are unremarkable.    There is reflux of contrast material into the IVC and hepatic veins   compatible with right heartdysfunction.    The main pulmonary artery is dilated, measuring 3.2 cm in diameter, which   may be seen with pulmonary hypertension.    LUNG PARENCHYMA/CENTRAL AIRWAYS/PLEURA: The central tracheobronchial tree   is patent. There are areas of atelectasis in the right and left lower   lobes and right middle lobe.  No evidence of pleural effusion or   pneumothorax    MEDIASTINUM/HILUM: There are no suspicious mediastinal, hilar, or   axillary lymph nodes.  The visualized portion of the thyroid glandis   unremarkable.    CHEST WALL/AXILLA: Unremarkable.    UPPER ABDOMEN: The partially visualized upper abdomen shows no acute   pathology.    OSSEOUS STRUCTURES: Degenerative changes of the spine are noted.      IMPRESSION:    Intraluminal filling defects, consistent with acute pulmonary emboli, are   noted in segmental branches of the right upper and middle lobe pulmonary   arteries. Intraluminal filling defects are noted in segmental and   subsegmental branches of the left upper and left lowerlobe, and lingula   pulmonary arteries.    There is evidence of right heart strain (RV:LV ratio >1; RV=6.2 cm and LV   = 4.5 cm)    There is reflux of contrast material into the IVC and hepatic veins   compatible with right heart dysfunction.    The findings were discussed with Dr. Gay at 10:32  5/3/2023 with read   back.    --- End of Report ---            < end of copied text >

## 2023-05-07 NOTE — PROGRESS NOTE ADULT - ASSESSMENT
Acute Hypoxic respiratory failure secondary to PE  PE associated with right heart strain  DVT  - on Eliquis 10mg (PO)  - no surgical intervention planned  - patient desaturates easily, walked with PT 20 feet and had to stop  - trend pulse ox; needs O2 for home;  - needs Auth and logistics arranged for home delivery    HTN, HLD  - troponins likely elevated due to heart strain  - continue home meds    COPD, Active smoker  - encourage smoking cessation- strongly encouraged to DC ; will f/u tomorrow.  - continue respiratory treatments  - monitor pulse ox    DM, Diabetic neuropathy  - on oral rx  -monitor fs  - sliding scale coverage    Chronic back pain   Chronic Opioid Dependence  Chronic Benzo Dependence  - continue home medications  - on Morphine, Percocet  - on Xanax    Diet: Dash / CCD  GI Prophylaxis: start on PPI give steroid and anticoagulation- see pulm note;  Give PPI while on steroids.    Patient stable, will need O2 for DC; then re-evaluation in 7-10 days.   Acute Hypoxic respiratory failure secondary to PE  PE associated with right heart strain  DVT  - on Eliquis 10mg (PO)- x 7 d , then 5mg q12h  - no surgical intervention planned  - patient desaturates easily, walked with PT 20 feet and had to stop  - trend pulse ox; needs O2 for home;   - needs Auth and logistics arranged for home delivery- still pending    HTN, HLD  - troponins likely elevated due to heart strain  - continue home meds    COPD, Active smoker  - encourage smoking cessation- strongly encouraged to DC ; will f/u tomorrow.  - continue respiratory treatments  - monitor pulse ox  - switch steroids to PO w taper over 6 days.    DM, Diabetic neuropathy  - on oral rx  -monitor fs  - sliding scale coverage    Chronic back pain   Chronic Opioid Dependence  Chronic Benzo Dependence  - continue home medications  - on Morphine, Percocet  - on Xanax    Diet: Dash / CCD  GI Prophylaxis: start on PPI give steroid and anticoagulation- see pulm note;  Give PPI while on steroids.    Patient stable, will need O2 for DC; then re-evaluation in 7-10 days.

## 2023-05-08 LAB
ANION GAP SERPL CALC-SCNC: 12 MMOL/L — SIGNIFICANT CHANGE UP (ref 7–14)
ANION GAP SERPL CALC-SCNC: 12 MMOL/L — SIGNIFICANT CHANGE UP (ref 7–14)
BUN SERPL-MCNC: 33 MG/DL — HIGH (ref 10–20)
BUN SERPL-MCNC: 34 MG/DL — HIGH (ref 10–20)
CALCIUM SERPL-MCNC: 8.8 MG/DL — SIGNIFICANT CHANGE UP (ref 8.4–10.5)
CALCIUM SERPL-MCNC: 9.5 MG/DL — SIGNIFICANT CHANGE UP (ref 8.4–10.5)
CHLORIDE SERPL-SCNC: 102 MMOL/L — SIGNIFICANT CHANGE UP (ref 98–110)
CHLORIDE SERPL-SCNC: 98 MMOL/L — SIGNIFICANT CHANGE UP (ref 98–110)
CO2 SERPL-SCNC: 28 MMOL/L — SIGNIFICANT CHANGE UP (ref 17–32)
CO2 SERPL-SCNC: 29 MMOL/L — SIGNIFICANT CHANGE UP (ref 17–32)
CREAT SERPL-MCNC: 1 MG/DL — SIGNIFICANT CHANGE UP (ref 0.7–1.5)
CREAT SERPL-MCNC: 1.1 MG/DL — SIGNIFICANT CHANGE UP (ref 0.7–1.5)
EGFR: 74 ML/MIN/1.73M2 — SIGNIFICANT CHANGE UP
EGFR: 83 ML/MIN/1.73M2 — SIGNIFICANT CHANGE UP
GAS PNL BLDA: SIGNIFICANT CHANGE UP
GLUCOSE BLDC GLUCOMTR-MCNC: 135 MG/DL — HIGH (ref 70–99)
GLUCOSE SERPL-MCNC: 120 MG/DL — HIGH (ref 70–99)
GLUCOSE SERPL-MCNC: 144 MG/DL — HIGH (ref 70–99)
HCT VFR BLD CALC: 43.9 % — SIGNIFICANT CHANGE UP (ref 42–52)
HGB BLD-MCNC: 13.1 G/DL — LOW (ref 14–18)
MAGNESIUM SERPL-MCNC: 2 MG/DL — SIGNIFICANT CHANGE UP (ref 1.8–2.4)
MCHC RBC-ENTMCNC: 28 PG — SIGNIFICANT CHANGE UP (ref 27–31)
MCHC RBC-ENTMCNC: 29.8 G/DL — LOW (ref 32–37)
MCV RBC AUTO: 93.8 FL — SIGNIFICANT CHANGE UP (ref 80–94)
NRBC # BLD: 0 /100 WBCS — SIGNIFICANT CHANGE UP (ref 0–0)
PLATELET # BLD AUTO: 281 K/UL — SIGNIFICANT CHANGE UP (ref 130–400)
PMV BLD: 10 FL — SIGNIFICANT CHANGE UP (ref 7.4–10.4)
POTASSIUM SERPL-MCNC: 4.3 MMOL/L — SIGNIFICANT CHANGE UP (ref 3.5–5)
POTASSIUM SERPL-MCNC: 4.7 MMOL/L — SIGNIFICANT CHANGE UP (ref 3.5–5)
POTASSIUM SERPL-SCNC: 4.3 MMOL/L — SIGNIFICANT CHANGE UP (ref 3.5–5)
POTASSIUM SERPL-SCNC: 4.7 MMOL/L — SIGNIFICANT CHANGE UP (ref 3.5–5)
RBC # BLD: 4.68 M/UL — LOW (ref 4.7–6.1)
RBC # FLD: 17.9 % — HIGH (ref 11.5–14.5)
SODIUM SERPL-SCNC: 138 MMOL/L — SIGNIFICANT CHANGE UP (ref 135–146)
SODIUM SERPL-SCNC: 143 MMOL/L — SIGNIFICANT CHANGE UP (ref 135–146)
WBC # BLD: 9.86 K/UL — SIGNIFICANT CHANGE UP (ref 4.8–10.8)
WBC # FLD AUTO: 9.86 K/UL — SIGNIFICANT CHANGE UP (ref 4.8–10.8)

## 2023-05-08 PROCEDURE — 71045 X-RAY EXAM CHEST 1 VIEW: CPT | Mod: 26

## 2023-05-08 PROCEDURE — 99233 SBSQ HOSP IP/OBS HIGH 50: CPT

## 2023-05-08 RX ORDER — NALOXONE HYDROCHLORIDE 4 MG/.1ML
0.4 SPRAY NASAL ONCE
Refills: 0 | Status: DISCONTINUED | OUTPATIENT
Start: 2023-05-08 | End: 2023-05-10

## 2023-05-08 RX ORDER — NALOXONE HYDROCHLORIDE 4 MG/.1ML
2 SPRAY NASAL ONCE
Refills: 0 | Status: DISCONTINUED | OUTPATIENT
Start: 2023-05-08 | End: 2023-05-08

## 2023-05-08 RX ORDER — MORPHINE SULFATE 50 MG/1
45 CAPSULE, EXTENDED RELEASE ORAL EVERY 12 HOURS
Refills: 0 | Status: DISCONTINUED | OUTPATIENT
Start: 2023-05-08 | End: 2023-05-10

## 2023-05-08 RX ORDER — NALOXONE HYDROCHLORIDE 4 MG/.1ML
0.4 SPRAY NASAL ONCE
Refills: 0 | Status: COMPLETED | OUTPATIENT
Start: 2023-05-08 | End: 2023-05-08

## 2023-05-08 RX ORDER — ALPRAZOLAM 0.25 MG
1 TABLET ORAL ONCE
Refills: 0 | Status: DISCONTINUED | OUTPATIENT
Start: 2023-05-08 | End: 2023-05-08

## 2023-05-08 RX ADMIN — SIMVASTATIN 20 MILLIGRAM(S): 20 TABLET, FILM COATED ORAL at 22:27

## 2023-05-08 RX ADMIN — DULOXETINE HYDROCHLORIDE 30 MILLIGRAM(S): 30 CAPSULE, DELAYED RELEASE ORAL at 13:21

## 2023-05-08 RX ADMIN — Medication 1 PATCH: at 19:00

## 2023-05-08 RX ADMIN — TAMSULOSIN HYDROCHLORIDE 0.4 MILLIGRAM(S): 0.4 CAPSULE ORAL at 22:27

## 2023-05-08 RX ADMIN — BUPROPION HYDROCHLORIDE 150 MILLIGRAM(S): 150 TABLET, EXTENDED RELEASE ORAL at 13:21

## 2023-05-08 RX ADMIN — Medication 145 MILLIGRAM(S): at 13:21

## 2023-05-08 RX ADMIN — NALOXONE HYDROCHLORIDE 0.4 MILLIGRAM(S): 4 SPRAY NASAL at 10:08

## 2023-05-08 RX ADMIN — Medication 20 MILLIGRAM(S): at 13:20

## 2023-05-08 RX ADMIN — APIXABAN 10 MILLIGRAM(S): 2.5 TABLET, FILM COATED ORAL at 05:14

## 2023-05-08 RX ADMIN — MORPHINE SULFATE 60 MILLIGRAM(S): 50 CAPSULE, EXTENDED RELEASE ORAL at 05:36

## 2023-05-08 RX ADMIN — Medication 1 MILLIGRAM(S): at 22:27

## 2023-05-08 RX ADMIN — Medication 1 MILLIGRAM(S): at 05:20

## 2023-05-08 RX ADMIN — Medication 3 MILLILITER(S): at 13:25

## 2023-05-08 RX ADMIN — LOSARTAN POTASSIUM 100 MILLIGRAM(S): 100 TABLET, FILM COATED ORAL at 05:21

## 2023-05-08 RX ADMIN — Medication 40 MILLIGRAM(S): at 17:24

## 2023-05-08 RX ADMIN — Medication 3 MILLILITER(S): at 20:22

## 2023-05-08 RX ADMIN — MORPHINE SULFATE 60 MILLIGRAM(S): 50 CAPSULE, EXTENDED RELEASE ORAL at 07:20

## 2023-05-08 RX ADMIN — Medication 10 MILLIGRAM(S): at 13:21

## 2023-05-08 RX ADMIN — Medication 40 MILLIGRAM(S): at 05:16

## 2023-05-08 RX ADMIN — Medication 20 MILLIGRAM(S): at 05:14

## 2023-05-08 RX ADMIN — APIXABAN 10 MILLIGRAM(S): 2.5 TABLET, FILM COATED ORAL at 17:24

## 2023-05-08 RX ADMIN — Medication 3 MILLILITER(S): at 08:05

## 2023-05-08 NOTE — RAPID RESPONSE TEAM SUMMARY - NSSITUATIONBACKGROUNDRRT_GEN_ALL_CORE
Pt found to be hypersomnolent and drowsy with pinpoint pupils suggesting OD on opiates. Took morphine and xanax this AM. Given narcan and improved. ABG obtained and CO2 noted to be increased to 60. BIPAP ordered but pt non-compliant

## 2023-05-08 NOTE — PROGRESS NOTE ADULT - SUBJECTIVE AND OBJECTIVE BOX
SANA JOE 67y Male  MRN#: [id number]   Hospital Day: 5d    HPI:  67 year old patient, known to have HTN, HLD, DM, COPD (not on home O2), smoker (1/2 ppd), 3 spinal operations (2010), history of PE (2018) and DVT (2018) s/p thrombolysis and tPA instillation, sent to the ED by his PCP for workup of increased SOB and LE swelling.  Patient reports that for the past 2 weeks, he has been having progressive worsening of SOB upon exertion and at rest, associated with b/l LE swelling and erythema. He also endorses orthopnea and PND. Last echo was done in 2021 with EF 66% and pulmonary HTN. He denies CP, palpitations. He denies recent travel, trauma or immobilization. No fever, chills, URT or urinary symptoms.     To note, patient had an accident in 2006 s/p multiple operations and since then he has been on Percocet 90 mg. On March 2023, pain management cut down on Percocet and currently he is taking morphine 60 mg BID, Baclofen 10 mg and clonidine patch 0.2mg/day. He states that his BP has been on the low side (100's/50's) and has been vomiting after each meal with subsequent weight loss.     In ED, vitals significant for BP 92/63  RR 24 SpO2 86% on RA improved on 3L NC.   Laboratory workup significant for Hb 11.4 MCV 90.5  Troponin 0.02 pro BNP 6472  Venous duplex b/l LE: DVTs in b/l PT, right gastrocnemius and left popliteal.  CTA chest: Intraluminal filling defects, consistent with acute pulmonary emboli, are noted in segmental branches of the right upper and middle lobe pulmonary arteries. Intraluminal filling defects are noted in segmental and subsegmental branches of the left upper and left lowerlobe, and lingula pulmonary arteries. There is evidence of right heart strain (RV:LV ratio >1; RV=6.2 cm and LV   = 4.5 cm) There is reflux of contrast material into the IVC and hepatic veins compatible with right heart dysfunction.  He was started on heparin drip and given 40 mg IV Lasix once in ED.   Admitted to Telemetry for further workup and management.    (03 May 2023 23:50)      SUBJECTIVE  Patient is a 67y old Male who presents with a chief complaint of PE (08 May 2023 08:48)  Currently admitted to medicine with the primary diagnosis of Pulmonary embolism      INTERVAL HPI AND OVERNIGHT EVENTS:  Patient was examined and seen at bedside. This morning he very somnolent, swaying in upright position, drowsy.     OBJECTIVE  PAST MEDICAL & SURGICAL HISTORY  Anxiety    HTN (hypertension)    Diabetes    High cholesterol    Asthma    Chronic low back pain with sciatica, sciatica laterality unspecified, unspecified back pain laterality    Uncomplicated opioid dependence    Syncopal episodes    History of back surgery  x 3      ALLERGIES:  pineapples (Anaphylaxis)  Originally Entered as [ANGIOEDEMA] reaction to [pineapple] (Unknown)  penicillins (Other)  aspirin (Stomach Upset)    MEDICATIONS:  STANDING MEDICATIONS  albuterol/ipratropium for Nebulization 3 milliLiter(s) Nebulizer every 6 hours  apixaban 10 milliGRAM(s) Oral every 12 hours  buPROPion XL (24-Hour) . 150 milliGRAM(s) Oral daily  cloNIDine Patch 0.2 mG/24Hr(s) 1 patch Transdermal every 7 days  DULoxetine 30 milliGRAM(s) Oral daily  fenofibrate Tablet 145 milliGRAM(s) Oral daily  furosemide    Tablet 20 milliGRAM(s) Oral two times a day  losartan 100 milliGRAM(s) Oral daily  methylPREDNISolone sodium succinate Injectable 40 milliGRAM(s) IV Push two times a day  morphine ER Tablet 60 milliGRAM(s) Oral every 12 hours  oxybutynin XL 10 milliGRAM(s) Oral daily  simvastatin 20 milliGRAM(s) Oral at bedtime  tamsulosin 0.4 milliGRAM(s) Oral at bedtime    PRN MEDICATIONS  albuterol    90 MICROgram(s) HFA Inhaler 2 Puff(s) Inhalation every 4 hours PRN  naloxone Injectable 0.4 milliGRAM(s) IV Push once PRN      VITAL SIGNS: Last 24 Hours  T(C): 36.7 (08 May 2023 05:00), Max: 36.8 (07 May 2023 21:14)  T(F): 98 (08 May 2023 05:00), Max: 98.2 (07 May 2023 21:14)  HR: 62 (08 May 2023 13:02) (57 - 93)  BP: 152/70 (08 May 2023 13:02) (90/60 - 152/70)  BP(mean): --  RR: 18 (08 May 2023 13:02) (18 - 18)  SpO2: 95% (08 May 2023 10:30) (83% - 100%)    LABS:                        13.1   9.86  )-----------( 281      ( 08 May 2023 05:51 )             43.9     05-08    138  |  98  |  33<H>  ----------------------------<  144<H>  4.3   |  28  |  1.1    Ca    8.8      08 May 2023 10:32  Mg     2.0     05-08          ABG - ( 08 May 2023 10:10 )  pH, Arterial: 7.31  pH, Blood: x     /  pCO2: 60    /  pO2: 86    / HCO3: 30    / Base Excess: 2.5   /  SaO2: 97.5                      RADIOLOGY:      PHYSICAL EXAM:  CONSTITUTIONAL: drowsy, AAOx3  HEAD: Atraumatic, normocephalic  EYES: EOM intact, PERRLA, conjunctiva and sclera clear  ENT: Supple, no masses, no thyromegaly, no bruits, no JVD; moist mucous membranes  PULMONARY: rhonchi upper lobes  CARDIOVASCULAR: Regular rate and rhythm; no murmurs, rubs, or gallops  GASTROINTESTINAL: Soft, non-tender, non-distended; bowel sounds present  MUSCULOSKELETAL: 2+ peripheral pulses; no clubbing, no cyanosis, no edema  NEUROLOGY: non-focal  SKIN: No rashes or lesions; warm and dry    ASSESSMENT & PLAN  #Acute Hypoxic respiratory failure secondary to PE  #PE associated with right heart strain  #DVT  - on apixaban 10 mg BID for 14 doses then 5 mg for 90 days  - no surgical intervention planned  - monitor on rx  - trend pulse ox    HTN, HLD, HF ?  - troponins likely elevated due to heart strain    COPD, Active smoker  - encourage smoking cessation  - continue repiratory treatments  - monitor pulse ox    DM, Diabetic neuropathy  - on oral rx  - monitor fs  - sliding scale coverage    Chronic back pain   Chronic Opioid Dependence  Chronic Benzo Dependence  - continue home medications  - on Morphine, Percocet  - on Xanax, ehld xanax due to drowsiness    Diet- PO  Pending- pain med consult

## 2023-05-08 NOTE — PROGRESS NOTE ADULT - SUBJECTIVE AND OBJECTIVE BOX
Pain Medicine Follow Up Visit    Subjective  Patient states that his low back pain is relatively well controlled. He acknowledges that he has been somewhat sleepy sedated after his dose of alprazolam this morning which required a dose of naloxone. He states that is still feels somewhat sleepy at this time.     Current Medication Regimen  albuterol    90 MICROgram(s) HFA Inhaler 2 Puff(s) Inhalation every 4 hours PRN  albuterol/ipratropium for Nebulization 3 milliLiter(s) Nebulizer every 6 hours  apixaban 10 milliGRAM(s) Oral every 12 hours  buPROPion XL (24-Hour) . 150 milliGRAM(s) Oral daily  cloNIDine Patch 0.2 mG/24Hr(s) 1 patch Transdermal every 7 days  DULoxetine 30 milliGRAM(s) Oral daily  fenofibrate Tablet 145 milliGRAM(s) Oral daily  furosemide    Tablet 20 milliGRAM(s) Oral two times a day  losartan 100 milliGRAM(s) Oral daily  methylPREDNISolone sodium succinate Injectable 40 milliGRAM(s) IV Push two times a day  morphine ER Tablet 45 milliGRAM(s) Oral every 12 hours  naloxone Injectable 0.4 milliGRAM(s) IV Push once PRN  oxybutynin XL 10 milliGRAM(s) Oral daily  simvastatin 20 milliGRAM(s) Oral at bedtime  tamsulosin 0.4 milliGRAM(s) Oral at bedtime        Allergies  pineapples (Anaphylaxis)  Originally Entered as [ANGIOEDEMA] reaction to [pineapple] (Unknown)  penicillins (Other)  aspirin (Stomach Upset)          Physical Exam  T(C): 36.7 (05-08-23 @ 05:00), Max: 36.8 (05-07-23 @ 21:14)  HR: 62 (05-08-23 @ 13:02) (57 - 93)  BP: 152/70 (05-08-23 @ 13:02) (90/60 - 152/70)  RR: 18 (05-08-23 @ 13:02) (18 - 18)  SpO2: 95% (05-08-23 @ 10:30) (83% - 100%)  Gen: Patient appears sleepy, has difficulty staying awake throughout interaction  Eyes: no glasses or scleral icterus  Head: Normocephalic / Atraumatic  CV: no JVD  Lungs: nonlabored breathing  Abdomen: nondistended, soft  : no escamilla catheter in place  Neuro: AOx3, Cranial nerves intact  Psych: normal affect      Labs  CBC  9.86 > 13.1 g/dL / 43.9 % < 281 K/uL        Imaging  CXR (5/8/2023)  Findings:    Support devices:  none    Cardiac/mediastinum/hilum: Unremarkable    Lung parenchyma/ Pleura: No significant change in bilateral basilar   opacities/atelectasis. No pneumothorax      Skeleton/soft tissues: Stable      Impression:    No significant change in bilateral basilar opacities/atelectasis. No   pneumothorax

## 2023-05-08 NOTE — PROGRESS NOTE ADULT - ASSESSMENT
Patient is a 66 y/o man with history of HTN, HL, DM, COPD, PE/DVT, pulmonary HTN, and chronic low back pain on chronic opioid therapy who was admitted on 5/3/2023 with dyspnea and lower extremity edema now found to have multiple PEs.

## 2023-05-08 NOTE — PROGRESS NOTE ADULT - PROBLEM SELECTOR PLAN 1
+Longstanding history of chronic opioid therapy (MED = 120) likely to have severe opioid tolerance. Low risk of opioid abuse per ORT.  1) Decrease morphine ER to 45mg Q12h  2) Avoid any additional opioids  3) Would hold alprazolam until mental status improves or make prn  4) Continue duloxetine  5) Continue clonidine patch - patient taking this to prevent opioid withdrawal symptoms  6) Provide a prescription for naloxone on discharge (patient takes opioids)

## 2023-05-08 NOTE — PROGRESS NOTE ADULT - ASSESSMENT
Acute Hypoxic respiratory failure secondary to PE  PE associated with right heart strain  DVT  - on Eliquis 10mg (PO)- x 7 d , then 5mg q12h  - no surgical intervention planned  - patient desaturates easily, walked with PT 20 feet and had to stop  - trend pulse ox; needs O2 for home;   - needs Auth and logistics arranged for home delivery- still pending      see CM f/u- 5/8 & 5/9.    HTN, HLD  - troponins likely elevated due to heart strain  - continue home meds    COPD, Active smoker  - encourage smoking cessation- strongly encouraged to DC ; will f/u tomorrow.  - continue respiratory treatments  - monitor pulse ox  - switch steroids to PO w taper over 6 days.    DM, Diabetic neuropathy  - on oral rx  -monitor fs  - sliding scale coverage    Chronic back pain   Chronic Opioid Dependence  Chronic Benzo Dependence  - continue home medications  - on Morphine, Percocet  - on Xanax    Diet: Dash / CCD  GI Prophylaxis: start on PPI give steroid and anticoagulation- see pulm note;  Give PPI while on steroids.    Patient stable, will need O2 for DC; then re-evaluation in 7-10 days.   Acute Hypoxic respiratory failure secondary to PE  PE associated with right heart strain  DVT  - on Eliquis 10mg (PO)- x 7 d , then 5mg q12h  - no surgical intervention planned  - patient desaturates easily, walked with PT 20 feet and had to stop  - trend pulse ox; needs O2 for home;   - needs Auth and logistics arranged for home delivery- still pending      see CM f/u- 5/8 & 5/9.  - now delayed by medication induced somnolence     probably 2/2 opioid &/or benzo    HTN, HLD  - troponins likely elevated due to heart strain  - continue home meds    COPD, Active smoker  - encourage smoking cessation- strongly encouraged to DC ; will f/u tomorrow.  - continue respiratory treatments  - monitor pulse ox  - switch steroids to PO w taper over 6 days.    DM, Diabetic neuropathy  - on oral rx  -monitor fs  - sliding scale coverage    Chronic back pain   Chronic Opioid Dependence  Chronic Benzo Dependence  - continue home medications  - on Morphine, Percocet  -  was on Xanax; held today; now to prn    Diet: Dash / CCD  GI Prophylaxis: start on PPI give steroid and anticoagulation- see pulm note;  Give PPI while on steroids.  MS decreased and alprazolam- to prn    Patient stable, will need O2 for DC; then re-evaluation in 7-10 days.

## 2023-05-08 NOTE — PROGRESS NOTE ADULT - SUBJECTIVE AND OBJECTIVE BOX
Chart reviewed, patient examined. Pertinent results reviewed.  Case discussed with HO; specialist f/u reviewed  HD#6  TATYANA, SANA    HPI:  67 year old patient, known to have HTN, HLD, DM, COPD (not on home O2), smoker (1/2 ppd), 3 spinal operations (2010), history of PE (2018) and DVT (2018) s/p thrombolysis and tPA instillation, sent to the ED by his PCP for workup of increased SOB and LE swelling.  Patient reported that for the prior 2 weeks, he has been having progressive worsening of SOB upon exertion and at rest, associated with b/l LE swelling and erythema. He also endorses orthopnea and PND. Last echo was done in 2021 with EF 66% and pulmonary HTN. He denies CP, palpitations. He denies recent travel, trauma or immobilization. No fever, chills, URT or urinary symptoms.     To note, patient had an accident in 2006 s/p multiple operations and since then he has been on Percocet 90 mg. On March 2023, pain management cut down on Percocet and currently he is taking morphine 60 mg BID, Baclofen 10 mg and clonidine patch 0.2mg/day. He states that his BP has been on the low side (100's/50's) and has been vomiting after each meal with subsequent weight loss.     In ED, vitals significant for BP 92/63  RR 24 SpO2 86% on RA improved on 3L NC.   Laboratory workup significant for Hb 11.4 MCV 90.5  Troponin 0.02 pro BNP 6472  Venous duplex b/l LE: DVTs in b/l PT, right gastrocnemius and left popliteal.  CTA chest: Intraluminal filling defects, consistent with acute pulmonary emboli, are noted in segmental branches of the right upper and middle lobe pulmonary arteries. Intraluminal filling defects are noted in segmental and subsegmental branches of the left upper and left lowerlobe, and lingula pulmonary arteries. There is evidence of right heart strain (RV:LV ratio >1; RV=6.2 cm and LV   = 4.5 cm) There is reflux of contrast material into the IVC and hepatic veins compatible with right heart dysfunction.  He was started on heparin drip and given 40 mg IV Lasix once in ED.   Admitted to Telemetry for further workup and management of his DVT & POS PE.      INTERVAL EVENTS: Patient seen today without distress, feels OK. wants to get home. Delay in arranging O2 for DC to home.    MEDICATIONS  (STANDING):  albuterol/ipratropium for Nebulization 3 milliLiter(s) Nebulizer every 6 hours  ALPRAZolam 1 milliGRAM(s) Oral three times a day  apixaban 10 milliGRAM(s) Oral every 12 hours  buPROPion XL (24-Hour) . 150 milliGRAM(s) Oral daily  cloNIDine Patch 0.2 mG/24Hr(s) 1 patch Transdermal every 7 days  DULoxetine 30 milliGRAM(s) Oral daily  fenofibrate Tablet 145 milliGRAM(s) Oral daily  furosemide    Tablet 20 milliGRAM(s) Oral two times a day  losartan 100 milliGRAM(s) Oral daily  methylPREDNISolone sodium succinate Injectable 40 milliGRAM(s) IV Push two times a day  morphine ER Tablet 60 milliGRAM(s) Oral every 12 hours  oxybutynin XL 10 milliGRAM(s) Oral daily  simvastatin 20 milliGRAM(s) Oral at bedtime  tamsulosin 0.4 milliGRAM(s) Oral at bedtime    MEDICATIONS  (PRN):  albuterol    90 MICROgram(s) HFA Inhaler 2 Puff(s) Inhalation every 4 hours PRN Bronchospasm  oxycodone    5 mG/acetaminophen 325 mG 2 Tablet(s) Oral daily PRN Severe Pain (7 - 10)    Vital Signs Last 24 Hrs  T(C): 36.7 (08 May 2023 05:00), Max: 36.8 (07 May 2023 13:02)  T(F): 98 (08 May 2023 05:00), Max: 98.3 (07 May 2023 13:02)  HR: 66 (08 May 2023 05:19) (57 - 93)  BP: 115/64 (08 May 2023 05:19) (90/60 - 125/64)  BP(mean): 72 (07 May 2023 13:02) (72 - 72)  RR: 18 (08 May 2023 05:00) (18 - 18)  SpO2: --  see POX: RA-88% at rest, 86%-ambulating      PHYSICAL EXAM:  GENERAL: NAD in chair on O2 at rest; walks w cane.  NECK: Supple, No JVD, .  CHEST/LUNG: scattered wheezing; lalo at bases. scattered min rhonchi  HEART: RRR, no MRG  ABDOMEN: Soft, Nontender,  Bowel sounds present  EXTREMITIES: ++ edema  Spine- 10 cm vertical LS scar- well healed. LS straight, NT    LABS:                          12.2   8.15  )-----------( 284      ( 06 May 2023 07:39 )             39.5                         12.7   7.07  )-----------( 328      ( 05 May 2023 05:20 )             41.0     05-06    142  |  101  |  25<H>  ----------------------------<  128<H>  4.6   |  32  |  1.0    Ca    9.1      06 May 2023 07:39  Mg     1.8     05-06    TPro  7.3  /  Alb  3.6  /  TBili  0.5  /  DBili  x   /  AST  7   /  ALT  <5  /  AlkPhos  71  05-05              05-05    144  |  103  |  26<H>  ----------------------------<  145<H>  4.4   |  29  |  1.2    Ca    8.8      05 May 2023 05:20  Phos  3.4     05-04  Mg     1.9     05-05    TPro  7.3  /  Alb  3.6  /  TBili  0.5  /  DBili  x   /  AST  7   /  ALT  <5  /  AlkPhos  71  05-05    LIVER FUNCTIONS - ( 05 May 2023 05:20 )  Alb: 3.6 g/dL / Pro: 7.3 g/dL / ALK PHOS: 71 U/L / ALT: <5 U/L / AST: 7 U/L / GGT: x                   PT/INR - ( 04 May 2023 12:07 )   PT: 13.30 sec;   INR: 1.16 ratio         PTT - ( 04 May 2023 13:13 )  PTT:162.5 sec  CARDIAC MARKERS ( 04 May 2023 12:07 )  x     / <0.01 ng/mL / x     / x     / x      CARDIAC MARKERS ( 04 May 2023 01:05 )  x     / 0.03 ng/mL / x     / x     / x      CARDIAC MARKERS ( 03 May 2023 16:19 )  x     / 0.02 ng/mL / x     / x     / x          ABG - ( 05 May 2023 09:41 )  pH, Arterial: 7.46  pH, Blood: x     /  pCO2: 37    /  pO2: 66    / HCO3: 26    / Base Excess: 2.5   /  SaO2: 95.5          ECHO:  Summary:   1. Left ventricular ejection fraction, by visual estimation, is 50 to   55%.   2. Normal global left ventricular systolic function.   3. Mildly increased LV wall thickness.   4. The mitral in-flow pattern reveals no discernable A-wave, therefore   no comment on diastolic function can be made.   5. Moderately enlarged left atrium.   6. Normal right atrial size.   7. Mitral annular calcification.   8. No evidence of mitral valve regurgitation.   9. Mild-moderate tricuspid regurgitation.  10. Dilatation of the aortic root and ascending aorta at 4.2 cm.  11. Estimated pulmonary artery systolic pressure is 49.2 mmHg assuming a   right atrial pressure of 10 mmHg, which is consistent with mild pulmonary   hypertension.  12. Endocardial visualization was enhanced with intravenous echo contrast.  13. Sclerotic aortic valve with normal opening.  14. Mildly enlarged right ventricle.  15. Mildly reduced RV systolic function.          RADIOLOGY & ADDITIONAL TESTS:  < from: CT Angio Chest PE Protocol w/ IV Cont (05.03.23 @ 22:03) >    INTERPRETATION:  CLINICAL HISTORY / REASON FOR EXAM: Shortness of breath   and swelling to lower extremities. Pulmonary embolus evaluation. WBC 6.02     PMHx of asthma, DM, HLD, HTN, back surgery    TECHNIQUE:   Contiguous axial CT images were obtained from the thoracic   inlet to the upper abdomen with intravenous contrast.  Thin sections were   reconstructed through the pulmonary vasculature. Reformatted images in   the coronal and sagittal planes were acquired, as well as 3D, Volume   rendering, and MIP reconstructed images.    Comparison: Chest CT dated 4/18/2021      FINDINGS:    TUBES/LINES: None.    PULMONARY ARTERIES: Intraluminal filling defects, consistent with acute   pulmonary emboli, are noted in segmental branches of the right upper lobe   pulmonary arteries. Intraluminal filling defects are noted in segmental   and subsegmental branches of the left upper and left lower lobe pulmonary   arteries. There is evidence of right heart strain (RV:LV ratio >1; RV=6.2   cm and LV = 4.5 cm)    GREAT VESSELS/CARDIAC STRUCTURES/PERICARDIUM: Cardiomegaly. No   pericardial effusion. Normal caliber aorta. Coronary artery and aortic   calcifications are noted. There is no evidence of aortic aneurysm or   dissection. The brachiocephalic vessels are unremarkable.    There is reflux of contrast material into the IVC and hepatic veins   compatible with right heartdysfunction.    The main pulmonary artery is dilated, measuring 3.2 cm in diameter, which   may be seen with pulmonary hypertension.    LUNG PARENCHYMA/CENTRAL AIRWAYS/PLEURA: The central tracheobronchial tree   is patent. There are areas of atelectasis in the right and left lower   lobes and right middle lobe.  No evidence of pleural effusion or   pneumothorax    MEDIASTINUM/HILUM: There are no suspicious mediastinal, hilar, or   axillary lymph nodes.  The visualized portion of the thyroid glandis   unremarkable.    CHEST WALL/AXILLA: Unremarkable.    UPPER ABDOMEN: The partially visualized upper abdomen shows no acute   pathology.    OSSEOUS STRUCTURES: Degenerative changes of the spine are noted.      IMPRESSION:    Intraluminal filling defects, consistent with acute pulmonary emboli, are   noted in segmental branches of the right upper and middle lobe pulmonary   arteries. Intraluminal filling defects are noted in segmental and   subsegmental branches of the left upper and left lowerlobe, and lingula   pulmonary arteries.    There is evidence of right heart strain (RV:LV ratio >1; RV=6.2 cm and LV   = 4.5 cm)    There is reflux of contrast material into the IVC and hepatic veins   compatible with right heart dysfunction.    The findings were discussed with Dr. Gay at 10:32  5/3/2023 with read   back.    --- End of Report ---            < end of copied text >   Chart reviewed, patient examined. Pertinent results reviewed.  Case discussed with HO; specialist f/u reviewed  HD#6  TATYANA, SANA    HPI:  67 year old patient, known to have HTN, HLD, DM, COPD (not on home O2), smoker (1/2 ppd), 3 spinal operations (2010), history of PE (2018) and DVT (2018) s/p thrombolysis and tPA instillation, sent to the ED by his PCP for workup of increased SOB and LE swelling.  Patient reported that for the prior 2 weeks, he has been having progressive worsening of SOB upon exertion and at rest, associated with b/l LE swelling and erythema. He also endorses orthopnea and PND. Last echo was done in 2021 with EF 66% and pulmonary HTN. He denies CP, palpitations. He denies recent travel, trauma or immobilization. No fever, chills, URT or urinary symptoms.     To note, patient had an accident in 2006 s/p multiple operations and since then he has been on Percocet 90 mg. On March 2023, pain management cut down on Percocet and currently he is taking morphine 60 mg BID, Baclofen 10 mg and clonidine patch 0.2mg/day. He states that his BP has been on the low side (100's/50's) and has been vomiting after each meal with subsequent weight loss.     In ED, vitals significant for BP 92/63  RR 24 SpO2 86% on RA improved on 3L NC.   Laboratory workup significant for Hb 11.4 MCV 90.5  Troponin 0.02 pro BNP 6472  Venous duplex b/l LE: DVTs in b/l PT, right gastrocnemius and left popliteal.  CTA chest: Intraluminal filling defects, consistent with acute pulmonary emboli, are noted in segmental branches of the right upper and middle lobe pulmonary arteries. Intraluminal filling defects are noted in segmental and subsegmental branches of the left upper and left lowerlobe, and lingula pulmonary arteries. There is evidence of right heart strain (RV:LV ratio >1; RV=6.2 cm and LV   = 4.5 cm) There is reflux of contrast material into the IVC and hepatic veins compatible with right heart dysfunction.  He was started on heparin drip and given 40 mg IV Lasix once in ED.   Admitted to Telemetry for further workup and management of his DVT & POS PE.      INTERVAL EVENTS: Patient still here in hospital. Delay in arranging O2 for DC to home.     This AM patient was somnolent- seen by myself, HO and RN.     He denied taking any other substances.    MEDICATIONS  (STANDING):  albuterol/ipratropium for Nebulization 3 milliLiter(s) Nebulizer every 6 hours  ALPRAZolam 1 milliGRAM(s) Oral three times a day---stopped after Rapid response for lethargy today- for PRN only  apixaban 10 milliGRAM(s) Oral every 12 hours  buPROPion XL (24-Hour) . 150 milliGRAM(s) Oral daily  cloNIDine Patch 0.2 mG/24Hr(s) 1 patch Transdermal every 7 days  DULoxetine 30 milliGRAM(s) Oral daily  fenofibrate Tablet 145 milliGRAM(s) Oral daily  furosemide    Tablet 20 milliGRAM(s) Oral two times a day  losartan 100 milliGRAM(s) Oral daily  methylPREDNISolone sodium succinate Injectable 40 milliGRAM(s) IV Push two times a day  morphine ER Tablet 60 milliGRAM(s) Oral every 12 hours  oxybutynin XL 10 milliGRAM(s) Oral daily  simvastatin 20 milliGRAM(s) Oral at bedtime  tamsulosin 0.4 milliGRAM(s) Oral at bedtime    MEDICATIONS  (PRN):  albuterol    90 MICROgram(s) HFA Inhaler 2 Puff(s) Inhalation every 4 hours PRN Bronchospasm  oxycodone    5 mG/acetaminophen 325 mG 2 Tablet(s) Oral daily PRN Severe Pain (7 - 10)    Vital Signs Last 24 Hrs  T(C): 36.7 (08 May 2023 05:00), Max: 36.8 (07 May 2023 13:02)  T(F): 98 (08 May 2023 05:00), Max: 98.3 (07 May 2023 13:02)  HR: 66 (08 May 2023 05:19) (57 - 93)  BP: 115/64 (08 May 2023 05:19) (90/60 - 125/64)  BP(mean): 72 (07 May 2023 13:02) (72 - 72)  RR: 18 (08 May 2023 05:00) (18 - 18)  SpO2: --  see POX: RA-88% at rest, 86%-ambulating        PHYSICAL EXAM: patient min lethargic: walks back slowly from working out. min slur of words- alex 19--  GENERAL: slumping in bed- on O2 at rest; walks w cane. slow to responed and follow directions  NECK: Supple, No JVD, .no thyroid palpable.  CHEST/LUNG: scattered wheezing; lalo at bases. scattered min rhonchi  HEART: RRR, no MRG  ABDOMEN: Soft, Nontender,  Bowel sounds present  EXTREMITIES: + edema  Spine- 10 cm vertical LS scar- well healed. LS straight, NT      LABS:                          12.2   8.15  )-----------( 284      ( 06 May 2023 07:39 )             39.5                         12.7   7.07  )-----------( 328      ( 05 May 2023 05:20 )             41.0     05-06    142  |  101  |  25<H>  ----------------------------<  128<H>  4.6   |  32  |  1.0    Ca    9.1      06 May 2023 07:39  Mg     1.8     05-06    TPro  7.3  /  Alb  3.6  /  TBili  0.5  /  DBili  x   /  AST  7   /  ALT  <5  /  AlkPhos  71  05-05              05-05    144  |  103  |  26<H>  ----------------------------<  145<H>  4.4   |  29  |  1.2    Ca    8.8      05 May 2023 05:20  Phos  3.4     05-04  Mg     1.9     05-05    TPro  7.3  /  Alb  3.6  /  TBili  0.5  /  DBili  x   /  AST  7   /  ALT  <5  /  AlkPhos  71  05-05    LIVER FUNCTIONS - ( 05 May 2023 05:20 )  Alb: 3.6 g/dL / Pro: 7.3 g/dL / ALK PHOS: 71 U/L / ALT: <5 U/L / AST: 7 U/L / GGT: x                   PT/INR - ( 04 May 2023 12:07 )   PT: 13.30 sec;   INR: 1.16 ratio         PTT - ( 04 May 2023 13:13 )  PTT:162.5 sec  CARDIAC MARKERS ( 04 May 2023 12:07 )  x     / <0.01 ng/mL / x     / x     / x      CARDIAC MARKERS ( 04 May 2023 01:05 )  x     / 0.03 ng/mL / x     / x     / x      CARDIAC MARKERS ( 03 May 2023 16:19 )  x     / 0.02 ng/mL / x     / x     / x          ABG - ( 05 May 2023 09:41 )  pH, Arterial: 7.46  pH, Blood: x     /  pCO2: 37    /  pO2: 66    / HCO3: 26    / Base Excess: 2.5   /  SaO2: 95.5          ECHO:  Summary:   1. Left ventricular ejection fraction, by visual estimation, is 50 to   55%.   2. Normal global left ventricular systolic function.   3. Mildly increased LV wall thickness.   4. The mitral in-flow pattern reveals no discernable A-wave, therefore   no comment on diastolic function can be made.   5. Moderately enlarged left atrium.   6. Normal right atrial size.   7. Mitral annular calcification.   8. No evidence of mitral valve regurgitation.   9. Mild-moderate tricuspid regurgitation.  10. Dilatation of the aortic root and ascending aorta at 4.2 cm.  11. Estimated pulmonary artery systolic pressure is 49.2 mmHg assuming a   right atrial pressure of 10 mmHg, which is consistent with mild pulmonary   hypertension.  12. Endocardial visualization was enhanced with intravenous echo contrast.  13. Sclerotic aortic valve with normal opening.  14. Mildly enlarged right ventricle.  15. Mildly reduced RV systolic function.          RADIOLOGY & ADDITIONAL TESTS:  < from: CT Angio Chest PE Protocol w/ IV Cont (05.03.23 @ 22:03) >    INTERPRETATION:  CLINICAL HISTORY / REASON FOR EXAM: Shortness of breath   and swelling to lower extremities. Pulmonary embolus evaluation. WBC 6.02     PMHx of asthma, DM, HLD, HTN, back surgery    TECHNIQUE:   Contiguous axial CT images were obtained from the thoracic   inlet to the upper abdomen with intravenous contrast.  Thin sections were   reconstructed through the pulmonary vasculature. Reformatted images in   the coronal and sagittal planes were acquired, as well as 3D, Volume   rendering, and MIP reconstructed images.    Comparison: Chest CT dated 4/18/2021      FINDINGS:    TUBES/LINES: None.    PULMONARY ARTERIES: Intraluminal filling defects, consistent with acute   pulmonary emboli, are noted in segmental branches of the right upper lobe   pulmonary arteries. Intraluminal filling defects are noted in segmental   and subsegmental branches of the left upper and left lower lobe pulmonary   arteries. There is evidence of right heart strain (RV:LV ratio >1; RV=6.2   cm and LV = 4.5 cm)    GREAT VESSELS/CARDIAC STRUCTURES/PERICARDIUM: Cardiomegaly. No   pericardial effusion. Normal caliber aorta. Coronary artery and aortic   calcifications are noted. There is no evidence of aortic aneurysm or   dissection. The brachiocephalic vessels are unremarkable.    There is reflux of contrast material into the IVC and hepatic veins   compatible with right heartdysfunction.    The main pulmonary artery is dilated, measuring 3.2 cm in diameter, which   may be seen with pulmonary hypertension.    LUNG PARENCHYMA/CENTRAL AIRWAYS/PLEURA: The central tracheobronchial tree   is patent. There are areas of atelectasis in the right and left lower   lobes and right middle lobe.  No evidence of pleural effusion or   pneumothorax    MEDIASTINUM/HILUM: There are no suspicious mediastinal, hilar, or   axillary lymph nodes.  The visualized portion of the thyroid glandis   unremarkable.    CHEST WALL/AXILLA: Unremarkable.    UPPER ABDOMEN: The partially visualized upper abdomen shows no acute   pathology.    OSSEOUS STRUCTURES: Degenerative changes of the spine are noted.      IMPRESSION:    Intraluminal filling defects, consistent with acute pulmonary emboli, are   noted in segmental branches of the right upper and middle lobe pulmonary   arteries. Intraluminal filling defects are noted in segmental and   subsegmental branches of the left upper and left lowerlobe, and lingula   pulmonary arteries.    There is evidence of right heart strain (RV:LV ratio >1; RV=6.2 cm and LV   = 4.5 cm)    There is reflux of contrast material into the IVC and hepatic veins   compatible with right heart dysfunction.    The findings were discussed with Dr. Gay at 10:32  5/3/2023 with read   back.    --- End of Report ---            < end of copied text >

## 2023-05-09 LAB
ANION GAP SERPL CALC-SCNC: 10 MMOL/L — SIGNIFICANT CHANGE UP (ref 7–14)
BASOPHILS # BLD AUTO: 0.01 K/UL — SIGNIFICANT CHANGE UP (ref 0–0.2)
BASOPHILS NFR BLD AUTO: 0.1 % — SIGNIFICANT CHANGE UP (ref 0–1)
BUN SERPL-MCNC: 29 MG/DL — HIGH (ref 10–20)
CALCIUM SERPL-MCNC: 9.1 MG/DL — SIGNIFICANT CHANGE UP (ref 8.4–10.5)
CHLORIDE SERPL-SCNC: 97 MMOL/L — LOW (ref 98–110)
CO2 SERPL-SCNC: 33 MMOL/L — HIGH (ref 17–32)
CREAT SERPL-MCNC: 1.1 MG/DL — SIGNIFICANT CHANGE UP (ref 0.7–1.5)
EGFR: 74 ML/MIN/1.73M2 — SIGNIFICANT CHANGE UP
EOSINOPHIL # BLD AUTO: 0 K/UL — SIGNIFICANT CHANGE UP (ref 0–0.7)
EOSINOPHIL NFR BLD AUTO: 0 % — SIGNIFICANT CHANGE UP (ref 0–8)
GLUCOSE SERPL-MCNC: 98 MG/DL — SIGNIFICANT CHANGE UP (ref 70–99)
HCT VFR BLD CALC: 41.5 % — LOW (ref 42–52)
HGB BLD-MCNC: 12.5 G/DL — LOW (ref 14–18)
IMM GRANULOCYTES NFR BLD AUTO: 0.3 % — SIGNIFICANT CHANGE UP (ref 0.1–0.3)
LYMPHOCYTES # BLD AUTO: 1.69 K/UL — SIGNIFICANT CHANGE UP (ref 1.2–3.4)
LYMPHOCYTES # BLD AUTO: 21.6 % — SIGNIFICANT CHANGE UP (ref 20.5–51.1)
MAGNESIUM SERPL-MCNC: 2 MG/DL — SIGNIFICANT CHANGE UP (ref 1.8–2.4)
MCHC RBC-ENTMCNC: 27.8 PG — SIGNIFICANT CHANGE UP (ref 27–31)
MCHC RBC-ENTMCNC: 30.1 G/DL — LOW (ref 32–37)
MCV RBC AUTO: 92.4 FL — SIGNIFICANT CHANGE UP (ref 80–94)
MONOCYTES # BLD AUTO: 0.9 K/UL — HIGH (ref 0.1–0.6)
MONOCYTES NFR BLD AUTO: 11.5 % — HIGH (ref 1.7–9.3)
NEUTROPHILS # BLD AUTO: 5.2 K/UL — SIGNIFICANT CHANGE UP (ref 1.4–6.5)
NEUTROPHILS NFR BLD AUTO: 66.5 % — SIGNIFICANT CHANGE UP (ref 42.2–75.2)
NRBC # BLD: 0 /100 WBCS — SIGNIFICANT CHANGE UP (ref 0–0)
PLATELET # BLD AUTO: 251 K/UL — SIGNIFICANT CHANGE UP (ref 130–400)
PMV BLD: 9.9 FL — SIGNIFICANT CHANGE UP (ref 7.4–10.4)
POTASSIUM SERPL-MCNC: 4.8 MMOL/L — SIGNIFICANT CHANGE UP (ref 3.5–5)
POTASSIUM SERPL-SCNC: 4.8 MMOL/L — SIGNIFICANT CHANGE UP (ref 3.5–5)
RBC # BLD: 4.49 M/UL — LOW (ref 4.7–6.1)
RBC # FLD: 17.2 % — HIGH (ref 11.5–14.5)
SODIUM SERPL-SCNC: 140 MMOL/L — SIGNIFICANT CHANGE UP (ref 135–146)
WBC # BLD: 7.82 K/UL — SIGNIFICANT CHANGE UP (ref 4.8–10.8)
WBC # FLD AUTO: 7.82 K/UL — SIGNIFICANT CHANGE UP (ref 4.8–10.8)

## 2023-05-09 RX ORDER — ALPRAZOLAM 0.25 MG
0.25 TABLET ORAL ONCE
Refills: 0 | Status: DISCONTINUED | OUTPATIENT
Start: 2023-05-09 | End: 2023-05-09

## 2023-05-09 RX ORDER — MORPHINE SULFATE 50 MG/1
1.5 CAPSULE, EXTENDED RELEASE ORAL
Qty: 90 | Refills: 0
Start: 2023-05-09 | End: 2023-06-07

## 2023-05-09 RX ORDER — APIXABAN 2.5 MG/1
1 TABLET, FILM COATED ORAL
Qty: 90 | Refills: 0
Start: 2023-05-09

## 2023-05-09 RX ORDER — MORPHINE SULFATE 50 MG/1
1 CAPSULE, EXTENDED RELEASE ORAL
Qty: 0 | Refills: 0 | DISCHARGE

## 2023-05-09 RX ORDER — NALOXONE HYDROCHLORIDE 4 MG/.1ML
8 SPRAY NASAL
Qty: 10 | Refills: 0
Start: 2023-05-09 | End: 2023-05-13

## 2023-05-09 RX ADMIN — SIMVASTATIN 20 MILLIGRAM(S): 20 TABLET, FILM COATED ORAL at 22:15

## 2023-05-09 RX ADMIN — MORPHINE SULFATE 45 MILLIGRAM(S): 50 CAPSULE, EXTENDED RELEASE ORAL at 18:00

## 2023-05-09 RX ADMIN — Medication 20 MILLIGRAM(S): at 05:14

## 2023-05-09 RX ADMIN — APIXABAN 10 MILLIGRAM(S): 2.5 TABLET, FILM COATED ORAL at 17:32

## 2023-05-09 RX ADMIN — Medication 40 MILLIGRAM(S): at 17:32

## 2023-05-09 RX ADMIN — Medication 40 MILLIGRAM(S): at 05:14

## 2023-05-09 RX ADMIN — MORPHINE SULFATE 45 MILLIGRAM(S): 50 CAPSULE, EXTENDED RELEASE ORAL at 17:32

## 2023-05-09 RX ADMIN — Medication 145 MILLIGRAM(S): at 12:57

## 2023-05-09 RX ADMIN — Medication 3 MILLILITER(S): at 13:19

## 2023-05-09 RX ADMIN — Medication 3 MILLILITER(S): at 07:51

## 2023-05-09 RX ADMIN — LOSARTAN POTASSIUM 100 MILLIGRAM(S): 100 TABLET, FILM COATED ORAL at 05:14

## 2023-05-09 RX ADMIN — Medication 10 MILLIGRAM(S): at 13:00

## 2023-05-09 RX ADMIN — Medication 3 MILLILITER(S): at 20:06

## 2023-05-09 RX ADMIN — MORPHINE SULFATE 45 MILLIGRAM(S): 50 CAPSULE, EXTENDED RELEASE ORAL at 05:15

## 2023-05-09 RX ADMIN — DULOXETINE HYDROCHLORIDE 30 MILLIGRAM(S): 30 CAPSULE, DELAYED RELEASE ORAL at 12:57

## 2023-05-09 RX ADMIN — Medication 0.25 MILLIGRAM(S): at 23:20

## 2023-05-09 RX ADMIN — APIXABAN 10 MILLIGRAM(S): 2.5 TABLET, FILM COATED ORAL at 05:14

## 2023-05-09 RX ADMIN — TAMSULOSIN HYDROCHLORIDE 0.4 MILLIGRAM(S): 0.4 CAPSULE ORAL at 22:15

## 2023-05-09 RX ADMIN — BUPROPION HYDROCHLORIDE 150 MILLIGRAM(S): 150 TABLET, EXTENDED RELEASE ORAL at 12:57

## 2023-05-09 RX ADMIN — Medication 20 MILLIGRAM(S): at 13:00
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
## 2023-05-09 NOTE — PROGRESS NOTE ADULT - ASSESSMENT
Acute Hypoxic respiratory failure secondary to PE  PE associated with right heart strain  DVT  - on Lovenox  - no surgical intervention planned  - patient desaturates easily, walked with PT 20 feet and had to stop  - trend pulse ox    HTN, HLD  - troponins likely elevated due to heart strain  - continue home meds    COPD, Active smoker  - encourage smoking cessation  - continue respiratory treatments  - pulse ox noted  - awaiting delivery of homeO2 concentrator    DM, Diabetic neuropathy  - on oral rx  -monitor fs  - sliding scale coverage    Chronic back pain   Chronic Opioid Dependence  Chronic Benzo Dependence  - post rapid response with dose of Narcan given  - pain management f/u noted  - Xanax held for sedation  - Morphine dose reduced to 45 mg q 12    Diet: Dash / CCD  GI Prophylaxis: start on PPI give steroid and anticoagulation    Discharge planning, when O2 delivery confirmed. Discharge on lower dose of Morphine
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
## 2023-05-10 VITALS — OXYGEN SATURATION: 94 % | SYSTOLIC BLOOD PRESSURE: 180 MMHG | DIASTOLIC BLOOD PRESSURE: 77 MMHG | HEART RATE: 61 BPM

## 2023-05-10 LAB
HCT VFR BLD CALC: 41.6 % — LOW (ref 42–52)
HGB BLD-MCNC: 12.9 G/DL — LOW (ref 14–18)
MCHC RBC-ENTMCNC: 27.5 PG — SIGNIFICANT CHANGE UP (ref 27–31)
MCHC RBC-ENTMCNC: 31 G/DL — LOW (ref 32–37)
MCV RBC AUTO: 88.7 FL — SIGNIFICANT CHANGE UP (ref 80–94)
NRBC # BLD: 0 /100 WBCS — SIGNIFICANT CHANGE UP (ref 0–0)
PLATELET # BLD AUTO: 249 K/UL — SIGNIFICANT CHANGE UP (ref 130–400)
PMV BLD: 9.6 FL — SIGNIFICANT CHANGE UP (ref 7.4–10.4)
RBC # BLD: 4.69 M/UL — LOW (ref 4.7–6.1)
RBC # FLD: 16.7 % — HIGH (ref 11.5–14.5)
WBC # BLD: 7.33 K/UL — SIGNIFICANT CHANGE UP (ref 4.8–10.8)
WBC # FLD AUTO: 7.33 K/UL — SIGNIFICANT CHANGE UP (ref 4.8–10.8)

## 2023-05-10 RX ADMIN — LOSARTAN POTASSIUM 100 MILLIGRAM(S): 100 TABLET, FILM COATED ORAL at 05:41

## 2023-05-10 RX ADMIN — Medication 1 PATCH: at 06:08

## 2023-05-10 RX ADMIN — DULOXETINE HYDROCHLORIDE 30 MILLIGRAM(S): 30 CAPSULE, DELAYED RELEASE ORAL at 11:23

## 2023-05-10 RX ADMIN — Medication 10 MILLIGRAM(S): at 11:23

## 2023-05-10 RX ADMIN — Medication 20 MILLIGRAM(S): at 05:41

## 2023-05-10 RX ADMIN — BUPROPION HYDROCHLORIDE 150 MILLIGRAM(S): 150 TABLET, EXTENDED RELEASE ORAL at 11:23

## 2023-05-10 RX ADMIN — Medication 40 MILLIGRAM(S): at 05:42

## 2023-05-10 RX ADMIN — MORPHINE SULFATE 45 MILLIGRAM(S): 50 CAPSULE, EXTENDED RELEASE ORAL at 06:05

## 2023-05-10 RX ADMIN — APIXABAN 10 MILLIGRAM(S): 2.5 TABLET, FILM COATED ORAL at 05:41

## 2023-05-10 RX ADMIN — Medication 145 MILLIGRAM(S): at 11:23

## 2023-05-10 NOTE — PROGRESS NOTE ADULT - SUBJECTIVE AND OBJECTIVE BOX
Chart reviewed, patient examined. Pertinent results reviewed.  Case discussed with HO; specialist f/u reviewed  HD#8  NORMA JOELANDO    HPI:  67 year old patient, known to have HTN, HLD, DM, COPD (not on home O2), smoker (1/2 ppd), 3 spinal operations (2010), history of PE (2018) and DVT (2018) s/p thrombolysis and tPA instillation, sent to the ED by his PCP for workup of increased SOB and LE swelling.  Patient reported that for the prior 2 weeks, he has been having progressive worsening of SOB upon exertion and at rest, associated with b/l LE swelling and erythema. He also endorses orthopnea and PND. Last echo was done in 2021 with EF 66% and pulmonary HTN. He denies CP, palpitations. He denies recent travel, trauma or immobilization. No fever, chills, URT or urinary symptoms.     To note, patient had an accident in 2006 s/p multiple operations and since then he has been on Percocet 90 mg. On March 2023, pain management cut down on Percocet and currently he is taking morphine 60 mg BID, Baclofen 10 mg and clonidine patch 0.2mg/day. He states that his BP has been on the low side (100's/50's) and has been vomiting after each meal with subsequent weight loss.     In ED, vitals significant for BP 92/63  RR 24 SpO2 86% on RA improved on 3L NC.   Laboratory workup significant for Hb 11.4 MCV 90.5  Troponin 0.02 pro BNP 6472  Venous duplex b/l LE: DVTs in b/l PT, right gastrocnemius and left popliteal.  CTA chest: Intraluminal filling defects, consistent with acute pulmonary emboli, are noted in segmental branches of the right upper and middle lobe pulmonary arteries. Intraluminal filling defects are noted in segmental and subsegmental branches of the left upper and left lowerlobe, and lingula pulmonary arteries. There is evidence of right heart strain (RV:LV ratio >1; RV=6.2 cm and LV   = 4.5 cm) There is reflux of contrast material into the IVC and hepatic veins compatible with right heart dysfunction.  He was started on heparin drip and given 40 mg IV Lasix once in ED.   Admitted to Telemetry for further workup and management of his DVT & POS PE.      INTERVAL EVENTS: Patient still here in hospital. Delay in arranging O2 for DC to home.     on 5/8 patient was somnolent- seen by myself, HO and RN. Had rapid response. opioid dose decreased & BZD held.     He denied taking any other substances.      MEDICATIONS  (STANDING):  albuterol/ipratropium for Nebulization 3 milliLiter(s) Nebulizer every 6 hours  apixaban 10 milliGRAM(s) Oral every 12 hours  buPROPion XL (24-Hour) . 150 milliGRAM(s) Oral daily  cloNIDine Patch 0.2 mG/24Hr(s) 1 patch Transdermal every 7 days  DULoxetine 30 milliGRAM(s) Oral daily  fenofibrate Tablet 145 milliGRAM(s) Oral daily  furosemide    Tablet 20 milliGRAM(s) Oral two times a day  losartan 100 milliGRAM(s) Oral daily  methylPREDNISolone sodium succinate Injectable 40 milliGRAM(s) IV Push two times a day  morphine ER Tablet 45 milliGRAM(s) Oral every 12 hours  oxybutynin XL 10 milliGRAM(s) Oral daily  simvastatin 20 milliGRAM(s) Oral at bedtime  tamsulosin 0.4 milliGRAM(s) Oral at bedtime    MEDICATIONS  (PRN):  albuterol    90 MICROgram(s) HFA Inhaler 2 Puff(s) Inhalation every 4 hours PRN Bronchospasm  naloxone Injectable 0.4 milliGRAM(s) IV Push once PRN sedation, altered mentation      Vital Signs Last 24 Hrs  T(C): 36.4 (10 May 2023 04:37), Max: 36.9 (09 May 2023 12:50)  T(F): 97.6 (10 May 2023 04:37), Max: 98.4 (09 May 2023 12:50)  HR: 61 (10 May 2023 05:30) (61 - 68)  BP: 180/77 (10 May 2023 05:30) (132/68 - 180/77)  BP(mean): 116 (10 May 2023 05:30) (116 - 116)  RR: 18 (10 May 2023 04:37) (18 - 18)  SpO2: 94% (10 May 2023 05:30) (94% - 94%)    Parameters below as of 10 May 2023 05:30  Patient On (Oxygen Delivery Method): nasal cannula  O2 Flow (L/min): 3    see POX: RA-88% at rest, 86%-ambulating      PHYSICAL EXAM: patient min lethargic: walks back slowly from working out. min slur of words- alex 19--  GENERAL: slumping in bed- on O2 at rest; walks w cane. slow to responed and follow directions  NECK: Supple, No JVD, .no thyroid palpable.  CHEST/LUNG: scattered wheezing; lalo at bases. scattered min rhonchi  HEART: RRR, no MRG  ABDOMEN: Soft, Nontender,  Bowel sounds present  EXTREMITIES: + edema  Spine- 10 cm vertical LS scar- well healed. LS straight, NT      LABS:                          12.2   8.15  )-----------( 284      ( 06 May 2023 07:39 )             39.5                         12.7   7.07  )-----------( 328      ( 05 May 2023 05:20 )             41.0     05-06    142  |  101  |  25<H>  ----------------------------<  128<H>  4.6   |  32  |  1.0    Ca    9.1      06 May 2023 07:39  Mg     1.8     05-06    TPro  7.3  /  Alb  3.6  /  TBili  0.5  /  DBili  x   /  AST  7   /  ALT  <5  /  AlkPhos  71  05-05              05-05    144  |  103  |  26<H>  ----------------------------<  145<H>  4.4   |  29  |  1.2    Ca    8.8      05 May 2023 05:20  Phos  3.4     05-04  Mg     1.9     05-05    TPro  7.3  /  Alb  3.6  /  TBili  0.5  /  DBili  x   /  AST  7   /  ALT  <5  /  AlkPhos  71  05-05    LIVER FUNCTIONS - ( 05 May 2023 05:20 )  Alb: 3.6 g/dL / Pro: 7.3 g/dL / ALK PHOS: 71 U/L / ALT: <5 U/L / AST: 7 U/L / GGT: x                   PT/INR - ( 04 May 2023 12:07 )   PT: 13.30 sec;   INR: 1.16 ratio         PTT - ( 04 May 2023 13:13 )  PTT:162.5 sec  CARDIAC MARKERS ( 04 May 2023 12:07 )  x     / <0.01 ng/mL / x     / x     / x      CARDIAC MARKERS ( 04 May 2023 01:05 )  x     / 0.03 ng/mL / x     / x     / x      CARDIAC MARKERS ( 03 May 2023 16:19 )  x     / 0.02 ng/mL / x     / x     / x          ABG - ( 05 May 2023 09:41 )  pH, Arterial: 7.46  pH, Blood: x     /  pCO2: 37    /  pO2: 66    / HCO3: 26    / Base Excess: 2.5   /  SaO2: 95.5          ECHO:  Summary:   1. Left ventricular ejection fraction, by visual estimation, is 50 to   55%.   2. Normal global left ventricular systolic function.   3. Mildly increased LV wall thickness.   4. The mitral in-flow pattern reveals no discernable A-wave, therefore   no comment on diastolic function can be made.   5. Moderately enlarged left atrium.   6. Normal right atrial size.   7. Mitral annular calcification.   8. No evidence of mitral valve regurgitation.   9. Mild-moderate tricuspid regurgitation.  10. Dilatation of the aortic root and ascending aorta at 4.2 cm.  11. Estimated pulmonary artery systolic pressure is 49.2 mmHg assuming a   right atrial pressure of 10 mmHg, which is consistent with mild pulmonary   hypertension.  12. Endocardial visualization was enhanced with intravenous echo contrast.  13. Sclerotic aortic valve with normal opening.  14. Mildly enlarged right ventricle.  15. Mildly reduced RV systolic function.          RADIOLOGY & ADDITIONAL TESTS:  < from: CT Angio Chest PE Protocol w/ IV Cont (05.03.23 @ 22:03) >    INTERPRETATION:  CLINICAL HISTORY / REASON FOR EXAM: Shortness of breath   and swelling to lower extremities. Pulmonary embolus evaluation. WBC 6.02     PMHx of asthma, DM, HLD, HTN, back surgery    TECHNIQUE:   Contiguous axial CT images were obtained from the thoracic   inlet to the upper abdomen with intravenous contrast.  Thin sections were   reconstructed through the pulmonary vasculature. Reformatted images in   the coronal and sagittal planes were acquired, as well as 3D, Volume   rendering, and MIP reconstructed images.    Comparison: Chest CT dated 4/18/2021      FINDINGS:    TUBES/LINES: None.    PULMONARY ARTERIES: Intraluminal filling defects, consistent with acute   pulmonary emboli, are noted in segmental branches of the right upper lobe   pulmonary arteries. Intraluminal filling defects are noted in segmental   and subsegmental branches of the left upper and left lower lobe pulmonary   arteries. There is evidence of right heart strain (RV:LV ratio >1; RV=6.2   cm and LV = 4.5 cm)    GREAT VESSELS/CARDIAC STRUCTURES/PERICARDIUM: Cardiomegaly. No   pericardial effusion. Normal caliber aorta. Coronary artery and aortic   calcifications are noted. There is no evidence of aortic aneurysm or   dissection. The brachiocephalic vessels are unremarkable.    There is reflux of contrast material into the IVC and hepatic veins   compatible with right heartdysfunction.    The main pulmonary artery is dilated, measuring 3.2 cm in diameter, which   may be seen with pulmonary hypertension.    LUNG PARENCHYMA/CENTRAL AIRWAYS/PLEURA: The central tracheobronchial tree   is patent. There are areas of atelectasis in the right and left lower   lobes and right middle lobe.  No evidence of pleural effusion or   pneumothorax    MEDIASTINUM/HILUM: There are no suspicious mediastinal, hilar, or   axillary lymph nodes.  The visualized portion of the thyroid glandis   unremarkable.    CHEST WALL/AXILLA: Unremarkable.    UPPER ABDOMEN: The partially visualized upper abdomen shows no acute   pathology.    OSSEOUS STRUCTURES: Degenerative changes of the spine are noted.      IMPRESSION:    Intraluminal filling defects, consistent with acute pulmonary emboli, are   noted in segmental branches of the right upper and middle lobe pulmonary   arteries. Intraluminal filling defects are noted in segmental and   subsegmental branches of the left upper and left lowerlobe, and lingula   pulmonary arteries.    There is evidence of right heart strain (RV:LV ratio >1; RV=6.2 cm and LV   = 4.5 cm)    There is reflux of contrast material into the IVC and hepatic veins   compatible with right heart dysfunction.    The findings were discussed with Dr. Gay at 10:32  5/3/2023 with read   back.    --- End of Report ---            < end of copied text >   Chart reviewed, patient examined. Pertinent results reviewed.  Case discussed with HO; specialist f/u reviewed  HD#8  NORMA JOELANDO    HPI:  67 year old patient, known to have HTN, HLD, DM, COPD (not on home O2), smoker (1/2 ppd), 3 spinal operations (2010), history of PE (2018) and DVT (2018) s/p thrombolysis and tPA instillation, sent to the ED by his PCP for workup of increased SOB and LE swelling.  Patient reported that for the prior 2 weeks, he has been having progressive worsening of SOB upon exertion and at rest, associated with b/l LE swelling and erythema. He also endorses orthopnea and PND. Last echo was done in 2021 with EF 66% and pulmonary HTN. He denies CP, palpitations. He denies recent travel, trauma or immobilization. No fever, chills, URT or urinary symptoms.     To note, patient had an accident in 2006 s/p multiple operations and since then he has been on Percocet 90 mg. On March 2023, pain management cut down on Percocet and currently he is taking morphine 60 mg BID, Baclofen 10 mg and clonidine patch 0.2mg/day. He states that his BP has been on the low side (100's/50's) and has been vomiting after each meal with subsequent weight loss.     In ED, vitals significant for BP 92/63  RR 24 SpO2 86% on RA improved on 3L NC.   Laboratory workup significant for Hb 11.4 MCV 90.5  Troponin 0.02 pro BNP 6472  Venous duplex b/l LE: DVTs in b/l PT, right gastrocnemius and left popliteal.  CTA chest: Intraluminal filling defects, consistent with acute pulmonary emboli, are noted in segmental branches of the right upper and middle lobe pulmonary arteries. Intraluminal filling defects are noted in segmental and subsegmental branches of the left upper and left lowerlobe, and lingula pulmonary arteries. There is evidence of right heart strain (RV:LV ratio >1; RV=6.2 cm and LV   = 4.5 cm) There is reflux of contrast material into the IVC and hepatic veins compatible with right heart dysfunction.  He was started on heparin drip and given 40 mg IV Lasix once in ED.   Admitted to Telemetry for further workup and management of his DVT & POS PE.      INTERVAL EVENTS: Patient still here in hospital. Delay in arranging O2 for DC to home.     on 5/8 patient was somnolent- seen by myself, HO and RN. Had rapid response. opioid dose decreased & BZD held.     He denied taking any other substances.      MEDICATIONS  (STANDING):  albuterol/ipratropium for Nebulization 3 milliLiter(s) Nebulizer every 6 hours  apixaban 10 milliGRAM(s) Oral every 12 hours  buPROPion XL (24-Hour) . 150 milliGRAM(s) Oral daily  cloNIDine Patch 0.2 mG/24Hr(s) 1 patch Transdermal every 7 days  DULoxetine 30 milliGRAM(s) Oral daily  fenofibrate Tablet 145 milliGRAM(s) Oral daily  furosemide    Tablet 20 milliGRAM(s) Oral two times a day  losartan 100 milliGRAM(s) Oral daily  methylPREDNISolone sodium succinate Injectable 40 milliGRAM(s) IV Push two times a day  morphine ER Tablet 45 milliGRAM(s) Oral every 12 hours  oxybutynin XL 10 milliGRAM(s) Oral daily  simvastatin 20 milliGRAM(s) Oral at bedtime  tamsulosin 0.4 milliGRAM(s) Oral at bedtime    MEDICATIONS  (PRN):  albuterol    90 MICROgram(s) HFA Inhaler 2 Puff(s) Inhalation every 4 hours PRN Bronchospasm  naloxone Injectable 0.4 milliGRAM(s) IV Push once PRN sedation, altered mentation      Vital Signs Last 24 Hrs  T(C): 36.4 (10 May 2023 04:37), Max: 36.9 (09 May 2023 12:50)  T(F): 97.6 (10 May 2023 04:37), Max: 98.4 (09 May 2023 12:50)  HR: 61 (10 May 2023 05:30) (61 - 68)  BP: 180/77 (10 May 2023 05:30) (132/68 - 180/77)  BP(mean): 116 (10 May 2023 05:30) (116 - 116)  RR: 18 (10 May 2023 04:37) (18 - 18)  SpO2: 94% (10 May 2023 05:30) (94% - 94%)    Parameters below as of 10 May 2023 05:30  Patient On (Oxygen Delivery Method): nasal cannula  O2 Flow (L/min): 3    see POX: RA-88% at rest, 86%-ambulating- on 5/8      PHYSICAL EXAM: patient in bedside chair, fully dressed , AAOx3; anxious to go home  GENERAL: oving easily; as above  NECK: Supple, No JVD, .no thyroid palpable.  CHEST/LUNG: scattered wheezing; lalo at bases. scattered min rhonchi  HEART: RRR, no MRG  ABDOMEN: Soft, Nontender,  Bowel sounds present  EXTREMITIES: + edema  Spine- 10 cm vertical LS scar- well healed. LS straight, NT      LABS:                             12.9   7.33  )-----------( 249      ( 10 May 2023 06:01 )             41.6                      12.2   8.15  )-----------( 284      ( 06 May 2023 07:39 )             39.5                         12.7   7.07  )-----------( 328      ( 05 May 2023 05:20 )             41.0     05-09    140  |  97<L>  |  29<H>  ----------------------------<  98  4.8   |  33<H>  |  1.1    Ca    9.1      09 May 2023 06:20  Mg     2.0     05-09 05-06    142  |  101  |  25<H>  ----------------------------<  128<H>  4.6   |  32  |  1.0    Ca    9.1      06 May 2023 07:39  Mg     1.8     05-06    TPro  7.3  /  Alb  3.6  /  TBili  0.5  /  DBili  x   /  AST  7   /  ALT  <5  /  AlkPhos  71  05-05              05-05    144  |  103  |  26<H>  ----------------------------<  145<H>  4.4   |  29  |  1.2    Ca    8.8      05 May 2023 05:20  Phos  3.4     05-04  Mg     1.9     05-05    TPro  7.3  /  Alb  3.6  /  TBili  0.5  /  DBili  x   /  AST  7   /  ALT  <5  /  AlkPhos  71  05-05    LIVER FUNCTIONS - ( 05 May 2023 05:20 )  Alb: 3.6 g/dL / Pro: 7.3 g/dL / ALK PHOS: 71 U/L / ALT: <5 U/L / AST: 7 U/L / GGT: x                   PT/INR - ( 04 May 2023 12:07 )   PT: 13.30 sec;   INR: 1.16 ratio         PTT - ( 04 May 2023 13:13 )  PTT:162.5 sec  CARDIAC MARKERS ( 04 May 2023 12:07 )  x     / <0.01 ng/mL / x     / x     / x      CARDIAC MARKERS ( 04 May 2023 01:05 )  x     / 0.03 ng/mL / x     / x     / x      CARDIAC MARKERS ( 03 May 2023 16:19 )  x     / 0.02 ng/mL / x     / x     / x          ABG - ( 05 May 2023 09:41 )  pH, Arterial: 7.46  pH, Blood: x     /  pCO2: 37    /  pO2: 66    / HCO3: 26    / Base Excess: 2.5   /  SaO2: 95.5          ECHO:  Summary:   1. Left ventricular ejection fraction, by visual estimation, is 50 to   55%.   2. Normal global left ventricular systolic function.   3. Mildly increased LV wall thickness.   4. The mitral in-flow pattern reveals no discernable A-wave, therefore   no comment on diastolic function can be made.   5. Moderately enlarged left atrium.   6. Normal right atrial size.   7. Mitral annular calcification.   8. No evidence of mitral valve regurgitation.   9. Mild-moderate tricuspid regurgitation.  10. Dilatation of the aortic root and ascending aorta at 4.2 cm.  11. Estimated pulmonary artery systolic pressure is 49.2 mmHg assuming a   right atrial pressure of 10 mmHg, which is consistent with mild pulmonary   hypertension.  12. Endocardial visualization was enhanced with intravenous echo contrast.  13. Sclerotic aortic valve with normal opening.  14. Mildly enlarged right ventricle.  15. Mildly reduced RV systolic function.          RADIOLOGY & ADDITIONAL TESTS:  < from: CT Angio Chest PE Protocol w/ IV Cont (05.03.23 @ 22:03) >    INTERPRETATION:  CLINICAL HISTORY / REASON FOR EXAM: Shortness of breath   and swelling to lower extremities. Pulmonary embolus evaluation. WBC 6.02     PMHx of asthma, DM, HLD, HTN, back surgery    TECHNIQUE:   Contiguous axial CT images were obtained from the thoracic   inlet to the upper abdomen with intravenous contrast.  Thin sections were   reconstructed through the pulmonary vasculature. Reformatted images in   the coronal and sagittal planes were acquired, as well as 3D, Volume   rendering, and MIP reconstructed images.    Comparison: Chest CT dated 4/18/2021      FINDINGS:    TUBES/LINES: None.    PULMONARY ARTERIES: Intraluminal filling defects, consistent with acute   pulmonary emboli, are noted in segmental branches of the right upper lobe   pulmonary arteries. Intraluminal filling defects are noted in segmental   and subsegmental branches of the left upper and left lower lobe pulmonary   arteries. There is evidence of right heart strain (RV:LV ratio >1; RV=6.2   cm and LV = 4.5 cm)    GREAT VESSELS/CARDIAC STRUCTURES/PERICARDIUM: Cardiomegaly. No   pericardial effusion. Normal caliber aorta. Coronary artery and aortic   calcifications are noted. There is no evidence of aortic aneurysm or   dissection. The brachiocephalic vessels are unremarkable.    There is reflux of contrast material into the IVC and hepatic veins   compatible with right heartdysfunction.    The main pulmonary artery is dilated, measuring 3.2 cm in diameter, which   may be seen with pulmonary hypertension.    LUNG PARENCHYMA/CENTRAL AIRWAYS/PLEURA: The central tracheobronchial tree   is patent. There are areas of atelectasis in the right and left lower   lobes and right middle lobe.  No evidence of pleural effusion or   pneumothorax    MEDIASTINUM/HILUM: There are no suspicious mediastinal, hilar, or   axillary lymph nodes.  The visualized portion of the thyroid glandis   unremarkable.    CHEST WALL/AXILLA: Unremarkable.    UPPER ABDOMEN: The partially visualized upper abdomen shows no acute   pathology.    OSSEOUS STRUCTURES: Degenerative changes of the spine are noted.      IMPRESSION:    Intraluminal filling defects, consistent with acute pulmonary emboli, are   noted in segmental branches of the right upper and middle lobe pulmonary   arteries. Intraluminal filling defects are noted in segmental and   subsegmental branches of the left upper and left lowerlobe, and lingula   pulmonary arteries.    There is evidence of right heart strain (RV:LV ratio >1; RV=6.2 cm and LV   = 4.5 cm)    There is reflux of contrast material into the IVC and hepatic veins   compatible with right heart dysfunction.    The findings were discussed with Dr. Gay at 10:32  5/3/2023 with read   back.    --- End of Report ---            < end of copied text >

## 2023-05-10 NOTE — PROGRESS NOTE ADULT - PROVIDER SPECIALTY LIST ADULT
Internal Medicine
Pain Medicine
Internal Medicine

## 2023-05-10 NOTE — PROGRESS NOTE ADULT - REASON FOR ADMISSION
PE
You can access the FollowMyHealth Patient Portal offered by [facility] by registering at the following website: [email or web address]. By joining [facility]’s FollowMyHealth portal, you will also be able to view your health information using other applications (apps) compatible with our system.

## 2023-05-10 NOTE — PROGRESS NOTE ADULT - ASSESSMENT
Acute Hypoxic respiratory failure secondary to PE  PE associated with right heart strain  DVT  - on Eliquis 10mg (PO)- x 7 d , then 5mg q12h  - no surgical intervention planned  - patient desaturates easily, walked with PT 20 feet and had to stop  - trend pulse ox; needs O2 for home;   - needs Auth and logistics arranged for home delivery- still pending      see CM f/u- 5/8 & 5/9.  - now delayed by medication induced somnolence     probably 2/2 opioid &/or benzo    HTN, HLD  - troponins likely elevated due to heart strain  - continue home meds    COPD, Active smoker  - encourage smoking cessation- strongly encouraged to DC ; will f/u tomorrow.  - continue respiratory treatments  - monitor pulse ox  - switch steroids to PO w taper over 6 days.    DM, Diabetic neuropathy  - on oral rx  -monitor fs  - sliding scale coverage    Chronic back pain   Chronic Opioid Dependence  Chronic Benzo Dependence  - continue home medications  - on Morphine, Percocet  -  was on Xanax; held today; now to prn    Diet: Dash / CCD  GI Prophylaxis: start on PPI give steroid and anticoagulation- see pulm note;  Give PPI while on steroids.  MS decreased and alprazolam- to prn    Patient stable, will need O2 for DC; then re-evaluation in 7-10 days.   Acute Hypoxic respiratory failure secondary to PE  PE associated with right heart strain  DVT  - on Eliquis 10mg (PO)- x 7 d , then 5mg q12h  - no surgical intervention planned  - patient desaturates easily, walked with PT 20 feet and had to stop  - trend pulse ox; needs O2 for home;   - needs Auth and logistics arranged for home delivery- patient OK for DC home, to be there for delivery      see CM f/u- 5/8 & 5/9.  - was delayed by medication induced somnolence on 5/8. now better w dose reductions.     probably 2/2 opioid &/or benzo    HTN, HLD  - troponins likely elevated due to heart strain  - continue home meds    COPD, Active smoker  - encourage smoking cessation- strongly encouraged to DC ; will f/u tomorrow.  - continue respiratory treatments  - monitor pulse ox  - switch steroids to PO w taper over 6 days.  Case discussed with YISSEL    DM, Diabetic neuropathy  - on oral rx  -monitor fs  - sliding scale coverage    Chronic back pain   Chronic Opioid Dependence  Chronic Benzo Dependence  - continue home medications  - on Morphine see dose lowered here, Percocet- held on 5/8  -  was on Xanax; held today; now to prn    Diet: Dash / CCD  GI Prophylaxis: start on PPI give steroid and anticoagulation- see pulm note;  Give PPI while on steroids.  alprazolam- to prn; P/s he takes 2/d at home.    Patient stable, will need O2 for DC; then re-evaluation in 7-10 days.  F/U w PM and Pulm  as well

## 2023-05-12 NOTE — CDI QUERY NOTE - NSCDIOTHERTXTBX_GEN_ALL_CORE_HH
Based on your professional judgement and the clinical indicators, can the type of CHF be further be clarifies as:    •   Acute HFpEF   •   Acute diastolic heart failure   •   Other (please specify)  •   Clinically unable to specify       CLINICAL INDICATORS    5/3 H&P Adult: …HPI: 67-year-old patient, known to have HTN, COPD, pulmonary hypertension,  history of PE (2018) sent to the ED by his PCP for workup of increased SOB and LE swelling. Patient reports that for the past 2 weeks, he has been having progressive worsening of SOB upon exertion and at rest, associated with b/l LE swelling and erythema. He also endorses orthopnea and PND… Constitutional: Respiratory:  B/L crackles and wheezing. CV:  +3 peripheral edema …Assessment: PE with right heart strain - hemodynamically stable. #HF exacerbation. - Chest X-ray: congested (unofficial read)…       5/9-5/10 Progress Note Adult-Internal Medicine Resident: …Assessment: #Acute Hypoxic respiratory failure secondary to PE. #PE associated with right heart strain. #DVT. ? HF …    5/10 Discharge Note Provider: … Secondary discharge diagnosis: CHF, Acute…    5/4 ECHO TTE: … Summary:1.  Left ventricular ejection fraction, by visual estimation, is 50 to 55%.  2. Normal global left ventricular systolic function. 3. Mildly increased LV wall thickness….      Radiology: …      5/5 CXR: Impression: …Unchanged bibasilar opacities, right greater than left…       Labs: …      5/3 BNP 6472 …      Medications: …      5/4-5/5 Lasix 40 mg IV push x 3, received 3 doses  5/5-5/10 Lasix 20 mg oral daily,  received 11 doses, not on Lasix prior to this admission ...        Thank you,  Teodora Myers, RN, BSN, CCDS, [facility]  [phone]

## 2023-05-16 LAB — DRUG SCREEN, SERUM: ABNORMAL

## 2023-05-18 ENCOUNTER — OUTPATIENT (OUTPATIENT)
Dept: OUTPATIENT SERVICES | Facility: HOSPITAL | Age: 68
LOS: 1 days | End: 2023-05-18
Payer: MEDICARE

## 2023-05-18 ENCOUNTER — NON-APPOINTMENT (OUTPATIENT)
Age: 68
End: 2023-05-18

## 2023-05-18 ENCOUNTER — APPOINTMENT (OUTPATIENT)
Dept: UROLOGY | Facility: CLINIC | Age: 68
End: 2023-05-18
Payer: MEDICARE

## 2023-05-18 VITALS
BODY MASS INDEX: 25.9 KG/M2 | SYSTOLIC BLOOD PRESSURE: 96 MMHG | TEMPERATURE: 97.8 F | OXYGEN SATURATION: 92 % | HEIGHT: 71 IN | HEART RATE: 78 BPM | DIASTOLIC BLOOD PRESSURE: 59 MMHG | WEIGHT: 185 LBS

## 2023-05-18 DIAGNOSIS — M96.1 POSTLAMINECTOMY SYNDROME, NOT ELSEWHERE CLASSIFIED: ICD-10-CM

## 2023-05-18 DIAGNOSIS — Z00.00 ENCOUNTER FOR GENERAL ADULT MEDICAL EXAMINATION WITHOUT ABNORMAL FINDINGS: ICD-10-CM

## 2023-05-18 DIAGNOSIS — R39.15 URGENCY OF URINATION: ICD-10-CM

## 2023-05-18 DIAGNOSIS — Z98.890 OTHER SPECIFIED POSTPROCEDURAL STATES: Chronic | ICD-10-CM

## 2023-05-18 DIAGNOSIS — N48.0 LEUKOPLAKIA OF PENIS: ICD-10-CM

## 2023-05-18 DIAGNOSIS — N47.1 PHIMOSIS: ICD-10-CM

## 2023-05-18 PROCEDURE — 99203 OFFICE O/P NEW LOW 30 MIN: CPT

## 2023-05-18 NOTE — ASSESSMENT
[FreeTextEntry1] : 67-year-old male with a history of failed back syndrome who has lichen sclerosus and mild phimosis although the foreskin currently is retractable.  I discussed the situation with him and have given him clobetasol to use in a defined sequence.  Currently he does not have any definitive scrotal issues although the skin is slightly thickened and we discussed using powders or water barrier creams as this is likely due to moisture.  He may in the future need a circumcision and we will reevaluate him in about 6 months.  He currently has had a recent DVT and PE and would not be a candidate for any procedure in any case.  All his questions were otherwise answered.  He was seen accompanied by his partner. Total time=30 min.

## 2023-05-18 NOTE — PHYSICAL EXAM
[General Appearance - Well Developed] : well developed [Urethral Meatus] : meatus normal [FreeTextEntry1] : Phallus is uncircumcised with changes consistent with lichen sclerosis and while the foreskin is retractable it is tight.  Testes themselves are without masses and the epidermides are nontender.  The scrotal skin is slightly thickened but there is no definitive rash present and there is no sign of current infection.

## 2023-05-18 NOTE — HISTORY OF PRESENT ILLNESS
[FreeTextEntry1] : Mr. Stanley is a 67-year-old male who complains of itchiness on the scrotum difficulty retracting foreskin occasionally swelling of the foreskin and some urgency he denies any hematuria dysuria or UTIs.  He denies any definitive urge incontinence.  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 and he has recently been diagnosed with a DVT and pulmonary emboli and is on anticoagulation.  The issues with the phallus have been on and off for the last 6 months or so.  He has always been able to retract the foreskin but it is getting more difficult.\par \par Past medical history as noted the aforementioned back issues DVT pulmonary embolism\par \par Allergies: Patient thinks he is allergic to penicillin aspirin and pineapples

## 2023-05-25 ENCOUNTER — APPOINTMENT (OUTPATIENT)
Dept: PAIN MANAGEMENT | Facility: CLINIC | Age: 68
End: 2023-05-25
Payer: OTHER MISCELLANEOUS

## 2023-05-25 VITALS
DIASTOLIC BLOOD PRESSURE: 63 MMHG | WEIGHT: 185 LBS | HEART RATE: 73 BPM | BODY MASS INDEX: 25.9 KG/M2 | SYSTOLIC BLOOD PRESSURE: 123 MMHG | HEIGHT: 71 IN

## 2023-05-25 PROCEDURE — 99214 OFFICE O/P EST MOD 30 MIN: CPT

## 2023-05-25 NOTE — HISTORY OF PRESENT ILLNESS
[FreeTextEntry1] : ORIGINAL PRESENTATION: This is a 67 year old man complaining of lower back and bilateral leg and foot pain. He also complains of upper back and neck pain into the upper extremities. The pain started after an injury at work on December 21, 2006 when he was moving a dresser and he fell down the steps with the dresser landing on top of him. Patient describes pain as severe. During the last month the pain has been no typical pattern. Pain described as burning, shooting, sharp, cutting, pressure-like, cramping, tall/aching, throbbing. Pain is associated with numbness and pins and needles into the upper and lower extremities. Patient has weakness in the upper and lower extremities Pain is not changed with lying down, standing, sitting, walking, exercise, relaxation, coughing sneezing or bowel movements.\par \par ACTIVITES: Can walk less than 1 block, uses scooter or rolling walker. He can sit for several hours, stand for 30-45 minutes. He sometimes lies down due to pain. He uses a cane, walker or scooter for mobility. HE cannot go to work, perform household chores, run local errands, participate in recreational activities or exercise.\par \par PRIOR PAIN TREATMENTS: No relief with surgery, traction, nerve block/injections, physical therapy, exercise, TENS unit, heat/cold treatment, psychotherapy, acupuncture, hypnosis, biofeedback, chiropractic manipulation, spinal cord manipulation.\par \par PRIOR PAIN MEDICATIONS: MSContin, Oxycodone, Baclofen, Cymbalta, Xanax\par \par PATIENT PRESENTS FOR FOLLOW UP: He is under our care for chronic lumbar pain with radiculopathy which he is receiving continuing active treatment for. He continues to experience low back pain radiating into the left leg. He is prone to frequent falls. He was recently hospitalized for fluid in the lungs and was diagnosed with a pulmonary embolism.  He was receiving care at the hospital and was given antibiotics and is now on oxygen. The hospital decreased his Morphine from 60 mg to 45 mg BID. He is aware that I will continue to write the Morphine 90 mg per day, specially in light of his compromised lung function. I will provide him with Morphine 30 mg to be taken TID. \par \par Of note, prior to being hospitalized he was a smoker. After being admitted he has not smoked since and states he will not start again.

## 2023-05-25 NOTE — DATA REVIEWED
[FreeTextEntry1] : MRI of the lumbar spine dated 7/14/22 finds at L3-L4 postsurgical changes from laminectomy with the canal decompressed. No significant canal enhancement to suggest granulation tissue. Despite the disc spacer, there is still advanced disc space Narrowing.  There is an associated disc bulge with disc edema.  Evaluation of the neural foramina is limited due to the susceptibility artifact from the hardware.  Combined with facet arthropathy there appears to be moderate bilateral neural foraminal stenosis with significant crowding of the exiting left L3 nerve root and mild crowding of the exiting right L3 nerve root.  At L2-3 5 mm of retrolisthesis with associated broad-based disc bulge and facet arthropathy with moderate left and mild to moderate right-sided neural foraminal stenosis.  Combined with buckling of the ligamentum flavum and prominent dorsal epidural fat there is moderate canal stenosis.  At L4-L5 trace 1 mm of retrolisthesis with associated broad-based disc bulge demonstrating minimal annular fissuring contacts the exiting right L4 nerve root.  Combined with facet arthropathy there is moderate right-sided neural foraminal external stenosis.  At L1-L2 left-sided facet arthropathy abuts the exiting left exiting L1 nerve root with mild left-sided neural foraminal stenosis.  Trace 1 mm of retrolisthesis with tiny disc bulge without significant canal stenosis.  At T12- L1 there is left-sided facet arthropathy likely with a tiny synovial cyst abutting the exiting left T12 nerve root.\par \par IMAGING: MRI in January 2017, prior L3 laminectomies and posterior fusions with right-sided pedicle screws at L3 and L4, L3 through L4 interbody fusion cage with a widely patent spinal canal and foramina, multilevel lumbar spondylosis, grade 1 mild retrolisthesis at L2, and presence of multiple prevertebral bridging osteophyte throughout the lumbar region especially at L2-L3. There is a large right renal cyst. Bulging at L4-L5 disc with subligamentous shallow enhancing disc protrusion. \par \par Flexion and extension x-ray of the lumbar spine in 2017, instability appreciated at L2-L3 with retrolisthesis appreciated. It does appear to worse on flexion views. EMG revealed a left L4 nerve root dysfunction. \par \par SOAPP: low on 12/28/22\par Low risk: Patient has combination of a low risk SOAP and no risk factors. UDS would be repeated randomly every quarter. \par \par UDS: 4/21/23, + MOP, BZO.\par \par [place] REGISTRY: Checked\par

## 2023-05-25 NOTE — ASSESSMENT
[FreeTextEntry1] : This is a 67 year old male with chronic lumbar pain with radiculopathy. He was recently hospitalized for a pulmonary embolism and was provided with an oxygen tank. They decreased his Morphine dosage to 45 mg BID. He is agreeable to continues with Morphine 90 mg per day. I will provide him with Morphine 30 mg to be taken TID so he has more relief throughout the day. He will follow up in 4 weeks for medication refill and reevaluation. \par \par I explained the risk of addiction, tolerance and withdrawals. UDS will be done randomly and a drug agreement was signed.\par \par I advised the patient they must keep their medication under a lock and key, or in a safe place away from children or other individuals. This medication given is solely for the patient and under no circumstances to be shared. Patient verbalized this and is in agreement with the aforementioned. I explained the risk of addiction, tolerance, and withdrawals. UDS will be done randomly and a drug agreement was signed.\par \par \par I, Armida Carbajal, attest that this documentation has been prepared under the direction and in the presence of Provider Brina Espinoza MD.\par \par \par Thank you for allowing me to assist in the management of this patient. \par \par \par Best Regards, \par \par \par Brina Espinoza M.D., [facility]\par \par \par Diplomate, American Board of Physical Medicine and Rehabilitation\par Diplomate, American Board of Pain Medicine \par Diplomate, American Board of Pain Management\par

## 2023-05-30 DIAGNOSIS — N48.0 LEUKOPLAKIA OF PENIS: ICD-10-CM

## 2023-05-30 DIAGNOSIS — R39.15 URGENCY OF URINATION: ICD-10-CM

## 2023-05-30 DIAGNOSIS — M96.1 POSTLAMINECTOMY SYNDROME, NOT ELSEWHERE CLASSIFIED: ICD-10-CM

## 2023-05-30 DIAGNOSIS — N47.1 PHIMOSIS: ICD-10-CM

## 2023-06-07 ENCOUNTER — APPOINTMENT (OUTPATIENT)
Dept: PULMONOLOGY | Facility: CLINIC | Age: 68
End: 2023-06-07
Payer: MEDICARE

## 2023-06-07 VITALS
RESPIRATION RATE: 14 BRPM | SYSTOLIC BLOOD PRESSURE: 134 MMHG | BODY MASS INDEX: 25.9 KG/M2 | OXYGEN SATURATION: 88 % | HEART RATE: 99 BPM | HEIGHT: 71 IN | DIASTOLIC BLOOD PRESSURE: 84 MMHG | WEIGHT: 185 LBS

## 2023-06-07 DIAGNOSIS — J44.9 CHRONIC OBSTRUCTIVE PULMONARY DISEASE, UNSPECIFIED: ICD-10-CM

## 2023-06-07 DIAGNOSIS — G47.33 OBSTRUCTIVE SLEEP APNEA (ADULT) (PEDIATRIC): ICD-10-CM

## 2023-06-07 PROCEDURE — 99214 OFFICE O/P EST MOD 30 MIN: CPT

## 2023-06-07 RX ORDER — APIXABAN 5 MG/1
5 TABLET, FILM COATED ORAL
Qty: 60 | Refills: 5 | Status: ACTIVE | COMMUNITY
Start: 2023-06-07 | End: 1900-01-01

## 2023-06-07 NOTE — ASSESSMENT
[FreeTextEntry1] : SP hospital stay for unprovoked VTE\par COPD \par Possible JESSICA \par On Home O2 since hospital stay \par Heavy smoking history > 30 PY quit 2023

## 2023-06-07 NOTE — HISTORY OF PRESENT ILLNESS
[Stable] : stable [Difficulty Breathing During Exertion] : stable dyspnea on exertion [Feelings Of Weakness On Exertion] : stable exercise intolerance [Adherent] : the patient is adherent with ~his/her~ medication regimen [PFTs] : pulmonary function tests [Follow-Up - From Hospitalization] : follow-up of a hospitalization for [Excessive Daytime Sleepiness] : excessive daytime sleepiness [Snoring] : snoring [Unrefreshing Sleep] : unrefreshing sleep [Currently Experiencing] : The patient is currently experiencing symptoms. [None] : No associated symptoms are reported

## 2023-06-07 NOTE — REASON FOR VISIT
[Follow-Up - From Hospitalization] : a follow-up visit after a recent hospitalization [Sleep Apnea] : sleep apnea [COPD] : COPD

## 2023-06-15 ENCOUNTER — LABORATORY RESULT (OUTPATIENT)
Age: 68
End: 2023-06-15

## 2023-06-15 ENCOUNTER — APPOINTMENT (OUTPATIENT)
Dept: HEMATOLOGY ONCOLOGY | Facility: CLINIC | Age: 68
End: 2023-06-15
Payer: MEDICARE

## 2023-06-15 ENCOUNTER — OUTPATIENT (OUTPATIENT)
Dept: OUTPATIENT SERVICES | Facility: HOSPITAL | Age: 68
LOS: 1 days | End: 2023-06-15
Payer: MEDICARE

## 2023-06-15 VITALS
BODY MASS INDEX: 26.04 KG/M2 | SYSTOLIC BLOOD PRESSURE: 104 MMHG | DIASTOLIC BLOOD PRESSURE: 55 MMHG | HEART RATE: 91 BPM | WEIGHT: 186 LBS | RESPIRATION RATE: 14 BRPM | TEMPERATURE: 97.8 F | OXYGEN SATURATION: 87 % | HEIGHT: 71 IN

## 2023-06-15 DIAGNOSIS — I26.99 OTHER PULMONARY EMBOLISM WITHOUT ACUTE COR PULMONALE: ICD-10-CM

## 2023-06-15 DIAGNOSIS — I26.99 OTHER PULMONARY EMBOLISM W/OUT ACUTE COR PULMONALE: ICD-10-CM

## 2023-06-15 DIAGNOSIS — Z98.890 OTHER SPECIFIED POSTPROCEDURAL STATES: Chronic | ICD-10-CM

## 2023-06-15 DIAGNOSIS — I82.409 ACUTE EMBOLISM AND THROMBOSIS OF UNSPECIFIED DEEP VEINS OF UNSPECIFIED LOWER EXTREMITY: ICD-10-CM

## 2023-06-15 PROCEDURE — 99205 OFFICE O/P NEW HI 60 MIN: CPT

## 2023-06-15 PROCEDURE — 85027 COMPLETE CBC AUTOMATED: CPT

## 2023-06-15 PROCEDURE — 82728 ASSAY OF FERRITIN: CPT

## 2023-06-15 PROCEDURE — 86147 CARDIOLIPIN ANTIBODY EA IG: CPT

## 2023-06-15 PROCEDURE — 80053 COMPREHEN METABOLIC PANEL: CPT

## 2023-06-15 PROCEDURE — 36415 COLL VENOUS BLD VENIPUNCTURE: CPT

## 2023-06-15 PROCEDURE — G0452: CPT | Mod: 26

## 2023-06-15 PROCEDURE — 81240 F2 GENE: CPT

## 2023-06-15 PROCEDURE — 85046 RETICYTE/HGB CONCENTRATE: CPT

## 2023-06-15 PROCEDURE — 86146 BETA-2 GLYCOPROTEIN ANTIBODY: CPT

## 2023-06-15 PROCEDURE — 82607 VITAMIN B-12: CPT

## 2023-06-15 PROCEDURE — 84165 PROTEIN E-PHORESIS SERUM: CPT

## 2023-06-15 NOTE — CONSULT LETTER
[Dear  ___] : Dear  [unfilled], [Consult Letter:] : I had the pleasure of evaluating your patient, [unfilled]. [DrJarret  ___] : Dr. AKINS

## 2023-06-16 DIAGNOSIS — I26.99 OTHER PULMONARY EMBOLISM WITHOUT ACUTE COR PULMONALE: ICD-10-CM

## 2023-06-16 DIAGNOSIS — I82.409 ACUTE EMBOLISM AND THROMBOSIS OF UNSPECIFIED DEEP VEINS OF UNSPECIFIED LOWER EXTREMITY: ICD-10-CM

## 2023-06-16 LAB
ALBUMIN SERPL ELPH-MCNC: 3 G/DL
ALP BLD-CCNC: 90 U/L
ALT SERPL-CCNC: 9 U/L
ANION GAP SERPL CALC-SCNC: 10 MMOL/L
AST SERPL-CCNC: 13 U/L
BILIRUB SERPL-MCNC: 0.4 MG/DL
BUN SERPL-MCNC: 21 MG/DL
CALCIUM SERPL-MCNC: 9.1 MG/DL
CHLORIDE SERPL-SCNC: 102 MMOL/L
CO2 SERPL-SCNC: 30 MMOL/L
CREAT SERPL-MCNC: 0.8 MG/DL
EGFR: 98 ML/MIN/1.73M2
FERRITIN SERPL-MCNC: 586 NG/ML
GLUCOSE SERPL-MCNC: 111 MG/DL
HCT VFR BLD CALC: 36 %
HGB BLD-MCNC: 11.1 G/DL
MCHC RBC-ENTMCNC: 26.6 PG
MCHC RBC-ENTMCNC: 30.8 G/DL
MCV RBC AUTO: 86.1 FL
PLATELET # BLD AUTO: 435 K/UL
PMV BLD: 9.1 FL
POTASSIUM SERPL-SCNC: 4.6 MMOL/L
PROT SERPL-MCNC: 7.6 G/DL
RBC # BLD: 4.18 M/UL
RBC # FLD: 17.9 %
RETICS # AUTO: 2 %
RETICS AGGREG/RBC NFR: 83.6 K/UL
SODIUM SERPL-SCNC: 142 MMOL/L
VIT B12 SERPL-MCNC: 977 PG/ML
WBC # FLD AUTO: 7.63 K/UL

## 2023-06-17 LAB
CARDIOLIPIN AB SER IA-ACNC: NEGATIVE
CARDIOLIPIN IGM SER-MCNC: 9.2 GPL

## 2023-06-18 LAB
B2 GLYCOPROT1 IGA SERPL IA-ACNC: 5.5 SAU
B2 GLYCOPROT1 IGG SER-ACNC: <5 SGU
B2 GLYCOPROT1 IGM SER-ACNC: 7.4 SMU

## 2023-06-20 LAB
ALBUMIN MFR SERPL ELPH: 35.1 %
ALBUMIN SERPL-MCNC: 2.5 G/DL
ALBUMIN/GLOB SERPL: 0.5 RATIO
ALPHA1 GLOB MFR SERPL ELPH: 8.5 %
ALPHA1 GLOB SERPL ELPH-MCNC: 0.6 G/DL
ALPHA2 GLOB MFR SERPL ELPH: 14.2 %
ALPHA2 GLOB SERPL ELPH-MCNC: 1 G/DL
B-GLOBULIN MFR SERPL ELPH: 14.3 %
B-GLOBULIN SERPL ELPH-MCNC: 1 G/DL
DEPRECATED KAPPA LC FREE/LAMBDA SER: 1.3 RATIO
GAMMA GLOB FLD ELPH-MCNC: 2 G/DL
GAMMA GLOB MFR SERPL ELPH: 27.9 %
IGA SER QL IEP: 417 MG/DL
IGG SER QL IEP: 2095 MG/DL
IGM SER QL IEP: 143 MG/DL
INTERPRETATION SERPL IEP-IMP: NORMAL
KAPPA LC CSF-MCNC: 8.93 MG/DL
KAPPA LC SERPL-MCNC: 11.59 MG/DL
M PROTEIN SPEC IFE-MCNC: NORMAL
PROT SERPL-MCNC: 7.2 G/DL
PROT SERPL-MCNC: 7.2 G/DL
PTR INTERP: NORMAL

## 2023-06-20 NOTE — HISTORY OF PRESENT ILLNESS
[de-identified] : 67 year old male referred by Dr Carballo for recurrent VTE . PMH is significant for COPD , chronic back pain s/p multiple surgeries ( last one in 2010 ) followed by pain management , very limited mobility , uses a walker to ambulate and has 24 hours home care . he had DVT and PE in 2018 . He was admitted in May 2023 for acute hypoxemic respiratory failure due bilateral lower extremity DVT with bilateral PE , right heart strain and  large clot burden . He is on continuous oxygen .\par He complains of dyspnea on mild exertion , progressive redness, skin changes left leg . no fever or chills . He lost nearly 30 lbs despite good appetite.\par last colonoscopy 4 years ago .\par Blood work notable for low albumin , mild anemia ( Hgb : 12 ) , baseline 14 - 15 .

## 2023-06-20 NOTE — ASSESSMENT
[FreeTextEntry1] : 67 year old male with COPD , on continuous O2 , suspected JESSICA>\par Severe back pain , marked immobility . no recent suregry or trauma. \par recurrent bilateral  DVT with PE , large clot burden , right heart strain , pulmonary hypertension ?\par Bilateral venous stasis dermatitis.\par No known family history of thrombophilia. \par \par Weight loss, anemia . rule out malignancy , rule out GI bleeding . \par Plan : anemia work up .\par          factor 5 Leiden , prothrombin gene mutation , APL panel .\par           needs indefinite anticoagulation \par          CT abdomen and pelvis\par          Consider vascular consult .

## 2023-06-20 NOTE — PHYSICAL EXAM
[Normal] : no peripheral adenopathy appreciated [de-identified] : bilateral edema , stasis dermatitis.

## 2023-06-20 NOTE — REASON FOR VISIT
[Initial Consultation] : an initial consultation for [Formal Caregiver] : formal caregiver [FreeTextEntry2] : VTE

## 2023-06-22 ENCOUNTER — APPOINTMENT (OUTPATIENT)
Dept: PAIN MANAGEMENT | Facility: CLINIC | Age: 68
End: 2023-06-22
Payer: OTHER MISCELLANEOUS

## 2023-06-22 VITALS
HEART RATE: 77 BPM | WEIGHT: 186 LBS | HEIGHT: 71 IN | DIASTOLIC BLOOD PRESSURE: 67 MMHG | SYSTOLIC BLOOD PRESSURE: 109 MMHG | BODY MASS INDEX: 26.04 KG/M2

## 2023-06-22 PROCEDURE — 99214 OFFICE O/P EST MOD 30 MIN: CPT

## 2023-06-22 NOTE — HISTORY OF PRESENT ILLNESS
[FreeTextEntry1] : ORIGINAL PRESENTATION: This is a 67 year old man complaining of lower back and bilateral leg and foot pain. He also complains of upper back and neck pain into the upper extremities. The pain started after an injury at work on December 21, 2006 when he was moving a dresser and he fell down the steps with the dresser landing on top of him. Patient describes pain as severe. During the last month the pain has been no typical pattern. Pain described as burning, shooting, sharp, cutting, pressure-like, cramping, tall/aching, throbbing. Pain is associated with numbness and pins and needles into the upper and lower extremities. Patient has weakness in the upper and lower extremities Pain is not changed with lying down, standing, sitting, walking, exercise, relaxation, coughing sneezing or bowel movements.\par \par ACTIVITES: Can walk less than 1 block, uses scooter or rolling walker. He can sit for several hours, stand for 30-45 minutes. He sometimes lies down due to pain. He uses a cane, walker or scooter for mobility. HE cannot go to work, perform household chores, run local errands, participate in recreational activities or exercise.\par \par PRIOR PAIN TREATMENTS: No relief with surgery, traction, nerve block/injections, physical therapy, exercise, TENS unit, heat/cold treatment, psychotherapy, acupuncture, hypnosis, biofeedback, chiropractic manipulation, spinal cord manipulation.\par \par PRIOR PAIN MEDICATIONS: MSContin, Oxycodone, Baclofen, Cymbalta, Xanax\par \par PATIENT PRESENTS FOR FOLLOW UP: He is under our care for chronic lumbar pain with radiculopathy which he is receiving continuing active treatment for. He continues to experience low back pain radiating into the left leg. He is prone to frequent falls. He was recently diagnosed with blood clots in his lungs and in his legs and is undergoing treatment. He is aware that I will continue to write the Morphine 90 mg per day, specially in light of his compromised lung function. I will provide him with Morphine 30 mg to be taken TID. There was a mistake with the refill and he was provided with 60 mg TID for the past month but he is aware that I will not to continue to prescribe this dosage. He appears drowsy today.

## 2023-06-22 NOTE — ASSESSMENT
[FreeTextEntry1] : This is a 67 year old male with chronic lumbar pain with radiculopathy. His pain continues to be bothersome and  I will provide him with Morphine 30 mg to be taken TID so he has more relief throughout the day. He will follow up in 4 weeks for medication refill and reevaluation. All this patients questions were answered and the conversation was understood well.\par \par \par I explained the risk of addiction, tolerance and withdrawals. UDS will be done randomly and a drug agreement was signed.\par \par I advised the patient they must keep their medication under a lock and key, or in a safe place away from children or other individuals. This medication given is solely for the patient and under no circumstances to be shared. Patient verbalized this and is in agreement with the aforementioned. I explained the risk of addiction, tolerance, and withdrawals. UDS will be done randomly and a drug agreement was signed.\par \par \par I, Armida Carbajal, attest that this documentation has been prepared under the direction and in the presence of Provider Brina Espinoza MD.\par \par \par Thank you for allowing me to assist in the management of this patient. \par \par \par Best Regards, \par \par \par Brina Espinoza M.D., FAAPMR\par \par \par Diplomate, American Board of Physical Medicine and Rehabilitation\par Diplomate, American Board of Pain Medicine \par Diplomate, American Board of Pain Management\par

## 2023-07-20 ENCOUNTER — LABORATORY RESULT (OUTPATIENT)
Age: 68
End: 2023-07-20

## 2023-07-20 ENCOUNTER — APPOINTMENT (OUTPATIENT)
Dept: PAIN MANAGEMENT | Facility: CLINIC | Age: 68
End: 2023-07-20
Payer: OTHER MISCELLANEOUS

## 2023-07-20 VITALS
BODY MASS INDEX: 26.04 KG/M2 | DIASTOLIC BLOOD PRESSURE: 78 MMHG | HEIGHT: 71 IN | HEART RATE: 65 BPM | WEIGHT: 186 LBS | SYSTOLIC BLOOD PRESSURE: 119 MMHG

## 2023-07-20 LAB
AMP / AMPHETAMINE: NEGATIVE
BAR / SECOBARBITAL: NEGATIVE
BUP / BUPRENORPHINE: NEGATIVE
BZO / OXAZEPAM: POSITIVE
COC / COCAINE: NEGATIVE
CREATININE: 50 MG/DL
MDMA / METHYLENEDIOXYMETHAMPHETAMINE: NEGATIVE
MET / METHAMPHETAMINES: NEGATIVE
MOP / MORPHINE: POSITIVE
MTD / METHADONE: NEGATIVE
OXY / OXYCODONE: NEGATIVE
PCP / PHENCYCLIDINE: NEGATIVE
PH: 5
SPECIFIC GRAVITY: > 1.03
TEMPERATURE: 92 C
THC / MARIJUANA: NEGATIVE

## 2023-07-20 PROCEDURE — 99214 OFFICE O/P EST MOD 30 MIN: CPT

## 2023-07-20 PROCEDURE — 80305 DRUG TEST PRSMV DIR OPT OBS: CPT | Mod: QW

## 2023-07-20 NOTE — DATA REVIEWED
[FreeTextEntry1] : MRI of the lumbar spine dated 7/14/22 finds at L3-L4 postsurgical changes from laminectomy with the canal decompressed. No significant canal enhancement to suggest granulation tissue. Despite the disc spacer, there is still advanced disc space Narrowing.  There is an associated disc bulge with disc edema.  Evaluation of the neural foramina is limited due to the susceptibility artifact from the hardware.  Combined with facet arthropathy there appears to be moderate bilateral neural foraminal stenosis with significant crowding of the exiting left L3 nerve root and mild crowding of the exiting right L3 nerve root.  At L2-3 5 mm of retrolisthesis with associated broad-based disc bulge and facet arthropathy with moderate left and mild to moderate right-sided neural foraminal stenosis.  Combined with buckling of the ligamentum flavum and prominent dorsal epidural fat there is moderate canal stenosis.  At L4-L5 trace 1 mm of retrolisthesis with associated broad-based disc bulge demonstrating minimal annular fissuring contacts the exiting right L4 nerve root.  Combined with facet arthropathy there is moderate right-sided neural foraminal external stenosis.  At L1-L2 left-sided facet arthropathy abuts the exiting left exiting L1 nerve root with mild left-sided neural foraminal stenosis.  Trace 1 mm of retrolisthesis with tiny disc bulge without significant canal stenosis.  At T12- L1 there is left-sided facet arthropathy likely with a tiny synovial cyst abutting the exiting left T12 nerve root.\par \par IMAGING: MRI in January 2017, prior L3 laminectomies and posterior fusions with right-sided pedicle screws at L3 and L4, L3 through L4 interbody fusion cage with a widely patent spinal canal and foramina, multilevel lumbar spondylosis, grade 1 mild retrolisthesis at L2, and presence of multiple prevertebral bridging osteophyte throughout the lumbar region especially at L2-L3. There is a large right renal cyst. Bulging at L4-L5 disc with subligamentous shallow enhancing disc protrusion. \par \par Flexion and extension x-ray of the lumbar spine in 2017, instability appreciated at L2-L3 with retrolisthesis appreciated. It does appear to worse on flexion views. EMG revealed a left L4 nerve root dysfunction. \par \par SOAPP: low on 12/28/22\par Low risk: Patient has combination of a low risk SOAP and no risk factors. UDS would be repeated randomly every quarter. \par \par UDS: 4/21/23, + MOP, BZO. UDS will be repeated today. \par \par [place] REGISTRY: Checked\par

## 2023-07-20 NOTE — ASSESSMENT
[FreeTextEntry1] : This is a 67 year old male with chronic lumbar pain with radiculopathy. His pain continues to be bothersome, hence I have agreed to increasing his Morphine dosage to 60mg BID.  He realizes that we will not increase this further.  UDS will be repeated today. He will follow up in 4 weeks for medication refill and reevaluation. All this patients questions were answered and the conversation was understood well.\par \par \par I explained the risk of addiction, tolerance and withdrawals. UDS will be done randomly and a drug agreement was signed.\par \par I advised the patient they must keep their medication under a lock and key, or in a safe place away from children or other individuals. This medication given is solely for the patient and under no circumstances to be shared. Patient verbalized this and is in agreement with the aforementioned. I explained the risk of addiction, tolerance, and withdrawals. UDS will be done randomly and a drug agreement was signed.\par \par \par I, Wyatt Moe, attest that this documentation has been prepared under the direction and in the presence of Provider Brina Espinoza MD.\par \par \par Thank you for allowing me to assist in the management of this patient. \par \par \par Best Regards, \par \par \par Brina Espinoza M.D., [facility]\par \par \par Diplomate, American Board of Physical Medicine and Rehabilitation\par Diplomate, American Board of Pain Medicine \par Diplomate, American Board of Pain Management\par

## 2023-07-21 ENCOUNTER — RX RENEWAL (OUTPATIENT)
Age: 68
End: 2023-07-21

## 2023-07-21 RX ORDER — CLONIDINE 0.2 MG/24H
0.2 PATCH, EXTENDED RELEASE TRANSDERMAL
Qty: 4 | Refills: 0 | Status: ACTIVE | COMMUNITY
Start: 2023-02-27 | End: 1900-01-01

## 2023-07-25 LAB
PM 6 MAM: NEGATIVE NG/ML
PM 7-AMINO-CLONAZ: NEGATIVE NG/ML
PM ALPHA-HYDROXY-ALPRAZOLAM: >1000 NG/ML
PM ALPHA-HYDROXY-MIDAZOLAM: NEGATIVE NG/ML
PM ALPRAZOLAM: 664 NG/ML
PM AMOBARBITAL: NEGATIVE NG/ML
PM AMPHETAMINE INTERP: NEGATIVE
PM AMPHETAMINE: NEGATIVE NG/ML
PM BARBURATES INTERP: NEGATIVE
PM BEG: NEGATIVE NG/ML
PM BENZODIAZEPINES INTERP: POSITIVE
PM BUPRENORPHINE INTERP: NEGATIVE
PM BUPRENORPHINE: NEGATIVE NG/ML
PM BUTALBITAL: NEGATIVE NG/ML
PM CLONAZEPAM: NEGATIVE NG/ML
PM COCAINE INTERP: NEGATIVE
PM COCAINE: NEGATIVE NG/ML
PM CODIENE: NEGATIVE NG/ML
PM COTININE: >1000 NG/ML
PM DIAZEPAM: NEGATIVE NG/ML
PM DIHYROCODEINE: NEGATIVE NG/ML
PM EDDP: NEGATIVE NG/ML
PM FENTANYL INTERP: NEGATIVE
PM FENTANYL: NEGATIVE NG/ML
PM FLUNITRAZEPAM: NEGATIVE NG/ML
PM FLURAZEPAM: NEGATIVE NG/ML
PM HYDROCODONE: 56 NG/ML
PM HYDROMORPHONE: NEGATIVE NG/ML
PM LORAZEPAM: NEGATIVE NG/ML
PM MARIJUANA (DELTA-9-THC): NEGATIVE NG/ML
PM MARIJUANA INTERP: NEGATIVE
PM MDA: NEGATIVE NG/ML
PM MDEA: NEGATIVE NG/ML
PM MDMA: NEGATIVE NG/ML
PM MEPERIDINE: NEGATIVE NG/ML
PM METHADONE INTERP: NEGATIVE
PM METHADONE: NEGATIVE NG/ML
PM METHAMPHETAMINE: NEGATIVE NG/ML
PM MIDAZOLAM: NEGATIVE NG/ML
PM MORPHINE: >1000 NG/ML
PM NALOXONE: NEGATIVE NG/ML
PM NALTREXONE: NEGATIVE NG/ML
PM NICOTINE INTERP: POSITIVE
PM NORBUPRENORPHINE: NEGATIVE NG/ML
PM NORDIAZEPAM: NEGATIVE NG/ML
PM NORMEPERIDINE: NEGATIVE NG/ML
PM NOROXYCODONE: NEGATIVE NG/ML
PM OPIOID INTERP: POSITIVE
PM OXAZEPAM: NEGATIVE NG/ML
PM OXXYCODONE INTERP: NEGATIVE
PM OXYCODONE: NEGATIVE NG/ML
PM OXYMORPHONE: NEGATIVE NG/ML
PM PCP: NEGATIVE NG/ML
PM PHENCYCLIDINE INTERP: NEGATIVE
PM PHENOBARBITAL: NEGATIVE NG/ML
PM PPX: NEGATIVE NG/ML
PM PROPOXYPHENE INTERP: NEGATIVE
PM SECOBARBITAL: NEGATIVE NG/ML
PM SUFENTANIL: NEGATIVE NG/ML
PM TAPENTADOL: NEGATIVE NG/ML
PM TEMAZEPAM: NEGATIVE NG/ML
PM TRAMADOL INTERP: NEGATIVE
PM TRAMADOL: NEGATIVE NG/ML

## 2023-08-08 RX ORDER — CLOBETASOL PROPIONATE 0.5 MG/G
0.05 CREAM TOPICAL TWICE DAILY
Qty: 1 | Refills: 0 | Status: ACTIVE | COMMUNITY
Start: 2023-05-18 | End: 1900-01-01

## 2023-08-11 ENCOUNTER — INPATIENT (INPATIENT)
Facility: HOSPITAL | Age: 68
LOS: 5 days | Discharge: HOME HEALTH | DRG: 602 | End: 2023-08-17
Attending: INTERNAL MEDICINE | Admitting: HOSPITALIST
Payer: MEDICARE

## 2023-08-11 VITALS
RESPIRATION RATE: 17 BRPM | DIASTOLIC BLOOD PRESSURE: 79 MMHG | WEIGHT: 179.9 LBS | HEART RATE: 78 BPM | OXYGEN SATURATION: 92 % | SYSTOLIC BLOOD PRESSURE: 171 MMHG | TEMPERATURE: 100 F

## 2023-08-11 DIAGNOSIS — R09.89 OTHER SPECIFIED SYMPTOMS AND SIGNS INVOLVING THE CIRCULATORY AND RESPIRATORY SYSTEMS: ICD-10-CM

## 2023-08-11 DIAGNOSIS — L03.90 CELLULITIS, UNSPECIFIED: ICD-10-CM

## 2023-08-11 DIAGNOSIS — Z98.890 OTHER SPECIFIED POSTPROCEDURAL STATES: Chronic | ICD-10-CM

## 2023-08-11 LAB
ALBUMIN SERPL ELPH-MCNC: 3.9 G/DL — SIGNIFICANT CHANGE UP (ref 3.5–5.2)
ALP SERPL-CCNC: 81 U/L — SIGNIFICANT CHANGE UP (ref 30–115)
ALT FLD-CCNC: 10 U/L — SIGNIFICANT CHANGE UP (ref 0–41)
ANION GAP SERPL CALC-SCNC: 14 MMOL/L — SIGNIFICANT CHANGE UP (ref 7–14)
APTT BLD: 36.1 SEC — SIGNIFICANT CHANGE UP (ref 27–39.2)
AST SERPL-CCNC: 31 U/L — SIGNIFICANT CHANGE UP (ref 0–41)
BASOPHILS # BLD AUTO: 0.03 K/UL — SIGNIFICANT CHANGE UP (ref 0–0.2)
BASOPHILS NFR BLD AUTO: 0.3 % — SIGNIFICANT CHANGE UP (ref 0–1)
BILIRUB SERPL-MCNC: 0.6 MG/DL — SIGNIFICANT CHANGE UP (ref 0.2–1.2)
BUN SERPL-MCNC: 10 MG/DL — SIGNIFICANT CHANGE UP (ref 10–20)
CALCIUM SERPL-MCNC: 9.2 MG/DL — SIGNIFICANT CHANGE UP (ref 8.4–10.4)
CHLORIDE SERPL-SCNC: 93 MMOL/L — LOW (ref 98–110)
CO2 SERPL-SCNC: 28 MMOL/L — SIGNIFICANT CHANGE UP (ref 17–32)
CREAT SERPL-MCNC: 0.7 MG/DL — SIGNIFICANT CHANGE UP (ref 0.7–1.5)
EGFR: 102 ML/MIN/1.73M2 — SIGNIFICANT CHANGE UP
EOSINOPHIL # BLD AUTO: 0.02 K/UL — SIGNIFICANT CHANGE UP (ref 0–0.7)
EOSINOPHIL NFR BLD AUTO: 0.2 % — SIGNIFICANT CHANGE UP (ref 0–8)
GLUCOSE BLDC GLUCOMTR-MCNC: 104 MG/DL — HIGH (ref 70–99)
GLUCOSE SERPL-MCNC: 122 MG/DL — HIGH (ref 70–99)
HCT VFR BLD CALC: 38.7 % — LOW (ref 42–52)
HGB BLD-MCNC: 12.6 G/DL — LOW (ref 14–18)
IMM GRANULOCYTES NFR BLD AUTO: 0.4 % — HIGH (ref 0.1–0.3)
INR BLD: 1.32 RATIO — HIGH (ref 0.65–1.3)
LACTATE SERPL-SCNC: 1.2 MMOL/L — SIGNIFICANT CHANGE UP (ref 0.7–2)
LYMPHOCYTES # BLD AUTO: 1.01 K/UL — LOW (ref 1.2–3.4)
LYMPHOCYTES # BLD AUTO: 10.4 % — LOW (ref 20.5–51.1)
MCHC RBC-ENTMCNC: 29.2 PG — SIGNIFICANT CHANGE UP (ref 27–31)
MCHC RBC-ENTMCNC: 32.6 G/DL — SIGNIFICANT CHANGE UP (ref 32–37)
MCV RBC AUTO: 89.6 FL — SIGNIFICANT CHANGE UP (ref 80–94)
MONOCYTES # BLD AUTO: 0.7 K/UL — HIGH (ref 0.1–0.6)
MONOCYTES NFR BLD AUTO: 7.2 % — SIGNIFICANT CHANGE UP (ref 1.7–9.3)
NEUTROPHILS # BLD AUTO: 7.94 K/UL — HIGH (ref 1.4–6.5)
NEUTROPHILS NFR BLD AUTO: 81.5 % — HIGH (ref 42.2–75.2)
NRBC # BLD: 0 /100 WBCS — SIGNIFICANT CHANGE UP (ref 0–0)
NT-PROBNP SERPL-SCNC: 1360 PG/ML — HIGH (ref 0–300)
PLATELET # BLD AUTO: 287 K/UL — SIGNIFICANT CHANGE UP (ref 130–400)
PMV BLD: 9.5 FL — SIGNIFICANT CHANGE UP (ref 7.4–10.4)
POTASSIUM SERPL-MCNC: 4.7 MMOL/L — SIGNIFICANT CHANGE UP (ref 3.5–5)
POTASSIUM SERPL-SCNC: 4.7 MMOL/L — SIGNIFICANT CHANGE UP (ref 3.5–5)
PROT SERPL-MCNC: 8.4 G/DL — HIGH (ref 6–8)
PROTHROM AB SERPL-ACNC: 15.2 SEC — HIGH (ref 9.95–12.87)
RBC # BLD: 4.32 M/UL — LOW (ref 4.7–6.1)
RBC # FLD: 17.6 % — HIGH (ref 11.5–14.5)
SODIUM SERPL-SCNC: 135 MMOL/L — SIGNIFICANT CHANGE UP (ref 135–146)
TROPONIN T SERPL-MCNC: <0.01 NG/ML — SIGNIFICANT CHANGE UP
WBC # BLD: 9.74 K/UL — SIGNIFICANT CHANGE UP (ref 4.8–10.8)
WBC # FLD AUTO: 9.74 K/UL — SIGNIFICANT CHANGE UP (ref 4.8–10.8)

## 2023-08-11 PROCEDURE — 70496 CT ANGIOGRAPHY HEAD: CPT

## 2023-08-11 PROCEDURE — 71045 X-RAY EXAM CHEST 1 VIEW: CPT | Mod: 26

## 2023-08-11 PROCEDURE — 94640 AIRWAY INHALATION TREATMENT: CPT

## 2023-08-11 PROCEDURE — 85027 COMPLETE CBC AUTOMATED: CPT

## 2023-08-11 PROCEDURE — 85652 RBC SED RATE AUTOMATED: CPT

## 2023-08-11 PROCEDURE — 82803 BLOOD GASES ANY COMBINATION: CPT

## 2023-08-11 PROCEDURE — 71045 X-RAY EXAM CHEST 1 VIEW: CPT

## 2023-08-11 PROCEDURE — 86140 C-REACTIVE PROTEIN: CPT

## 2023-08-11 PROCEDURE — 84145 PROCALCITONIN (PCT): CPT

## 2023-08-11 PROCEDURE — 73590 X-RAY EXAM OF LOWER LEG: CPT | Mod: 26,LT

## 2023-08-11 PROCEDURE — 99285 EMERGENCY DEPT VISIT HI MDM: CPT

## 2023-08-11 PROCEDURE — 71275 CT ANGIOGRAPHY CHEST: CPT

## 2023-08-11 PROCEDURE — 93970 EXTREMITY STUDY: CPT | Mod: 26

## 2023-08-11 PROCEDURE — 70498 CT ANGIOGRAPHY NECK: CPT

## 2023-08-11 PROCEDURE — 84100 ASSAY OF PHOSPHORUS: CPT

## 2023-08-11 PROCEDURE — 36415 COLL VENOUS BLD VENIPUNCTURE: CPT

## 2023-08-11 PROCEDURE — 82962 GLUCOSE BLOOD TEST: CPT

## 2023-08-11 PROCEDURE — 93005 ELECTROCARDIOGRAM TRACING: CPT

## 2023-08-11 PROCEDURE — 83605 ASSAY OF LACTIC ACID: CPT

## 2023-08-11 PROCEDURE — 85025 COMPLETE CBC W/AUTO DIFF WBC: CPT

## 2023-08-11 PROCEDURE — 83735 ASSAY OF MAGNESIUM: CPT

## 2023-08-11 PROCEDURE — 94660 CPAP INITIATION&MGMT: CPT

## 2023-08-11 PROCEDURE — 83036 HEMOGLOBIN GLYCOSYLATED A1C: CPT

## 2023-08-11 PROCEDURE — 70450 CT HEAD/BRAIN W/O DYE: CPT

## 2023-08-11 PROCEDURE — 95819 EEG AWAKE AND ASLEEP: CPT

## 2023-08-11 PROCEDURE — 80202 ASSAY OF VANCOMYCIN: CPT

## 2023-08-11 PROCEDURE — 80053 COMPREHEN METABOLIC PANEL: CPT

## 2023-08-11 RX ORDER — INSULIN LISPRO 100/ML
VIAL (ML) SUBCUTANEOUS
Refills: 0 | Status: DISCONTINUED | OUTPATIENT
Start: 2023-08-11 | End: 2023-08-17

## 2023-08-11 RX ORDER — IPRATROPIUM/ALBUTEROL SULFATE 18-103MCG
3 AEROSOL WITH ADAPTER (GRAM) INHALATION EVERY 6 HOURS
Refills: 0 | Status: DISCONTINUED | OUTPATIENT
Start: 2023-08-11 | End: 2023-08-15

## 2023-08-11 RX ORDER — MORPHINE SULFATE 50 MG/1
4 CAPSULE, EXTENDED RELEASE ORAL ONCE
Refills: 0 | Status: DISCONTINUED | OUTPATIENT
Start: 2023-08-11 | End: 2023-08-11

## 2023-08-11 RX ORDER — ALPRAZOLAM 0.25 MG
1 TABLET ORAL THREE TIMES A DAY
Refills: 0 | Status: DISCONTINUED | OUTPATIENT
Start: 2023-08-11 | End: 2023-08-17

## 2023-08-11 RX ORDER — DEXTROSE 50 % IN WATER 50 %
25 SYRINGE (ML) INTRAVENOUS ONCE
Refills: 0 | Status: DISCONTINUED | OUTPATIENT
Start: 2023-08-11 | End: 2023-08-17

## 2023-08-11 RX ORDER — GLUCAGON INJECTION, SOLUTION 0.5 MG/.1ML
1 INJECTION, SOLUTION SUBCUTANEOUS ONCE
Refills: 0 | Status: DISCONTINUED | OUTPATIENT
Start: 2023-08-11 | End: 2023-08-17

## 2023-08-11 RX ORDER — DULOXETINE HYDROCHLORIDE 30 MG/1
30 CAPSULE, DELAYED RELEASE ORAL DAILY
Refills: 0 | Status: DISCONTINUED | OUTPATIENT
Start: 2023-08-11 | End: 2023-08-17

## 2023-08-11 RX ORDER — METRONIDAZOLE 500 MG
500 TABLET ORAL ONCE
Refills: 0 | Status: COMPLETED | OUTPATIENT
Start: 2023-08-11 | End: 2023-08-11

## 2023-08-11 RX ORDER — VANCOMYCIN HCL 1 G
1000 VIAL (EA) INTRAVENOUS ONCE
Refills: 0 | Status: COMPLETED | OUTPATIENT
Start: 2023-08-11 | End: 2023-08-11

## 2023-08-11 RX ORDER — MORPHINE SULFATE 50 MG/1
30 CAPSULE, EXTENDED RELEASE ORAL
Refills: 0 | Status: DISCONTINUED | OUTPATIENT
Start: 2023-08-11 | End: 2023-08-17

## 2023-08-11 RX ORDER — SIMVASTATIN 20 MG/1
20 TABLET, FILM COATED ORAL AT BEDTIME
Refills: 0 | Status: DISCONTINUED | OUTPATIENT
Start: 2023-08-11 | End: 2023-08-17

## 2023-08-11 RX ORDER — FUROSEMIDE 40 MG
20 TABLET ORAL DAILY
Refills: 0 | Status: DISCONTINUED | OUTPATIENT
Start: 2023-08-11 | End: 2023-08-12

## 2023-08-11 RX ORDER — PANTOPRAZOLE SODIUM 20 MG/1
40 TABLET, DELAYED RELEASE ORAL
Refills: 0 | Status: DISCONTINUED | OUTPATIENT
Start: 2023-08-11 | End: 2023-08-17

## 2023-08-11 RX ORDER — APIXABAN 2.5 MG/1
5 TABLET, FILM COATED ORAL
Refills: 0 | Status: DISCONTINUED | OUTPATIENT
Start: 2023-08-11 | End: 2023-08-17

## 2023-08-11 RX ORDER — LOSARTAN POTASSIUM 100 MG/1
100 TABLET, FILM COATED ORAL DAILY
Refills: 0 | Status: DISCONTINUED | OUTPATIENT
Start: 2023-08-11 | End: 2023-08-17

## 2023-08-11 RX ORDER — BUPROPION HYDROCHLORIDE 150 MG/1
150 TABLET, EXTENDED RELEASE ORAL DAILY
Refills: 0 | Status: DISCONTINUED | OUTPATIENT
Start: 2023-08-11 | End: 2023-08-17

## 2023-08-11 RX ORDER — AZTREONAM 2 G
2000 VIAL (EA) INJECTION ONCE
Refills: 0 | Status: COMPLETED | OUTPATIENT
Start: 2023-08-11 | End: 2023-08-11

## 2023-08-11 RX ORDER — BACLOFEN 100 %
10 POWDER (GRAM) MISCELLANEOUS EVERY 8 HOURS
Refills: 0 | Status: DISCONTINUED | OUTPATIENT
Start: 2023-08-11 | End: 2023-08-17

## 2023-08-11 RX ORDER — FENOFIBRATE,MICRONIZED 130 MG
160 CAPSULE ORAL DAILY
Refills: 0 | Status: DISCONTINUED | OUTPATIENT
Start: 2023-08-11 | End: 2023-08-12

## 2023-08-11 RX ORDER — SODIUM CHLORIDE 9 MG/ML
1000 INJECTION, SOLUTION INTRAVENOUS
Refills: 0 | Status: DISCONTINUED | OUTPATIENT
Start: 2023-08-11 | End: 2023-08-17

## 2023-08-11 RX ORDER — DEXTROSE 50 % IN WATER 50 %
15 SYRINGE (ML) INTRAVENOUS ONCE
Refills: 0 | Status: DISCONTINUED | OUTPATIENT
Start: 2023-08-11 | End: 2023-08-17

## 2023-08-11 RX ORDER — DEXTROSE 50 % IN WATER 50 %
12.5 SYRINGE (ML) INTRAVENOUS ONCE
Refills: 0 | Status: DISCONTINUED | OUTPATIENT
Start: 2023-08-11 | End: 2023-08-17

## 2023-08-11 RX ORDER — OXYBUTYNIN CHLORIDE 5 MG
10 TABLET ORAL DAILY
Refills: 0 | Status: DISCONTINUED | OUTPATIENT
Start: 2023-08-11 | End: 2023-08-17

## 2023-08-11 RX ORDER — TAMSULOSIN HYDROCHLORIDE 0.4 MG/1
0.4 CAPSULE ORAL AT BEDTIME
Refills: 0 | Status: DISCONTINUED | OUTPATIENT
Start: 2023-08-11 | End: 2023-08-17

## 2023-08-11 RX ADMIN — Medication 250 MILLIGRAM(S): at 18:30

## 2023-08-11 RX ADMIN — MORPHINE SULFATE 30 MILLIGRAM(S): 50 CAPSULE, EXTENDED RELEASE ORAL at 23:04

## 2023-08-11 RX ADMIN — Medication 10 MILLIGRAM(S): at 19:14

## 2023-08-11 RX ADMIN — TAMSULOSIN HYDROCHLORIDE 0.4 MILLIGRAM(S): 0.4 CAPSULE ORAL at 22:41

## 2023-08-11 RX ADMIN — MORPHINE SULFATE 4 MILLIGRAM(S): 50 CAPSULE, EXTENDED RELEASE ORAL at 15:03

## 2023-08-11 RX ADMIN — MORPHINE SULFATE 30 MILLIGRAM(S): 50 CAPSULE, EXTENDED RELEASE ORAL at 23:25

## 2023-08-11 RX ADMIN — Medication 100 MILLIGRAM(S): at 15:24

## 2023-08-11 RX ADMIN — Medication 100 MILLIGRAM(S): at 17:03

## 2023-08-11 RX ADMIN — APIXABAN 5 MILLIGRAM(S): 2.5 TABLET, FILM COATED ORAL at 19:07

## 2023-08-11 RX ADMIN — Medication 1 TABLET(S): at 19:16

## 2023-08-11 RX ADMIN — SIMVASTATIN 20 MILLIGRAM(S): 20 TABLET, FILM COATED ORAL at 22:41

## 2023-08-11 NOTE — H&P ADULT - NSHPLABSRESULTS_GEN_ALL_CORE
08-11    135  |  93<L>  |  10  ----------------------------<  122<H>  4.7   |  28  |  0.7    Ca    9.2      11 Aug 2023 13:46    TPro  8.4<H>  /  Alb  3.9  /  TBili  0.6  /  DBili  x   /  AST  31  /  ALT  10  /  AlkPhos  81  08-11                        12.6   9.74  )-----------( 287      ( 11 Aug 2023 13:46 )             38.7

## 2023-08-11 NOTE — ED PROVIDER NOTE - OBJECTIVE STATEMENT
67 year old male with pmhx of chf, copd on home o2, h/o PE and DVT on eliquis, afib, dm, htn and hld sent in by pmd for cellulitis of left lower extremity. pt admits redness started on Monday which is getting more persistent and spread up leg. also complaining of chronic sob. no fever, chills, chest pain, abd pain or nausea, vomiting, diarrhea.

## 2023-08-11 NOTE — H&P ADULT - HISTORY OF PRESENT ILLNESS
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 Says symptoms started this past Monday as a small "speck" near medial aspect of left knee. Since then, erythema has spread to along inner thigh. mild pain and itchiness. Also says erythema has spread down the leg with some associated swelling. Denies trauma, recent insect bite, new medications, new topicals, recent travel, IV drug use, immobilization, chest pain, abdominal pain, fever, chills, recent travel. Says this has never happened before.       In the ED, T 100, ,79. BNP 1360. XR left tibia/fibula showing diffuse soft tissue swelling. CXR showing some pulmonary vascular congestion.

## 2023-08-11 NOTE — H&P ADULT - NSHPPHYSICALEXAM_GEN_ALL_CORE
GENERAL: NAD, resting comfortably in bed   HEENT: Normocephalic, atraumatic  PULMONARY: Clear to auscultation bilaterally. No rales, ronchi, or wheezing.   CARDIOVASCULAR: Regular rate and rhythm, S1-S2, no murmurs   GASTROINTESTINAL: Soft, non-tender, non-distended, no guarding.   SKIN/EXTREMITIES: left inner thigh erythematous patch, with erythema spreading down leg, pitting edema; no right LE erythema/edema   NEUROLOGIC: AAOX3

## 2023-08-11 NOTE — H&P ADULT - ASSESSMENT
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 sent to the ED by his PCP for left leg swelling and redness      #Left leg erythema/swelling, cellulitis vs lymphangitis vs venous insufficiency  - symptoms started as a small "speck" on left inner thigh, that has since spread to travel down left leg  - mild tenderness/itchiness, warmth ; afebrile at home  - T 100 in ED, wbc normal, no SIRS/sepsis  - no recent bug bites, travel, medications, topicals  - aztreonam (PCN allergy), vanc and flagyl in ED x1  - prelim duplex negative for DVT (f/u official read)  - would give bactrim for now  - ID consult    #HTN/HLD  #Hx of DVT/PE  - continue simvastatin, losartan, lasix  - continue home eliquis    #DM  - ISS for now; add basal/bolus PRN  - FS q6    #COPD on 4L  - not in exacerbation  - duonebs PRN    #hx of spinal operations, pain  - continue home pain meds      DVT ppx: eliquis  GI ppx: protonix  Diet: DASH/TLC  Full code

## 2023-08-11 NOTE — ED PROVIDER NOTE - PROGRESS NOTE DETAILS
Patient sent in by PMD for evaluation of left leg redness warmth.  Plan admit the patient for IV antibiotics due to the extent of the cellulitis.

## 2023-08-11 NOTE — ED PROVIDER NOTE - CLINICAL SUMMARY MEDICAL DECISION MAKING FREE TEXT BOX
67year-old male history of CHF, COPD on home O2 history of PE/DVT on Eliquis A-fib diabetes hypertension intermittently for left lower extremity swelling warmth.  Patient was started antibiotics earlier in the the week without improvement.  Here patient with significant cellulitis of left lower extremity w/o evidence of abscess or necrotizing infection.  IV antibiotics given admitted for further evaluation and treatment.

## 2023-08-11 NOTE — PATIENT PROFILE ADULT - FALL HARM RISK - HARM RISK INTERVENTIONS
Assistance OOB with selected safe patient handling equipment/Communicate Risk of Fall with Harm to all staff/Discuss with provider need for PT consult/Monitor gait and stability/Provide patient with walking aids - walker, cane, crutches/Reinforce activity limits and safety measures with patient and family/Tailored Fall Risk Interventions/Use of alarms - bed, chair and/or voice tab/Visual Cue: Yellow wristband and red socks/Bed in lowest position, wheels locked, appropriate side rails in place/Call bell, personal items and telephone in reach/Instruct patient to call for assistance before getting out of bed or chair/Non-slip footwear when patient is out of bed/[place] to call system/Physically safe environment - no spills, clutter or unnecessary equipment/Purposeful Proactive Rounding/Room/bathroom lighting operational, light cord in reach

## 2023-08-11 NOTE — ED PROVIDER NOTE - PHYSICAL EXAMINATION
CONST: Well appearing in NAD  EYES: PERRL, EOMI, Sclera and conjunctiva clear.   ENT: No nasal discharge. Oropharynx normal appearing, no erythema or exudates. No abscess or swelling. Uvula midline. No temporal artery or mastoid tenderness.  NECK: Non-tender, no meningeal signs. normal ROM. supple   CARD: Normal S1 S2; Normal rate and rhythm  RESP: Equal BS B/L, No wheezes, rhonchi or rales. No distress  GI: Soft, non-tender, non-distended. no cva tenderness. normal BS  MS: Normal ROM in all extremities. No midline spinal tenderness. pulses 2 +. no calf tenderness or swelling. compartments soft.   SKIN: warmth and erythema extending from left ankle to left mid thigh.  Warm, dry, no acute rashes. Good turgor  NEURO: A&Ox3, No focal deficits. Strength 5/5 with no sensory deficits. Steady gait.

## 2023-08-12 LAB
A1C WITH ESTIMATED AVERAGE GLUCOSE RESULT: 5.5 % — SIGNIFICANT CHANGE UP (ref 4–5.6)
ALBUMIN SERPL ELPH-MCNC: 3.4 G/DL — LOW (ref 3.5–5.2)
ALP SERPL-CCNC: 74 U/L — SIGNIFICANT CHANGE UP (ref 30–115)
ALT FLD-CCNC: 7 U/L — SIGNIFICANT CHANGE UP (ref 0–41)
ANION GAP SERPL CALC-SCNC: 9 MMOL/L — SIGNIFICANT CHANGE UP (ref 7–14)
AST SERPL-CCNC: 9 U/L — SIGNIFICANT CHANGE UP (ref 0–41)
BILIRUB SERPL-MCNC: 0.6 MG/DL — SIGNIFICANT CHANGE UP (ref 0.2–1.2)
BUN SERPL-MCNC: 11 MG/DL — SIGNIFICANT CHANGE UP (ref 10–20)
CALCIUM SERPL-MCNC: 9.1 MG/DL — SIGNIFICANT CHANGE UP (ref 8.4–10.4)
CHLORIDE SERPL-SCNC: 98 MMOL/L — SIGNIFICANT CHANGE UP (ref 98–110)
CO2 SERPL-SCNC: 32 MMOL/L — SIGNIFICANT CHANGE UP (ref 17–32)
CREAT SERPL-MCNC: 0.7 MG/DL — SIGNIFICANT CHANGE UP (ref 0.7–1.5)
CRP SERPL-MCNC: 120.5 MG/L — HIGH
EGFR: 102 ML/MIN/1.73M2 — SIGNIFICANT CHANGE UP
ERYTHROCYTE [SEDIMENTATION RATE] IN BLOOD: 53 MM/HR — HIGH (ref 0–10)
ESTIMATED AVERAGE GLUCOSE: 111 MG/DL — SIGNIFICANT CHANGE UP (ref 68–114)
GLUCOSE BLDC GLUCOMTR-MCNC: 103 MG/DL — HIGH (ref 70–99)
GLUCOSE BLDC GLUCOMTR-MCNC: 140 MG/DL — HIGH (ref 70–99)
GLUCOSE BLDC GLUCOMTR-MCNC: 98 MG/DL — SIGNIFICANT CHANGE UP (ref 70–99)
GLUCOSE BLDC GLUCOMTR-MCNC: 98 MG/DL — SIGNIFICANT CHANGE UP (ref 70–99)
GLUCOSE SERPL-MCNC: 106 MG/DL — HIGH (ref 70–99)
HCT VFR BLD CALC: 36.1 % — LOW (ref 42–52)
HGB BLD-MCNC: 11.5 G/DL — LOW (ref 14–18)
MAGNESIUM SERPL-MCNC: 1.7 MG/DL — LOW (ref 1.8–2.4)
MCHC RBC-ENTMCNC: 29 PG — SIGNIFICANT CHANGE UP (ref 27–31)
MCHC RBC-ENTMCNC: 31.9 G/DL — LOW (ref 32–37)
MCV RBC AUTO: 90.9 FL — SIGNIFICANT CHANGE UP (ref 80–94)
NRBC # BLD: 0 /100 WBCS — SIGNIFICANT CHANGE UP (ref 0–0)
PLATELET # BLD AUTO: 284 K/UL — SIGNIFICANT CHANGE UP (ref 130–400)
PMV BLD: 9.4 FL — SIGNIFICANT CHANGE UP (ref 7.4–10.4)
POTASSIUM SERPL-MCNC: 3.9 MMOL/L — SIGNIFICANT CHANGE UP (ref 3.5–5)
POTASSIUM SERPL-SCNC: 3.9 MMOL/L — SIGNIFICANT CHANGE UP (ref 3.5–5)
PROCALCITONIN SERPL-MCNC: 0.09 NG/ML — SIGNIFICANT CHANGE UP (ref 0.02–0.1)
PROT SERPL-MCNC: 7.3 G/DL — SIGNIFICANT CHANGE UP (ref 6–8)
RBC # BLD: 3.97 M/UL — LOW (ref 4.7–6.1)
RBC # FLD: 17.5 % — HIGH (ref 11.5–14.5)
SODIUM SERPL-SCNC: 139 MMOL/L — SIGNIFICANT CHANGE UP (ref 135–146)
WBC # BLD: 8.29 K/UL — SIGNIFICANT CHANGE UP (ref 4.8–10.8)
WBC # FLD AUTO: 8.29 K/UL — SIGNIFICANT CHANGE UP (ref 4.8–10.8)

## 2023-08-12 PROCEDURE — 99222 1ST HOSP IP/OBS MODERATE 55: CPT

## 2023-08-12 RX ORDER — MAGNESIUM SULFATE 500 MG/ML
2 VIAL (ML) INJECTION ONCE
Refills: 0 | Status: COMPLETED | OUTPATIENT
Start: 2023-08-12 | End: 2023-08-12

## 2023-08-12 RX ORDER — FENOFIBRATE,MICRONIZED 130 MG
145 CAPSULE ORAL DAILY
Refills: 0 | Status: DISCONTINUED | OUTPATIENT
Start: 2023-08-12 | End: 2023-08-17

## 2023-08-12 RX ORDER — FUROSEMIDE 40 MG
40 TABLET ORAL DAILY
Refills: 0 | Status: DISCONTINUED | OUTPATIENT
Start: 2023-08-12 | End: 2023-08-14

## 2023-08-12 RX ORDER — VANCOMYCIN HCL 1 G
1000 VIAL (EA) INTRAVENOUS EVERY 12 HOURS
Refills: 0 | Status: DISCONTINUED | OUTPATIENT
Start: 2023-08-12 | End: 2023-08-13

## 2023-08-12 RX ADMIN — Medication 1 TABLET(S): at 05:24

## 2023-08-12 RX ADMIN — Medication 10 MILLIGRAM(S): at 16:28

## 2023-08-12 RX ADMIN — BUPROPION HYDROCHLORIDE 150 MILLIGRAM(S): 150 TABLET, EXTENDED RELEASE ORAL at 12:49

## 2023-08-12 RX ADMIN — Medication 250 MILLIGRAM(S): at 18:30

## 2023-08-12 RX ADMIN — Medication 10 MILLIGRAM(S): at 05:23

## 2023-08-12 RX ADMIN — Medication 10 MILLIGRAM(S): at 12:49

## 2023-08-12 RX ADMIN — APIXABAN 5 MILLIGRAM(S): 2.5 TABLET, FILM COATED ORAL at 05:23

## 2023-08-12 RX ADMIN — Medication 1 MILLIGRAM(S): at 00:30

## 2023-08-12 RX ADMIN — Medication 10 MILLIGRAM(S): at 21:19

## 2023-08-12 RX ADMIN — DULOXETINE HYDROCHLORIDE 30 MILLIGRAM(S): 30 CAPSULE, DELAYED RELEASE ORAL at 12:49

## 2023-08-12 RX ADMIN — Medication 1 TABLET(S): at 18:30

## 2023-08-12 RX ADMIN — PANTOPRAZOLE SODIUM 40 MILLIGRAM(S): 20 TABLET, DELAYED RELEASE ORAL at 06:06

## 2023-08-12 RX ADMIN — MORPHINE SULFATE 30 MILLIGRAM(S): 50 CAPSULE, EXTENDED RELEASE ORAL at 12:54

## 2023-08-12 RX ADMIN — Medication 20 MILLIGRAM(S): at 05:23

## 2023-08-12 RX ADMIN — Medication 1 MILLIGRAM(S): at 12:54

## 2023-08-12 RX ADMIN — Medication 40 MILLIGRAM(S): at 16:27

## 2023-08-12 RX ADMIN — SIMVASTATIN 20 MILLIGRAM(S): 20 TABLET, FILM COATED ORAL at 21:19

## 2023-08-12 RX ADMIN — APIXABAN 5 MILLIGRAM(S): 2.5 TABLET, FILM COATED ORAL at 18:30

## 2023-08-12 RX ADMIN — TAMSULOSIN HYDROCHLORIDE 0.4 MILLIGRAM(S): 0.4 CAPSULE ORAL at 21:19

## 2023-08-12 RX ADMIN — LOSARTAN POTASSIUM 100 MILLIGRAM(S): 100 TABLET, FILM COATED ORAL at 05:23

## 2023-08-12 RX ADMIN — MORPHINE SULFATE 30 MILLIGRAM(S): 50 CAPSULE, EXTENDED RELEASE ORAL at 14:00

## 2023-08-12 RX ADMIN — Medication 25 GRAM(S): at 12:43

## 2023-08-12 NOTE — CONSULT NOTE ADULT - ASSESSMENT
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     IMPRESSION  #  #Sepsis ruled out on admission   #Chronic DVT R   < from: [facility] Duplex Lower Ext Vein Scan, Bilat (08.11.23 @ 15:04) >  Chronic deep venous thrombosis seen in the right popliteal and   gastrocnemius veins. No evidence of deep venous thrombosis in the left   lower extremity. Interstitial edema seen in the bilateral legs limiting   visualization of the tibial veins.  #COPD 4L at home  #DM     Creatinine: 0.7 (08-12-23 @ 06:48)    Height (cm): 175.3 (08-11-23 @ 20:18)  Weight (kg): 84.3 (08-11-23 @ 20:18)    RECOMMENDATIONS  This is an incomplete consult note. All final recommendations to follow after interview and examination of the patient. Please follow recommendations noted below.  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     IMPRESSION  #Nonpurulent LLE cellulitis   #Sepsis ruled out on admission   #Chronic DVT R   < from: [facility] Duplex Lower Ext Vein Scan, Bilat (08.11.23 @ 15:04) >  Chronic deep venous thrombosis seen in the right popliteal and   gastrocnemius veins. No evidence of deep venous thrombosis in the left   lower extremity. Interstitial edema seen in the bilateral legs limiting   visualization of the tibial veins.  #COPD 4L at home  #DM   #PCN allergy- tolerated ampicillin-    Creatinine: 0.7 (08-12-23 @ 06:48)    Height (cm): 175.3 (08-11-23 @ 20:18)  Weight (kg): 84.3 (08-11-23 @ 20:18)    RECOMMENDATIONS  - Cefazolin  2g q8h IV   - LLE elevation  - No fluctuance to suspect posterior abscess, monitor if need for imaging   - Avoid combo of vanc/bactrim- highly nephrotoxic-- D/C     If any questions, please send a message or call on [employer]  Please continue to update ID with any pertinent new laboratory or radiographic findings

## 2023-08-12 NOTE — CONSULT NOTE ADULT - SUBJECTIVE AND OBJECTIVE BOX
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 Says symptoms started this past Monday as a small "speck" near medial aspect of left knee. Since then, erythema has spread to along inner thigh. mild pain and itchiness. Also says erythema has spread down the leg with some associated swelling. Denies trauma, recent insect bite, new medications, new topicals, recent travel, IV drug use, immobilization, chest pain, abdominal pain, fever, chills, recent travel. Says this has never happened before.       In the ED, T 100, ,79. BNP 1360. XR left tibia/fibula showing diffuse soft tissue swelling. CXR showing some pulmonary vascular congestion.      (11 Aug 2023 16:42)      PAST MEDICAL & SURGICAL HISTORY  Anxiety; HTN (hypertension); Diabetes; High cholesterol; Asthma; Chronic low back pain with sciatica, sciatica laterality unspecified, unspecified back pain laterality; Uncomplicated opioid dependence; Syncopal episodes; History of back surgeryx 3        FAMILY HISTORY:  FAMILY HISTORY:  No pertinent family history in first degree relatives        SOCIAL HISTORY:  30pk year smoking hx, rare EtOH, no recreational drug use    ALLERGIES:  Originally Entered as [ANGIOEDEMA] reaction to [pineapple] (Unknown)  aspirin (Stomach Upset)  pineapples (Anaphylaxis)  penicillins (Anaphylaxis)      MEDICATIONS:  MEDICATIONS  (STANDING):  apixaban 5 milliGRAM(s) Oral two times a day  baclofen 10 milliGRAM(s) Oral every 8 hours  buPROPion XL (24-Hour) . 150 milliGRAM(s) Oral daily  dextrose 5%. 1000 milliLiter(s) (100 mL/Hr) IV Continuous <Continuous>  dextrose 5%. 1000 milliLiter(s) (50 mL/Hr) IV Continuous <Continuous>  dextrose 50% Injectable 25 Gram(s) IV Push once  dextrose 50% Injectable 25 Gram(s) IV Push once  dextrose 50% Injectable 12.5 Gram(s) IV Push once  DULoxetine 30 milliGRAM(s) Oral daily  fenofibrate Tablet 160 milliGRAM(s) Oral daily  furosemide    Tablet 40 milliGRAM(s) Oral daily  glucagon  Injectable 1 milliGRAM(s) IntraMuscular once  insulin lispro (ADMELOG) corrective regimen sliding scale   SubCutaneous three times a day before meals  losartan 100 milliGRAM(s) Oral daily  oxybutynin 10 milliGRAM(s) Oral daily  pantoprazole    Tablet 40 milliGRAM(s) Oral before breakfast  simvastatin 20 milliGRAM(s) Oral at bedtime  tamsulosin 0.4 milliGRAM(s) Oral at bedtime  trimethoprim  160 mG/sulfamethoxazole 800 mG 1 Tablet(s) Oral two times a day  vancomycin  IVPB 1000 milliGRAM(s) IV Intermittent every 12 hours    MEDICATIONS  (PRN):  albuterol/ipratropium for Nebulization 3 milliLiter(s) Nebulizer every 6 hours PRN Shortness of Breath and/or Wheezing  ALPRAZolam 1 milliGRAM(s) Oral three times a day PRN anxiety  dextrose Oral Gel 15 Gram(s) Oral once PRN Blood Glucose LESS THAN 70 milliGRAM(s)/deciliter  morphine ER Tablet 30 milliGRAM(s) Oral two times a day PRN pain  oxycodone    5 mG/acetaminophen 325 mG 1 Tablet(s) Oral every 8 hours PRN Severe Pain (7 - 10)      HOME MEDICATIONS:  Home Medications:  ALPRAZolam 1 mg oral tablet: 1 tab(s) orally 3 times a day (18 Apr 2021 03:34)  baclofen 10 mg oral tablet: 1 orally 3 times a day (04 May 2023 14:46)  cloNIDine 0.2 mg/24 hr transdermal film, extended release: 1 transdermally once a week (04 May 2023 14:44)  DULoxetine 30 mg oral delayed release capsule: 1 orally 3 times a day (04 May 2023 14:10)  fenofibrate 160 mg oral tablet: 1 tab(s) orally once a day (18 Apr 2021 03:34)  furosemide 20 mg oral tablet: 1 orally 2 times a day (04 May 2023 14:43)  losartan 100 mg oral tablet: 1 tab(s) orally once a day (18 Apr 2021 03:34)  metFORMIN 500 mg oral tablet: 1 tab(s) orally 2 times a day (18 Apr 2021 03:34)  oxyBUTYnin 10 mg/24 hr oral tablet, extended release: 1 orally once a day (04 May 2023 14:49)  Percocet 10/325: 1 tab orally 3 times a day, As Needed (18 Apr 2021 03:34)  simvastatin 20 mg oral tablet: 1 tab(s) orally once a day (at bedtime) (18 Apr 2021 03:34)  tamsulosin 0.4 mg oral capsule: 1 cap(s) orally once a day (18 Apr 2021 03:34)  Ventolin HFA 90 mcg/inh inhalation aerosol:  (09 May 2023 15:36)  Wellbutrin  mg/12 hours oral tablet, extended release: 1 orally once a day (04 May 2023 14:51)      VITALS:   T(F): 97.9 (08-12 @ 07:15), Max: 100 (08-11 @ 11:16)  HR: 66 (08-12 @ 07:15) (63 - 86)  BP: 134/63 (08-12 @ 07:15) (134/63 - 171/79)  BP(mean): --  RR: 19 (08-12 @ 07:15) (17 - 19)  SpO2: 95% (08-12 @ 07:15) (92% - 97%)    I&O's Summary      REVIEW OF SYSTEMS:  CONSTITUTIONAL: No weakness, fevers or chills  EYES: No visual changes  ENT: No vertigo or throat pain   NECK: No pain or stiffness  RESPIRATORY: endorses chronic shortness of breath, significant productive cough, denies wheezing, hemoptysis; endorses chronic shortness of breath  CARDIOVASCULAR: No chest pain or palpitations  GASTROINTESTINAL: No abdominal or epigastric pain. No nausea, vomiting, or hematemesis; No diarrhea or constipation. No melena or hematochezia.  GENITOURINARY: No dysuria, frequency or hematuria  NEUROLOGICAL: No numbness or weakness  SKIN: rash L inner thigh at knee  MSK: chronic back pain    PHYSICAL EXAM:  NEURO: patient is awake, alert and oriented  GEN: Not in acute distress  NECK: no thyroid enlargement, no JVD  LUNGS: BL crackles R>L   CARDIOVASCULAR: S1/S2 present, RRR , no murmurs or rubs, no carotid bruits,    ABD: Soft, non-tender, non-distended, +BS  EXT: significant 2+ pitting to knees, L > R  SKIN: Intact    LABS:                        11.5   8.29  )-----------( 284      ( 12 Aug 2023 06:48 )             36.1     08-12    139  |  98  |  11  ----------------------------<  106<H>  3.9   |  32  |  0.7    Ca    9.1      12 Aug 2023 06:48  Mg     1.7     08-12    TPro  7.3  /  Alb  3.4<L>  /  TBili  0.6  /  DBili  x   /  AST  9   /  ALT  7   /  AlkPhos  74  08-12    PT/INR - ( 11 Aug 2023 13:46 )   PT: 15.20 sec;   INR: 1.32 ratio         PTT - ( 11 Aug 2023 13:46 )  PTT:36.1 sec  Troponin T, Serum: <0.01 ng/mL (08-11-23 @ 13:46)  Lactate, Blood: 1.2 mmol/L (08-11-23 @ 13:46)    CARDIAC MARKERS ( 11 Aug 2023 13:46 )  x     / <0.01 ng/mL / x     / x     / x        RADIOLOGY:  -CXR: Right pleural effusion, pulmonary vascular congestion.  -TTE: 5/2023 EF 50-55%, PA 49.2 mmHg, mild-mod TR    ECG:    < from: 12 Lead ECG (08.11.23 @ 13:13) >    Ventricular Rate 68 BPM    Atrial Rate 68 BPM    P-R Interval 210 ms    QRS Duration 112 ms    Q-T Interval 432 ms    QTC Calculation(Bazett) 459 ms    P Axis 45 degrees    R Axis 40 degrees    T Axis 12 degrees    Diagnosis Line Sinus rhythm with 1st degree A-V block  Incomplete right bundle branch block  Borderline ECG    Confirmed by Terrence Pleitez ([id number]) on 8/11/2023 3:38:37 PM    < end of copied text >

## 2023-08-12 NOTE — PROGRESS NOTE ADULT - SUBJECTIVE AND OBJECTIVE BOX
24H events:    Patient is a 67y old Male who presents with a chief complaint of left leg swelling (12 Aug 2023 10:58)    Primary diagnosis of Cellulitis    Today is hospital day 1d. This morning patient was seen and examined at bedside, resting comfortably in bed.    No acute or major events overnight.      Code Status: FULL       PAST MEDICAL & SURGICAL HISTORY  Anxiety    HTN (hypertension)    Diabetes    High cholesterol    Asthma    Chronic low back pain with sciatica, sciatica laterality unspecified, unspecified back pain laterality    Uncomplicated opioid dependence    Syncopal episodes    History of back surgery  x 3      SOCIAL HISTORY:  Social History:  Former smoker (11 Aug 2023 16:42)      ALLERGIES:  Originally Entered as [ANGIOEDEMA] reaction to [pineapple] (Unknown)  aspirin (Stomach Upset)  pineapples (Anaphylaxis)  penicillins (Anaphylaxis)    MEDICATIONS:  STANDING MEDICATIONS  apixaban 5 milliGRAM(s) Oral two times a day  baclofen 10 milliGRAM(s) Oral every 8 hours  buPROPion XL (24-Hour) . 150 milliGRAM(s) Oral daily  dextrose 5%. 1000 milliLiter(s) IV Continuous <Continuous>  dextrose 5%. 1000 milliLiter(s) IV Continuous <Continuous>  dextrose 50% Injectable 25 Gram(s) IV Push once  dextrose 50% Injectable 12.5 Gram(s) IV Push once  dextrose 50% Injectable 25 Gram(s) IV Push once  DULoxetine 30 milliGRAM(s) Oral daily  fenofibrate Tablet 160 milliGRAM(s) Oral daily  furosemide    Tablet 40 milliGRAM(s) Oral daily  glucagon  Injectable 1 milliGRAM(s) IntraMuscular once  insulin lispro (ADMELOG) corrective regimen sliding scale   SubCutaneous three times a day before meals  losartan 100 milliGRAM(s) Oral daily  oxybutynin 10 milliGRAM(s) Oral daily  pantoprazole    Tablet 40 milliGRAM(s) Oral before breakfast  simvastatin 20 milliGRAM(s) Oral at bedtime  tamsulosin 0.4 milliGRAM(s) Oral at bedtime  trimethoprim  160 mG/sulfamethoxazole 800 mG 1 Tablet(s) Oral two times a day  vancomycin  IVPB 1000 milliGRAM(s) IV Intermittent every 12 hours    PRN MEDICATIONS  albuterol/ipratropium for Nebulization 3 milliLiter(s) Nebulizer every 6 hours PRN  ALPRAZolam 1 milliGRAM(s) Oral three times a day PRN  dextrose Oral Gel 15 Gram(s) Oral once PRN  morphine ER Tablet 30 milliGRAM(s) Oral two times a day PRN  oxycodone    5 mG/acetaminophen 325 mG 1 Tablet(s) Oral every 8 hours PRN    VITALS:   T(F): 97.9  HR: 66  BP: 134/63  RR: 19  SpO2: 95%    PHYSICAL EXAM:  GENERAL:   ( x ) NAD, lying in bed comfortably     (  ) obtunded     (  ) lethargic     (  ) somnolent    HEAD:   ( x ) Atraumatic     (  ) hematoma     (  ) laceration (specify location:       )     HEART:  Rate -->     ( x ) normal rate     (  ) bradycardic     (  ) tachycardic  Rhythm -->     ( x ) regular     (  ) regularly irregular     (  ) irregularly irregular  Murmurs -->     ( x) normal s1s2     (  ) systolic murmur     (  ) diastolic murmur     (  ) continuous murmur      (  ) S3 present     (  ) S4 present    LUNGS:   ( x )Unlabored respirations     (  ) tachypnea  ( x ) B/L air entry     (  ) decreased breath sounds in:  (location     )    ( x ) no adventitious sound     (  ) crackles     (  ) wheezing      (  ) rhonchi      (specify location:       )  (  ) chest wall tenderness (specify location:       )    ABDOMEN:   ( x ) Soft     (  ) tense   |   (  ) nondistended     (  ) distended   |   (  ) +BS     (  ) hypoactive bowel sounds     (  ) hyperactive bowel sounds  ( x ) nontender     (  ) RUQ tenderness     (  ) RLQ tenderness     (  ) LLQ tenderness     (  ) epigastric tenderness     (  ) diffuse tenderness    EXTREMITIES:  (  ) Normal     (  ) Rash     (  ) ecchymosis     (  ) varicose veins      (  ) pitting edema     (  ) non-pitting edema   (  ) ulceration     (  ) gangrene:     (location:   left leg erythrma  )    NERVOUS SYSTEM:    ( x ) A&Ox3     (  ) confused     (  ) lethargic  CN II-XII:     ( x ) Intact     (  ) deficits found     (Specify:     )   Upper extremities:     ( x ) no sensorimotor deficits     (  ) weakness     (  ) loss of proprioception/vibration     (  ) loss of touch/temperature (specify:    )  Lower extremities:     ( x ) no sensorimotor deficits     (  ) weakness     (  ) loss of proprioception/vibration     (  ) loss of touch/temperature (specify:    )        LABS:                        11.5   8.29  )-----------( 284      ( 12 Aug 2023 06:48 )             36.1     08-12    139  |  98  |  11  ----------------------------<  106<H>  3.9   |  32  |  0.7    Ca    9.1      12 Aug 2023 06:48  Mg     1.7     08-12    TPro  7.3  /  Alb  3.4<L>  /  TBili  0.6  /  DBili  x   /  AST  9   /  ALT  7   /  AlkPhos  74  08-12    PT/INR - ( 11 Aug 2023 13:46 )   PT: 15.20 sec;   INR: 1.32 ratio         PTT - ( 11 Aug 2023 13:46 )  PTT:36.1 sec  Urinalysis Basic - ( 12 Aug 2023 06:48 )    Color: x / Appearance: x / SG: x / pH: x  Gluc: 106 mg/dL / Ketone: x  / Bili: x / Urobili: x   Blood: x / Protein: x / Nitrite: x   Leuk Esterase: x / RBC: x / WBC x   Sq Epi: x / Non Sq Epi: x / Bacteria: x        Troponin T, Serum: <0.01 ng/mL (08-11-23 @ 13:46)  Lactate, Blood: 1.2 mmol/L (08-11-23 @ 13:46)      CARDIAC MARKERS ( 11 Aug 2023 13:46 )  x     / <0.01 ng/mL / x     / x     / x

## 2023-08-12 NOTE — CONSULT NOTE ADULT - ASSESSMENT
Controlling Your Cholesterol  Cholesterol is a waxy substance. Your body needs it to stay healthy. But too much can cause problems. It travels in your blood through the blood vessels. If you have a lot of cholesterol in your blood, it can build up along the walls of the blood vessels. This makes the vessels narrower. It decreases blood flow. You are then at greater risk of a heart attack or a stroke.  Good and bad cholesterol  Cholesterol is a type of lipid. Lipids are fats. Blood is mostly water. Fat and water don't mix. So lipids are carried in the blood inside a protein shell. These are called lipoproteins. There are 2 main kinds of lipoproteins:  • LDL (low-density lipoprotein). This is known as \"bad cholesterol.\" It mainly carries cholesterol to body cells. Excess LDL will build up on artery walls. This raises your risk for heart disease and stroke.  • HDL (high-density lipoprotein). This is known as \"good cholesterol.\" This protein shell collects excess cholesterol that LDL has left behind on blood vessel walls. That's why high levels of HDL can lower your risk of heart disease and stroke.  Controlling cholesterol levels  Total cholesterol includes LDL and HDL cholesterol, as well as other fats in the bloodstream. If your total cholesterol is high, follow the steps below to help lower your total cholesterol level:  Eat less unhealthy fat  • Cut back on saturated fats and trans fats. A diet that’s high in these fats raises your bad cholesterol. It's not enough to just cut back on foods that have cholesterol. Trans fats are also called partially hydrogenated oil. Choose lean cuts of meat and low-fat dairy. Use oils instead of solid fats. Limit baked goods, processed meats, and fried foods.  • Eat about 2 servings of fish each week. A serving size is about 3.5 ounces. Good choices include salmon, herring, tuna, sardines, or mackerel. The fish should not be fried. Most fish contain omega-3 fatty acids. 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 Omega-3 fatty acids lowers triglyceride levels, another form of fat in the blood. If you are pregnant, planning to be pregnant, or are breastfeeding, talk with your healthcare provider for advice. Ask about the best fish choices and how much is safe to eat.  • Eat more whole grains. Eat soluble fiber such as oat bran. These lower overall cholesterol.  Be active  • If you haven't been exercising regularly, start slowly. Check with your healthcare provider to make sure the exercise plan is right for you.  • Choose a physical activity you enjoy. Walk, swim, or ride a bike. These are all good ways to be active.  • Start at a level where you feel comfortable. Increase your time and pace a little each week.  • Work up to 30 to 40 minutes of moderate to high intensity physical activity at least 3 to 4 days per week.  • Remember, some activity is better than none.  Quit smoking  Quitting smoking can improve your lipid levels. It also lowers your risk for heart disease and stroke.  Manage your weight  If you are overweight, your healthcare provider will help you to lose weight. They will help you lower your BMI (body mass index) to a normal or near-normal level. Making diet changes and increasing physical activity can help.  Take medicine as directed  Many people need medicine to help their cholesterol levels. Medicine to treat high cholesterol is effective and safe. But keep in mind that medicine does not take the place of exercise and eating healthy foods. All of these things work together. Your healthcare provider can tell you if you may need a medicine to help lower your cholesterol.  [employer] last reviewed this educational content on 6/1/2021 © 2000-2022 [employer]. All rights reserved. This information is not intended as a substitute for professional medical care. Always follow your healthcare professional's instructions.         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

## 2023-08-12 NOTE — CONSULT NOTE ADULT - SUBJECTIVE AND OBJECTIVE BOX
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 Says symptoms started this past Monday as a small "speck" near medial aspect of left knee. Since then, erythema has spread to along inner thigh. mild pain and itchiness. Also says erythema has spread down the leg with some associated swelling. Denies trauma, recent insect bite, new medications, new topicals, recent travel, IV drug use, immobilization, chest pain, abdominal pain, fever, chills, recent travel. Says this has never happened before.       In the ED, T 100, ,79. BNP 1360. XR left tibia/fibula showing diffuse soft tissue swelling. CXR showing some pulmonary vascular congestion.           (11 Aug 2023 16:42)      INFECTIOUS DISEASE HISTORY:  ID consulted for cellulitis     Currently ordered for:  trimethoprim  160 mG/sulfamethoxazole 800 mG 1 Tablet(s) Oral two times a day  vancomycin  IVPB 1000 milliGRAM(s) IV Intermittent every 12 hours      PMH  PAST MEDICAL & SURGICAL HISTORY:  Anxiety      HTN (hypertension)      Diabetes      High cholesterol      Asthma      Chronic low back pain with sciatica, sciatica laterality unspecified, unspecified back pain laterality      Uncomplicated opioid dependence      Syncopal episodes      History of back surgery  x 3          FAMILY HISTORY  No pertinent family history in first degree relatives        SOCIAL HISTORY  Social History:  Former smoker (11 Aug 2023 16:42)        ROS  ***    VITALS:  T(F): 97.9, Max: 100 (08-11-23 @ 11:16)  HR: 66  BP: 134/63  RR: 19Vital Signs Last 24 Hrs  T(C): 36.6 (12 Aug 2023 07:15), Max: 37.8 (11 Aug 2023 11:16)  T(F): 97.9 (12 Aug 2023 07:15), Max: 100 (11 Aug 2023 11:16)  HR: 66 (12 Aug 2023 07:15) (63 - 86)  BP: 134/63 (12 Aug 2023 07:15) (134/63 - 171/79)  BP(mean): --  RR: 19 (12 Aug 2023 07:15) (17 - 19)  SpO2: 95% (12 Aug 2023 07:15) (92% - 97%)    Parameters below as of 12 Aug 2023 07:15  Patient On (Oxygen Delivery Method): room air        PHYSICAL EXAM:  ***    TESTS & MEASUREMENTS:                        11.5   8.29  )-----------( 284      ( 12 Aug 2023 06:48 )             36.1     08-12    139  |  98  |  11  ----------------------------<  106<H>  3.9   |  32  |  0.7    Ca    9.1      12 Aug 2023 06:48  Mg     1.7     08-12    TPro  7.3  /  Alb  3.4<L>  /  TBili  0.6  /  DBili  x   /  AST  9   /  ALT  7   /  AlkPhos  74  08-12      LIVER FUNCTIONS - ( 12 Aug 2023 06:48 )  Alb: 3.4 g/dL / Pro: 7.3 g/dL / ALK PHOS: 74 U/L / ALT: 7 U/L / AST: 9 U/L / GGT: x           Urinalysis Basic - ( 12 Aug 2023 06:48 )    Color: x / Appearance: x / SG: x / pH: x  Gluc: 106 mg/dL / Ketone: x  / Bili: x / Urobili: x   Blood: x / Protein: x / Nitrite: x   Leuk Esterase: x / RBC: x / WBC x   Sq Epi: x / Non Sq Epi: x / Bacteria: x          Lactate, Blood: 1.2 mmol/L (08-11-23 @ 13:46)      INFECTIOUS DISEASES TESTING      INFLAMMATORY MARKERS      RADIOLOGY & ADDITIONAL TESTS:  I have personally reviewed the last Chest xray  CXR      CT      CARDIOLOGY TESTING  12 Lead ECG:   Ventricular Rate 68 BPM    Atrial Rate 68 BPM    P-R Interval 210 ms    QRS Duration 112 ms    Q-T Interval 432 ms    QTC Calculation(Bazett) 459 ms    P Axis 45 degrees    R Axis 40 degrees    T Axis 12 degrees    Diagnosis Line Sinus rhythm with 1st degree A-V block  Incomplete right bundle branch block  Borderline ECG    Confirmed by Terrence Pleitez ([id number]) on 8/11/2023 3:38:37 PM (08-11-23 @ 13:13)      MEDICATIONS  apixaban 5 Oral two times a day  baclofen 10 Oral every 8 hours  buPROPion XL (24-Hour) . 150 Oral daily  dextrose 5%. 1000 IV Continuous <Continuous>  dextrose 5%. 1000 IV Continuous <Continuous>  dextrose 50% Injectable 12.5 IV Push once  dextrose 50% Injectable 25 IV Push once  dextrose 50% Injectable 25 IV Push once  DULoxetine 30 Oral daily  fenofibrate Tablet 160 Oral daily  furosemide    Tablet 40 Oral daily  glucagon  Injectable 1 IntraMuscular once  insulin lispro (ADMELOG) corrective regimen sliding scale  SubCutaneous three times a day before meals  losartan 100 Oral daily  oxybutynin 10 Oral daily  pantoprazole    Tablet 40 Oral before breakfast  simvastatin 20 Oral at bedtime  tamsulosin 0.4 Oral at bedtime  trimethoprim  160 mG/sulfamethoxazole 800 mG 1 Oral two times a day  vancomycin  IVPB 1000 IV Intermittent every 12 hours        ANTIBIOTICS:  trimethoprim  160 mG/sulfamethoxazole 800 mG 1 Tablet(s) Oral two times a day  vancomycin  IVPB 1000 milliGRAM(s) IV Intermittent every 12 hours      ALLERGIES:  Originally Entered as [ANGIOEDEMA] reaction to [pineapple] (Unknown)  aspirin (Stomach Upset)  pineapples (Anaphylaxis)  penicillins (Anaphylaxis)           TATYANA, SANA  67y, Male  Allergy: Originally Entered as [ANGIOEDEMA] reaction to [pineapple] (Unknown)  aspirin (Stomach Upset)  pineapples (Anaphylaxis)  penicillins (Anaphylaxis)      CHIEF COMPLAINT:   left leg swelling (12 Aug 2023 09:15)      LOS  1d    HPI  HPI:  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 Says symptoms started this past Monday as a small "speck" near medial aspect of left knee. Since then, erythema has spread to along inner thigh. mild pain and itchiness. Also says erythema has spread down the leg with some associated swelling. Denies trauma, recent insect bite, new medications, new topicals, recent travel, IV drug use, immobilization, chest pain, abdominal pain, fever, chills, recent travel. Says this has never happened before.       In the ED, T 100, ,79. BNP 1360. XR left tibia/fibula showing diffuse soft tissue swelling. CXR showing some pulmonary vascular congestion.           (11 Aug 2023 16:42)      INFECTIOUS DISEASE HISTORY:  ID consulted for cellulitis   Reports LLE erythema and pain    Currently ordered for:  trimethoprim  160 mG/sulfamethoxazole 800 mG 1 Tablet(s) Oral two times a day  vancomycin  IVPB 1000 milliGRAM(s) IV Intermittent every 12 hours      PMH  PAST MEDICAL & SURGICAL HISTORY:  Anxiety      HTN (hypertension)      Diabetes      High cholesterol      Asthma      Chronic low back pain with sciatica, sciatica laterality unspecified, unspecified back pain laterality      Uncomplicated opioid dependence      Syncopal episodes      History of back surgery  x 3          FAMILY HISTORY  No pertinent family history in first degree relatives        SOCIAL HISTORY  Social History:  Former smoker (11 Aug 2023 16:42)        ROS  General: Denies rigors, nightsweats  HEENT: Denies headache, rhinorrhea, sore throat, eye pain  CV: Denies CP, palpitations  PULM: Denies wheezing, hemoptysis  GI: Denies hematemesis, hematochezia, melena  : Denies discharge, hematuria  MSK: Denies arthralgias, myalgias  SKIN: as noted above   NEURO: Denies paresthesias, weakness  PSYCH: Denies depression, anxiety     VITALS:  T(F): 97.9, Max: 100 (08-11-23 @ 11:16)  HR: 66  BP: 134/63  RR: 19Vital Signs Last 24 Hrs  T(C): 36.6 (12 Aug 2023 07:15), Max: 37.8 (11 Aug 2023 11:16)  T(F): 97.9 (12 Aug 2023 07:15), Max: 100 (11 Aug 2023 11:16)  HR: 66 (12 Aug 2023 07:15) (63 - 86)  BP: 134/63 (12 Aug 2023 07:15) (134/63 - 171/79)  BP(mean): --  RR: 19 (12 Aug 2023 07:15) (17 - 19)  SpO2: 95% (12 Aug 2023 07:15) (92% - 97%)    Parameters below as of 12 Aug 2023 07:15  Patient On (Oxygen Delivery Method): room air        PHYSICAL EXAM:  Gen: NAD, resting in bed  HEENT: Normocephalic, atraumatic  Neck: supple, no lymphadenopathy  CV: Regular rate & regular rhythm  Lungs: decreased BS at bases, no fremitus  Abdomen: Soft, BS present  Ext: Warm, well perfused  Neuro: non focal, awake  Skin: LLE erythema and some induration posterior knee extending up the back of the thigh   Lines: no phlebitis     TESTS & MEASUREMENTS:                        11.5   8.29  )-----------( 284      ( 12 Aug 2023 06:48 )             36.1     08-12    139  |  98  |  11  ----------------------------<  106<H>  3.9   |  32  |  0.7    Ca    9.1      12 Aug 2023 06:48  Mg     1.7     08-12    TPro  7.3  /  Alb  3.4<L>  /  TBili  0.6  /  DBili  x   /  AST  9   /  ALT  7   /  AlkPhos  74  08-12      LIVER FUNCTIONS - ( 12 Aug 2023 06:48 )  Alb: 3.4 g/dL / Pro: 7.3 g/dL / ALK PHOS: 74 U/L / ALT: 7 U/L / AST: 9 U/L / GGT: x           Urinalysis Basic - ( 12 Aug 2023 06:48 )    Color: x / Appearance: x / SG: x / pH: x  Gluc: 106 mg/dL / Ketone: x  / Bili: x / Urobili: x   Blood: x / Protein: x / Nitrite: x   Leuk Esterase: x / RBC: x / WBC x   Sq Epi: x / Non Sq Epi: x / Bacteria: x          Lactate, Blood: 1.2 mmol/L (08-11-23 @ 13:46)      INFECTIOUS DISEASES TESTING      INFLAMMATORY MARKERS      RADIOLOGY & ADDITIONAL TESTS:  I have personally reviewed the last Chest xray  CXR      CT      CARDIOLOGY TESTING  12 Lead ECG:   Ventricular Rate 68 BPM    Atrial Rate 68 BPM    P-R Interval 210 ms    QRS Duration 112 ms    Q-T Interval 432 ms    QTC Calculation(Bazett) 459 ms    P Axis 45 degrees    R Axis 40 degrees    T Axis 12 degrees    Diagnosis Line Sinus rhythm with 1st degree A-V block  Incomplete right bundle branch block  Borderline ECG    Confirmed by Terrence Pleitez ([id number]) on 8/11/2023 3:38:37 PM (08-11-23 @ 13:13)      MEDICATIONS  apixaban 5 Oral two times a day  baclofen 10 Oral every 8 hours  buPROPion XL (24-Hour) . 150 Oral daily  dextrose 5%. 1000 IV Continuous <Continuous>  dextrose 5%. 1000 IV Continuous <Continuous>  dextrose 50% Injectable 12.5 IV Push once  dextrose 50% Injectable 25 IV Push once  dextrose 50% Injectable 25 IV Push once  DULoxetine 30 Oral daily  fenofibrate Tablet 160 Oral daily  furosemide    Tablet 40 Oral daily  glucagon  Injectable 1 IntraMuscular once  insulin lispro (ADMELOG) corrective regimen sliding scale  SubCutaneous three times a day before meals  losartan 100 Oral daily  oxybutynin 10 Oral daily  pantoprazole    Tablet 40 Oral before breakfast  simvastatin 20 Oral at bedtime  tamsulosin 0.4 Oral at bedtime  trimethoprim  160 mG/sulfamethoxazole 800 mG 1 Oral two times a day  vancomycin  IVPB 1000 IV Intermittent every 12 hours        ANTIBIOTICS:  trimethoprim  160 mG/sulfamethoxazole 800 mG 1 Tablet(s) Oral two times a day  vancomycin  IVPB 1000 milliGRAM(s) IV Intermittent every 12 hours      ALLERGIES:  Originally Entered as [ANGIOEDEMA] reaction to [pineapple] (Unknown)  aspirin (Stomach Upset)  pineapples (Anaphylaxis)  penicillins (Anaphylaxis)

## 2023-08-12 NOTE — PROGRESS NOTE ADULT - SUBJECTIVE AND OBJECTIVE BOX
SANA JOE  67y  Male      Patient is a 67y old  Male who presents with a chief complaint of left leg swelling (11 Aug 2023 16:42)        REVIEW OF SYSTEMS:  CONSTITUTIONAL: No fever, weight loss, or fatigue  EYES: No eye pain, visual disturbances, or discharge  ENMT:  No difficulty hearing, tinnitus, vertigo; No sinus or throat pain  NECK: No pain or stiffness  BREASTS: No pain, masses, or nipple discharge  RESPIRATORY: No cough, wheezing, chills or hemoptysis; No shortness of breath  CARDIOVASCULAR: No chest pain, palpitations, dizziness,  leg swelling+  GASTROINTESTINAL: No abdominal or epigastric pain. No nausea, vomiting, or hematemesis; No diarrhea or constipation. No melena or hematochezia.  GENITOURINARY: No dysuria, frequency, hematuria, or incontinence  MUSCULOSKELETAL: No joint pain or swelling; No muscle, back, or extremity pain  PSYCHIATRIC: No depression, anxiety, mood swings, or difficulty sleeping  HEME/LYMPH: No easy bruising, or bleeding gums  ALLERY AND IMMUNOLOGIC: No hives or eczema  FAMILY HISTORY:  No pertinent family history in first degree relatives      T(C): 36.6 (08-12-23 @ 07:15), Max: 37.8 (08-11-23 @ 11:16)  HR: 66 (08-12-23 @ 07:15) (63 - 86)  BP: 134/63 (08-12-23 @ 07:15) (134/63 - 171/79)  RR: 19 (08-12-23 @ 07:15) (17 - 19)  SpO2: 95% (08-12-23 @ 07:15) (92% - 97%)  Wt(kg): --Vital Signs Last 24 Hrs  T(C): 36.6 (12 Aug 2023 07:15), Max: 37.8 (11 Aug 2023 11:16)  T(F): 97.9 (12 Aug 2023 07:15), Max: 100 (11 Aug 2023 11:16)  HR: 66 (12 Aug 2023 07:15) (63 - 86)  BP: 134/63 (12 Aug 2023 07:15) (134/63 - 171/79)  BP(mean): --  RR: 19 (12 Aug 2023 07:15) (17 - 19)  SpO2: 95% (12 Aug 2023 07:15) (92% - 97%)    Parameters below as of 12 Aug 2023 07:15  Patient On (Oxygen Delivery Method): room air      Originally Entered as [ANGIOEDEMA] reaction to [pineapple] (Unknown)  aspirin (Stomach Upset)  pineapples (Anaphylaxis)  penicillins (Anaphylaxis)      PHYSICAL EXAM:  GENERAL: NAD,   HEAD:  Atraumatic, Normocephalic  EYES: EOMI, PERRLA, conjunctiva and sclera clear  ENMT: No tonsillar erythema, exudates, or enlargement;   NECK: Supple, No JVD, Normal thyroid  NERVOUS SYSTEM:  Alert & Oriented   CHEST/LUNG: Clear to percussion bilaterally; minimal rales+  HEART: Regular rate and rhythm; No murmurs, rubs, or gallops  ABDOMEN: Soft, Nontender, Nondistended; Bowel sounds present  EXTREMITIES: , No clubbing, cyanosis, both legs edema++,erythematous   LYMPH: No lymphadenopathy noted  SKIN: No rashes or lesions      LABS:  08-12    139  |  98  |  11  ----------------------------<  106<H>  3.9   |  32  |  0.7    Ca    9.1      12 Aug 2023 06:48  Mg     1.7     08-12    TPro  7.3  /  Alb  3.4<L>  /  TBili  0.6  /  DBili  x   /  AST  9   /  ALT  7   /  AlkPhos  74  08-12                          11.5   8.29  )-----------( 284      ( 12 Aug 2023 06:48 )             36.1   < from: CT Angio Chest PE Protocol w/ IV Cont (05.03.23 @ 22:03) >  Intraluminal filling defects, consistent with acute pulmonary emboli, are   noted in segmental branches of the right upper and middle lobe pulmonary   arteries. Intraluminal filling defects are noted in segmental and   subsegmental branches of the left upper and left lowerlobe, and lingula   pulmonary arteries.    There is evidence of right heart strain (RV:LV ratio >1; RV=6.2 cm and LV   = 4.5 cm)    There is reflux of contrast material into the IVC and hepatic veins   compatible with right heart dysfunction.    The findings were discussed with Dr. Gay at 10:32  5/3/2023 with read   back.    < end of copied text >  < from: [facility] Duplex Lower Ext Vein Scan, Bilat (05.03.23 @ 19:43) >  Deep venous thrombosis of the right gastrocnemius and posterior tibial   veins.    Deep venous thrombosis of the left popliteal and posterior tibial vein.    < end of copied text >      < from: TTE Echo Complete w/ Contrast w/ Doppler (05.04.23 @ 07:44) >  Summary:   1. Left ventricular ejection fraction, by visual estimation, is 50 to   55%.   2. Normal global left ventricular systolic function.   3. Mildly increased LV wall thickness.   4. The mitral in-flow pattern reveals no discernable A-wave, therefore   no comment on diastolic function can be made.   5. Moderately enlargedleft atrium.   6. Normal right atrial size.   7. Mitral annular calcification.   8. No evidence of mitral valve regurgitation.   9. Mild-moderate tricuspid regurgitation.  10. Dilatation of the aortic root and ascending aorta at 4.2 cm.  11. Estimated pulmonary artery systolic pressure is 49.2 mmHg assuming a   right atrial pressure of 10 mmHg, which is consistent with mild pulmonary   hypertension.  12. Endocardial visualization was enhanced with intravenous echo contrast.  13. Sclerotic aortic valve with normal opening.  14. Mildly enlarged right ventricle.  15. Mildly reduced RV systolic function.    < end of copied text >    RADIOLOGY & ADDITIONAL TESTS:    MEDICATION:  albuterol/ipratropium for Nebulization 3 milliLiter(s) Nebulizer every 6 hours PRN  ALPRAZolam 1 milliGRAM(s) Oral three times a day PRN  apixaban 5 milliGRAM(s) Oral two times a day  baclofen 10 milliGRAM(s) Oral every 8 hours  buPROPion XL (24-Hour) . 150 milliGRAM(s) Oral daily  dextrose 5%. 1000 milliLiter(s) IV Continuous <Continuous>  dextrose 5%. 1000 milliLiter(s) IV Continuous <Continuous>  dextrose 50% Injectable 25 Gram(s) IV Push once  dextrose 50% Injectable 12.5 Gram(s) IV Push once  dextrose 50% Injectable 25 Gram(s) IV Push once  dextrose Oral Gel 15 Gram(s) Oral once PRN  DULoxetine 30 milliGRAM(s) Oral daily  fenofibrate Tablet 160 milliGRAM(s) Oral daily  furosemide    Tablet 40 milliGRAM(s) Oral daily  glucagon  Injectable 1 milliGRAM(s) IntraMuscular once  insulin lispro (ADMELOG) corrective regimen sliding scale   SubCutaneous three times a day before meals  losartan 100 milliGRAM(s) Oral daily  morphine ER Tablet 30 milliGRAM(s) Oral two times a day PRN  oxybutynin 10 milliGRAM(s) Oral daily  oxycodone    5 mG/acetaminophen 325 mG 1 Tablet(s) Oral every 8 hours PRN  pantoprazole    Tablet 40 milliGRAM(s) Oral before breakfast  simvastatin 20 milliGRAM(s) Oral at bedtime  tamsulosin 0.4 milliGRAM(s) Oral at bedtime  trimethoprim  160 mG/sulfamethoxazole 800 mG 1 Tablet(s) Oral two times a day  vancomycin  IVPB 1000 milliGRAM(s) IV Intermittent every 12 hours      HEALTH ISSUES - PROBLEM Dx:  cellulitis lt leg start on vancomycin ,   hx deep vein thrombosis (DVT),PE on eliquis  acute on chronic CHF 2 d echo,increase lasix to 40mg,cardiology consult  hx chronic pain on morphine  Type 2 DM on insulin  HTN on losartan  HLD on simvastatin

## 2023-08-12 NOTE — PROGRESS NOTE ADULT - ASSESSMENT
Case of 67 year old patient, known to have HTN, HLD, DM, COPD (on 4L home O2), depression/anxiety, former smoker, 3 spinal operations (2010), history of DVT/PE s/p thrombolysis (CURRENTLY on eliquis) sent to the ED by his PCP for left leg swelling and redness.      #Left leg erythema/swelling, cellulitis vs lymphangitis vs venous insufficiency  - Symptoms started as a small "speck" on left inner thigh, that has since spread to travel down left leg  - Mild tenderness/itchiness, warmth ; afebrile at home  - T 100 in ED, wbc normal, no SIRS/sepsis  - no recent bug bites, travel, medications, topicals  - prelim duplex negative for DVT (f/u official read)  - On bactrim PO, started on Vancomycin IV today   - F/U ESR, CRP    #HTN/HLD  #Hx of DVT/PE  - continue simvastatin, losartan, lasix  - continue home eliquis    #DM  - ISS for now; add basal/bolus PRN  - FS q6    #COPD on 4L  - not in exacerbation  - duonebs PRN    #hx of spinal operations, pain  - continue home pain meds      DVT ppx: eliquis  GI ppx: protonix  Diet: DASH/TLC  Full code

## 2023-08-13 LAB
ALBUMIN SERPL ELPH-MCNC: 3.4 G/DL — LOW (ref 3.5–5.2)
ALP SERPL-CCNC: 74 U/L — SIGNIFICANT CHANGE UP (ref 30–115)
ALT FLD-CCNC: 7 U/L — SIGNIFICANT CHANGE UP (ref 0–41)
ANION GAP SERPL CALC-SCNC: 12 MMOL/L — SIGNIFICANT CHANGE UP (ref 7–14)
AST SERPL-CCNC: 8 U/L — SIGNIFICANT CHANGE UP (ref 0–41)
BILIRUB SERPL-MCNC: 0.4 MG/DL — SIGNIFICANT CHANGE UP (ref 0.2–1.2)
BUN SERPL-MCNC: 15 MG/DL — SIGNIFICANT CHANGE UP (ref 10–20)
CALCIUM SERPL-MCNC: 9 MG/DL — SIGNIFICANT CHANGE UP (ref 8.4–10.5)
CHLORIDE SERPL-SCNC: 100 MMOL/L — SIGNIFICANT CHANGE UP (ref 98–110)
CO2 SERPL-SCNC: 30 MMOL/L — SIGNIFICANT CHANGE UP (ref 17–32)
CREAT SERPL-MCNC: 0.7 MG/DL — SIGNIFICANT CHANGE UP (ref 0.7–1.5)
EGFR: 102 ML/MIN/1.73M2 — SIGNIFICANT CHANGE UP
GLUCOSE BLDC GLUCOMTR-MCNC: 100 MG/DL — HIGH (ref 70–99)
GLUCOSE BLDC GLUCOMTR-MCNC: 155 MG/DL — HIGH (ref 70–99)
GLUCOSE BLDC GLUCOMTR-MCNC: 180 MG/DL — HIGH (ref 70–99)
GLUCOSE BLDC GLUCOMTR-MCNC: 181 MG/DL — HIGH (ref 70–99)
GLUCOSE SERPL-MCNC: 108 MG/DL — HIGH (ref 70–99)
HCT VFR BLD CALC: 36.9 % — LOW (ref 42–52)
HGB BLD-MCNC: 11.5 G/DL — LOW (ref 14–18)
MCHC RBC-ENTMCNC: 28.6 PG — SIGNIFICANT CHANGE UP (ref 27–31)
MCHC RBC-ENTMCNC: 31.2 G/DL — LOW (ref 32–37)
MCV RBC AUTO: 91.8 FL — SIGNIFICANT CHANGE UP (ref 80–94)
NRBC # BLD: 0 /100 WBCS — SIGNIFICANT CHANGE UP (ref 0–0)
PLATELET # BLD AUTO: 262 K/UL — SIGNIFICANT CHANGE UP (ref 130–400)
PMV BLD: 9.2 FL — SIGNIFICANT CHANGE UP (ref 7.4–10.4)
POTASSIUM SERPL-MCNC: 4.3 MMOL/L — SIGNIFICANT CHANGE UP (ref 3.5–5)
POTASSIUM SERPL-SCNC: 4.3 MMOL/L — SIGNIFICANT CHANGE UP (ref 3.5–5)
PROT SERPL-MCNC: 7.2 G/DL — SIGNIFICANT CHANGE UP (ref 6–8)
RBC # BLD: 4.02 M/UL — LOW (ref 4.7–6.1)
RBC # FLD: 17.5 % — HIGH (ref 11.5–14.5)
SODIUM SERPL-SCNC: 142 MMOL/L — SIGNIFICANT CHANGE UP (ref 135–146)
VANCOMYCIN TROUGH SERPL-MCNC: <4 UG/ML — LOW (ref 5–10)
WBC # BLD: 7.98 K/UL — SIGNIFICANT CHANGE UP (ref 4.8–10.8)
WBC # FLD AUTO: 7.98 K/UL — SIGNIFICANT CHANGE UP (ref 4.8–10.8)

## 2023-08-13 RX ORDER — CEFAZOLIN SODIUM 1 G
2000 VIAL (EA) INJECTION EVERY 8 HOURS
Refills: 0 | Status: DISCONTINUED | OUTPATIENT
Start: 2023-08-13 | End: 2023-08-16

## 2023-08-13 RX ADMIN — MORPHINE SULFATE 30 MILLIGRAM(S): 50 CAPSULE, EXTENDED RELEASE ORAL at 18:57

## 2023-08-13 RX ADMIN — Medication 145 MILLIGRAM(S): at 11:15

## 2023-08-13 RX ADMIN — Medication 40 MILLIGRAM(S): at 05:39

## 2023-08-13 RX ADMIN — TAMSULOSIN HYDROCHLORIDE 0.4 MILLIGRAM(S): 0.4 CAPSULE ORAL at 22:03

## 2023-08-13 RX ADMIN — PANTOPRAZOLE SODIUM 40 MILLIGRAM(S): 20 TABLET, DELAYED RELEASE ORAL at 05:38

## 2023-08-13 RX ADMIN — Medication 3 MILLILITER(S): at 22:14

## 2023-08-13 RX ADMIN — Medication 2: at 12:05

## 2023-08-13 RX ADMIN — BUPROPION HYDROCHLORIDE 150 MILLIGRAM(S): 150 TABLET, EXTENDED RELEASE ORAL at 11:16

## 2023-08-13 RX ADMIN — Medication 1 TABLET(S): at 05:38

## 2023-08-13 RX ADMIN — DULOXETINE HYDROCHLORIDE 30 MILLIGRAM(S): 30 CAPSULE, DELAYED RELEASE ORAL at 11:16

## 2023-08-13 RX ADMIN — LOSARTAN POTASSIUM 100 MILLIGRAM(S): 100 TABLET, FILM COATED ORAL at 05:38

## 2023-08-13 RX ADMIN — Medication 10 MILLIGRAM(S): at 13:09

## 2023-08-13 RX ADMIN — Medication 10 MILLIGRAM(S): at 22:02

## 2023-08-13 RX ADMIN — Medication 10 MILLIGRAM(S): at 05:38

## 2023-08-13 RX ADMIN — Medication 100 MILLIGRAM(S): at 13:09

## 2023-08-13 RX ADMIN — APIXABAN 5 MILLIGRAM(S): 2.5 TABLET, FILM COATED ORAL at 05:38

## 2023-08-13 RX ADMIN — Medication 250 MILLIGRAM(S): at 05:40

## 2023-08-13 RX ADMIN — Medication 100 MILLIGRAM(S): at 22:03

## 2023-08-13 RX ADMIN — Medication 1 MILLIGRAM(S): at 22:02

## 2023-08-13 RX ADMIN — APIXABAN 5 MILLIGRAM(S): 2.5 TABLET, FILM COATED ORAL at 18:07

## 2023-08-13 RX ADMIN — Medication 10 MILLIGRAM(S): at 11:15

## 2023-08-13 RX ADMIN — MORPHINE SULFATE 30 MILLIGRAM(S): 50 CAPSULE, EXTENDED RELEASE ORAL at 18:45

## 2023-08-13 RX ADMIN — SIMVASTATIN 20 MILLIGRAM(S): 20 TABLET, FILM COATED ORAL at 22:03

## 2023-08-13 RX ADMIN — Medication 2: at 18:08

## 2023-08-13 NOTE — PROGRESS NOTE ADULT - ASSESSMENT
Case of 67 year old patient, known to have HTN, HLD, DM, COPD (on 4L home O2), depression/anxiety, former smoker, 3 spinal operations (2010), history of DVT/PE s/p thrombolysis (CURRENTLY on eliquis) sent to the ED by his PCP for left leg swelling and redness.      #Left leg erythema/swelling, cellulitis vs lymphangitis vs venous insufficiency  - Symptoms started as a small "speck" on left inner thigh, that has since spread to travel down left leg  - Mild tenderness/itchiness, warmth ; afebrile at home  - T 100 in ED, wbc normal, no SIRS/sepsis  - no recent bug bites, travel, medications, topicals  - prelim duplex negative for DVT (f/u official read)  - d/c bactrim and Vancomycin today as per ID (HIGHLY NEPHROTOXIC)  - as per ID start cefazolin 2gm iv q8 and LLE elevation (pt educated)   - ESR: 53, CRP:120.5    #HTN/HLD  - Hx of DVT/PE  - continue simvastatin, losartan, lasix  - continue home eliquis    #DM  - ISS for now; add basal/bolus PRN  - FS q6    #COPD on 4L  - not in exacerbation  - duonebs PRN    #hx of spinal operations, pain  - continue home pain meds      DVT ppx: eliquis  GI ppx: protonix  Diet: DASH/TLC  Full code

## 2023-08-13 NOTE — PROGRESS NOTE ADULT - SUBJECTIVE AND OBJECTIVE BOX
SANA JOE  67y  Male      Patient is a 67y old  Male who presents with a chief complaint of left leg swelling (12 Aug 2023 12:47)        REVIEW OF SYSTEMS:  CONSTITUTIONAL: No fever, weight loss, or fatigue  EYES: No eye pain, visual disturbances, or discharge  ENMT:  No difficulty hearing, tinnitus, vertigo; No sinus or throat pain  NECK: No pain or stiffness  BREASTS: No pain, masses, or nipple discharge  RESPIRATORY: No cough, wheezing, chills or hemoptysis; No shortness of breath  CARDIOVASCULAR: No chest pain, palpitations, dizziness,both leg swelling+  GASTROINTESTINAL: No abdominal or epigastric pain. No nausea, vomiting, or hematemesis; No diarrhea or constipation. No melena or hematochezia.  GENITOURINARY: No dysuria, frequency, hematuria, or incontinence  MUSCULOSKELETAL: No joint pain or swelling; No muscle, back, or extremity pain  PSYCHIATRIC: No depression, anxiety, mood swings, or difficulty sleeping  HEME/LYMPH: No easy bruising, or bleeding gums  ALLERY AND IMMUNOLOGIC: No hives or eczema  FAMILY HISTORY:  No pertinent family history in first degree relatives      T(C): 36 (08-13-23 @ 05:35), Max: 37.4 (08-12-23 @ 16:04)  HR: 60 (08-13-23 @ 05:35) (60 - 84)  BP: 121/68 (08-13-23 @ 05:35) (121/68 - 140/60)  RR: 18 (08-13-23 @ 05:35) (18 - 18)  SpO2: 95% (08-13-23 @ 05:35) (95% - 95%)  Wt(kg): --Vital Signs Last 24 Hrs  T(C): 36 (13 Aug 2023 05:35), Max: 37.4 (12 Aug 2023 16:04)  T(F): 96.8 (13 Aug 2023 05:35), Max: 99.4 (12 Aug 2023 16:04)  HR: 60 (13 Aug 2023 05:35) (60 - 84)  BP: 121/68 (13 Aug 2023 05:35) (121/68 - 140/60)  BP(mean): 92 (12 Aug 2023 20:06) (92 - 92)  RR: 18 (13 Aug 2023 05:35) (18 - 18)  SpO2: 95% (13 Aug 2023 05:35) (95% - 95%)    Parameters below as of 13 Aug 2023 05:35  Patient On (Oxygen Delivery Method): nasal cannula      Originally Entered as [ANGIOEDEMA] reaction to [pineapple] (Unknown)  aspirin (Stomach Upset)  pineapples (Anaphylaxis)  penicillins (Anaphylaxis)      PHYSICAL EXAM:  GENERAL: NAD,   HEAD:  Atraumatic, Normocephalic  EYES: EOMI, PERRLA, conjunctiva and sclera clear  ENMT: No tonsillar erythema, exudates, or enlargement;   NECK: Supple, No JVD, Normal thyroid  NERVOUS SYSTEM:  Alert & Oriented   CHEST/LUNG: Clear to percussion bilaterally; No rales, rhonchi, wheezing, or rubs  HEART: Regular rate and rhythm; No murmurs, rubs, or gallops  ABDOMEN: Soft, Nontender, Nondistended; Bowel sounds present  EXTREMITIES:  No clubbing, cyanosis, lt leg erythematous edema+  LYMPH: No lymphadenopathy noted  SKIN: No rashes or lesions      LABS:  08-12    139  |  98  |  11  ----------------------------<  106<H>  3.9   |  32  |  0.7    Ca    9.1      12 Aug 2023 06:48  Mg     1.7     08-12    TPro  7.3  /  Alb  3.4<L>  /  TBili  0.6  /  DBili  x   /  AST  9   /  ALT  7   /  AlkPhos  74  08-12                          11.5   7.98  )-----------( 262      ( 13 Aug 2023 08:19 )             36.9         RADIOLOGY & ADDITIONAL TESTS:    < from: VA Duplex Lower Ext Vein Scan, Bilat (08.11.23 @ 15:04) >  Chronic deep venous thrombosis seen in the right popliteal and   gastrocnemius veins. No evidence of deep venous thrombosis in the left   lower extremity. Interstitial edema seen in the bilateral legs limiting   visualization of the tibial veins.      < end of copied text >  < from: Xray Chest 1 View-PORTABLE IMMEDIATE (Xray Chest 1 View-PORTABLE IMMEDIATE .) (08.11.23 @ 13:02) >  Right pleural effusion, pulmonary vascular congestion.    < end of copied text >  MEDICATION:  albuterol/ipratropium for Nebulization 3 milliLiter(s) Nebulizer every 6 hours PRN  ALPRAZolam 1 milliGRAM(s) Oral three times a day PRN  apixaban 5 milliGRAM(s) Oral two times a day  baclofen 10 milliGRAM(s) Oral every 8 hours  buPROPion XL (24-Hour) . 150 milliGRAM(s) Oral daily  ceFAZolin   IVPB 2000 milliGRAM(s) IV Intermittent every 8 hours  dextrose 5%. 1000 milliLiter(s) IV Continuous <Continuous>  dextrose 5%. 1000 milliLiter(s) IV Continuous <Continuous>  dextrose 50% Injectable 12.5 Gram(s) IV Push once  dextrose 50% Injectable 25 Gram(s) IV Push once  dextrose 50% Injectable 25 Gram(s) IV Push once  dextrose Oral Gel 15 Gram(s) Oral once PRN  DULoxetine 30 milliGRAM(s) Oral daily  fenofibrate Tablet 145 milliGRAM(s) Oral daily  furosemide    Tablet 40 milliGRAM(s) Oral daily  glucagon  Injectable 1 milliGRAM(s) IntraMuscular once  insulin lispro (ADMELOG) corrective regimen sliding scale   SubCutaneous three times a day before meals  losartan 100 milliGRAM(s) Oral daily  morphine ER Tablet 30 milliGRAM(s) Oral two times a day PRN  oxybutynin 10 milliGRAM(s) Oral daily  oxycodone    5 mG/acetaminophen 325 mG 1 Tablet(s) Oral every 8 hours PRN  pantoprazole    Tablet 40 milliGRAM(s) Oral before breakfast  simvastatin 20 milliGRAM(s) Oral at bedtime  tamsulosin 0.4 milliGRAM(s) Oral at bedtime      HEALTH ISSUES - PROBLEM Dx:Lt  lt leg cellulitis change to ancef  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

## 2023-08-13 NOTE — PROGRESS NOTE ADULT - ASSESSMENT
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     IMPRESSION  #Nonpurulent LLE cellulitis   #Sepsis ruled out on admission   #Chronic DVT R   < from: VA Duplex Lower Ext Vein Scan, Bilat (08.11.23 @ 15:04) >  Chronic deep venous thrombosis seen in the right popliteal and   gastrocnemius veins. No evidence of deep venous thrombosis in the left   lower extremity. Interstitial edema seen in the bilateral legs limiting   visualization of the tibial veins.  #COPD 4L at home  #DM   #PCN allergy- tolerated ampicillin-    Creatinine: 0.7 (08-12-23 @ 06:48)    Height (cm): 175.3 (08-11-23 @ 20:18)  Weight (kg): 84.3 (08-11-23 @ 20:18)    RECOMMENDATIONS  - Cefazolin  2g q8h IV , on D/C PO keflex 500mg four times daily to complete 10 days total of antibiotics (including IV)  - LLE elevation  - No fluctuance to suspect posterior abscess, monitor if need for imaging   - Please recall ID PRN. Please inform ID of any patient clinical change or any new pertinent laboratory or radiographic data       If any questions, please send a message or call on [employer] Teams  Please continue to update ID with any pertinent new laboratory or radiographic findings

## 2023-08-13 NOTE — PROGRESS NOTE ADULT - SUBJECTIVE AND OBJECTIVE BOX
SANA JOE  67y, Male  Allergy: Originally Entered as [ANGIOEDEMA] reaction to [pineapple] (Unknown)  aspirin (Stomach Upset)  pineapples (Anaphylaxis)  penicillins (Anaphylaxis)      LOS  2d    CHIEF COMPLAINT: left leg swelling (13 Aug 2023 09:52)      INTERVAL EVENTS/HPI  - No acute events overnight, reports improvement in the LE  - T(F): , Max: 99.4 (08-12-23 @ 16:04)  - Denies any worsening symptoms  - Tolerating medication  - WBC Count: 7.98 (08-13-23 @ 08:19)  WBC Count: 8.29 (08-12-23 @ 06:48)     - Creatinine: 0.7 (08-13-23 @ 08:19)  Creatinine: 0.7 (08-12-23 @ 06:48)       ROS  General: Denies rigors, nightsweats  HEENT: Denies headache, rhinorrhea, sore throat, eye pain  CV: Denies CP, palpitations  PULM: Denies wheezing, hemoptysis  GI: Denies hematemesis, hematochezia, melena  : Denies discharge, hematuria  MSK: Denies arthralgias, myalgias  SKIN:a as noted above   NEURO: Denies paresthesias, weakness  PSYCH: Denies depression, anxiety    VITALS:  T(F): 98.6, Max: 99.4 (08-12-23 @ 16:04)  HR: 79  BP: 129/69  RR: 18Vital Signs Last 24 Hrs  T(C): 37 (13 Aug 2023 13:09), Max: 37.4 (12 Aug 2023 16:04)  T(F): 98.6 (13 Aug 2023 13:09), Max: 99.4 (12 Aug 2023 16:04)  HR: 79 (13 Aug 2023 13:09) (60 - 84)  BP: 129/69 (13 Aug 2023 13:09) (121/68 - 140/60)  BP(mean): 92 (12 Aug 2023 20:06) (92 - 92)  RR: 18 (13 Aug 2023 13:09) (18 - 18)  SpO2: 96% (13 Aug 2023 13:09) (95% - 96%)    Parameters below as of 13 Aug 2023 05:35  Patient On (Oxygen Delivery Method): nasal cannula        PHYSICAL EXAM:  Gen: NAD, resting in bed  HEENT: Normocephalic, atraumatic  Neck: supple, no lymphadenopathy  CV: Regular rate & regular rhythm  Lungs: decreased BS at bases, no fremitus  Abdomen: Soft, BS present  Ext: Warm, well perfused  Neuro: non focal, awake  Skin: decreased LLE edema/ erythema  Lines: no phlebitis    FH: Non-contributory  Social Hx: Non-contributory    TESTS & MEASUREMENTS:                        11.5   7.98  )-----------( 262      ( 13 Aug 2023 08:19 )             36.9     08-13    142  |  100  |  15  ----------------------------<  108<H>  4.3   |  30  |  0.7    Ca    9.0      13 Aug 2023 08:19  Mg     1.7     08-12    TPro  7.2  /  Alb  3.4<L>  /  TBili  0.4  /  DBili  x   /  AST  8   /  ALT  7   /  AlkPhos  74  08-13      LIVER FUNCTIONS - ( 13 Aug 2023 08:19 )  Alb: 3.4 g/dL / Pro: 7.2 g/dL / ALK PHOS: 74 U/L / ALT: 7 U/L / AST: 8 U/L / GGT: x           Urinalysis Basic - ( 13 Aug 2023 08:19 )    Color: x / Appearance: x / SG: x / pH: x  Gluc: 108 mg/dL / Ketone: x  / Bili: x / Urobili: x   Blood: x / Protein: x / Nitrite: x   Leuk Esterase: x / RBC: x / WBC x   Sq Epi: x / Non Sq Epi: x / Bacteria: x        Culture - Blood (collected 08-11-23 @ 13:46)  Source: .Blood Blood  Preliminary Report (08-12-23 @ 23:01):    No growth at 24 hours    Culture - Blood (collected 08-11-23 @ 13:46)  Source: .Blood Blood  Preliminary Report (08-12-23 @ 23:01):    No growth at 24 hours        Lactate, Blood: 1.2 mmol/L (08-11-23 @ 13:46)      INFECTIOUS DISEASES TESTING  Procalcitonin, Serum: 0.09 (08-12-23 @ 06:48)      INFLAMMATORY MARKERS  C-Reactive Protein, Serum: 120.5 mg/L (08-12-23 @ 10:50)  Sedimentation Rate, Erythrocyte: 53 mm/Hr (08-12-23 @ 10:50)      RADIOLOGY & ADDITIONAL TESTS:  I have personally reviewed the last available Chest xray  CXR      CT      CARDIOLOGY TESTING  12 Lead ECG:   Ventricular Rate 68 BPM    Atrial Rate 68 BPM    P-R Interval 210 ms    QRS Duration 112 ms    Q-T Interval 432 ms    QTC Calculation(Bazett) 459 ms    P Axis 45 degrees    R Axis 40 degrees    T Axis 12 degrees    Diagnosis Line Sinus rhythm with 1st degree A-V block  Incomplete right bundle branch block  Borderline ECG    Confirmed by Terrence Pleitez ([id number]) on 8/11/2023 3:38:37 PM (08-11-23 @ 13:13)      MEDICATIONS  apixaban 5 Oral two times a day  baclofen 10 Oral every 8 hours  buPROPion XL (24-Hour) . 150 Oral daily  ceFAZolin   IVPB 2000 IV Intermittent every 8 hours  dextrose 5%. 1000 IV Continuous <Continuous>  dextrose 5%. 1000 IV Continuous <Continuous>  dextrose 50% Injectable 25 IV Push once  dextrose 50% Injectable 12.5 IV Push once  dextrose 50% Injectable 25 IV Push once  DULoxetine 30 Oral daily  fenofibrate Tablet 145 Oral daily  furosemide    Tablet 40 Oral daily  glucagon  Injectable 1 IntraMuscular once  insulin lispro (ADMELOG) corrective regimen sliding scale  SubCutaneous three times a day before meals  losartan 100 Oral daily  oxybutynin 10 Oral daily  pantoprazole    Tablet 40 Oral before breakfast  simvastatin 20 Oral at bedtime  tamsulosin 0.4 Oral at bedtime      WEIGHT  Weight (kg): 84.3 (08-11-23 @ 20:18)  Creatinine: 0.7 mg/dL (08-13-23 @ 08:19)      ANTIBIOTICS:  ceFAZolin   IVPB 2000 milliGRAM(s) IV Intermittent every 8 hours      All available historical records have been reviewed

## 2023-08-14 LAB
ALBUMIN SERPL ELPH-MCNC: 3.5 G/DL — SIGNIFICANT CHANGE UP (ref 3.5–5.2)
ALP SERPL-CCNC: 74 U/L — SIGNIFICANT CHANGE UP (ref 30–115)
ALT FLD-CCNC: 7 U/L — SIGNIFICANT CHANGE UP (ref 0–41)
ANION GAP SERPL CALC-SCNC: 13 MMOL/L — SIGNIFICANT CHANGE UP (ref 7–14)
AST SERPL-CCNC: 9 U/L — SIGNIFICANT CHANGE UP (ref 0–41)
BASE EXCESS BLDA CALC-SCNC: 8.2 MMOL/L — HIGH (ref -2–3)
BASOPHILS # BLD AUTO: 0.04 K/UL — SIGNIFICANT CHANGE UP (ref 0–0.2)
BASOPHILS NFR BLD AUTO: 0.6 % — SIGNIFICANT CHANGE UP (ref 0–1)
BILIRUB SERPL-MCNC: 0.3 MG/DL — SIGNIFICANT CHANGE UP (ref 0.2–1.2)
BUN SERPL-MCNC: 15 MG/DL — SIGNIFICANT CHANGE UP (ref 10–20)
CALCIUM SERPL-MCNC: 8.8 MG/DL — SIGNIFICANT CHANGE UP (ref 8.4–10.4)
CHLORIDE SERPL-SCNC: 98 MMOL/L — SIGNIFICANT CHANGE UP (ref 98–110)
CO2 SERPL-SCNC: 28 MMOL/L — SIGNIFICANT CHANGE UP (ref 17–32)
CREAT SERPL-MCNC: 0.8 MG/DL — SIGNIFICANT CHANGE UP (ref 0.7–1.5)
EGFR: 98 ML/MIN/1.73M2 — SIGNIFICANT CHANGE UP
EOSINOPHIL # BLD AUTO: 0.24 K/UL — SIGNIFICANT CHANGE UP (ref 0–0.7)
EOSINOPHIL NFR BLD AUTO: 3.5 % — SIGNIFICANT CHANGE UP (ref 0–8)
GLUCOSE BLDC GLUCOMTR-MCNC: 110 MG/DL — HIGH (ref 70–99)
GLUCOSE BLDC GLUCOMTR-MCNC: 136 MG/DL — HIGH (ref 70–99)
GLUCOSE BLDC GLUCOMTR-MCNC: 159 MG/DL — HIGH (ref 70–99)
GLUCOSE BLDC GLUCOMTR-MCNC: 199 MG/DL — HIGH (ref 70–99)
GLUCOSE BLDC GLUCOMTR-MCNC: 221 MG/DL — HIGH (ref 70–99)
GLUCOSE SERPL-MCNC: 108 MG/DL — HIGH (ref 70–99)
HCO3 BLDA-SCNC: 37 MMOL/L — HIGH (ref 21–28)
HCT VFR BLD CALC: 36.4 % — LOW (ref 42–52)
HGB BLD-MCNC: 11 G/DL — LOW (ref 14–18)
HOROWITZ INDEX BLDA+IHG-RTO: 36 — SIGNIFICANT CHANGE UP
IMM GRANULOCYTES NFR BLD AUTO: 0.3 % — SIGNIFICANT CHANGE UP (ref 0.1–0.3)
LYMPHOCYTES # BLD AUTO: 1.51 K/UL — SIGNIFICANT CHANGE UP (ref 1.2–3.4)
LYMPHOCYTES # BLD AUTO: 21.7 % — SIGNIFICANT CHANGE UP (ref 20.5–51.1)
MAGNESIUM SERPL-MCNC: 1.9 MG/DL — SIGNIFICANT CHANGE UP (ref 1.8–2.4)
MCHC RBC-ENTMCNC: 27.9 PG — SIGNIFICANT CHANGE UP (ref 27–31)
MCHC RBC-ENTMCNC: 30.2 G/DL — LOW (ref 32–37)
MCV RBC AUTO: 92.4 FL — SIGNIFICANT CHANGE UP (ref 80–94)
MONOCYTES # BLD AUTO: 0.65 K/UL — HIGH (ref 0.1–0.6)
MONOCYTES NFR BLD AUTO: 9.4 % — HIGH (ref 1.7–9.3)
NEUTROPHILS # BLD AUTO: 4.49 K/UL — SIGNIFICANT CHANGE UP (ref 1.4–6.5)
NEUTROPHILS NFR BLD AUTO: 64.5 % — SIGNIFICANT CHANGE UP (ref 42.2–75.2)
NRBC # BLD: 0 /100 WBCS — SIGNIFICANT CHANGE UP (ref 0–0)
PCO2 BLDA: 72 MMHG — CRITICAL HIGH (ref 35–48)
PH BLDA: 7.32 — LOW (ref 7.35–7.45)
PHOSPHATE SERPL-MCNC: 3.7 MG/DL — SIGNIFICANT CHANGE UP (ref 2.1–4.9)
PLATELET # BLD AUTO: 285 K/UL — SIGNIFICANT CHANGE UP (ref 130–400)
PMV BLD: 9.4 FL — SIGNIFICANT CHANGE UP (ref 7.4–10.4)
PO2 BLDA: 75 MMHG — LOW (ref 83–108)
POTASSIUM SERPL-MCNC: 4.3 MMOL/L — SIGNIFICANT CHANGE UP (ref 3.5–5)
POTASSIUM SERPL-SCNC: 4.3 MMOL/L — SIGNIFICANT CHANGE UP (ref 3.5–5)
PROT SERPL-MCNC: 7.2 G/DL — SIGNIFICANT CHANGE UP (ref 6–8)
RBC # BLD: 3.94 M/UL — LOW (ref 4.7–6.1)
RBC # FLD: 17.5 % — HIGH (ref 11.5–14.5)
SAO2 % BLDA: 94.5 % — SIGNIFICANT CHANGE UP (ref 94–98)
SODIUM SERPL-SCNC: 139 MMOL/L — SIGNIFICANT CHANGE UP (ref 135–146)
WBC # BLD: 6.95 K/UL — SIGNIFICANT CHANGE UP (ref 4.8–10.8)
WBC # FLD AUTO: 6.95 K/UL — SIGNIFICANT CHANGE UP (ref 4.8–10.8)

## 2023-08-14 PROCEDURE — 93010 ELECTROCARDIOGRAM REPORT: CPT

## 2023-08-14 PROCEDURE — 70498 CT ANGIOGRAPHY NECK: CPT | Mod: 26

## 2023-08-14 PROCEDURE — 71045 X-RAY EXAM CHEST 1 VIEW: CPT | Mod: 26

## 2023-08-14 PROCEDURE — 99222 1ST HOSP IP/OBS MODERATE 55: CPT

## 2023-08-14 PROCEDURE — 70450 CT HEAD/BRAIN W/O DYE: CPT | Mod: 26,59

## 2023-08-14 PROCEDURE — 70496 CT ANGIOGRAPHY HEAD: CPT | Mod: 26

## 2023-08-14 RX ORDER — NIFEDIPINE 30 MG
10 TABLET, EXTENDED RELEASE 24 HR ORAL ONCE
Refills: 0 | Status: COMPLETED | OUTPATIENT
Start: 2023-08-14 | End: 2023-08-14

## 2023-08-14 RX ORDER — FUROSEMIDE 40 MG
40 TABLET ORAL
Refills: 0 | Status: DISCONTINUED | OUTPATIENT
Start: 2023-08-14 | End: 2023-08-17

## 2023-08-14 RX ADMIN — BUPROPION HYDROCHLORIDE 150 MILLIGRAM(S): 150 TABLET, EXTENDED RELEASE ORAL at 11:57

## 2023-08-14 RX ADMIN — Medication 100 MILLIGRAM(S): at 06:37

## 2023-08-14 RX ADMIN — Medication 40 MILLIGRAM(S): at 14:47

## 2023-08-14 RX ADMIN — APIXABAN 5 MILLIGRAM(S): 2.5 TABLET, FILM COATED ORAL at 21:50

## 2023-08-14 RX ADMIN — Medication 4: at 12:00

## 2023-08-14 RX ADMIN — TAMSULOSIN HYDROCHLORIDE 0.4 MILLIGRAM(S): 0.4 CAPSULE ORAL at 21:50

## 2023-08-14 RX ADMIN — Medication 10 MILLIGRAM(S): at 11:57

## 2023-08-14 RX ADMIN — Medication 10 MILLIGRAM(S): at 21:50

## 2023-08-14 RX ADMIN — LOSARTAN POTASSIUM 100 MILLIGRAM(S): 100 TABLET, FILM COATED ORAL at 06:33

## 2023-08-14 RX ADMIN — PANTOPRAZOLE SODIUM 40 MILLIGRAM(S): 20 TABLET, DELAYED RELEASE ORAL at 06:33

## 2023-08-14 RX ADMIN — Medication 40 MILLIGRAM(S): at 07:42

## 2023-08-14 RX ADMIN — Medication 145 MILLIGRAM(S): at 11:57

## 2023-08-14 RX ADMIN — DULOXETINE HYDROCHLORIDE 30 MILLIGRAM(S): 30 CAPSULE, DELAYED RELEASE ORAL at 11:58

## 2023-08-14 RX ADMIN — APIXABAN 5 MILLIGRAM(S): 2.5 TABLET, FILM COATED ORAL at 06:36

## 2023-08-14 RX ADMIN — Medication 10 MILLIGRAM(S): at 06:34

## 2023-08-14 RX ADMIN — MORPHINE SULFATE 30 MILLIGRAM(S): 50 CAPSULE, EXTENDED RELEASE ORAL at 06:36

## 2023-08-14 RX ADMIN — Medication 100 MILLIGRAM(S): at 14:47

## 2023-08-14 RX ADMIN — Medication 100 MILLIGRAM(S): at 21:48

## 2023-08-14 RX ADMIN — Medication 10 MILLIGRAM(S): at 15:36

## 2023-08-14 RX ADMIN — Medication 40 MILLIGRAM(S): at 06:34

## 2023-08-14 RX ADMIN — Medication 2: at 08:11

## 2023-08-14 RX ADMIN — SIMVASTATIN 20 MILLIGRAM(S): 20 TABLET, FILM COATED ORAL at 21:50

## 2023-08-14 RX ADMIN — Medication 2: at 17:16

## 2023-08-14 NOTE — CONSULT NOTE ADULT - ASSESSMENT
Impression:  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 Treated for LLE cellulitis. Stroke code called for lethargy and confusion but o2 saturation 72%, patient with 72 co2. NIHSS 2 for bilateral LE weakness he attributes to lower back pain. CTH without acute intracranial pathology. Patient rapidly improving not a candidate for IV or IA acute stroke intervention. Etiology more likely due to co2 narcosis.     Suggestion:  Follow up CTA  Routine EEG  Seizure precautions  Keep magnesium >2  Medical management as per primary team.   Workup for suspicious neoplasm on CTA

## 2023-08-14 NOTE — CONSULT NOTE ADULT - NS_MD_PANP_GEN_ALL_CORE
Attending and PA/NP shared services statement (NON-critical care): PAST MEDICAL HISTORY:  Heart murmur unknown type

## 2023-08-14 NOTE — CONSULT NOTE ADULT - SUBJECTIVE AND OBJECTIVE BOX
Neurology Consult    Patient is a 67y old  Male who presents with a chief complaint of left leg swelling (14 Aug 2023 11:45)      HPI:  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 Says symptoms started this past Monday as a small "speck" near medial aspect of left knee. Since then, erythema has spread to along inner thigh. mild pain and itchiness. Also says erythema has spread down the leg with some associated swelling. Denies trauma, recent insect bite, new medications, new topicals, recent travel, IV drug use, immobilization, chest pain, abdominal pain, fever, chills, recent travel. Says this has never happened before.       In the ED, T 100, ,79. BNP 1360. XR left tibia/fibula showing diffuse soft tissue swelling. CXR showing some pulmonary vascular congestion.      **Stroke code for AMS, o2 saturation 72% co2 72 on ABG     (11 Aug 2023 16:42)      PAST MEDICAL & SURGICAL HISTORY:  Anxiety      HTN (hypertension)      Diabetes      High cholesterol      Asthma      Chronic low back pain with sciatica, sciatica laterality unspecified, unspecified back pain laterality      Uncomplicated opioid dependence      Syncopal episodes      History of back surgery  x 3          FAMILY HISTORY:  No pertinent family history in first degree relatives        Social History: (-) x 3    Allergies    Originally Entered as [ANGIOEDEMA] reaction to [pineapple] (Unknown)  aspirin (Stomach Upset)  pineapples (Anaphylaxis)  penicillins (Anaphylaxis)    Intolerances        MEDICATIONS  (STANDING):  apixaban 5 milliGRAM(s) Oral two times a day  baclofen 10 milliGRAM(s) Oral every 8 hours  buPROPion XL (24-Hour) . 150 milliGRAM(s) Oral daily  ceFAZolin   IVPB 2000 milliGRAM(s) IV Intermittent every 8 hours  dextrose 5%. 1000 milliLiter(s) (50 mL/Hr) IV Continuous <Continuous>  dextrose 5%. 1000 milliLiter(s) (100 mL/Hr) IV Continuous <Continuous>  dextrose 50% Injectable 25 Gram(s) IV Push once  dextrose 50% Injectable 25 Gram(s) IV Push once  dextrose 50% Injectable 12.5 Gram(s) IV Push once  DULoxetine 30 milliGRAM(s) Oral daily  fenofibrate Tablet 145 milliGRAM(s) Oral daily  furosemide   Injectable 40 milliGRAM(s) IV Push two times a day  glucagon  Injectable 1 milliGRAM(s) IntraMuscular once  insulin lispro (ADMELOG) corrective regimen sliding scale   SubCutaneous three times a day before meals  losartan 100 milliGRAM(s) Oral daily  NIFEdipine IR 10 milliGRAM(s) Oral once  oxybutynin 10 milliGRAM(s) Oral daily  pantoprazole    Tablet 40 milliGRAM(s) Oral before breakfast  simvastatin 20 milliGRAM(s) Oral at bedtime  tamsulosin 0.4 milliGRAM(s) Oral at bedtime    MEDICATIONS  (PRN):  albuterol/ipratropium for Nebulization 3 milliLiter(s) Nebulizer every 6 hours PRN Shortness of Breath and/or Wheezing  ALPRAZolam 1 milliGRAM(s) Oral three times a day PRN anxiety  dextrose Oral Gel 15 Gram(s) Oral once PRN Blood Glucose LESS THAN 70 milliGRAM(s)/deciliter  morphine ER Tablet 30 milliGRAM(s) Oral two times a day PRN pain  oxycodone    5 mG/acetaminophen 325 mG 1 Tablet(s) Oral every 8 hours PRN Severe Pain (7 - 10)      Vital Signs Last 24 Hrs  T(C): 36.3 (14 Aug 2023 12:01), Max: 36.3 (14 Aug 2023 12:01)  T(F): 97.4 (14 Aug 2023 12:01), Max: 97.4 (14 Aug 2023 12:01)  HR: 104 (14 Aug 2023 13:34) (60 - 104)  BP: 183/90 (14 Aug 2023 13:34) (120/62 - 192/88)  BP(mean): --  RR: 18 (14 Aug 2023 12:01) (18 - 18)  SpO2: 97% (14 Aug 2023 12:01) (93% - 97%)    Parameters below as of 14 Aug 2023 05:28  Patient On (Oxygen Delivery Method): room air        Examination:  Cognitive/Language:  The patient is oriented to person, place, time and date.  Recent and remote memory intact.  Fund of knowledge is intact and normal.  Language with normal repetition, comprehension and naming.  Nondysarthric.    Eyes: intact VA, VFF.  EOMI w/o nystagmus, skew or reported double vision.  PERRL.  No ptosis/weakness of eyelid closure.    Face:  Facial sensation normal V1 - 3, no facial asymmetry.    Formal Muscle Strength Testing: (MRC grade R/L) 5/5 UE; 4/5 LE he reports due to lower back pain.    Sensory examination:   Intact to light touch  in all extremities.  Cerebellum:   FTN intact No dysmetria    NIHSS 2    Labs:   CBC Full  -  ( 14 Aug 2023 06:44 )  WBC Count : 6.95 K/uL  RBC Count : 3.94 M/uL  Hemoglobin : 11.0 g/dL  Hematocrit : 36.4 %  Platelet Count - Automated : 285 K/uL  Mean Cell Volume : 92.4 fL  Mean Cell Hemoglobin : 27.9 pg  Mean Cell Hemoglobin Concentration : 30.2 g/dL  Auto Neutrophil # : 4.49 K/uL  Auto Lymphocyte # : 1.51 K/uL  Auto Monocyte # : 0.65 K/uL  Auto Eosinophil # : 0.24 K/uL  Auto Basophil # : 0.04 K/uL  Auto Neutrophil % : 64.5 %  Auto Lymphocyte % : 21.7 %  Auto Monocyte % : 9.4 %  Auto Eosinophil % : 3.5 %  Auto Basophil % : 0.6 %    08-14    139  |  98  |  15  ----------------------------<  108<H>  4.3   |  28  |  0.8    Ca    8.8      14 Aug 2023 06:44  Phos  3.7     08-14  Mg     1.9     08-14    TPro  7.2  /  Alb  3.5  /  TBili  0.3  /  DBili  x   /  AST  9   /  ALT  7   /  AlkPhos  74  08-14    LIVER FUNCTIONS - ( 14 Aug 2023 06:44 )  Alb: 3.5 g/dL / Pro: 7.2 g/dL / ALK PHOS: 74 U/L / ALT: 7 U/L / AST: 9 U/L / GGT: x             Urinalysis Basic - ( 14 Aug 2023 06:44 )    Color: x / Appearance: x / SG: x / pH: x  Gluc: 108 mg/dL / Ketone: x  / Bili: x / Urobili: x   Blood: x / Protein: x / Nitrite: x   Leuk Esterase: x / RBC: x / WBC x   Sq Epi: x / Non Sq Epi: x / Bacteria: x          Neuroimaging:  < from: CT Brain Stroke Protocol (08.14.23 @ 14:27) >  IMPRESSION:    In comparison with the prior CT scan of the brain dated January 28, 2018:    No acute intracranial hemorrhage or acute large territorial infarct.    Stable examination.    Spoke with GREGORY XAVIER NP on 8/14/2023 2:29 PM with readback.    --- End of Report ---    < end of copied text >

## 2023-08-14 NOTE — PROGRESS NOTE ADULT - ASSESSMENT
Case of 67 year old patient, known to have HTN, HLD, DM, COPD (on 4L home O2), depression/anxiety, former smoker, 3 spinal operations (2010), history of DVT/PE s/p thrombolysis (CURRENTLY on eliquis) sent to the ED by his PCP for left leg swelling and redness.      #Left leg erythema/swelling, cellulitis vs lymphangitis vs venous insufficiency  - Symptoms started as a small "speck" on left inner thigh, that has since spread to travel down left leg  - Mild tenderness/itchiness, warmth ; afebrile at home  - T 100 in ED, wbc normal, no SIRS/sepsis  - no recent bug bites, travel, medications, topicals  - prelim duplex negative for DVT (f/u official read)  - d/c bactrim and Vancomycin today as per ID (HIGHLY NEPHROTOXIC)  - as per ID start cefazolin 2gm iv q8 and LLE elevation (pt educated)   - ESR: 53, CRP:120.5    CHF  - see Cardio and prior ECHO  - HFpEF w R Heart component also.  - needs aggressive diuresis, I's and O's & daily weights.    #HTN/HLD  - Hx of DVT/PE  - continue simvastatin, losartan, lasix  - continue home eliquis    #DM  - ISS for now; add basal/bolus PRN  - FS q6    #COPD on 4L  - not in exacerbation  - duonebs PRN  - Rx CHF as above    #hx of spinal operations, pain  - continue home pain meds      DVT ppx: eliquis  GI ppx: protonix  Diet: DASH/TLC  Full code     Case of 67 year old patient, known to have HTN, HLD, DM, COPD (on 4L home O2), depression/anxiety, former smoker, 3 spinal operations (2010), history of DVT/PE s/p thrombolysis (CURRENTLY on eliquis) sent to the ED by his PCP for left leg swelling and redness.      #Left leg erythema/swelling, cellulitis vs lymphangitis vs venous insufficiency  - Symptoms started as a small "speck" on left inner thigh, that has since spread to travel down left leg  - Mild tenderness/itchiness, warmth ; afebrile at home  - T 100 in ED, wbc normal, no SIRS/sepsis  - no recent bug bites, travel, medications, topicals  - prelim duplex negative for DVT (f/u official read)  - d/c bactrim and Vancomycin today as per ID (HIGHLY NEPHROTOXIC)  - as per ID start cefazolin 2gm iv q8 and LLE elevation (pt educated)   - ESR: 53, CRP:120.5    CHF  - see Cardio and prior ECHO  - HFpEF w R Heart component also.  - needs aggressive diuresis, I's and O's & daily weights.  - Ht rhythm irregular on PE- Get EKG and review.    #HTN/HLD  - Hx of DVT/PE  - continue simvastatin, losartan, lasix  - continue home eliquis;- started in May 2023- ? duration.     Ask Vascular Svc to see.     #DM  - ISS for now; add basal/bolus PRN  - FS q6    #COPD on 4L  - not in exacerbation  - duonebs PRN  - Rx CHF as above    #hx of spinal operations, pain  - continue home pain meds      DVT ppx: eliquis  GI ppx: protonix  Diet: DASH/TLC  Full code

## 2023-08-14 NOTE — CHART NOTE - NSCHARTNOTEFT_GEN_A_CORE
I was called by nurse because BP was 192/88. I came and asked the nurse to recheck BP. I went to the room to examine the patient. The nurse rechecked the BP and also checked SaO2 which was in the 70s. Sao2 improved slowly once placed back on oxygen at first via NC on 4L that was increased to 6L and briefly put on non-rebreather. Patient was saturating at 93%. Patient was more lethargic than this morning. Attending was contacted. Stroke code was called. EKG, ABG, CXR ordered. Neuro recommended getting CT head non-contrast. I was called by nurse because BP was 192/88. I came and asked the nurse to recheck BP. I went to the room to examine the patient. The nurse rechecked the BP and also checked SaO2 which was in the 70s. Sao2 improved slowly once placed back on oxygen at first via NC on 4L that was increased to 6L and briefly put on non-rebreather. Patient was saturating at 93%. Patient was more lethargic than this morning. Attending was contacted. Stroke code was called. EKG, ABG, CXR ordered. Neuro recommended getting CT head non-contrast, and CTA head & neck.   ABG showed CO2 of 70, will put on BIPAP

## 2023-08-14 NOTE — PROGRESS NOTE ADULT - ASSESSMENT
Case of 67 year old patient, known to have HTN, HLD, DM, COPD (on 4L home O2), depression/anxiety, former smoker, 3 spinal operations (2010), history of DVT/PE s/p thrombolysis (CURRENTLY on eliquis) sent to the ED by his PCP for left leg swelling and redness.      #Left leg erythema/swelling, cellulitis vs lymphangitis vs venous insufficiency  - Symptoms started as a small "speck" on left inner thigh, that has since spread to travel down left leg  - Mild tenderness/itchiness, warmth ; afebrile at home  - T 100 in ED, wbc normal, no SIRS/sepsis  - no recent bug bites, travel, medications, topicals  - prelim duplex negative for DVT (f/u official read)  - d/c bactrim and Vancomycin today as per ID (HIGHLY NEPHROTOXIC)  - as per ID start cefazolin 2gm iv q8 and LLE elevation (pt educated)   - ESR: 53, CRP:120.5    #HTN/HLD  - Hx of DVT/PE  - continue simvastatin, losartan, lasix  - continue home eliquis    #DM  - ISS for now; add basal/bolus PRN  - FS q6    #COPD on 4L  - not in exacerbation  - duonebs PRN    #hx of spinal operations, pain  - continue home pain meds      DVT ppx: eliquis  GI ppx: protonix  Diet: DASH/TLC  Full code     Case of 67 year old patient, known to have HTN, HLD, DM, COPD (on 4L home O2), depression/anxiety, former smoker, 3 spinal operations (2010), history of DVT/PE s/p thrombolysis (CURRENTLY on eliquis) sent to the ED by his PCP for left leg swelling and redness.    #AMS   - patient more lethargic, acute change in mental status from morning  - Stroke Code Called  - CT Head (-)  - ABG pCO2 of 72  - Placed on BiPAP  - f/u repeat ABG    #Left leg erythema/swelling, cellulitis vs lymphangitis vs venous insufficiency  - Symptoms started as a small "speck" on left inner thigh, that has since spread to travel down left leg  - Mild tenderness/itchiness, warmth ; afebrile at home  - T 100 in ED, wbc normal, no SIRS/sepsis  - no recent bug bites, travel, medications, topicals  - prelim duplex negative for DVT (f/u official read)  - d/c bactrim and Vancomycin today as per ID (HIGHLY NEPHROTOXIC)  - as per ID start cefazolin 2gm iv q8 and LLE elevation (pt educated)   - ESR: 53, CRP:120.5    #HTN/HLD  - Hx of DVT/PE  - continue simvastatin, losartan, lasix  - continue home eliquis    #DM  - ISS for now; add basal/bolus PRN  - FS q6    #COPD on 4L  - not in exacerbation  - duonebs PRN    #hx of spinal operations, pain  - continue home pain meds      DVT ppx: eliquis  GI ppx: protonix  Diet: DASH/TLC  Full code    Pending: repeat ABG (8pm),

## 2023-08-14 NOTE — PROGRESS NOTE ADULT - SUBJECTIVE AND OBJECTIVE BOX
24H events:    Patient is a 67y old Male who presents with a chief complaint of left leg swelling (14 Aug 2023 08:11)    Primary diagnosis of Cellulitis      Today is hospital day 3d. This morning patient was seen and examined at bedside, resting comfortably in bed.    No acute or major events overnight.    Code Status:    Family communication:  Contact date:  Name of person contacted:  Relationship to patient:  Communication details:  What matters most:    PAST MEDICAL & SURGICAL HISTORY  Anxiety    HTN (hypertension)    Diabetes    High cholesterol    Asthma    Chronic low back pain with sciatica, sciatica laterality unspecified, unspecified back pain laterality    Uncomplicated opioid dependence    Syncopal episodes    History of back surgery  x 3      SOCIAL HISTORY:  Social History:  Former smoker (11 Aug 2023 16:42)      ALLERGIES:  Originally Entered as [ANGIOEDEMA] reaction to [pineapple] (Unknown)  aspirin (Stomach Upset)  pineapples (Anaphylaxis)  penicillins (Anaphylaxis)    MEDICATIONS:  STANDING MEDICATIONS  apixaban 5 milliGRAM(s) Oral two times a day  baclofen 10 milliGRAM(s) Oral every 8 hours  buPROPion XL (24-Hour) . 150 milliGRAM(s) Oral daily  ceFAZolin   IVPB 2000 milliGRAM(s) IV Intermittent every 8 hours  dextrose 5%. 1000 milliLiter(s) IV Continuous <Continuous>  dextrose 5%. 1000 milliLiter(s) IV Continuous <Continuous>  dextrose 50% Injectable 25 Gram(s) IV Push once  dextrose 50% Injectable 25 Gram(s) IV Push once  dextrose 50% Injectable 12.5 Gram(s) IV Push once  DULoxetine 30 milliGRAM(s) Oral daily  fenofibrate Tablet 145 milliGRAM(s) Oral daily  furosemide   Injectable 40 milliGRAM(s) IV Push two times a day  glucagon  Injectable 1 milliGRAM(s) IntraMuscular once  insulin lispro (ADMELOG) corrective regimen sliding scale   SubCutaneous three times a day before meals  losartan 100 milliGRAM(s) Oral daily  oxybutynin 10 milliGRAM(s) Oral daily  pantoprazole    Tablet 40 milliGRAM(s) Oral before breakfast  simvastatin 20 milliGRAM(s) Oral at bedtime  tamsulosin 0.4 milliGRAM(s) Oral at bedtime    PRN MEDICATIONS  albuterol/ipratropium for Nebulization 3 milliLiter(s) Nebulizer every 6 hours PRN  ALPRAZolam 1 milliGRAM(s) Oral three times a day PRN  dextrose Oral Gel 15 Gram(s) Oral once PRN  morphine ER Tablet 30 milliGRAM(s) Oral two times a day PRN  oxycodone    5 mG/acetaminophen 325 mG 1 Tablet(s) Oral every 8 hours PRN    VITALS:   T(F): 97.2  HR: 60  BP: 120/62  RR: 18  SpO2: 93%    PHYSICAL EXAM:  GENERAL:   (x  ) NAD, lying in bed comfortably     (  ) obtunded     (  ) lethargic     (  ) somnolent    HEAD:   ( x ) Atraumatic     (  ) hematoma     (  ) laceration (specify location:       )     NECK:  ( x ) Supple     (  ) neck stiffness     (  ) nuchal rigidity     (  )  no JVD     (  ) JVD present ( -- cm)    HEART:  Rate -->     (x  ) normal rate     (  ) bradycardic     (  ) tachycardic  Rhythm -->     (x  ) regular     (  ) regularly irregular     (  ) irregularly irregular  Murmurs -->     ( x ) normal s1s2     (  ) systolic murmur     (  ) diastolic murmur     (  ) continuous murmur      (  ) S3 present     (  ) S4 present    LUNGS:   (  )Unlabored respirations     (  ) tachypnea  (  ) B/L air entry     (  ) decreased breath sounds in:  (location     )    (  ) no adventitious sound     (  ) crackles     (  ) wheezing      (  ) rhonchi      (specify location:       )  (  ) chest wall tenderness (specify location:       )    ABDOMEN:   (  ) Soft     (  ) tense   |   (  ) nondistended     (  ) distended   |   (  ) +BS     (  ) hypoactive bowel sounds     (  ) hyperactive bowel sounds  (  ) nontender     (  ) RUQ tenderness     (  ) RLQ tenderness     (  ) LLQ tenderness     (  ) epigastric tenderness     (  ) diffuse tenderness  (  ) Splenomegaly      (  ) Hepatomegaly      (  ) Jaundice     (  ) ecchymosis     EXTREMITIES: 3+ pitting edema b/l. R leg erythema beyond knee  (  ) Normal     (  ) Rash     (  ) ecchymosis     (  ) varicose veins      (  ) pitting edema     (  ) non-pitting edema   (  ) ulceration     (  ) gangrene:     (location:     )    NERVOUS SYSTEM:    (  ) A&Ox3     (  ) confused     (  ) lethargic  CN II-XII:     (  ) Intact     (  ) deficits found     (Specify:     )   Upper extremities:     (  ) no sensorimotor deficits     (  ) weakness     (  ) loss of proprioception/vibration     (  ) loss of touch/temperature (specify:    )  Lower extremities:     (  ) no sensorimotor deficits     (  ) weakness     (  ) loss of proprioception/vibration     (  ) loss of touch/temperature (specify:    )    SKIN:   (  ) No rashes or lesions     (  ) maculopapular rash     (  ) pustules     (  ) vesicles     (  ) ulcer     (  ) ecchymosis     (specify location:     )    AMPAC score:    (  ) Indwelling Quintana Catheter:   Date insterted:    Reason (  ) Critical illness     (  ) urinary retention    (  ) Accurate Ins/Outs Monitoring     (  ) CMO patient    (  ) Central Line:   Date inserted:  Location: (  ) Right IJ     (  ) Left IJ     (  ) Right Fem     (  ) Left Fem    (  ) SPC        (  ) pigtail       (  ) PEG tube       (  ) colostomy       (  ) jejunostomy  (  ) U-Dall    LABS:                        11.0   6.95  )-----------( 285      ( 14 Aug 2023 06:44 )             36.4     08-14    139  |  98  |  15  ----------------------------<  108<H>  4.3   |  28  |  0.8    Ca    8.8      14 Aug 2023 06:44  Phos  3.7     08-14  Mg     1.9     08-14    TPro  7.2  /  Alb  3.5  /  TBili  0.3  /  DBili  x   /  AST  9   /  ALT  7   /  AlkPhos  74  08-14      Urinalysis Basic - ( 14 Aug 2023 06:44 )    Color: x / Appearance: x / SG: x / pH: x  Gluc: 108 mg/dL / Ketone: x  / Bili: x / Urobili: x   Blood: x / Protein: x / Nitrite: x   Leuk Esterase: x / RBC: x / WBC x   Sq Epi: x / Non Sq Epi: x / Bacteria: x            Culture - Blood (collected 11 Aug 2023 13:46)  Source: .Blood Blood  Preliminary Report (13 Aug 2023 23:02):    No growth at 48 Hours    Culture - Blood (collected 11 Aug 2023 13:46)  Source: .Blood Blood  Preliminary Report (13 Aug 2023 23:02):    No growth at 48 Hours          RADIOLOGY:           24H events:    Patient is a 67y old Male who presents with a chief complaint of left leg swelling (14 Aug 2023 08:11)    Primary diagnosis of Cellulitis      Today is hospital day 3d. This morning patient was seen and examined at bedside, resting comfortably in bed.    No acute or major events overnight.   This morning *08/14/23* *Stroke code for AMS, o2 saturation 72% co2 72 on ABG      Code Status:    Family communication:  Contact date:  Name of person contacted:  Relationship to patient:  Communication details:  What matters most:    PAST MEDICAL & SURGICAL HISTORY  Anxiety    HTN (hypertension)    Diabetes    High cholesterol    Asthma    Chronic low back pain with sciatica, sciatica laterality unspecified, unspecified back pain laterality    Uncomplicated opioid dependence    Syncopal episodes    History of back surgery  x 3      SOCIAL HISTORY:  Social History:  Former smoker (11 Aug 2023 16:42)      ALLERGIES:  Originally Entered as [ANGIOEDEMA] reaction to [pineapple] (Unknown)  aspirin (Stomach Upset)  pineapples (Anaphylaxis)  penicillins (Anaphylaxis)    MEDICATIONS:  STANDING MEDICATIONS  apixaban 5 milliGRAM(s) Oral two times a day  baclofen 10 milliGRAM(s) Oral every 8 hours  buPROPion XL (24-Hour) . 150 milliGRAM(s) Oral daily  ceFAZolin   IVPB 2000 milliGRAM(s) IV Intermittent every 8 hours  dextrose 5%. 1000 milliLiter(s) IV Continuous <Continuous>  dextrose 5%. 1000 milliLiter(s) IV Continuous <Continuous>  dextrose 50% Injectable 25 Gram(s) IV Push once  dextrose 50% Injectable 25 Gram(s) IV Push once  dextrose 50% Injectable 12.5 Gram(s) IV Push once  DULoxetine 30 milliGRAM(s) Oral daily  fenofibrate Tablet 145 milliGRAM(s) Oral daily  furosemide   Injectable 40 milliGRAM(s) IV Push two times a day  glucagon  Injectable 1 milliGRAM(s) IntraMuscular once  insulin lispro (ADMELOG) corrective regimen sliding scale   SubCutaneous three times a day before meals  losartan 100 milliGRAM(s) Oral daily  oxybutynin 10 milliGRAM(s) Oral daily  pantoprazole    Tablet 40 milliGRAM(s) Oral before breakfast  simvastatin 20 milliGRAM(s) Oral at bedtime  tamsulosin 0.4 milliGRAM(s) Oral at bedtime    PRN MEDICATIONS  albuterol/ipratropium for Nebulization 3 milliLiter(s) Nebulizer every 6 hours PRN  ALPRAZolam 1 milliGRAM(s) Oral three times a day PRN  dextrose Oral Gel 15 Gram(s) Oral once PRN  morphine ER Tablet 30 milliGRAM(s) Oral two times a day PRN  oxycodone    5 mG/acetaminophen 325 mG 1 Tablet(s) Oral every 8 hours PRN    VITALS:   T(F): 97.2  HR: 60  BP: 120/62  RR: 18  SpO2: 93%    PHYSICAL EXAM:  GENERAL:   (x  ) NAD, lying in bed comfortably     (  ) obtunded     (  ) lethargic     (  ) somnolent    HEAD:   ( x ) Atraumatic     (  ) hematoma     (  ) laceration (specify location:       )     NECK:  ( x ) Supple     (  ) neck stiffness     (  ) nuchal rigidity     (  )  no JVD     (  ) JVD present ( -- cm)    HEART:  Rate -->     (x  ) normal rate     (  ) bradycardic     (  ) tachycardic  Rhythm -->     (x  ) regular     (  ) regularly irregular     (  ) irregularly irregular  Murmurs -->     ( x ) normal s1s2     (  ) systolic murmur     (  ) diastolic murmur     (  ) continuous murmur      (  ) S3 present     (  ) S4 present    LUNGS:   (  )Unlabored respirations     (  ) tachypnea  (  ) B/L air entry     (  ) decreased breath sounds in:  (location     )    (  ) no adventitious sound     (  ) crackles     (  ) wheezing      (  ) rhonchi      (specify location:       )  (  ) chest wall tenderness (specify location:       )    ABDOMEN:   (  ) Soft     (  ) tense   |   (  ) nondistended     (  ) distended   |   (  ) +BS     (  ) hypoactive bowel sounds     (  ) hyperactive bowel sounds  (  ) nontender     (  ) RUQ tenderness     (  ) RLQ tenderness     (  ) LLQ tenderness     (  ) epigastric tenderness     (  ) diffuse tenderness  (  ) Splenomegaly      (  ) Hepatomegaly      (  ) Jaundice     (  ) ecchymosis     EXTREMITIES: 3+ pitting edema RLE, 2+ on LLE. R leg with erythema extending up medial leg above knee  (  ) Normal     (  ) Rash     (  ) ecchymosis     (  ) varicose veins      (  ) pitting edema     (  ) non-pitting edema   (  ) ulceration     (  ) gangrene:     (location:     )    NERVOUS SYSTEM:    (  ) A&Ox3     (  ) confused     (  ) lethargic  CN II-XII:     (  ) Intact     (  ) deficits found     (Specify:     )   Upper extremities:     (  ) no sensorimotor deficits     (  ) weakness     (  ) loss of proprioception/vibration     (  ) loss of touch/temperature (specify:    )  Lower extremities:     (  ) no sensorimotor deficits     (  ) weakness     (  ) loss of proprioception/vibration     (  ) loss of touch/temperature (specify:    )    SKIN:   (  ) No rashes or lesions     (  ) maculopapular rash     (  ) pustules     (  ) vesicles     (  ) ulcer     (  ) ecchymosis     (specify location:     )    AMPAC score:    (  ) Indwelling Quintana Catheter:   Date insterted:    Reason (  ) Critical illness     (  ) urinary retention    (  ) Accurate Ins/Outs Monitoring     (  ) CMO patient    (  ) Central Line:   Date inserted:  Location: (  ) Right IJ     (  ) Left IJ     (  ) Right Fem     (  ) Left Fem    (  ) SPC        (  ) pigtail       (  ) PEG tube       (  ) colostomy       (  ) jejunostomy  (  ) U-Dall    LABS:                        11.0   6.95  )-----------( 285      ( 14 Aug 2023 06:44 )             36.4     08-14    139  |  98  |  15  ----------------------------<  108<H>  4.3   |  28  |  0.8    Ca    8.8      14 Aug 2023 06:44  Phos  3.7     08-14  Mg     1.9     08-14    TPro  7.2  /  Alb  3.5  /  TBili  0.3  /  DBili  x   /  AST  9   /  ALT  7   /  AlkPhos  74  08-14      Urinalysis Basic - ( 14 Aug 2023 06:44 )    Color: x / Appearance: x / SG: x / pH: x  Gluc: 108 mg/dL / Ketone: x  / Bili: x / Urobili: x   Blood: x / Protein: x / Nitrite: x   Leuk Esterase: x / RBC: x / WBC x   Sq Epi: x / Non Sq Epi: x / Bacteria: x            Culture - Blood (collected 11 Aug 2023 13:46)  Source: .Blood Blood  Preliminary Report (13 Aug 2023 23:02):    No growth at 48 Hours    Culture - Blood (collected 11 Aug 2023 13:46)  Source: .Blood Blood  Preliminary Report (13 Aug 2023 23:02):    No growth at 48 Hours          RADIOLOGY:  < from: CT Angio Neck w/ IV Cont (08.14.23 @ 14:41) >  1.  No significant vascular stenosis or large vessel occlusion.    2.  Multiple findings in both lungs with the largest lesion in the right   lung measuring at least 2.8 cm in maximum diameter, further evaluation   with a CT scan of the chest is recommended to exclude neoplasm.    Spoke with GREGORY XAVIER NP on 8/14/2023 2:54 PM with readback.      < end of copied text >    < from: CT Brain Stroke Protocol (08.14.23 @ 14:27) >  In comparison with the prior CT scan of the brain dated January 28, 2018:    No acute intracranial hemorrhage or acute large territorial infarct.    Stable examination.    Spoke with GREGORY XAVIER NP on 8/14/2023 2:29 PM with readback.      < end of copied text >    [facility] Duplex Lower Ext Vein Scan, Bilat (08.11.23)   Chronic deep venous thrombosis seen in the right popliteal and   gastrocnemius veins. No evidence of deep venous thrombosis in the left   lower extremity. Interstitial edema seen in the bilateral legs limiting   visualization of the tibial veins.   24H events:    Patient is a 67y old Male who presents with a chief complaint of left leg swelling (14 Aug 2023 08:11)    Primary diagnosis of Cellulitis      Today is hospital day 3d. This morning patient was seen and examined at bedside, resting comfortably in bed.    No acute or major events overnight.   This morning *08/14/23* *Stroke code for AMS      Code Status:    Family communication:  Contact date:  Name of person contacted:  Relationship to patient:  Communication details:  What matters most:    PAST MEDICAL & SURGICAL HISTORY  Anxiety    HTN (hypertension)    Diabetes    High cholesterol    Asthma    Chronic low back pain with sciatica, sciatica laterality unspecified, unspecified back pain laterality    Uncomplicated opioid dependence    Syncopal episodes    History of back surgery  x 3      SOCIAL HISTORY:  Social History:  Former smoker (11 Aug 2023 16:42)      ALLERGIES:  Originally Entered as [ANGIOEDEMA] reaction to [pineapple] (Unknown)  aspirin (Stomach Upset)  pineapples (Anaphylaxis)  penicillins (Anaphylaxis)    MEDICATIONS:  STANDING MEDICATIONS  apixaban 5 milliGRAM(s) Oral two times a day  baclofen 10 milliGRAM(s) Oral every 8 hours  buPROPion XL (24-Hour) . 150 milliGRAM(s) Oral daily  ceFAZolin   IVPB 2000 milliGRAM(s) IV Intermittent every 8 hours  dextrose 5%. 1000 milliLiter(s) IV Continuous <Continuous>  dextrose 5%. 1000 milliLiter(s) IV Continuous <Continuous>  dextrose 50% Injectable 25 Gram(s) IV Push once  dextrose 50% Injectable 25 Gram(s) IV Push once  dextrose 50% Injectable 12.5 Gram(s) IV Push once  DULoxetine 30 milliGRAM(s) Oral daily  fenofibrate Tablet 145 milliGRAM(s) Oral daily  furosemide   Injectable 40 milliGRAM(s) IV Push two times a day  glucagon  Injectable 1 milliGRAM(s) IntraMuscular once  insulin lispro (ADMELOG) corrective regimen sliding scale   SubCutaneous three times a day before meals  losartan 100 milliGRAM(s) Oral daily  oxybutynin 10 milliGRAM(s) Oral daily  pantoprazole    Tablet 40 milliGRAM(s) Oral before breakfast  simvastatin 20 milliGRAM(s) Oral at bedtime  tamsulosin 0.4 milliGRAM(s) Oral at bedtime    PRN MEDICATIONS  albuterol/ipratropium for Nebulization 3 milliLiter(s) Nebulizer every 6 hours PRN  ALPRAZolam 1 milliGRAM(s) Oral three times a day PRN  dextrose Oral Gel 15 Gram(s) Oral once PRN  morphine ER Tablet 30 milliGRAM(s) Oral two times a day PRN  oxycodone    5 mG/acetaminophen 325 mG 1 Tablet(s) Oral every 8 hours PRN    VITALS:   T(F): 97.2  HR: 60  BP: 120/62  RR: 18  SpO2: 93%    PHYSICAL EXAM:  GENERAL:   (x  ) NAD, lying in bed comfortably     (  ) obtunded     (  ) lethargic     (  ) somnolent    HEAD:   ( x ) Atraumatic     (  ) hematoma     (  ) laceration (specify location:       )     NECK:  ( x ) Supple     (  ) neck stiffness     (  ) nuchal rigidity     (  )  no JVD     (  ) JVD present ( -- cm)    HEART:  Rate -->     (x  ) normal rate     (  ) bradycardic     (  ) tachycardic  Rhythm -->     (x  ) regular     (  ) regularly irregular     (  ) irregularly irregular  Murmurs -->     ( x ) normal s1s2     (  ) systolic murmur     (  ) diastolic murmur     (  ) continuous murmur      (  ) S3 present     (  ) S4 present    LUNGS:   (  )Unlabored respirations     (  ) tachypnea  (  ) B/L air entry     (  ) decreased breath sounds in:  (location     )    (  ) no adventitious sound     (  ) crackles     (  ) wheezing      (  ) rhonchi      (specify location:       )  (  ) chest wall tenderness (specify location:       )    ABDOMEN:   (  ) Soft     (  ) tense   |   (  ) nondistended     (  ) distended   |   (  ) +BS     (  ) hypoactive bowel sounds     (  ) hyperactive bowel sounds  (  ) nontender     (  ) RUQ tenderness     (  ) RLQ tenderness     (  ) LLQ tenderness     (  ) epigastric tenderness     (  ) diffuse tenderness  (  ) Splenomegaly      (  ) Hepatomegaly      (  ) Jaundice     (  ) ecchymosis     EXTREMITIES: 3+ pitting edema RLE, 2+ on LLE. R leg with erythema extending up medial leg above knee  (  ) Normal     (  ) Rash     (  ) ecchymosis     (  ) varicose veins      (  ) pitting edema     (  ) non-pitting edema   (  ) ulceration     (  ) gangrene:     (location:     )    NERVOUS SYSTEM:    (  ) A&Ox3     (  ) confused     (  ) lethargic  CN II-XII:     (  ) Intact     (  ) deficits found     (Specify:     )   Upper extremities:     (  ) no sensorimotor deficits     (  ) weakness     (  ) loss of proprioception/vibration     (  ) loss of touch/temperature (specify:    )  Lower extremities:     (  ) no sensorimotor deficits     (  ) weakness     (  ) loss of proprioception/vibration     (  ) loss of touch/temperature (specify:    )    SKIN:   (  ) No rashes or lesions     (  ) maculopapular rash     (  ) pustules     (  ) vesicles     (  ) ulcer     (  ) ecchymosis     (specify location:     )    AMPAC score:    (  ) Indwelling Quintana Catheter:   Date insterted:    Reason (  ) Critical illness     (  ) urinary retention    (  ) Accurate Ins/Outs Monitoring     (  ) CMO patient    (  ) Central Line:   Date inserted:  Location: (  ) Right IJ     (  ) Left IJ     (  ) Right Fem     (  ) Left Fem    (  ) SPC        (  ) pigtail       (  ) PEG tube       (  ) colostomy       (  ) jejunostomy  (  ) U-Dall    LABS:                        11.0   6.95  )-----------( 285      ( 14 Aug 2023 06:44 )             36.4     08-14    139  |  98  |  15  ----------------------------<  108<H>  4.3   |  28  |  0.8    Ca    8.8      14 Aug 2023 06:44  Phos  3.7     08-14  Mg     1.9     08-14    TPro  7.2  /  Alb  3.5  /  TBili  0.3  /  DBili  x   /  AST  9   /  ALT  7   /  AlkPhos  74  08-14      Urinalysis Basic - ( 14 Aug 2023 06:44 )    Color: x / Appearance: x / SG: x / pH: x  Gluc: 108 mg/dL / Ketone: x  / Bili: x / Urobili: x   Blood: x / Protein: x / Nitrite: x   Leuk Esterase: x / RBC: x / WBC x   Sq Epi: x / Non Sq Epi: x / Bacteria: x            Culture - Blood (collected 11 Aug 2023 13:46)  Source: .Blood Blood  Preliminary Report (13 Aug 2023 23:02):    No growth at 48 Hours    Culture - Blood (collected 11 Aug 2023 13:46)  Source: .Blood Blood  Preliminary Report (13 Aug 2023 23:02):    No growth at 48 Hours          RADIOLOGY:  < from: CT Angio Neck w/ IV Cont (08.14.23 @ 14:41) >  1.  No significant vascular stenosis or large vessel occlusion.    2.  Multiple findings in both lungs with the largest lesion in the right   lung measuring at least 2.8 cm in maximum diameter, further evaluation   with a CT scan of the chest is recommended to exclude neoplasm.    Spoke with GREGORY XAVIER NP on 8/14/2023 2:54 PM with readback.      < end of copied text >    < from: CT Brain Stroke Protocol (08.14.23 @ 14:27) >  In comparison with the prior CT scan of the brain dated January 28, 2018:    No acute intracranial hemorrhage or acute large territorial infarct.    Stable examination.    Spoke with GREGORY XAVIER NP on 8/14/2023 2:29 PM with readback.      < end of copied text >    [facility] Duplex Lower Ext Vein Scan, Bilat (08.11.23)   Chronic deep venous thrombosis seen in the right popliteal and   gastrocnemius veins. No evidence of deep venous thrombosis in the left   lower extremity. Interstitial edema seen in the bilateral legs limiting   visualization of the tibial veins.

## 2023-08-14 NOTE — STROKE CODE NOTE - IV ALTEPLASE EXCLUSION ABS OTHER
Patient with NIHSS 2 for bilateral LE weakness in the presence of lethargy, not a candidate for IV thrombolytics. CO2 >70 likely contributing to lethargy. Patient rapidly improving and NIHSS 2 not a candidate for IA intervention.

## 2023-08-14 NOTE — PROGRESS NOTE ADULT - SUBJECTIVE AND OBJECTIVE BOX
Chart reviewed, patient examined. Pertinent results reviewed.  Case discussed with HO; specialist f/u reviewed  HD#4    Patient is a 67y old Male who presents with a chief complaint of left leg swelling and redness. Patient also w chronic SOB.    Primary diagnosis of Cellulitis    This morning patient was seen and examined at bedside, resting comfortably in bed.    No acute or major events overnight.        PAST MEDICAL & SURGICAL HISTORY  Anxiety    HTN (hypertension)    Diabetes    High cholesterol    Asthma    Chronic low back pain with sciatica, sciatica laterality unspecified, unspecified back pain laterality    Uncomplicated opioid dependence    Syncopal episodes    History of back surgery  x 3      SOCIAL HISTORY:  Social History:  Former smoker (11 Aug 2023 16:42)      ALLERGIES:  Originally Entered as [ANGIOEDEMA] reaction to [pineapple] (Unknown)  aspirin (Stomach Upset)  pineapples (Anaphylaxis)  penicillins (Anaphylaxis)    MEDICATIONS:  STANDING MEDICATIONS  apixaban 5 milliGRAM(s) Oral two times a day  baclofen 10 milliGRAM(s) Oral every 8 hours  buPROPion XL (24-Hour) . 150 milliGRAM(s) Oral daily  ceFAZolin   IVPB 2000 milliGRAM(s) IV Intermittent every 8 hours  dextrose 5%. 1000 milliLiter(s) IV Continuous <Continuous>  dextrose 5%. 1000 milliLiter(s) IV Continuous <Continuous>  dextrose 50% Injectable 25 Gram(s) IV Push once  dextrose 50% Injectable 12.5 Gram(s) IV Push once  dextrose 50% Injectable 25 Gram(s) IV Push once  DULoxetine 30 milliGRAM(s) Oral daily  fenofibrate Tablet 145 milliGRAM(s) Oral daily  furosemide    Tablet 40 milliGRAM(s) Oral daily  glucagon  Injectable 1 milliGRAM(s) IntraMuscular once  insulin lispro (ADMELOG) corrective regimen sliding scale   SubCutaneous three times a day before meals  losartan 100 milliGRAM(s) Oral daily  oxybutynin 10 milliGRAM(s) Oral daily  pantoprazole    Tablet 40 milliGRAM(s) Oral before breakfast  simvastatin 20 milliGRAM(s) Oral at bedtime  tamsulosin 0.4 milliGRAM(s) Oral at bedtime    PRN MEDICATIONS  albuterol/ipratropium for Nebulization 3 milliLiter(s) Nebulizer every 6 hours PRN  ALPRAZolam 1 milliGRAM(s) Oral three times a day PRN  dextrose Oral Gel 15 Gram(s) Oral once PRN  morphine ER Tablet 30 milliGRAM(s) Oral two times a day PRN  oxycodone    5 mG/acetaminophen 325 mG 1 Tablet(s) Oral every 8 hours PRN    VITALS:   Vital Signs Last 24 Hrs  T(C): 36.2 (14 Aug 2023 05:28), Max: 37 (13 Aug 2023 13:09)  T(F): 97.2 (14 Aug 2023 05:28), Max: 98.6 (13 Aug 2023 13:09)  HR: 60 (14 Aug 2023 05:28) (60 - 79)  BP: 120/62 (14 Aug 2023 05:28) (120/62 - 129/69)  BP(mean): --  RR: 18 (14 Aug 2023 05:28) (18 - 18)  SpO2: 93% (14 Aug 2023 05:28) (93% - 96%)    Parameters below as of 14 Aug 2023 05:28  Patient On (Oxygen Delivery Method): room air      PHYSICAL EXAM:  GENERAL:   ( X ) NAD, lying in bed comfortably     (  ) obtunded     (  ) lethargic     (  ) somnolent    HEAD:   ( X ) Atraumatic     (  ) hematoma     (  ) laceration (specify location:       )     NECK:  (X  ) Supple     (  ) neck stiffness     (  ) nuchal rigidity     (  )  no JVD     (  ) JVD present ( -- cm)    HEART:  Rate -->     ( X ) normal rate     (  ) bradycardic     (  ) tachycardic  Rhythm -->     ( X ) regular     (  ) regularly irregular     (  ) irregularly irregular  Murmurs -->     ( X ) normal s1s2     (  ) systolic murmur     (  ) diastolic murmur     (  ) continuous murmur      (  ) S3 present     (  ) S4 present    LUNGS:   (  )Unlabored respirations     (  ) tachypnea  ( X ) B/L air entry     (  ) decreased breath sounds in:  (location     )    (X  ) no adventitious sound     (  ) crackles     (  ) wheezing      (  ) rhonchi      (specify location:       )  (  ) chest wall tenderness (specify location:       )    ABDOMEN:   ( X ) Soft     (  ) tense   |   (  ) nondistended     (  ) distended   |   (X  ) +BS     (  ) hypoactive bowel sounds     (  ) hyperactive bowel sounds  ( X ) nontender     (  ) RUQ tenderness     (  ) RLQ tenderness     (  ) LLQ tenderness     (  ) epigastric tenderness     (  ) diffuse tenderness  (  ) Splenomegaly      (  ) Hepatomegaly      (  ) Jaundice     (  ) ecchymosis     EXTREMITIES:  (  ) Normal     (  ) Rash     (  ) ecchymosis     (  ) varicose veins      (  ) pitting edema     (  ) non-pitting edema   (  ) ulceration     (  ) gangrene:     (location:     )  LLE redness,warmth and tender to palpation (below knee upto ankle)    NERVOUS SYSTEM:    ( X ) A&Ox3     (  ) confused     (  ) lethargic  CN II-XII:     (  ) Intact     (  ) deficits found     (Specify:     )   Upper extremities:     (  ) no sensorimotor deficits     (  ) weakness     (  ) loss of proprioception/vibration     (  ) loss of touch/temperature (specify:    )  Lower extremities:     (  ) no sensorimotor deficits     (  ) weakness     (  ) loss of proprioception/vibration     (  ) loss of touch/temperature (specify:    )    SKIN:   ( X ) No rashes or lesions     (  ) maculopapular rash     (  ) pustules     (  ) vesicles     (  ) ulcer     (  ) ecchymosis     (specify location:     )    AMPAC score:    (  ) Indwelling Quintana Catheter:   Date insterted:    Reason (  ) Critical illness     (  ) urinary retention    (  ) Accurate Ins/Outs Monitoring     (  ) CMO patient    (  ) Central Line:   Date inserted:  Location: (  ) Right IJ     (  ) Left IJ     (  ) Right Fem     (  ) Left Fem    (  ) SPC        (  ) pigtail       (  ) PEG tube       (  ) colostomy       (  ) jejunostomy  (  ) U-Dall    LABS:                        11.5   7.98  )-----------( 262      ( 13 Aug 2023 08:19 )             36.9     08-13    142  |  100  |  15  ----------------------------<  108<H>  4.3   |  30  |  0.7    Ca    9.0      13 Aug 2023 08:19  Mg     1.7     08-12    TPro  7.2  /  Alb  3.4<L>  /  TBili  0.4  /  DBili  x   /  AST  8   /  ALT  7   /  AlkPhos  74  08-13      Urinalysis Basic - ( 13 Aug 2023 08:19 )    Color: x / Appearance: x / SG: x / pH: x  Gluc: 108 mg/dL / Ketone: x  / Bili: x / Urobili: x   Blood: x / Protein: x / Nitrite: x   Leuk Esterase: x / RBC: x / WBC x   Sq Epi: x / Non Sq Epi: x / Bacteria: x            Culture - Blood (collected 11 Aug 2023 13:46)  Source: .Blood Blood  Preliminary Report (12 Aug 2023 23:01):    No growth at 24 hours    Culture - Blood (collected 11 Aug 2023 13:46)  Source: .Blood Blood  Preliminary Report (12 Aug 2023 23:01):    No growth at 24 hours          RADIOLOGY:    VA Duplex Lower Ext Vein Scan, Bilat (08.11.23)   Chronic deep venous thrombosis seen in the right popliteal and   gastrocnemius veins. No evidence of deep venous thrombosis in the left   lower extremity. Interstitial edema seen in the bilateral legs limiting   visualization of the tibial veins.           Chart reviewed, patient examined. Pertinent results reviewed.  Case discussed with HO; specialist f/u reviewed  HD#4    Patient is a 67y old Male who presents with a chief complaint of left leg swelling and redness. Patient also w chronic SOB.    Primary diagnosis of Cellulitis    This morning patient was seen and examined at bedside, resting comfortably in bed.    No acute or major events overnight.        PAST MEDICAL & SURGICAL HISTORY  Anxiety    HTN (hypertension)    Diabetes    High cholesterol    Asthma    Chronic low back pain with sciatica, sciatica laterality unspecified, unspecified back pain laterality    Uncomplicated opioid dependence    Syncopal episodes    History of back surgery  x 3      SOCIAL HISTORY:  Social History:  Former smoker (11 Aug 2023 16:42)      ALLERGIES:  Originally Entered as [ANGIOEDEMA] reaction to [pineapple] (Unknown)  aspirin (Stomach Upset)  pineapples (Anaphylaxis)  penicillins (Anaphylaxis)    MEDICATIONS:  STANDING MEDICATIONS  apixaban 5 milliGRAM(s) Oral two times a day  baclofen 10 milliGRAM(s) Oral every 8 hours  buPROPion XL (24-Hour) . 150 milliGRAM(s) Oral daily  ceFAZolin   IVPB 2000 milliGRAM(s) IV Intermittent every 8 hours  dextrose 5%. 1000 milliLiter(s) IV Continuous <Continuous>  dextrose 5%. 1000 milliLiter(s) IV Continuous <Continuous>  dextrose 50% Injectable 25 Gram(s) IV Push once  dextrose 50% Injectable 12.5 Gram(s) IV Push once  dextrose 50% Injectable 25 Gram(s) IV Push once  DULoxetine 30 milliGRAM(s) Oral daily  fenofibrate Tablet 145 milliGRAM(s) Oral daily  furosemide    Tablet 40 milliGRAM(s) Oral daily  glucagon  Injectable 1 milliGRAM(s) IntraMuscular once  insulin lispro (ADMELOG) corrective regimen sliding scale   SubCutaneous three times a day before meals  losartan 100 milliGRAM(s) Oral daily  oxybutynin 10 milliGRAM(s) Oral daily  pantoprazole    Tablet 40 milliGRAM(s) Oral before breakfast  simvastatin 20 milliGRAM(s) Oral at bedtime  tamsulosin 0.4 milliGRAM(s) Oral at bedtime    PRN MEDICATIONS  albuterol/ipratropium for Nebulization 3 milliLiter(s) Nebulizer every 6 hours PRN  ALPRAZolam 1 milliGRAM(s) Oral three times a day PRN  dextrose Oral Gel 15 Gram(s) Oral once PRN  morphine ER Tablet 30 milliGRAM(s) Oral two times a day PRN  oxycodone    5 mG/acetaminophen 325 mG 1 Tablet(s) Oral every 8 hours PRN    VITALS:   Vital Signs Last 24 Hrs  T(C): 36.2 (14 Aug 2023 05:28), Max: 37 (13 Aug 2023 13:09)  T(F): 97.2 (14 Aug 2023 05:28), Max: 98.6 (13 Aug 2023 13:09)  HR: 60 (14 Aug 2023 05:28) (60 - 79)  BP: 120/62 (14 Aug 2023 05:28) (120/62 - 129/69)  BP(mean): --  RR: 18 (14 Aug 2023 05:28) (18 - 18)  SpO2: 93% (14 Aug 2023 05:28) (93% - 96%)    Parameters below as of 14 Aug 2023 05:28  Patient On (Oxygen Delivery Method): room air      PHYSICAL EXAM:  GENERAL:   ( X ) NAD, lying in bed comfortably     (  ) obtunded     (  ) lethargic     (  ) somnolent    HEAD:   ( X ) Atraumatic     (  ) hematoma     (  ) laceration (specify location:       )     NECK:  (X  ) Supple     (  ) neck stiffness     (  ) nuchal rigidity     (  )  no JVD     (  ) JVD present ( -- cm)    HEART:  Rate -->     ( X ) normal rate     (  ) bradycardic     (  ) tachycardic  Rhythm -->     (  ) regular     (x  ) regularly irregular     (  ) irregularly irregular; ? bigeminy  Murmurs -->     ( X ) normal s1s2     (  ) systolic murmur     (  ) diastolic murmur     (  ) continuous murmur      (  ) S3 present     (  ) S4 present    LUNGS:   (  )Unlabored respirations     (  ) tachypnea  ( X ) B/L air entry     (  ) decreased breath sounds in:  (location     )    (  ) no adventitious sound     (  ) crackles     (  ) wheezing      ( x  ) rhonchi - R base     (specify location:       )  (  ) chest wall tenderness (specify location:       )    ABDOMEN:   ( X ) Soft     (  ) tense   |   (  ) nondistended     (  ) distended   |   (X  ) +BS     (  ) hypoactive bowel sounds     (  ) hyperactive bowel sounds  ( X ) nontender     (  ) RUQ tenderness     (  ) RLQ tenderness     (  ) LLQ tenderness     (  ) epigastric tenderness     (  ) diffuse tenderness  (  ) Splenomegaly      (  ) Hepatomegaly      (  ) Jaundice     (  ) ecchymosis     EXTREMITIES:  (  ) Normal     (  ) Rash     (  ) ecchymosis     (  ) varicose veins      ( x  ) pitting edema L>RLE     (  ) non-pitting edema   (  ) ulceration     (  ) gangrene:     (location:     )  LLE redness,warmth and tender to palpation (R thigh to med ankle ankle)    NERVOUS SYSTEM:    ( X ) A&Ox3     (  ) confused     (  ) lethargic  CN II-XII:     (  ) Intact     (  ) deficits found     (Specify:     )   Upper extremities:     (  ) no sensorimotor deficits     (  ) weakness     (  ) loss of proprioception/vibration     (  ) loss of touch/temperature (specify:    )  Lower extremities:     (  ) no sensorimotor deficits     (  ) weakness     (  ) loss of proprioception/vibration     (  ) loss of touch/temperature (specify:    )    SKIN:   ( X ) No rashes or lesions     (  ) maculopapular rash     (  ) pustules     (  ) vesicles     (  ) ulcer     (  ) ecchymosis     (specify location:     )    AMPAC score:    (  ) Indwelling Quintana Catheter:   Date insterted:    Reason (  ) Critical illness     (  ) urinary retention    (  ) Accurate Ins/Outs Monitoring     (  ) CMO patient    (  ) Central Line:   Date inserted:  Location: (  ) Right IJ     (  ) Left IJ     (  ) Right Fem     (  ) Left Fem    (  ) SPC        (  ) pigtail       (  ) PEG tube       (  ) colostomy       (  ) jejunostomy  (  ) U-Dall    LABS:                        11.5   7.98  )-----------( 262      ( 13 Aug 2023 08:19 )             36.9     08-13    142  |  100  |  15  ----------------------------<  108<H>  4.3   |  30  |  0.7    Ca    9.0      13 Aug 2023 08:19  Mg     1.7     08-12    TPro  7.2  /  Alb  3.4<L>  /  TBili  0.4  /  DBili  x   /  AST  8   /  ALT  7   /  AlkPhos  74  08-13      Urinalysis Basic - ( 13 Aug 2023 08:19 )    Color: x / Appearance: x / SG: x / pH: x  Gluc: 108 mg/dL / Ketone: x  / Bili: x / Urobili: x   Blood: x / Protein: x / Nitrite: x   Leuk Esterase: x / RBC: x / WBC x   Sq Epi: x / Non Sq Epi: x / Bacteria: x            Culture - Blood (collected 11 Aug 2023 13:46)  Source: .Blood Blood  Preliminary Report (12 Aug 2023 23:01):    No growth at 24 hours    Culture - Blood (collected 11 Aug 2023 13:46)  Source: .Blood Blood  Preliminary Report (12 Aug 2023 23:01):    No growth at 24 hours          RADIOLOGY:    VA Duplex Lower Ext Vein Scan, Bilat (08.11.23)   Chronic deep venous thrombosis seen in the right popliteal and   gastrocnemius veins. No evidence of deep venous thrombosis in the left   lower extremity. Interstitial edema seen in the bilateral legs limiting   visualization of the tibial veins.

## 2023-08-14 NOTE — CONSULT NOTE ADULT - NS PANP COMMENT GEN_ALL_CORE FT
Patient seen and examined and agree with above except as noted.  Patients history, notes ,labs, imaging, vitals and meds reviewed personally.  Stroke code called as patient was confused.  Was off his oxygen with pulse ox in the 70's.  Mental status improved when oxygen was placed on patient  Symptoms improved quickly so not a TNK or intervention candidate    Plan as above

## 2023-08-15 LAB
ALBUMIN SERPL ELPH-MCNC: 3.8 G/DL — SIGNIFICANT CHANGE UP (ref 3.5–5.2)
ALP SERPL-CCNC: 96 U/L — SIGNIFICANT CHANGE UP (ref 30–115)
ALT FLD-CCNC: 7 U/L — SIGNIFICANT CHANGE UP (ref 0–41)
ANION GAP SERPL CALC-SCNC: 11 MMOL/L — SIGNIFICANT CHANGE UP (ref 7–14)
AST SERPL-CCNC: 10 U/L — SIGNIFICANT CHANGE UP (ref 0–41)
BASE EXCESS BLDA CALC-SCNC: 16.2 MMOL/L — HIGH (ref -2–3)
BASOPHILS # BLD AUTO: 0.06 K/UL — SIGNIFICANT CHANGE UP (ref 0–0.2)
BASOPHILS NFR BLD AUTO: 0.5 % — SIGNIFICANT CHANGE UP (ref 0–1)
BILIRUB SERPL-MCNC: 0.4 MG/DL — SIGNIFICANT CHANGE UP (ref 0.2–1.2)
BUN SERPL-MCNC: 11 MG/DL — SIGNIFICANT CHANGE UP (ref 10–20)
CALCIUM SERPL-MCNC: 9.4 MG/DL — SIGNIFICANT CHANGE UP (ref 8.4–10.4)
CHLORIDE SERPL-SCNC: 93 MMOL/L — LOW (ref 98–110)
CO2 SERPL-SCNC: 36 MMOL/L — HIGH (ref 17–32)
CREAT SERPL-MCNC: 0.7 MG/DL — SIGNIFICANT CHANGE UP (ref 0.7–1.5)
EGFR: 102 ML/MIN/1.73M2 — SIGNIFICANT CHANGE UP
EOSINOPHIL # BLD AUTO: 0.06 K/UL — SIGNIFICANT CHANGE UP (ref 0–0.7)
EOSINOPHIL NFR BLD AUTO: 0.5 % — SIGNIFICANT CHANGE UP (ref 0–8)
GAS PNL BLDA: SIGNIFICANT CHANGE UP
GLUCOSE BLDC GLUCOMTR-MCNC: 113 MG/DL — HIGH (ref 70–99)
GLUCOSE BLDC GLUCOMTR-MCNC: 119 MG/DL — HIGH (ref 70–99)
GLUCOSE BLDC GLUCOMTR-MCNC: 139 MG/DL — HIGH (ref 70–99)
GLUCOSE BLDC GLUCOMTR-MCNC: 144 MG/DL — HIGH (ref 70–99)
GLUCOSE SERPL-MCNC: 132 MG/DL — HIGH (ref 70–99)
HCO3 BLDA-SCNC: 43 MMOL/L — HIGH (ref 21–28)
HCT VFR BLD CALC: 41.6 % — LOW (ref 42–52)
HGB BLD-MCNC: 12.6 G/DL — LOW (ref 14–18)
HOROWITZ INDEX BLDA+IHG-RTO: 36 — SIGNIFICANT CHANGE UP
IMM GRANULOCYTES NFR BLD AUTO: 0.2 % — SIGNIFICANT CHANGE UP (ref 0.1–0.3)
LYMPHOCYTES # BLD AUTO: 0.98 K/UL — LOW (ref 1.2–3.4)
LYMPHOCYTES # BLD AUTO: 8 % — LOW (ref 20.5–51.1)
MAGNESIUM SERPL-MCNC: 1.9 MG/DL — SIGNIFICANT CHANGE UP (ref 1.8–2.4)
MCHC RBC-ENTMCNC: 28.1 PG — SIGNIFICANT CHANGE UP (ref 27–31)
MCHC RBC-ENTMCNC: 30.3 G/DL — LOW (ref 32–37)
MCV RBC AUTO: 92.9 FL — SIGNIFICANT CHANGE UP (ref 80–94)
MONOCYTES # BLD AUTO: 0.72 K/UL — HIGH (ref 0.1–0.6)
MONOCYTES NFR BLD AUTO: 5.9 % — SIGNIFICANT CHANGE UP (ref 1.7–9.3)
NEUTROPHILS # BLD AUTO: 10.43 K/UL — HIGH (ref 1.4–6.5)
NEUTROPHILS NFR BLD AUTO: 84.9 % — HIGH (ref 42.2–75.2)
NRBC # BLD: 0 /100 WBCS — SIGNIFICANT CHANGE UP (ref 0–0)
PCO2 BLDA: 59 MMHG — HIGH (ref 35–48)
PH BLDA: 7.47 — HIGH (ref 7.35–7.45)
PLATELET # BLD AUTO: 351 K/UL — SIGNIFICANT CHANGE UP (ref 130–400)
PMV BLD: 9.4 FL — SIGNIFICANT CHANGE UP (ref 7.4–10.4)
PO2 BLDA: 81 MMHG — LOW (ref 83–108)
POTASSIUM SERPL-MCNC: 4.5 MMOL/L — SIGNIFICANT CHANGE UP (ref 3.5–5)
POTASSIUM SERPL-SCNC: 4.5 MMOL/L — SIGNIFICANT CHANGE UP (ref 3.5–5)
PROT SERPL-MCNC: 8.3 G/DL — HIGH (ref 6–8)
RBC # BLD: 4.48 M/UL — LOW (ref 4.7–6.1)
RBC # FLD: 17.1 % — HIGH (ref 11.5–14.5)
SAO2 % BLDA: 97.9 % — SIGNIFICANT CHANGE UP (ref 94–98)
SODIUM SERPL-SCNC: 140 MMOL/L — SIGNIFICANT CHANGE UP (ref 135–146)
WBC # BLD: 12.28 K/UL — HIGH (ref 4.8–10.8)
WBC # FLD AUTO: 12.28 K/UL — HIGH (ref 4.8–10.8)

## 2023-08-15 PROCEDURE — 99223 1ST HOSP IP/OBS HIGH 75: CPT

## 2023-08-15 PROCEDURE — 71275 CT ANGIOGRAPHY CHEST: CPT | Mod: 26

## 2023-08-15 PROCEDURE — 93010 ELECTROCARDIOGRAM REPORT: CPT

## 2023-08-15 RX ORDER — AZITHROMYCIN 500 MG/1
500 TABLET, FILM COATED ORAL ONCE
Refills: 0 | Status: DISCONTINUED | OUTPATIENT
Start: 2023-08-15 | End: 2023-08-15

## 2023-08-15 RX ORDER — HYDROCORTISONE 20 MG
60 TABLET ORAL EVERY 12 HOURS
Refills: 0 | Status: DISCONTINUED | OUTPATIENT
Start: 2023-08-15 | End: 2023-08-16

## 2023-08-15 RX ORDER — AZITHROMYCIN 500 MG/1
TABLET, FILM COATED ORAL
Refills: 0 | Status: DISCONTINUED | OUTPATIENT
Start: 2023-08-15 | End: 2023-08-15

## 2023-08-15 RX ORDER — AZITHROMYCIN 500 MG/1
500 TABLET, FILM COATED ORAL EVERY 24 HOURS
Refills: 0 | Status: DISCONTINUED | OUTPATIENT
Start: 2023-08-15 | End: 2023-08-16

## 2023-08-15 RX ORDER — IPRATROPIUM/ALBUTEROL SULFATE 18-103MCG
3 AEROSOL WITH ADAPTER (GRAM) INHALATION EVERY 6 HOURS
Refills: 0 | Status: DISCONTINUED | OUTPATIENT
Start: 2023-08-15 | End: 2023-08-17

## 2023-08-15 RX ADMIN — Medication 40 MILLIGRAM(S): at 05:19

## 2023-08-15 RX ADMIN — APIXABAN 5 MILLIGRAM(S): 2.5 TABLET, FILM COATED ORAL at 17:59

## 2023-08-15 RX ADMIN — Medication 100 MILLIGRAM(S): at 05:19

## 2023-08-15 RX ADMIN — APIXABAN 5 MILLIGRAM(S): 2.5 TABLET, FILM COATED ORAL at 12:34

## 2023-08-15 RX ADMIN — AZITHROMYCIN 255 MILLIGRAM(S): 500 TABLET, FILM COATED ORAL at 18:02

## 2023-08-15 RX ADMIN — BUPROPION HYDROCHLORIDE 150 MILLIGRAM(S): 150 TABLET, EXTENDED RELEASE ORAL at 12:34

## 2023-08-15 RX ADMIN — TAMSULOSIN HYDROCHLORIDE 0.4 MILLIGRAM(S): 0.4 CAPSULE ORAL at 21:55

## 2023-08-15 RX ADMIN — Medication 100 MILLIGRAM(S): at 14:07

## 2023-08-15 RX ADMIN — SIMVASTATIN 20 MILLIGRAM(S): 20 TABLET, FILM COATED ORAL at 21:56

## 2023-08-15 RX ADMIN — LOSARTAN POTASSIUM 100 MILLIGRAM(S): 100 TABLET, FILM COATED ORAL at 12:35

## 2023-08-15 RX ADMIN — Medication 3 MILLILITER(S): at 21:32

## 2023-08-15 RX ADMIN — DULOXETINE HYDROCHLORIDE 30 MILLIGRAM(S): 30 CAPSULE, DELAYED RELEASE ORAL at 12:34

## 2023-08-15 RX ADMIN — Medication 145 MILLIGRAM(S): at 12:35

## 2023-08-15 RX ADMIN — Medication 100 MILLIGRAM(S): at 21:55

## 2023-08-15 RX ADMIN — Medication 10 MILLIGRAM(S): at 21:55

## 2023-08-15 RX ADMIN — Medication 10 MILLIGRAM(S): at 14:05

## 2023-08-15 RX ADMIN — Medication 60 MILLIGRAM(S): at 21:55

## 2023-08-15 RX ADMIN — Medication 40 MILLIGRAM(S): at 14:05

## 2023-08-15 RX ADMIN — Medication 60 MILLIGRAM(S): at 21:56

## 2023-08-15 RX ADMIN — Medication 10 MILLIGRAM(S): at 12:35

## 2023-08-15 NOTE — PROGRESS NOTE ADULT - ASSESSMENT
67 year old patient, known to have HTN, HLD, DM, COPD (on 4L home O2), depression/anxiety, former smoker, 3 spinal operations (2010), history of DVT/PE s/p thrombolysis (CURRENTLY on eliquis) sent to the ED by his PCP for left leg swelling and redness.    #AMS   - patient more lethargic, acute change in mental status from morning  - Stroke Code Called  - CT Head (-)  - ABG pCO2 of 72  - Placed on BiPAP  - repeat ABG: pH; 7.47, pCO2 57, pO2 81, HCO3 43   - d/dominick BiPAP  - started on NC on 6L     #Left leg erythema/swelling, cellulitis vs lymphangitis vs venous insufficiency  - Symptoms started as a small "speck" on left inner thigh, that has since spread to travel down left leg  - Mild tenderness/itchiness, warmth ; afebrile at home  - T 100 in ED, wbc normal, no SIRS/sepsis  - no recent bug bites, travel, medications, topicals  - prelim duplex negative for DVT (f/u official read)  - d/c bactrim and Vancomycin today as per ID (HIGHLY NEPHROTOXIC)  - as per ID start cefazolin 2gm iv q8 and LLE elevation (pt educated)   - ESR: 53, CRP:120.5    #HTN/HLD  - Hx of DVT/PE  - continue simvastatin, losartan, lasix  - continue home eliquis    #DM  - ISS for now; add basal/bolus PRN  - FS q6    #COPD on 4L  - not in exacerbation  - duonebs PRN    #hx of spinal operations, pain  - continue home pain meds      DVT ppx: eliquis  GI ppx: protonix  Diet: DASH/TLC  Full code    Pending: CTA chest, Pulm consult 67 year old patient, known to have HTN, HLD, DM, COPD (on 4L home O2), depression/anxiety, former smoker, 3 spinal operations (2010), history of DVT/PE s/p thrombolysis (CURRENTLY on eliquis) sent to the ED by his PCP for left leg swelling and redness.    #AMS   - patient more lethargic, acute change in mental status from morning  - Stroke Code Called  - CT Head (-)  - ABG pCO2 of 72  - Placed on BiPAP  - repeat ABG: pH; 7.47, pCO2 57, pO2 81, HCO3 43   - BiPAP at night  - started on NC on 6L   - Pulm consulted  - F/u CTA chest    #Left leg erythema/swelling, cellulitis vs lymphangitis vs venous insufficiency  - Symptoms started as a small "speck" on left inner thigh, that has since spread to travel down left leg  - Mild tenderness/itchiness, warmth ; afebrile at home  - T 100 in ED, wbc normal, no SIRS/sepsis  - no recent bug bites, travel, medications, topicals  - prelim duplex negative for DVT (f/u official read)  - d/c bactrim and Vancomycin today as per ID (HIGHLY NEPHROTOXIC)  - as per ID start cefazolin 2gm iv q8 and LLE elevation (pt educated)   - ESR: 53, CRP:120.5    #HTN/HLD  - Hx of DVT/PE  - continue simvastatin, losartan, lasix  - continue home eliquis    #DM  - ISS for now; add basal/bolus PRN  - FS q6    #COPD on 4L  - not in exacerbation  - duonebs PRN    #hx of spinal operations, pain  - continue home pain meds      DVT ppx: eliquis  GI ppx: protonix  Diet: DASH/TLC  Full code    Pending: CTA chest, Pulm consult 67 year old patient, known to have HTN, HLD, DM, COPD (on 4L home O2), depression/anxiety, former smoker, 3 spinal operations (2010), history of DVT/PE s/p thrombolysis (CURRENTLY on eliquis) sent to the ED by his PCP for left leg swelling and redness.    #AMS   - patient more lethargic, acute change in mental status from morning  - Stroke Code Called  - CT Head (-)  - ABG pCO2 of 72  - Placed on BiPAP  - repeat ABG: pH; 7.47, pCO2 57, pO2 81, HCO3 43   - BiPAP at night  - started on NC on 6L   - Pulm consulted  - F/u CTA chest    #Left leg erythema/swelling, cellulitis vs lymphangitis vs venous insufficiency  - Symptoms started as a small "speck" on left inner thigh, that has since spread to travel down left leg  - Mild tenderness/itchiness, warmth ; afebrile at home  - T 100 in ED, wbc normal, no SIRS/sepsis  - no recent bug bites, travel, medications, topicals  - prelim duplex negative for DVT (f/u official read)  - d/c bactrim and Vancomycin today as per ID (HIGHLY NEPHROTOXIC)  - as per ID start cefazolin 2gm iv q8 and LLE elevation (pt educated)   - ESR: 53, CRP:120.5  - ID recs appreciated  - Started on Azithromycin 500 IVBP qd  - C/w Cefazolin 2g IV q8h      #HTN/HLD  - Hx of DVT/PE  - continue simvastatin, losartan, lasix  - continue home eliquis    #DM  - ISS for now; add basal/bolus PRN  - FS q6    #COPD on 4L  - not in exacerbation  - duonebs PRN    #hx of spinal operations, pain  - continue home pain meds      DVT ppx: eliquis  GI ppx: protonix  Diet: DASH/TLC  Full code    Pending: CTA chest, Pulm consult

## 2023-08-15 NOTE — CONSULT NOTE ADULT - SUBJECTIVE AND OBJECTIVE BOX
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 Says symptoms started this past Monday as a small "speck" near medial aspect of left knee. Since then, erythema has spread to along inner thigh. mild pain and itchiness. Also says erythema has spread down the leg with some associated swelling. Denies trauma, recent insect bite, new medications, new topicals, recent travel, IV drug use, immobilization, chest pain, abdominal pain, fever, chills, recent travel. Says this has never happened before.       In the ED, T 100, ,79. BNP 1360. XR left tibia/fibula showing diffuse soft tissue swelling. CXR showing some pulmonary vascular congestion.           (11 Aug 2023 16:42)      PAST MEDICAL & SURGICAL HISTORY:  Anxiety      HTN (hypertension)      Diabetes      High cholesterol      Asthma      Chronic low back pain with sciatica, sciatica laterality unspecified, unspecified back pain laterality      Uncomplicated opioid dependence      Syncopal episodes      History of back surgery  x 3          SOCIAL HX:   Smoking                         ETOH                            Other    FAMILY HISTORY:  No pertinent family history in first degree relatives    .  No cardiovascular or pulmonary family history     REVIEW OF SYSTEMS:    All ROS are negative exept per HPI       Allergies    Originally Entered as [ANGIOEDEMA] reaction to [pineapple] (Unknown)  aspirin (Stomach Upset)  pineapples (Anaphylaxis)  penicillins (Anaphylaxis)    Intolerances          PHYSICAL EXAM  Vital Signs Last 24 Hrs  T(C): 36.6 (15 Aug 2023 12:00), Max: 36.6 (15 Aug 2023 12:00)  T(F): 97.9 (15 Aug 2023 12:00), Max: 97.9 (15 Aug 2023 12:00)  HR: 79 (15 Aug 2023 12:00) (58 - 130)  BP: 155/71 (15 Aug 2023 12:00) (134/76 - 183/90)  BP(mean): --  RR: 20 (15 Aug 2023 12:00) (18 - 20)  SpO2: 95% (15 Aug 2023 12:00) (95% - 100%)    Parameters below as of 15 Aug 2023 07:42  Patient On (Oxygen Delivery Method): BiPAP/CPAP        CONSTITUTIONAL:  Well nourished.  NAD    ENT:   Airway patent,   No thrush    EYES:   Clear bilaterally,   pupils equal,   round and reactive to light.    CARDIAC:   Normal rate,   regular rhythm.    no edema      RESPIRATORY:   No wheezing   Normal chest expansion  Not tachypneic,  No use of accessory muscles    GASTROINTESTINAL:  Abdomen soft, non-tender,   No guarding,   Positive BS    MUSCULOSKELETAL:   Range of motion is not limited,  No clubbing, cyanosis    NEUROLOGICAL:   Alert and oriented   No motor deficits.    SKIN:   Skin normal color for race,   No evidence of rash.      HEME LYMPH:   No cervical  lymphadenopathy.  no inguinal lymphadenopathy          LABS:                          12.6   12.28 )-----------( 351      ( 15 Aug 2023 06:33 )             41.6                                               08-15    140  |  93<L>  |  11  ----------------------------<  132<H>  4.5   |  36<H>  |  0.7    Ca    9.4      15 Aug 2023 06:33  Phos  3.7     08-14  Mg     1.9     08-15    TPro  8.3<H>  /  Alb  3.8  /  TBili  0.4  /  DBili  x   /  AST  10  /  ALT  7   /  AlkPhos  96  08-15                                             Urinalysis Basic - ( 15 Aug 2023 06:33 )    Color: x / Appearance: x / SG: x / pH: x  Gluc: 132 mg/dL / Ketone: x  / Bili: x / Urobili: x   Blood: x / Protein: x / Nitrite: x   Leuk Esterase: x / RBC: x / WBC x   Sq Epi: x / Non Sq Epi: x / Bacteria: x                                                  LIVER FUNCTIONS - ( 15 Aug 2023 06:33 )  Alb: 3.8 g/dL / Pro: 8.3 g/dL / ALK PHOS: 96 U/L / ALT: 7 U/L / AST: 10 U/L / GGT: x                                                                                            ABG - ( 15 Aug 2023 07:32 )  pH, Arterial: 7.47  pH, Blood: x     /  pCO2: 59    /  pO2: 81    / HCO3: 43    / Base Excess: 16.2  /  SaO2: 97.9                MEDICATIONS  (STANDING):  apixaban 5 milliGRAM(s) Oral two times a day  baclofen 10 milliGRAM(s) Oral every 8 hours  buPROPion XL (24-Hour) . 150 milliGRAM(s) Oral daily  ceFAZolin   IVPB 2000 milliGRAM(s) IV Intermittent every 8 hours  dextrose 5%. 1000 milliLiter(s) (50 mL/Hr) IV Continuous <Continuous>  dextrose 5%. 1000 milliLiter(s) (100 mL/Hr) IV Continuous <Continuous>  dextrose 50% Injectable 25 Gram(s) IV Push once  dextrose 50% Injectable 12.5 Gram(s) IV Push once  dextrose 50% Injectable 25 Gram(s) IV Push once  DULoxetine 30 milliGRAM(s) Oral daily  fenofibrate Tablet 145 milliGRAM(s) Oral daily  furosemide   Injectable 40 milliGRAM(s) IV Push two times a day  glucagon  Injectable 1 milliGRAM(s) IntraMuscular once  insulin lispro (ADMELOG) corrective regimen sliding scale   SubCutaneous three times a day before meals  losartan 100 milliGRAM(s) Oral daily  oxybutynin 10 milliGRAM(s) Oral daily  pantoprazole    Tablet 40 milliGRAM(s) Oral before breakfast  simvastatin 20 milliGRAM(s) Oral at bedtime  tamsulosin 0.4 milliGRAM(s) Oral at bedtime    MEDICATIONS  (PRN):  albuterol/ipratropium for Nebulization 3 milliLiter(s) Nebulizer every 6 hours PRN Shortness of Breath and/or Wheezing  ALPRAZolam 1 milliGRAM(s) Oral three times a day PRN anxiety  dextrose Oral Gel 15 Gram(s) Oral once PRN Blood Glucose LESS THAN 70 milliGRAM(s)/deciliter  morphine ER Tablet 30 milliGRAM(s) Oral two times a day PRN pain  oxycodone    5 mG/acetaminophen 325 mG 1 Tablet(s) Oral every 8 hours PRN Severe Pain (7 - 10)      X-Rays reviewed:    CXR interpreted by me: Patient is a 67y old  Male who presents with a chief complaint of left leg swelling (14 Aug 2023 14:45)      HPI:  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 Says symptoms started this past Monday as a small "speck" near medial aspect of left knee. Since then, erythema has spread to along inner thigh. mild pain and itchiness. Also says erythema has spread down the leg with some associated swelling. Denies trauma, recent insect bite, new medications, new topicals, recent travel, IV drug use, immobilization, chest pain, abdominal pain, fever, chills, recent travel. Says this has never happened before.       In the ED, T 100, ,79. BNP 1360. XR left tibia/fibula showing diffuse soft tissue swelling. CXR showing some pulmonary vascular congestion.           (11 Aug 2023 16:42)      PAST MEDICAL & SURGICAL HISTORY:  Anxiety      HTN (hypertension)      Diabetes      High cholesterol      Asthma      Chronic low back pain with sciatica, sciatica laterality unspecified, unspecified back pain laterality      Uncomplicated opioid dependence      Syncopal episodes      History of back surgery  x 3          SOCIAL HX:   Smoking                         ETOH                            Other    FAMILY HISTORY:  No pertinent family history in first degree relatives    .  No cardiovascular or pulmonary family history     REVIEW OF SYSTEMS:    All ROS are negative exept per HPI       Allergies    Originally Entered as [ANGIOEDEMA] reaction to [pineapple] (Unknown)  aspirin (Stomach Upset)  pineapples (Anaphylaxis)  penicillins (Anaphylaxis)    Intolerances          PHYSICAL EXAM  Vital Signs Last 24 Hrs  T(C): 36.6 (15 Aug 2023 12:00), Max: 36.6 (15 Aug 2023 12:00)  T(F): 97.9 (15 Aug 2023 12:00), Max: 97.9 (15 Aug 2023 12:00)  HR: 79 (15 Aug 2023 12:00) (58 - 130)  BP: 155/71 (15 Aug 2023 12:00) (134/76 - 183/90)  BP(mean): --  RR: 20 (15 Aug 2023 12:00) (18 - 20)  SpO2: 95% (15 Aug 2023 12:00) (95% - 100%)    Parameters below as of 15 Aug 2023 07:42  Patient On (Oxygen Delivery Method): BiPAP/CPAP        CONSTITUTIONAL:  Well nourished. On NC    ENT:   Airway patent,     EYES:   pupils equal    CARDIAC:   Normal rate,   regular rhythm.    no edema    RESPIRATORY:   Wheezing+   Normal chest expansion    GASTROINTESTINAL:  Abdomen soft, non-tender,   No guarding    MUSCULOSKELETAL:   No clubbing, cyanosis    NEUROLOGICAL:   Alert and oriented     SKIN:   Skin normal color for race,       LABS:                          12.6   12.28 )-----------( 351      ( 15 Aug 2023 06:33 )             41.6                                               08-15    140  |  93<L>  |  11  ----------------------------<  132<H>  4.5   |  36<H>  |  0.7    Ca    9.4      15 Aug 2023 06:33  Phos  3.7     08-14  Mg     1.9     08-15    TPro  8.3<H>  /  Alb  3.8  /  TBili  0.4  /  DBili  x   /  AST  10  /  ALT  7   /  AlkPhos  96  08-15                                             Urinalysis Basic - ( 15 Aug 2023 06:33 )    Color: x / Appearance: x / SG: x / pH: x  Gluc: 132 mg/dL / Ketone: x  / Bili: x / Urobili: x   Blood: x / Protein: x / Nitrite: x   Leuk Esterase: x / RBC: x / WBC x   Sq Epi: x / Non Sq Epi: x / Bacteria: x                                                  LIVER FUNCTIONS - ( 15 Aug 2023 06:33 )  Alb: 3.8 g/dL / Pro: 8.3 g/dL / ALK PHOS: 96 U/L / ALT: 7 U/L / AST: 10 U/L / GGT: x                                                                                            ABG - ( 15 Aug 2023 07:32 )  pH, Arterial: 7.47  pH, Blood: x     /  pCO2: 59    /  pO2: 81    / HCO3: 43    / Base Excess: 16.2  /  SaO2: 97.9                MEDICATIONS  (STANDING):  apixaban 5 milliGRAM(s) Oral two times a day  baclofen 10 milliGRAM(s) Oral every 8 hours  buPROPion XL (24-Hour) . 150 milliGRAM(s) Oral daily  ceFAZolin   IVPB 2000 milliGRAM(s) IV Intermittent every 8 hours  dextrose 5%. 1000 milliLiter(s) (50 mL/Hr) IV Continuous <Continuous>  dextrose 5%. 1000 milliLiter(s) (100 mL/Hr) IV Continuous <Continuous>  dextrose 50% Injectable 25 Gram(s) IV Push once  dextrose 50% Injectable 12.5 Gram(s) IV Push once  dextrose 50% Injectable 25 Gram(s) IV Push once  DULoxetine 30 milliGRAM(s) Oral daily  fenofibrate Tablet 145 milliGRAM(s) Oral daily  furosemide   Injectable 40 milliGRAM(s) IV Push two times a day  glucagon  Injectable 1 milliGRAM(s) IntraMuscular once  insulin lispro (ADMELOG) corrective regimen sliding scale   SubCutaneous three times a day before meals  losartan 100 milliGRAM(s) Oral daily  oxybutynin 10 milliGRAM(s) Oral daily  pantoprazole    Tablet 40 milliGRAM(s) Oral before breakfast  simvastatin 20 milliGRAM(s) Oral at bedtime  tamsulosin 0.4 milliGRAM(s) Oral at bedtime    MEDICATIONS  (PRN):  albuterol/ipratropium for Nebulization 3 milliLiter(s) Nebulizer every 6 hours PRN Shortness of Breath and/or Wheezing  ALPRAZolam 1 milliGRAM(s) Oral three times a day PRN anxiety  dextrose Oral Gel 15 Gram(s) Oral once PRN Blood Glucose LESS THAN 70 milliGRAM(s)/deciliter  morphine ER Tablet 30 milliGRAM(s) Oral two times a day PRN pain  oxycodone    5 mG/acetaminophen 325 mG 1 Tablet(s) Oral every 8 hours PRN Severe Pain (7 - 10)      X-Rays reviewed:    CXR interpreted by me: Patient is a 67y old  Male who presents with a chief complaint of left leg swelling (14 Aug 2023 14:45)      HPI:  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 Says symptoms started this past Monday as a small "speck" near medial aspect of left knee. Since then, erythema has spread to along inner thigh. mild pain and itchiness. Also says erythema has spread down the leg with some associated swelling. Denies trauma, recent insect bite, new medications, new topicals, recent travel, IV drug use, immobilization, chest pain, abdominal pain, fever, chills, recent travel. Says this has never happened before.       In the ED, T 100, ,79. BNP 1360. XR left tibia/fibula showing diffuse soft tissue swelling. CXR showing some pulmonary vascular congestion.           (11 Aug 2023 16:42)      PAST MEDICAL & SURGICAL HISTORY:  Anxiety      HTN (hypertension)      Diabetes      High cholesterol      Asthma      Chronic low back pain with sciatica, sciatica laterality unspecified, unspecified back pain laterality      Uncomplicated opioid dependence      Syncopal episodes      History of back surgery  x 3          SOCIAL HX:   Smoking                         ETOH                            Other    FAMILY HISTORY:  No pertinent family history in first degree relatives    .  No cardiovascular or pulmonary family history     REVIEW OF SYSTEMS:    All ROS are negative exept per HPI       Allergies    Originally Entered as [ANGIOEDEMA] reaction to [pineapple] (Unknown)  aspirin (Stomach Upset)  pineapples (Anaphylaxis)  penicillins (Anaphylaxis)    Intolerances          PHYSICAL EXAM  Vital Signs Last 24 Hrs  T(C): 36.6 (15 Aug 2023 12:00), Max: 36.6 (15 Aug 2023 12:00)  T(F): 97.9 (15 Aug 2023 12:00), Max: 97.9 (15 Aug 2023 12:00)  HR: 79 (15 Aug 2023 12:00) (58 - 130)  BP: 155/71 (15 Aug 2023 12:00) (134/76 - 183/90)  BP(mean): --  RR: 20 (15 Aug 2023 12:00) (18 - 20)  SpO2: 95% (15 Aug 2023 12:00) (95% - 100%)    Parameters below as of 15 Aug 2023 07:42  Patient On (Oxygen Delivery Method): BiPAP/CPAP        CONSTITUTIONAL:  Well nourished. On NC    ENT:   Airway patent,     EYES:   pupils equal    CARDIAC:   Normal rate,   regular rhythm.    no edema    RESPIRATORY:   Wheezing+   Normal chest expansion    GASTROINTESTINAL:  Abdomen soft, non-tender,   No guarding    MUSCULOSKELETAL:   No clubbing, cyanosis    NEUROLOGICAL:   Alert and oriented     SKIN:   Skin normal color for race,       LABS:                          12.6   12.28 )-----------( 351      ( 15 Aug 2023 06:33 )             41.6                                               08-15    140  |  93<L>  |  11  ----------------------------<  132<H>  4.5   |  36<H>  |  0.7    Ca    9.4      15 Aug 2023 06:33  Phos  3.7     08-14  Mg     1.9     08-15    TPro  8.3<H>  /  Alb  3.8  /  TBili  0.4  /  DBili  x   /  AST  10  /  ALT  7   /  AlkPhos  96  08-15                                             Urinalysis Basic - ( 15 Aug 2023 06:33 )    Color: x / Appearance: x / SG: x / pH: x  Gluc: 132 mg/dL / Ketone: x  / Bili: x / Urobili: x   Blood: x / Protein: x / Nitrite: x   Leuk Esterase: x / RBC: x / WBC x   Sq Epi: x / Non Sq Epi: x / Bacteria: x                                                  LIVER FUNCTIONS - ( 15 Aug 2023 06:33 )  Alb: 3.8 g/dL / Pro: 8.3 g/dL / ALK PHOS: 96 U/L / ALT: 7 U/L / AST: 10 U/L / GGT: x                                                                                            ABG - ( 15 Aug 2023 07:32 )  pH, Arterial: 7.47  pH, Blood: x     /  pCO2: 59    /  pO2: 81    / HCO3: 43    / Base Excess: 16.2  /  SaO2: 97.9                MEDICATIONS  (STANDING):  apixaban 5 milliGRAM(s) Oral two times a day  baclofen 10 milliGRAM(s) Oral every 8 hours  buPROPion XL (24-Hour) . 150 milliGRAM(s) Oral daily  ceFAZolin   IVPB 2000 milliGRAM(s) IV Intermittent every 8 hours  dextrose 5%. 1000 milliLiter(s) (50 mL/Hr) IV Continuous <Continuous>  dextrose 5%. 1000 milliLiter(s) (100 mL/Hr) IV Continuous <Continuous>  dextrose 50% Injectable 25 Gram(s) IV Push once  dextrose 50% Injectable 12.5 Gram(s) IV Push once  dextrose 50% Injectable 25 Gram(s) IV Push once  DULoxetine 30 milliGRAM(s) Oral daily  fenofibrate Tablet 145 milliGRAM(s) Oral daily  furosemide   Injectable 40 milliGRAM(s) IV Push two times a day  glucagon  Injectable 1 milliGRAM(s) IntraMuscular once  insulin lispro (ADMELOG) corrective regimen sliding scale   SubCutaneous three times a day before meals  losartan 100 milliGRAM(s) Oral daily  oxybutynin 10 milliGRAM(s) Oral daily  pantoprazole    Tablet 40 milliGRAM(s) Oral before breakfast  simvastatin 20 milliGRAM(s) Oral at bedtime  tamsulosin 0.4 milliGRAM(s) Oral at bedtime    MEDICATIONS  (PRN):  albuterol/ipratropium for Nebulization 3 milliLiter(s) Nebulizer every 6 hours PRN Shortness of Breath and/or Wheezing  ALPRAZolam 1 milliGRAM(s) Oral three times a day PRN anxiety  dextrose Oral Gel 15 Gram(s) Oral once PRN Blood Glucose LESS THAN 70 milliGRAM(s)/deciliter  morphine ER Tablet 30 milliGRAM(s) Oral two times a day PRN pain  oxycodone    5 mG/acetaminophen 325 mG 1 Tablet(s) Oral every 8 hours PRN Severe Pain (7 - 10)      X-Rays reviewed:

## 2023-08-15 NOTE — PROGRESS NOTE ADULT - ASSESSMENT
67 year old patient, known to have HTN, HLD, DM, COPD (on 4L home O2), depression/anxiety, former smoker, 3 spinal operations (2010), history of DVT/PE s/p thrombolysis ( on Eliquis sent to the ED by his PCP for left leg swelling and redness.    AMS due to hypercapnia  - patient more lethargic, acute change in mental status from morning  - Stroke Code called, negative  - new findings of pulm nodules, spiculated?  - ABG pCO2 of 72  - Placed on BiPAP  - repeat ABG: pH; 7.47, pCO2 57, pO2 81, HCO3 43   - BiPAP at night  - started on NC on 6L   - Pulmonary consulted      Left leg erythema cellulitis, chronic venous insufficiency  - no SIRS/sepsis  - no recent bug bites, travel, medications, topicals  - prelim duplex negative for DVT   - ESR: 53, CRP:120.5  - ID recs appreciated  - add Azithromycin 500 IVBP qd  - Continue Cefazolin 2g IV q8h      HTN/HLD  - continue Simvastatin, Losartan, Lasix      DM  - ISS for now; add basal/bolus PRN  - FS q6    Chronic Hypoxic Respiratory Failure, COPD on 4L, Recent dx of PE  - supplemental O2, BoPAP trial  - duonebs PRN  - continue Eliquis    hx of spinal operations, Chronic Pain Opioid Dependence    - continue home pain meds  - consider reduction in rx, in lieu of hypercapnia and AMS    Anxiety, Benzo Dependence      DVT ppx: Eliquis  GI ppx: Protonix  Diet: DASH/TLC  Full code    Pending: CTA chest, Pulm consult

## 2023-08-15 NOTE — PROGRESS NOTE ADULT - SUBJECTIVE AND OBJECTIVE BOX
24H events:    Patient is a 67y old Male who presents with a chief complaint of left leg swelling (15 Aug 2023 12:54)    Primary diagnosis of Cellulitis      Today is hospital day 4d. This morning patient was seen and examined at bedside, resting comfortably in bed.    No acute or major events overnight.    Code Status:    Family communication:  Contact date:  Name of person contacted:  Relationship to patient:  Communication details:  What matters most:    PAST MEDICAL & SURGICAL HISTORY  Anxiety    HTN (hypertension)    Diabetes    High cholesterol    Asthma    Chronic low back pain with sciatica, sciatica laterality unspecified, unspecified back pain laterality    Uncomplicated opioid dependence    Syncopal episodes    History of back surgery  x 3      SOCIAL HISTORY:  Social History:  Former smoker (11 Aug 2023 16:42)      ALLERGIES:  Originally Entered as [ANGIOEDEMA] reaction to [pineapple] (Unknown)  aspirin (Stomach Upset)  pineapples (Anaphylaxis)  penicillins (Anaphylaxis)    MEDICATIONS:  STANDING MEDICATIONS  apixaban 5 milliGRAM(s) Oral two times a day  baclofen 10 milliGRAM(s) Oral every 8 hours  buPROPion XL (24-Hour) . 150 milliGRAM(s) Oral daily  ceFAZolin   IVPB 2000 milliGRAM(s) IV Intermittent every 8 hours  dextrose 5%. 1000 milliLiter(s) IV Continuous <Continuous>  dextrose 5%. 1000 milliLiter(s) IV Continuous <Continuous>  dextrose 50% Injectable 25 Gram(s) IV Push once  dextrose 50% Injectable 12.5 Gram(s) IV Push once  dextrose 50% Injectable 25 Gram(s) IV Push once  DULoxetine 30 milliGRAM(s) Oral daily  fenofibrate Tablet 145 milliGRAM(s) Oral daily  furosemide   Injectable 40 milliGRAM(s) IV Push two times a day  glucagon  Injectable 1 milliGRAM(s) IntraMuscular once  insulin lispro (ADMELOG) corrective regimen sliding scale   SubCutaneous three times a day before meals  losartan 100 milliGRAM(s) Oral daily  oxybutynin 10 milliGRAM(s) Oral daily  pantoprazole    Tablet 40 milliGRAM(s) Oral before breakfast  simvastatin 20 milliGRAM(s) Oral at bedtime  tamsulosin 0.4 milliGRAM(s) Oral at bedtime    PRN MEDICATIONS  albuterol/ipratropium for Nebulization 3 milliLiter(s) Nebulizer every 6 hours PRN  ALPRAZolam 1 milliGRAM(s) Oral three times a day PRN  dextrose Oral Gel 15 Gram(s) Oral once PRN  morphine ER Tablet 30 milliGRAM(s) Oral two times a day PRN  oxycodone    5 mG/acetaminophen 325 mG 1 Tablet(s) Oral every 8 hours PRN    VITALS:   T(F): 97.9  HR: 79  BP: 155/71  RR: 20  SpO2: 95%    PHYSICAL EXAM:  GENERAL:   (x  ) NAD, lying in bed comfortably     (  ) obtunded     (  ) lethargic     (  ) somnolent    HEAD:   (x  ) Atraumatic     (  ) hematoma     (  ) laceration (specify location:       )     NECK:  ( x ) Supple     (  ) neck stiffness     (  ) nuchal rigidity     (  )  no JVD     (  ) JVD present ( -- cm)    HEART:  Rate -->     (x  ) normal rate     (  ) bradycardic     (  ) tachycardic  Rhythm -->     (x  ) regular     (  ) regularly irregular     (  ) irregularly irregular  Murmurs -->     ( x ) normal s1s2     (  ) systolic murmur     (  ) diastolic murmur     (  ) continuous murmur      (  ) S3 present     (  ) S4 present    LUNGS:   (x  )Unlabored respirations     (  ) tachypnea  (x  ) B/L air entry     (  ) decreased breath sounds in:  (location     )    ( x ) no adventitious sound     (  ) crackles     (  ) wheezing      (  ) rhonchi      (specify location:       )  (  ) chest wall tenderness (specify location:       )    ABDOMEN:   (x  ) Soft     (  ) tense   |   (x  ) nondistended     (  ) distended   |   (  ) +BS     (  ) hypoactive bowel sounds     (  ) hyperactive bowel sounds  ( x ) nontender     (  ) RUQ tenderness     (  ) RLQ tenderness     (  ) LLQ tenderness     (  ) epigastric tenderness     (  ) diffuse tenderness  (  ) Splenomegaly      (  ) Hepatomegaly      (  ) Jaundice     (  ) ecchymosis     EXTREMITIES: 3+ pitting edema on L with erythema extending up medial leg above knee, 2+ pitting edema on R.   (  ) Normal     (  ) Rash     (  ) ecchymosis     (  ) varicose veins      (  ) pitting edema     (  ) non-pitting edema   (  ) ulceration     (  ) gangrene:     (location:     )    NERVOUS SYSTEM:    (  ) A&Ox3     (  ) confused     (  ) lethargic  CN II-XII:     (  ) Intact     (  ) deficits found     (Specify:     )   Upper extremities:     (  ) no sensorimotor deficits     (  ) weakness     (  ) loss of proprioception/vibration     (  ) loss of touch/temperature (specify:    )  Lower extremities:     (  ) no sensorimotor deficits     (  ) weakness     (  ) loss of proprioception/vibration     (  ) loss of touch/temperature (specify:    )    SKIN:   (  ) No rashes or lesions     (  ) maculopapular rash     (  ) pustules     (  ) vesicles     (  ) ulcer     (  ) ecchymosis     (specify location:     )    AMPAC score:    (  ) Indwelling Quintana Catheter:   Date insterted:    Reason (  ) Critical illness     (  ) urinary retention    (  ) Accurate Ins/Outs Monitoring     (  ) CMO patient    (  ) Central Line:   Date inserted:  Location: (  ) Right IJ     (  ) Left IJ     (  ) Right Fem     (  ) Left Fem    (  ) SPC        (  ) pigtail       (  ) PEG tube       (  ) colostomy       (  ) jejunostomy  (  ) U-Dall    LABS:                        12.6   12.28 )-----------( 351      ( 15 Aug 2023 06:33 )             41.6     08-15    140  |  93<L>  |  11  ----------------------------<  132<H>  4.5   |  36<H>  |  0.7    Ca    9.4      15 Aug 2023 06:33  Phos  3.7     08-14  Mg     1.9     08-15    TPro  8.3<H>  /  Alb  3.8  /  TBili  0.4  /  DBili  x   /  AST  10  /  ALT  7   /  AlkPhos  96  08-15      Urinalysis Basic - ( 15 Aug 2023 06:33 )    Color: x / Appearance: x / SG: x / pH: x  Gluc: 132 mg/dL / Ketone: x  / Bili: x / Urobili: x   Blood: x / Protein: x / Nitrite: x   Leuk Esterase: x / RBC: x / WBC x   Sq Epi: x / Non Sq Epi: x / Bacteria: x      ABG - ( 15 Aug 2023 07:32 )  pH, Arterial: 7.47  pH, Blood: x     /  pCO2: 59    /  pO2: 81    / HCO3: 43    / Base Excess: 16.2  /  SaO2: 97.9                      RADIOLOGY:      < from: CT Angio Neck w/ IV Cont (08.14.23 @ 14:41) >  IMPRESSION:    1.  No significant vascular stenosis or large vessel occlusion.    2.  Multiple findings in both lungs with the largest lesion in the right   lung measuring at least 2.8 cm in maximum diameter, further evaluation   with a CT scan of the chest is recommended to exclude neoplasm.    < end of copied text >

## 2023-08-15 NOTE — CONSULT NOTE ADULT - ATTENDING COMMENTS
Impression:   Cellulitis of the left lower extremity  Lower extremity edema  COPD on 4L nc at home  HTN  HLD  DM    Patient with BNP 1360 and CXR with reticular markings as well as a right pleural effusion. On exam, 2+ lower extremity edema. On review of prior records, patient with BNP 5472 in 5/2023, at which time echo showed LVEF 50-55%, enlarged RV with decreased RV function, mild pHTN, and mild-moderate TR.     Recommendations:  - Diurese with Lasix 40 mg IV BID  - Goal net negative 1000 cc per day  - If unable to achieve above goal, can increase to Lasix 60-80 mg IV BID  - Once leg swelling has resolved, transition to PO diuretics. I recommend starting with Lasix 40 mg PO daily.   - Patient should be monitored on PO diuretics for 24-48 hours. Goal is to adjust the diuretic to ensure he remains net neutral with regard to I/Os.   - Cardiology consult team to sign off  -
IMPRESSION:    Acute on Chronic Hypercapnic Respiratory Failure improved   COPD exacerbation   COPD on 4L NC  Incidental solid pulmonary nodules: RUL 2.8 cm spiculated lesion, RUL 1.4 cm lesion, 2 MC pleural based nodules, 1cm each.  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

## 2023-08-15 NOTE — PROGRESS NOTE ADULT - SUBJECTIVE AND OBJECTIVE BOX
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 Says symptoms started this past Monday as a small "speck" near medial aspect of left knee. Since then, erythema has spread to along inner thigh. mild pain and itchiness. Also says erythema has spread down the leg with some associated swelling. Denies trauma, recent insect bite, new medications, new topicals, recent travel, IV drug use, immobilization, chest pain, abdominal pain, fever, chills, recent travel. Says this has never happened before.       In the ED, T 100, ,79. BNP 1360. XR left tibia/fibula showing diffuse soft tissue swelling. CXR showing some pulmonary vascular congestion.           (11 Aug 2023 16:42)    MEDICATIONS  (STANDING):  albuterol/ipratropium for Nebulization 3 milliLiter(s) Nebulizer every 6 hours  apixaban 5 milliGRAM(s) Oral two times a day  azithromycin  IVPB 500 milliGRAM(s) IV Intermittent every 24 hours  baclofen 10 milliGRAM(s) Oral every 8 hours  buPROPion XL (24-Hour) . 150 milliGRAM(s) Oral daily  ceFAZolin   IVPB 2000 milliGRAM(s) IV Intermittent every 8 hours  dextrose 5%. 1000 milliLiter(s) (50 mL/Hr) IV Continuous <Continuous>  dextrose 5%. 1000 milliLiter(s) (100 mL/Hr) IV Continuous <Continuous>  dextrose 50% Injectable 25 Gram(s) IV Push once  dextrose 50% Injectable 12.5 Gram(s) IV Push once  dextrose 50% Injectable 25 Gram(s) IV Push once  DULoxetine 30 milliGRAM(s) Oral daily  fenofibrate Tablet 145 milliGRAM(s) Oral daily  furosemide   Injectable 40 milliGRAM(s) IV Push two times a day  glucagon  Injectable 1 milliGRAM(s) IntraMuscular once  hydrocortisone sodium succinate Injectable 60 milliGRAM(s) IV Push every 12 hours  insulin lispro (ADMELOG) corrective regimen sliding scale   SubCutaneous three times a day before meals  losartan 100 milliGRAM(s) Oral daily  oxybutynin 10 milliGRAM(s) Oral daily  pantoprazole    Tablet 40 milliGRAM(s) Oral before breakfast  simvastatin 20 milliGRAM(s) Oral at bedtime  tamsulosin 0.4 milliGRAM(s) Oral at bedtime    MEDICATIONS  (PRN):  ALPRAZolam 1 milliGRAM(s) Oral three times a day PRN anxiety  dextrose Oral Gel 15 Gram(s) Oral once PRN Blood Glucose LESS THAN 70 milliGRAM(s)/deciliter  morphine ER Tablet 30 milliGRAM(s) Oral two times a day PRN pain  oxycodone    5 mG/acetaminophen 325 mG 1 Tablet(s) Oral every 8 hours PRN Severe Pain (7 - 10)    INTERVAL EVENTS: Patient seen today without distress, alert, staff concerned patient about easily desaturates?    T(C): 36.6 (08-15-23 @ 12:00), Max: 36.6 (08-15-23 @ 12:00)  HR: 79 (08-15-23 @ 12:00) (58 - 115)  BP: 155/71 (08-15-23 @ 12:00) (134/76 - 155/71)  RR: 20 (08-15-23 @ 12:00) (18 - 20)  SpO2: 95% (08-15-23 @ 12:00) (95% - 100%)  Wt(kg): --Vital Signs Last 24 Hrs  T(C): 36.6 (15 Aug 2023 12:00), Max: 36.6 (15 Aug 2023 12:00)  T(F): 97.9 (15 Aug 2023 12:00), Max: 97.9 (15 Aug 2023 12:00)  HR: 79 (15 Aug 2023 12:00) (58 - 115)  BP: 155/71 (15 Aug 2023 12:00) (134/76 - 155/71)  BP(mean): --  RR: 20 (15 Aug 2023 12:00) (18 - 20)  SpO2: 95% (15 Aug 2023 12:00) (95% - 100%)    Parameters below as of 15 Aug 2023 07:42  Patient On (Oxygen Delivery Method): BiPAP/CPAP        PHYSICAL EXAM:  GENERAL: Weak, O2 via NC in place  NECK: Supple, No JVD  CHEST/LUNG: Decreased BS Clear; No wheezing  HEART: S1, S2, Regular   ABDOMEN: Soft, Nontender, Nondistended; Bowel sounds present  EXTREMITIES: Trace edema      LABS:                        12.6   12.28 )-----------( 351      ( 15 Aug 2023 06:33 )             41.6             08-15    140  |  93<L>  |  11  ----------------------------<  132<H>  4.5   |  36<H>  |  0.7    Ca    9.4      15 Aug 2023 06:33  Phos  3.7     08-14  Mg     1.9     08-15    TPro  8.3<H>  /  Alb  3.8  /  TBili  0.4  /  DBili  x   /  AST  10  /  ALT  7   /  AlkPhos  96  08-15    LIVER FUNCTIONS - ( 15 Aug 2023 06:33 )  Alb: 3.8 g/dL / Pro: 8.3 g/dL / ALK PHOS: 96 U/L / ALT: 7 U/L / AST: 10 U/L / GGT: x                       ABG - ( 15 Aug 2023 07:32 )  pH, Arterial: 7.47  pH, Blood: x     /  pCO2: 59    /  pO2: 81    / HCO3: 43    / Base Excess: 16.2  /  SaO2: 97.9              Urinalysis Basic - ( 15 Aug 2023 06:33 )    Color: x / Appearance: x / SG: x / pH: x  Gluc: 132 mg/dL / Ketone: x  / Bili: x / Urobili: x   Blood: x / Protein: x / Nitrite: x   Leuk Esterase: x / RBC: x / WBC x   Sq Epi: x / Non Sq Epi: x / Bacteria: x        RADIOLOGY & ADDITIONAL TESTS:  < from: CT Angio Chest PE Protocol w/ IV Cont (08.15.23 @ 16:51) >  PROCEDURE DATE:  08/15/2023          INTERPRETATION:  CLINICAL STATEMENT: Evaluation for pulmonary embolism    TECHNIQUE: Multislice helical sections were obtained from the thoracic   inlet to the lung bases during rapid administration of 65 mL Omnipaque   350 intravenous contrast using a CTA protocol. Thin sections were   reconstructed through the pulmonary vasculature. Coronal, sagittal and   MIP reformatted images are also submitted.    COMPARISON: CTA chest 5/3/2023      FINDINGS:    PULMONARY EMBOLUS: No central or segmental pulmonary embolus.    LUNGS, PLEURA, AIRWAYS: Debris within the right distal mainstem bronchi.   Bibasilar consolidations, slightly increased in size on the right lower   lung.  Small right pleural effusion. New scattered groundglass/nodular   consolidations predominantly in the upper lungs.  Some of the scattered nodular consolidations have peripheral spiculations   and are concerning for focal lesions for example:  -2.6 x 2.5 cm right upper lung nodule (series 6 image 138)  -1.2 x 0.9 cm right upper lung nodule (series 6 image 161)    THORACIC NODES: Mediastinal lymphadenopathy with reference lesions as   follows:  -1.8cm pretracheal lymph node (series 6 image 107)  -1.2 cm left-sided lymph node just superolateral to the mayco (series 6   image 112)    MEDIASTINUM/GREAT VESSELS: Normal heart size. No pericardial effusion.   Ascending thoracic aorta measures 3.7 cm(series 6 image 155). Main   pulmonary artery measures 3 cm (series 6 image 155).    VISUALIZED UPPER ABDOMEN: No acute abnormality in the visualized upper   abdomen.    BONES/SOFT TISSUES: New expansile lesions versus healing fractures of the   right posterior lateral fifth, sixth and seventh ribs.      IMPRESSION:    No CT evidence of acute pulmonary embolus.    New nodular consolidations in the upper lobe some which have peripheral   spiculations and are concerning for focal lesions (i.e. metastases).   Mediastinal lymphadenopathy as described.    New expansile lesions versus healing fractures of the right posterior   fifth through seventh ribs.    Bibasilar consolidations with increase in size in the right. New small   right pleural effusion.    Additional findings in the body the report.    --- End of Report ---    < end of copied text >

## 2023-08-15 NOTE — CONSULT NOTE ADULT - ASSESSMENT
IMPRESSION:    Indicental pulmonary nodules: RUL 2.8 cm spiculated lesion, RUL 1.4 cm lesion, 2 MC pleural based nodules, 1cm each  Former smoker  Intermediate to High risk Juan Score  No lesions noted on previous CTA Chest in May      PLAN:    FU Dedicated CT Chest  Rec Outpt PET Scan  HOB @ 45 degrees.  Aspiration precautions   DVT prophylaxis.     IMPRESSION:    Indicental pulmonary nodules: RUL 2.8 cm spiculated lesion, RUL 1.4 cm lesion, 2 MC pleural based nodules, 1cm each  Former smoker  Intermediate to High risk Juan Score  No lesions noted on previous CTA Chest in May  COPD on 4L NC, stable      PLAN:    FU Dedicated CT Chest  Rec Outpt PET Scan  CW Home inhalers  Supplemental O2 to target SpO2 88-92%  HOB @ 45 degrees.  Aspiration precautions   DVT prophylaxis.   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  Aspiration precautions IMPRESSION:    Acute on Chronic Hypercapnic Respiratory Failure improved   COPD exacerbation   COPD on 4L NC  Incidental solid pulmonary nodules: RUL 2.8 cm spiculated lesion, RUL 1.4 cm lesion, 2 MC pleural based nodules, 1cm each.  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    Solumedrol 60mg q12,  Azithromycin course   NIV qHS.  WIll benefit from NIV upon DC.  AVAPS  RR 16.  IPAP max 20 IPAP min 16.  PEEP 8.    CT Chest NC as OP in 2-3 weeks   Supplemental O2 to target SpO2 88-92%   HOB @ 45 degrees.  Aspiration precautions  Will need OP pulm follow up

## 2023-08-16 ENCOUNTER — TRANSCRIPTION ENCOUNTER (OUTPATIENT)
Age: 68
End: 2023-08-16

## 2023-08-16 DIAGNOSIS — M54.50 LOW BACK PAIN, UNSPECIFIED: ICD-10-CM

## 2023-08-16 DIAGNOSIS — L03.119 CELLULITIS OF UNSPECIFIED PART OF LIMB: ICD-10-CM

## 2023-08-16 LAB
ALBUMIN SERPL ELPH-MCNC: 3.8 G/DL — SIGNIFICANT CHANGE UP (ref 3.5–5.2)
ALP SERPL-CCNC: 84 U/L — SIGNIFICANT CHANGE UP (ref 30–115)
ALT FLD-CCNC: 6 U/L — SIGNIFICANT CHANGE UP (ref 0–41)
ANION GAP SERPL CALC-SCNC: 17 MMOL/L — HIGH (ref 7–14)
AST SERPL-CCNC: 9 U/L — SIGNIFICANT CHANGE UP (ref 0–41)
BASOPHILS # BLD AUTO: 0.02 K/UL — SIGNIFICANT CHANGE UP (ref 0–0.2)
BASOPHILS NFR BLD AUTO: 0.2 % — SIGNIFICANT CHANGE UP (ref 0–1)
BILIRUB SERPL-MCNC: 0.3 MG/DL — SIGNIFICANT CHANGE UP (ref 0.2–1.2)
BUN SERPL-MCNC: 14 MG/DL — SIGNIFICANT CHANGE UP (ref 10–20)
CALCIUM SERPL-MCNC: 9.7 MG/DL — SIGNIFICANT CHANGE UP (ref 8.4–10.5)
CHLORIDE SERPL-SCNC: 95 MMOL/L — LOW (ref 98–110)
CO2 SERPL-SCNC: 32 MMOL/L — SIGNIFICANT CHANGE UP (ref 17–32)
CREAT SERPL-MCNC: 0.7 MG/DL — SIGNIFICANT CHANGE UP (ref 0.7–1.5)
CULTURE RESULTS: SIGNIFICANT CHANGE UP
CULTURE RESULTS: SIGNIFICANT CHANGE UP
EGFR: 102 ML/MIN/1.73M2 — SIGNIFICANT CHANGE UP
EOSINOPHIL # BLD AUTO: 0 K/UL — SIGNIFICANT CHANGE UP (ref 0–0.7)
EOSINOPHIL NFR BLD AUTO: 0 % — SIGNIFICANT CHANGE UP (ref 0–8)
GLUCOSE BLDC GLUCOMTR-MCNC: 127 MG/DL — HIGH (ref 70–99)
GLUCOSE BLDC GLUCOMTR-MCNC: 150 MG/DL — HIGH (ref 70–99)
GLUCOSE BLDC GLUCOMTR-MCNC: 189 MG/DL — HIGH (ref 70–99)
GLUCOSE BLDC GLUCOMTR-MCNC: 210 MG/DL — HIGH (ref 70–99)
GLUCOSE SERPL-MCNC: 231 MG/DL — HIGH (ref 70–99)
HCT VFR BLD CALC: 40.3 % — LOW (ref 42–52)
HGB BLD-MCNC: 12.9 G/DL — LOW (ref 14–18)
IMM GRANULOCYTES NFR BLD AUTO: 0.5 % — HIGH (ref 0.1–0.3)
LYMPHOCYTES # BLD AUTO: 0.92 K/UL — LOW (ref 1.2–3.4)
LYMPHOCYTES # BLD AUTO: 8.6 % — LOW (ref 20.5–51.1)
MAGNESIUM SERPL-MCNC: 1.8 MG/DL — SIGNIFICANT CHANGE UP (ref 1.8–2.4)
MCHC RBC-ENTMCNC: 28.9 PG — SIGNIFICANT CHANGE UP (ref 27–31)
MCHC RBC-ENTMCNC: 32 G/DL — SIGNIFICANT CHANGE UP (ref 32–37)
MCV RBC AUTO: 90.2 FL — SIGNIFICANT CHANGE UP (ref 80–94)
MONOCYTES # BLD AUTO: 0.29 K/UL — SIGNIFICANT CHANGE UP (ref 0.1–0.6)
MONOCYTES NFR BLD AUTO: 2.7 % — SIGNIFICANT CHANGE UP (ref 1.7–9.3)
NEUTROPHILS # BLD AUTO: 9.41 K/UL — HIGH (ref 1.4–6.5)
NEUTROPHILS NFR BLD AUTO: 88 % — HIGH (ref 42.2–75.2)
NRBC # BLD: 0 /100 WBCS — SIGNIFICANT CHANGE UP (ref 0–0)
PLATELET # BLD AUTO: 358 K/UL — SIGNIFICANT CHANGE UP (ref 130–400)
PMV BLD: 9.3 FL — SIGNIFICANT CHANGE UP (ref 7.4–10.4)
POTASSIUM SERPL-MCNC: 3.9 MMOL/L — SIGNIFICANT CHANGE UP (ref 3.5–5)
POTASSIUM SERPL-SCNC: 3.9 MMOL/L — SIGNIFICANT CHANGE UP (ref 3.5–5)
PROT SERPL-MCNC: 7.8 G/DL — SIGNIFICANT CHANGE UP (ref 6–8)
RBC # BLD: 4.47 M/UL — LOW (ref 4.7–6.1)
RBC # FLD: 16.9 % — HIGH (ref 11.5–14.5)
SODIUM SERPL-SCNC: 144 MMOL/L — SIGNIFICANT CHANGE UP (ref 135–146)
SPECIMEN SOURCE: SIGNIFICANT CHANGE UP
SPECIMEN SOURCE: SIGNIFICANT CHANGE UP
WBC # BLD: 10.69 K/UL — SIGNIFICANT CHANGE UP (ref 4.8–10.8)
WBC # FLD AUTO: 10.69 K/UL — SIGNIFICANT CHANGE UP (ref 4.8–10.8)

## 2023-08-16 PROCEDURE — 99223 1ST HOSP IP/OBS HIGH 75: CPT

## 2023-08-16 PROCEDURE — 99233 SBSQ HOSP IP/OBS HIGH 50: CPT

## 2023-08-16 PROCEDURE — 95816 EEG AWAKE AND DROWSY: CPT | Mod: 26

## 2023-08-16 RX ORDER — MAGNESIUM SULFATE 500 MG/ML
2 VIAL (ML) INJECTION ONCE
Refills: 0 | Status: COMPLETED | OUTPATIENT
Start: 2023-08-16 | End: 2023-08-16

## 2023-08-16 RX ORDER — SENNA PLUS 8.6 MG/1
1 TABLET ORAL ONCE
Refills: 0 | Status: COMPLETED | OUTPATIENT
Start: 2023-08-16 | End: 2023-08-16

## 2023-08-16 RX ADMIN — PANTOPRAZOLE SODIUM 40 MILLIGRAM(S): 20 TABLET, DELAYED RELEASE ORAL at 06:06

## 2023-08-16 RX ADMIN — DULOXETINE HYDROCHLORIDE 30 MILLIGRAM(S): 30 CAPSULE, DELAYED RELEASE ORAL at 12:04

## 2023-08-16 RX ADMIN — Medication 3 MILLILITER(S): at 19:46

## 2023-08-16 RX ADMIN — Medication 1 MILLIGRAM(S): at 17:15

## 2023-08-16 RX ADMIN — APIXABAN 5 MILLIGRAM(S): 2.5 TABLET, FILM COATED ORAL at 06:04

## 2023-08-16 RX ADMIN — Medication 3 MILLILITER(S): at 14:44

## 2023-08-16 RX ADMIN — BUPROPION HYDROCHLORIDE 150 MILLIGRAM(S): 150 TABLET, EXTENDED RELEASE ORAL at 12:05

## 2023-08-16 RX ADMIN — Medication 3 MILLILITER(S): at 08:43

## 2023-08-16 RX ADMIN — APIXABAN 5 MILLIGRAM(S): 2.5 TABLET, FILM COATED ORAL at 17:10

## 2023-08-16 RX ADMIN — Medication 10 MILLIGRAM(S): at 12:05

## 2023-08-16 RX ADMIN — Medication 60 MILLIGRAM(S): at 06:05

## 2023-08-16 RX ADMIN — MORPHINE SULFATE 30 MILLIGRAM(S): 50 CAPSULE, EXTENDED RELEASE ORAL at 21:38

## 2023-08-16 RX ADMIN — SIMVASTATIN 20 MILLIGRAM(S): 20 TABLET, FILM COATED ORAL at 21:32

## 2023-08-16 RX ADMIN — SENNA PLUS 1 TABLET(S): 8.6 TABLET ORAL at 22:17

## 2023-08-16 RX ADMIN — Medication 145 MILLIGRAM(S): at 12:05

## 2023-08-16 RX ADMIN — Medication 40 MILLIGRAM(S): at 14:25

## 2023-08-16 RX ADMIN — Medication 2: at 12:04

## 2023-08-16 RX ADMIN — LOSARTAN POTASSIUM 100 MILLIGRAM(S): 100 TABLET, FILM COATED ORAL at 06:04

## 2023-08-16 RX ADMIN — Medication 40 MILLIGRAM(S): at 06:05

## 2023-08-16 RX ADMIN — Medication 100 MILLIGRAM(S): at 06:04

## 2023-08-16 RX ADMIN — TAMSULOSIN HYDROCHLORIDE 0.4 MILLIGRAM(S): 0.4 CAPSULE ORAL at 21:32

## 2023-08-16 RX ADMIN — Medication 60 MILLIGRAM(S): at 14:28

## 2023-08-16 RX ADMIN — Medication 3 MILLILITER(S): at 02:41

## 2023-08-16 RX ADMIN — Medication 10 MILLIGRAM(S): at 14:26

## 2023-08-16 RX ADMIN — Medication 10 MILLIGRAM(S): at 06:04

## 2023-08-16 RX ADMIN — Medication 10 MILLIGRAM(S): at 21:32

## 2023-08-16 RX ADMIN — Medication 25 GRAM(S): at 12:08

## 2023-08-16 NOTE — PROGRESS NOTE ADULT - SUBJECTIVE AND OBJECTIVE BOX
Patient is a 67y old  Male who presents with a chief complaint of left leg swelling (16 Aug 2023 11:37)        Over Night Events:    On Nc   At baseline   No shortness of breath         ROS:  See HPI    PHYSICAL EXAM    ICU Vital Signs Last 24 Hrs  T(C): 37 (16 Aug 2023 04:35), Max: 37 (16 Aug 2023 04:35)  T(F): 98.6 (16 Aug 2023 04:35), Max: 98.6 (16 Aug 2023 04:35)  HR: 55 (16 Aug 2023 04:35) (55 - 78)  BP: 123/68 (16 Aug 2023 04:35) (123/68 - 123/68)  BP(mean): --  ABP: --  ABP(mean): --  RR: 19 (16 Aug 2023 04:35) (19 - 19)  SpO2: 95% (16 Aug 2023 04:35) (95% - 96%)    O2 Parameters below as of 16 Aug 2023 04:35  Patient On (Oxygen Delivery Method): nasal cannula             General: NAD   HEENT: RAHEEM             Lymphatic system: No cervical LN   Lungs: Bilateral BS, clear   Cardiovascular: Regular   Gastrointestinal: Soft, Positive BS  Extremities: No clubbing.  Moves extremities.  Full Range of motion   Skin: Warm, intact  Neurological: No motor or sensory deficit       08-16-23 @ 07:01  -  08-16-23 @ 12:14  --------------------------------------------------------  IN:  Total IN: 0 mL    OUT:    Voided (mL): 400 mL  Total OUT: 400 mL    Total NET: -400 mL          LABS:                            12.9   10.69 )-----------( 358      ( 16 Aug 2023 09:40 )             40.3                                               08-16    144  |  95<L>  |  14  ----------------------------<  231<H>  3.9   |  32  |  0.7    Ca    9.7      16 Aug 2023 09:40  Mg     1.8     08-16    TPro  7.8  /  Alb  3.8  /  TBili  0.3  /  DBili  x   /  AST  9   /  ALT  6   /  AlkPhos  84  08-16                                             Urinalysis Basic - ( 16 Aug 2023 09:40 )    Color: x / Appearance: x / SG: x / pH: x  Gluc: 231 mg/dL / Ketone: x  / Bili: x / Urobili: x   Blood: x / Protein: x / Nitrite: x   Leuk Esterase: x / RBC: x / WBC x   Sq Epi: x / Non Sq Epi: x / Bacteria: x                                                  LIVER FUNCTIONS - ( 16 Aug 2023 09:40 )  Alb: 3.8 g/dL / Pro: 7.8 g/dL / ALK PHOS: 84 U/L / ALT: 6 U/L / AST: 9 U/L / GGT: x                                                                                                                                   ABG - ( 15 Aug 2023 07:32 )  pH, Arterial: 7.47  pH, Blood: x     /  pCO2: 59    /  pO2: 81    / HCO3: 43    / Base Excess: 16.2  /  SaO2: 97.9                MEDICATIONS  (STANDING):  albuterol/ipratropium for Nebulization 3 milliLiter(s) Nebulizer every 6 hours  apixaban 5 milliGRAM(s) Oral two times a day  azithromycin  IVPB 500 milliGRAM(s) IV Intermittent every 24 hours  baclofen 10 milliGRAM(s) Oral every 8 hours  buPROPion XL (24-Hour) . 150 milliGRAM(s) Oral daily  ceFAZolin   IVPB 2000 milliGRAM(s) IV Intermittent every 8 hours  dextrose 5%. 1000 milliLiter(s) (50 mL/Hr) IV Continuous <Continuous>  dextrose 5%. 1000 milliLiter(s) (100 mL/Hr) IV Continuous <Continuous>  dextrose 50% Injectable 25 Gram(s) IV Push once  dextrose 50% Injectable 12.5 Gram(s) IV Push once  dextrose 50% Injectable 25 Gram(s) IV Push once  DULoxetine 30 milliGRAM(s) Oral daily  fenofibrate Tablet 145 milliGRAM(s) Oral daily  furosemide   Injectable 40 milliGRAM(s) IV Push two times a day  glucagon  Injectable 1 milliGRAM(s) IntraMuscular once  hydrocortisone sodium succinate Injectable 60 milliGRAM(s) IV Push every 12 hours  insulin lispro (ADMELOG) corrective regimen sliding scale   SubCutaneous three times a day before meals  losartan 100 milliGRAM(s) Oral daily  oxybutynin 10 milliGRAM(s) Oral daily  pantoprazole    Tablet 40 milliGRAM(s) Oral before breakfast  simvastatin 20 milliGRAM(s) Oral at bedtime  tamsulosin 0.4 milliGRAM(s) Oral at bedtime    MEDICATIONS  (PRN):  ALPRAZolam 1 milliGRAM(s) Oral three times a day PRN anxiety  dextrose Oral Gel 15 Gram(s) Oral once PRN Blood Glucose LESS THAN 70 milliGRAM(s)/deciliter  morphine ER Tablet 30 milliGRAM(s) Oral two times a day PRN pain  oxycodone    5 mG/acetaminophen 325 mG 1 Tablet(s) Oral every 8 hours PRN Severe Pain (7 - 10)      Xrays:                                                                                     ECHO

## 2023-08-16 NOTE — PROGRESS NOTE ADULT - ASSESSMENT
IMPRESSION:    Acute on Chronic Hypercapnic Respiratory Failure improved   COPD exacerbation   COPD on 4L NC  Incidental solid pulmonary nodules: RUL 2.8 cm spiculated lesion, RUL 1.4 cm lesion, 2 MC pleural based nodules, 1cm each.  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  Aspiration precautions  Outpatient follow up; states he sees Dr Culver IMPRESSION:    Acute on Chronic Hypercapnic Respiratory Failure improved   COPD exacerbation   COPD on 4L NC  Incidental solid pulmonary nodules: RUL 2.8 cm spiculated lesion, RUL 1.4 cm lesion, 2 MC pleural based nodules, 1cm each.  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  Aspiration precautions  Outpatient follow up; states he sees Dr Culver

## 2023-08-16 NOTE — DISCHARGE NOTE PROVIDER - HOSPITAL COURSE
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 Says symptoms started this past Monday as a small "speck" near medial aspect of left knee. Since then, erythema has spread to along inner thigh. mild pain and itchiness. Also says erythema has spread down the leg with some associated swelling. Denies trauma, recent insect bite, new medications, new topicals, recent travel, IV drug use, immobilization, chest pain, abdominal pain, fever, chills, recent travel. Says this has never happened before.       In the ED, T 100, ,79. BNP 1360. XR left tibia/fibula showing diffuse soft tissue swelling. CXR showing some pulmonary vascular congestion.    HD#6; S/P Stroke Code on 08/14. Neuro consulted who recommended CT head no con and EEG. CT head was done and negative for acute pathology. EEG was negative for epileptiform activity.   SAo2 was in the 70s during code. ABG showed Arterial: 7.47  pH, Blood: x     /  pCO2: 59    /  pO2: 81    / HCO3: 43    / Base Excess: 16.2  /  SaO2: 97.9. Pulmonary was consulted and patient palced on Bipap until hypercarbia improved.   Patient was treated for COPD exacerbation and started on duonebs q4h and prn, solumedrol 60mg q12, and azithromycin course.   Repeat ABG showed: pH 7.47, pCO2 of 59, pO2 of 81. Patient taken off BiPAP and placed on __L via NC.     Resting pulse ox on RA: ___  Resting pulse ox on O2: ___  Ambulating on RA: ___  Ambulating on O2: ___    Patient deemed medically stable and cleared for discharge. 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 Says symptoms started this past Monday as a small "speck" near medial aspect of left knee. Since then, erythema has spread to along inner thigh. mild pain and itchiness. Also says erythema has spread down the leg with some associated swelling. Denies trauma, recent insect bite, new medications, new topicals, recent travel, IV drug use, immobilization, chest pain, abdominal pain, fever, chills, recent travel. Says this has never happened before.       In the ED, T 100, ,79. BNP 1360. XR left tibia/fibula showing diffuse soft tissue swelling. CXR showing some pulmonary vascular congestion.    HD#6; S/P Stroke Code on 08/14. Neuro consulted who recommended CT head no con and EEG. CT head was done and negative for acute pathology. EEG was negative for epileptiform activity.   SAo2 was in the 70s during code. ABG showed Arterial: 7.47  pH, Blood: x     /  pCO2: 59    /  pO2: 81    / HCO3: 43    / Base Excess: 16.2  /  SaO2: 97.9. Pulmonary was consulted and patient palced on Bipap until hypercarbia improved.   Patient was treated for COPD exacerbation and started on duonebs q4h and prn, solumedrol 60mg q12, and azithromycin course.   Repeat ABG showed: pH 7.47, pCO2 of 59, pO2 of 81. Patient taken off BiPAP and placed on __L via NC.     Resting pulse ox on RA: ___  Resting pulse ox on O2: ___  Ambulating on RA: _86__  Ambulating on O2: _93__    Patient deemed medically stable and cleared for discharge. 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 Says symptoms started this past Monday as a small "speck" near medial aspect of left knee. Since then, erythema has spread to along inner thigh. mild pain and itchiness. Also says erythema has spread down the leg with some associated swelling. Denies trauma, recent insect bite, new medications, new topicals, recent travel, IV drug use, immobilization, chest pain, abdominal pain, fever, chills, recent travel. Says this has never happened before.       In the ED, T 100, ,79. BNP 1360. XR left tibia/fibula showing diffuse soft tissue swelling. CXR showing some pulmonary vascular congestion.    HD#6; S/P Stroke Code on 08/14. Neuro consulted who recommended CT head no con and EEG. CT head was done and negative for acute pathology. EEG was negative for epileptiform activity.   SAo2 was in the 70s during code. ABG showed Arterial: 7.47  pH, Blood: x     /  pCO2: 59    /  pO2: 81    / HCO3: 43    / Base Excess: 16.2  /  SaO2: 97.9. Pulmonary was consulted and patient palced on Bipap until hypercarbia improved.   Patient was treated for COPD exacerbation and started on duonebs q4h and prn, solumedrol 60mg q12, and azithromycin course.   Repeat ABG showed: pH 7.47, pCO2 of 59, pO2 of 81. Patient taken off BiPAP and placed on 6 L via NC. Oxygen was weaned down to ___  L via NC.     Patient deemed medically stable and cleared for discharge.       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 Says symptoms started this past Monday as a small "speck" near medial aspect of left knee. Since then, erythema has spread to along inner thigh. mild pain and itchiness. Also says erythema has spread down the leg with some associated swelling. Denies trauma, recent insect bite, new medications, new topicals, recent travel, IV drug use, immobilization, chest pain, abdominal pain, fever, chills, recent travel. Says this has never happened before.       In the ED, T 100, ,79. BNP 1360. XR left tibia/fibula showing diffuse soft tissue swelling. CXR showing some pulmonary vascular congestion.    HD#6; S/P Stroke Code on 08/14. Neuro consulted who recommended CT head no con and EEG. CT head was done and negative for acute pathology. EEG was negative for epileptiform activity.   SAo2 was in the 70s during code. ABG showed Arterial: 7.47  pH, Blood: x     /  pCO2: 59    /  pO2: 81    / HCO3: 43    / Base Excess: 16.2  /  SaO2: 97.9. Pulmonary was consulted and patient palced on Bipap until hypercarbia improved.   Patient was treated for COPD exacerbation and started on duonebs q4h and prn, solumedrol 60mg q12, and azithromycin course.   Repeat ABG showed: pH 7.47, pCO2 of 59, pO2 of 81. Patient taken off BiPAP and placed on 6 L via NC. Oxygen was weaned down to 6L via NC.     Patient deemed medically stable and cleared for discharge.    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 Says symptoms started this past Monday as a small "speck" near medial aspect of left knee. Since then, erythema has spread to along inner thigh. mild pain and itchiness. Also says erythema has spread down the leg with some associated swelling. Denies trauma, recent insect bite, new medications, new topicals, recent travel, IV drug use, immobilization, chest pain, abdominal pain, fever, chills, recent travel. Says this has never happened before.       In the ED, T 100, ,79. BNP 1360. XR left tibia/fibula showing diffuse soft tissue swelling. CXR showing some pulmonary vascular congestion.    HD#6; S/P Stroke Code on 08/14. Neuro consulted who recommended CT head no con and EEG. CT head was done and negative for acute pathology. EEG was negative for epileptiform activity.   SAo2 was in the 70s during code. ABG showed Arterial: 7.47  pH, Blood: x     /  pCO2: 59    /  pO2: 81    / HCO3: 43    / Base Excess: 16.2  /  SaO2: 97.9. Pulmonary was consulted and patient palced on Bipap until hypercarbia improved.   Patient was treated for COPD exacerbation and started on duonebs q4h and prn, solumedrol 60mg q12, and azithromycin course. Will be discharged on steroid taper and azithromycin for total of 5 days.    Patient was also treated for cellulitis with ancef and will be discharged on keflex for a total of 10 days.     CT chest showed:  New nodular consolidations in the upper lobe some which have peripheral   speculations and are concerning for focal lesions (i.e. metastases).   Mediastinal lymphadenopathy as described.  New expansile lesions versus healing fractures of the right posterior   fifth through seventh ribs.    Pulm consulted, patient will follow up with repeat CT scan outpatient.       Patient deemed medically stable and cleared for discharge.

## 2023-08-16 NOTE — DISCHARGE NOTE PROVIDER - CARE PROVIDER_API CALL
Fernando Stein  Internal Medicine  [address] [place] [address]  Phone: [phone]  Fax: [phone]  Follow Up Time:    Fernando Stein  Internal Medicine  [address] [place] [address]  Phone: [phone]  Fax: [phone]  Follow Up Time:     Javier Acharya  Anesthesiology  [address] [place] [address], [address]  [place] [address]  Phone: [phone]  Fax: [phone]  Follow Up Time:    Fernando Stien  Internal Medicine  [address] [place] [address]  Phone: [phone]  Fax: [phone]  Follow Up Time:     Javier Acharya  Anesthesiology  [address] [place] [address], [address]  [place] [address]  Phone: [phone]  Fax: [phone]  Follow Up Time:     Baljinder Carballo  Pulmonary Disease  [address] [place] [id number]  Phone: [phone]  Fax: [phone]  Follow Up Time:

## 2023-08-16 NOTE — PROGRESS NOTE ADULT - SUBJECTIVE AND OBJECTIVE BOX
24H events:    Patient is a 67y old Male who presents with a chief complaint of left leg swelling (16 Aug 2023 12:14)    Primary diagnosis of Cellulitis.    Today is hospital day 5d. This morning patient was seen and examined at bedside, resting comfortably in bed.    No acute or major events overnight.    Code Status:    Family communication:  Contact date:  Name of person contacted:  Relationship to patient:  Communication details:  What matters most:    PAST MEDICAL & SURGICAL HISTORY  Anxiety    HTN (hypertension)    Diabetes    High cholesterol    Asthma    Chronic low back pain with sciatica, sciatica laterality unspecified, unspecified back pain laterality    Uncomplicated opioid dependence    Syncopal episodes    History of back surgery  x 3      SOCIAL HISTORY:  Social History:  Former smoker (11 Aug 2023 16:42)      ALLERGIES:  Originally Entered as [ANGIOEDEMA] reaction to [pineapple] (Unknown)  aspirin (Stomach Upset)  pineapples (Anaphylaxis)  penicillins (Anaphylaxis)    MEDICATIONS:  STANDING MEDICATIONS  albuterol/ipratropium for Nebulization 3 milliLiter(s) Nebulizer every 6 hours  apixaban 5 milliGRAM(s) Oral two times a day  baclofen 10 milliGRAM(s) Oral every 8 hours  buPROPion XL (24-Hour) . 150 milliGRAM(s) Oral daily  dextrose 5%. 1000 milliLiter(s) IV Continuous <Continuous>  dextrose 5%. 1000 milliLiter(s) IV Continuous <Continuous>  dextrose 50% Injectable 25 Gram(s) IV Push once  dextrose 50% Injectable 12.5 Gram(s) IV Push once  dextrose 50% Injectable 25 Gram(s) IV Push once  DULoxetine 30 milliGRAM(s) Oral daily  fenofibrate Tablet 145 milliGRAM(s) Oral daily  furosemide   Injectable 40 milliGRAM(s) IV Push two times a day  glucagon  Injectable 1 milliGRAM(s) IntraMuscular once  insulin lispro (ADMELOG) corrective regimen sliding scale   SubCutaneous three times a day before meals  levoFLOXacin  Tablet 750 milliGRAM(s) Oral every 24 hours  losartan 100 milliGRAM(s) Oral daily  methylPREDNISolone sodium succinate Injectable 60 milliGRAM(s) IV Push once  oxybutynin 10 milliGRAM(s) Oral daily  pantoprazole    Tablet 40 milliGRAM(s) Oral before breakfast  simvastatin 20 milliGRAM(s) Oral at bedtime  tamsulosin 0.4 milliGRAM(s) Oral at bedtime    PRN MEDICATIONS  ALPRAZolam 1 milliGRAM(s) Oral three times a day PRN  dextrose Oral Gel 15 Gram(s) Oral once PRN  morphine ER Tablet 30 milliGRAM(s) Oral two times a day PRN  oxycodone    5 mG/acetaminophen 325 mG 1 Tablet(s) Oral every 8 hours PRN    VITALS:   T(F): 98.5  HR: 91  BP: 119/66  RR: 16  SpO2: 96%    PHYSICAL EXAM:  GENERAL:   (x  ) NAD, lying in bed comfortably     (  ) obtunded     (  ) lethargic     (  ) somnolent    HEAD:   ( x ) Atraumatic     (  ) hematoma     (  ) laceration (specify location:       )     NECK:  (x  ) Supple     (  ) neck stiffness     (  ) nuchal rigidity     (  )  no JVD     (  ) JVD present ( -- cm)    HEART:  Rate -->     (  ) normal rate     (  ) bradycardic     (  ) tachycardic  Rhythm -->     (  ) regular     (  ) regularly irregular     (  ) irregularly irregular  Murmurs -->     (  ) normal s1s2     (  ) systolic murmur     (  ) diastolic murmur     (  ) continuous murmur      (  ) S3 present     (  ) S4 present    LUNGS:   (  )Unlabored respirations     (  ) tachypnea  (  ) B/L air entry     (  ) decreased breath sounds in:  (location     )    (  ) no adventitious sound     (  ) crackles     (  ) wheezing      (  ) rhonchi      (specify location:       )  (  ) chest wall tenderness (specify location:       )    ABDOMEN:   (  ) Soft     (  ) tense   |   (  ) nondistended     (  ) distended   |   (  ) +BS     (  ) hypoactive bowel sounds     (  ) hyperactive bowel sounds  (  ) nontender     (  ) RUQ tenderness     (  ) RLQ tenderness     (  ) LLQ tenderness     (  ) epigastric tenderness     (  ) diffuse tenderness  (  ) Splenomegaly      (  ) Hepatomegaly      (  ) Jaundice     (  ) ecchymosis     EXTREMITIES:  (  ) Normal     (  ) Rash     (  ) ecchymosis     (  ) varicose veins      (  ) pitting edema     (  ) non-pitting edema   (  ) ulceration     (  ) gangrene:     (location:     )    NERVOUS SYSTEM:    (  ) A&Ox3     (  ) confused     (  ) lethargic  CN II-XII:     (  ) Intact     (  ) deficits found     (Specify:     )   Upper extremities:     (  ) no sensorimotor deficits     (  ) weakness     (  ) loss of proprioception/vibration     (  ) loss of touch/temperature (specify:    )  Lower extremities:     (  ) no sensorimotor deficits     (  ) weakness     (  ) loss of proprioception/vibration     (  ) loss of touch/temperature (specify:    )    SKIN:   (  ) No rashes or lesions     (  ) maculopapular rash     (  ) pustules     (  ) vesicles     (  ) ulcer     (  ) ecchymosis     (specify location:     )    [facility]    (  ) Indwelling Quintana Catheter:   Date insterted:    Reason (  ) Critical illness     (  ) urinary retention    (  ) Accurate Ins/Outs Monitoring     (  ) CMO patient    (  ) Central Line:   Date inserted:  Location: (  ) Right IJ     (  ) Left IJ     (  ) Right Fem     (  ) Left Fem    (  ) SPC        (  ) pigtail       (  ) PEG tube       (  ) colostomy       (  ) jejunostomy  (  ) U-Dall    LABS:                        12.9   10.69 )-----------( 358      ( 16 Aug 2023 09:40 )             40.3     08-16    144  |  95<L>  |  14  ----------------------------<  231<H>  3.9   |  32  |  0.7    Ca    9.7      16 Aug 2023 09:40  Mg     1.8     08-16    TPro  7.8  /  Alb  3.8  /  TBili  0.3  /  DBili  x   /  AST  9   /  ALT  6   /  AlkPhos  84  08-16      Urinalysis Basic - ( 16 Aug 2023 09:40 )    Color: x / Appearance: x / SG: x / pH: x  Gluc: 231 mg/dL / Ketone: x  / Bili: x / Urobili: x   Blood: x / Protein: x / Nitrite: x   Leuk Esterase: x / RBC: x / WBC x   Sq Epi: x / Non Sq Epi: x / Bacteria: x      ABG - ( 15 Aug 2023 07:32 )  pH, Arterial: 7.47  pH, Blood: x     /  pCO2: 59    /  pO2: 81    / HCO3: 43    / Base Excess: 16.2  /  SaO2: 97.9                      RADIOLOGY:  < from: CT Angio Neck w/ IV Cont (08.14.23 @ 14:41) >  IMPRESSION:    1.  No significant vascular stenosis or large vessel occlusion.    2.  Multiple findings in both lungs with the largest lesion in the right   lung measuring at least 2.8 cm in maximum diameter, further evaluation   with a CT scan of the chest is recommended to exclude neoplasm.    < end of copied text >

## 2023-08-16 NOTE — DISCHARGE NOTE PROVIDER - CARE PROVIDERS DIRECT ADDRESSES
[email or web address] ,[email or web address],DirectAddress_Unknown,DirectAddress_Unknown

## 2023-08-16 NOTE — PROGRESS NOTE ADULT - ASSESSMENT
67 year old patient, known to have HTN, HLD, DM, COPD (on 4L home O2), depression/anxiety, former smoker, 3 spinal operations (2010), history of DVT/PE s/p thrombolysis (CURRENTLY on eliquis) sent to the ED by his PCP for left leg swelling and redness.    #AMS   - patient more lethargic, acute change in mental status from morning  - Stroke Code Called  - CT Head (-)  - ABG pCO2 of 72  - Placed on BiPAP  - repeat ABG: pH; 7.47, pCO2 57, pO2 81, HCO3 43   - BiPAP at night  - started on NC on 6L   - Pulm consulted    #Left leg erythema/swelling, cellulitis vs lymphangitis vs venous insufficiency  - Symptoms started as a small "speck" on left inner thigh, that has since spread to travel down left leg  - Mild tenderness/itchiness, warmth ; afebrile at home  - T 100 in ED, wbc normal, no SIRS/sepsis  - no recent bug bites, travel, medications, topicals  - prelim duplex negative for DVT (f/u official read)  - d/c bactrim and Vancomycin today as per ID (HIGHLY NEPHROTOXIC)  - as per ID start cefazolin 2gm iv q8 and LLE elevation (pt educated)   - ESR: 53, CRP:120.5  - ID recs appreciated  - C/w Cefazolin 2g IV q8h      #HTN/HLD  - Hx of DVT/PE  - continue simvastatin, losartan, lasix  - continue home eliquis    #DM  - ISS for now; add basal/bolus PRN  - FS q6    #COPD on 4L  - duonebs PRN  - c/w Azithromycin 500mg BID   - Solumedrol   - Pulm consulted for CT chest findings and recs appreciated:  CT chest noted; multiple nodular opacities noted   Wean off to prednisone and taper over 10 days   Finish 5 day abx course: Levaquin   Recommend maintenance inhalers with triple therapy: ICS/LABA/LAMA; Albuterol as needed   Continue NIV as needed; during sleep; will benefit from home AVAP due to COPD and hypercapnia   Recommend outpatient repeat CT chest in 4-6 weeks; if persistent then pet is needed   Keep spo2 more than 88%; on O2 at baseline  HOB @ 45 degrees.  Aspiration precautions  Outpatient follow up; states he sees Dr Culver    #hx of spinal operations, pain  - continue home pain meds      DVT ppx: eliquis  GI ppx: protonix  Diet: DASH/TLC  Full code    Pending: D/c planning 67 year old patient, known to have HTN, HLD, DM, COPD (on 4L home O2), depression/anxiety, former smoker, 3 spinal operations (2010), history of DVT/PE s/p thrombolysis (CURRENTLY on eliquis) sent to the ED by his PCP for left leg swelling and redness.    #AMS 2/2 CO2 Narcosis  - patient more lethargic, acute change in mental status from morning  - Stroke Code Called  - CT Head (-)  - ABG pCO2 of 72  - Placed on BiPAP  - repeat ABG: pH; 7.47, pCO2 57, pO2 81, HCO3 43   - Taken off BiPAP and resumed NC on 6L    #Left leg erythema/swelling, cellulitis vs lymphangitis vs venous insufficiency  - Symptoms started as a small "speck" on left inner thigh, that has since spread to travel down left leg  - Mild tenderness/itchiness, warmth ; afebrile at home  - T 100 in ED, wbc normal, no SIRS/sepsis  - no recent bug bites, travel, medications, topicals  - prelim duplex negative for DVT (f/u official read)  - d/c bactrim and Vancomycin today as per ID (HIGHLY NEPHROTOXIC)  - as per ID start cefazolin 2gm iv q8 and LLE elevation (pt educated)   - ESR: 53, CRP:120.5  - ID recs appreciated  - C/w Cefazolin 2g IV q8h      #HTN/HLD  - Hx of DVT/PE  - continue simvastatin, losartan, lasix  - continue home eliquis    #DM  - ISS for now; add basal/bolus PRN  - FS q6    #COPD on 4L  - duonebs PRN  - c/w Azithromycin 500mg BID   - Solumedrol   - Pulm consulted for CT chest findings and recs appreciated:    Wean off to prednisone and taper over 10 days   Finish 5 day abx course: Shannon   Recommend maintenance inhalers with triple therapy: ICS/LABA/LAMA; Albuterol as needed   Continue NIV as needed; during sleep; will benefit from home AVAP due to COPD and hypercapnia   Recommend outpatient repeat CT chest in 4-6 weeks; if persistent then pet is needed   Keep spo2 more than 88%; on O2 at baseline  HOB @ 45 degrees.  Aspiration precautions  Outpatient follow up; states he sees Dr Culver    - BiPAP at night  - c/w NC on 6L  - Resting O2 on RA: ___  - Resting O2 on 6L via NC: ____  - Ambulatory O2 on RA:  ___  - Ambulatory O2 on 6L: 94%    #hx of spinal operations, pain  - continue home pain meds      DVT ppx: eliquis  GI ppx: protonix  Diet: DASH/TLC  Full code    Pending: D/c planning

## 2023-08-16 NOTE — DISCHARGE NOTE PROVIDER - NSDCFUSCHEDAPPT_GEN_ALL_CORE_FT
[facility]  ONCPAINMGT [address]  Scheduled Appointment: 08/18/2023    [facility]  PULED [address]  Scheduled Appointment: 09/07/2023    Baljinder Carballo  [facility]  PULCY [address]  Scheduled Appointment: 09/07/2023

## 2023-08-16 NOTE — DISCHARGE NOTE PROVIDER - NSDCMRMEDTOKEN_GEN_ALL_CORE_FT
ALPRAZolam 1 mg oral tablet: 1 tab(s) orally 3 times a day  baclofen 10 mg oral tablet: 1 orally 3 times a day  cloNIDine 0.2 mg/24 hr transdermal film, extended release: 1 transdermally once a week  DULoxetine 30 mg oral delayed release capsule: 1 orally 3 times a day  Eliquis Starter Pack for Treatment of DVT and PE 5 mg oral tablet: 1 tab(s) orally 2 times a day 2 tabs (10mg) two times a day for 7 days  1 tab (5mg) two times a day after that  fenofibrate 160 mg oral tablet: 1 tab(s) orally once a day  furosemide 20 mg oral tablet: 1 orally 2 times a day  losartan 100 mg oral tablet: 1 tab(s) orally once a day  metFORMIN 500 mg oral tablet: 1 tab(s) orally 2 times a day  morphine 30 mg/12 to 24 hr oral capsule, extended release: 1.5 cap(s) orally 2 times a day MDD: 90 mg  naloxone 8 mg/0.1 mL nasal spray: 8 milligram(s) intranasally once a day  oxyBUTYnin 10 mg/24 hr oral tablet, extended release: 1 orally once a day  Percocet 10/325: 1 tab orally 3 times a day, As Needed  simvastatin 20 mg oral tablet: 1 tab(s) orally once a day (at bedtime)  tamsulosin 0.4 mg oral capsule: 1 cap(s) orally once a day  Ventolin HFA 90 mcg/inh inhalation aerosol:   Wellbutrin  mg/12 hours oral tablet, extended release: 1 orally once a day   ALPRAZolam 1 mg oral tablet: 1 tab(s) orally 3 times a day  baclofen 10 mg oral tablet: 1 orally 3 times a day  cloNIDine 0.2 mg/24 hr transdermal film, extended release: 1 transdermally once a week  DULoxetine 30 mg oral delayed release capsule: 1 orally 3 times a day  Eliquis Starter Pack for Treatment of DVT and PE 5 mg oral tablet: 1 tab(s) orally 2 times a day 2 tabs (10mg) two times a day for 7 days  1 tab (5mg) two times a day after that  fenofibrate 160 mg oral tablet: 1 tab(s) orally once a day  furosemide 20 mg oral tablet: 1 orally 2 times a day  losartan 100 mg oral tablet: 1 tab(s) orally once a day  metFORMIN 500 mg oral tablet: 1 tab(s) orally 2 times a day  morphine 30 mg/12 to 24 hr oral capsule, extended release: 1.5 cap(s) orally 2 times a day MDD: 90 mg  naloxone 8 mg/0.1 mL nasal spray: 8 milligram(s) intranasally once a day  oxyBUTYnin 10 mg/24 hr oral tablet, extended release: 1 orally once a day  simvastatin 20 mg oral tablet: 1 tab(s) orally once a day (at bedtime)  tamsulosin 0.4 mg oral capsule: 1 cap(s) orally once a day  Ventolin HFA 90 mcg/inh inhalation aerosol:   Wellbutrin  mg/12 hours oral tablet, extended release: 1 orally once a day   ALPRAZolam 1 mg oral tablet: 1 tab(s) orally 3 times a day  baclofen 10 mg oral tablet: 1 orally 3 times a day  cloNIDine 0.2 mg/24 hr transdermal film, extended release: 1 transdermally once a week  DULoxetine 30 mg oral delayed release capsule: 1 orally 3 times a day  Eliquis Starter Pack for Treatment of DVT and PE 5 mg oral tablet: 1 tab(s) orally 2 times a day 2 tabs (10mg) two times a day for 7 days  1 tab (5mg) two times a day after that  fenofibrate 160 mg oral tablet: 1 tab(s) orally once a day  furosemide 20 mg oral tablet: 1 orally 2 times a day  levoFLOXacin 750 mg oral tablet: 1 tab(s) orally every 24 hours  losartan 100 mg oral tablet: 1 tab(s) orally once a day  metFORMIN 500 mg oral tablet: 1 tab(s) orally 2 times a day  morphine 30 mg/12 to 24 hr oral capsule, extended release: 1.5 cap(s) orally 2 times a day MDD: 90 mg  naloxone 8 mg/0.1 mL nasal spray: 8 milligram(s) intranasally once a day  oxyBUTYnin 10 mg/24 hr oral tablet, extended release: 1 orally once a day  simvastatin 20 mg oral tablet: 1 tab(s) orally once a day (at bedtime)  tamsulosin 0.4 mg oral capsule: 1 cap(s) orally once a day  Ventolin HFA 90 mcg/inh inhalation aerosol:   Wellbutrin  mg/12 hours oral tablet, extended release: 1 orally once a day   ALPRAZolam 1 mg oral tablet: 1 tab(s) orally 3 times a day  apixaban 5 mg oral tablet: 1 tab(s) orally 2 times a day  baclofen 10 mg oral tablet: 1 orally 3 times a day  cloNIDine 0.2 mg/24 hr transdermal film, extended release: 1 transdermally once a week  DULoxetine 30 mg oral delayed release capsule: 1 orally 3 times a day  fenofibrate 160 mg oral tablet: 1 tab(s) orally once a day  furosemide 20 mg oral tablet: 1 orally 2 times a day  levoFLOXacin 750 mg oral tablet: 1 tab(s) orally every 24 hours  losartan 100 mg oral tablet: 1 tab(s) orally once a day  metFORMIN 500 mg oral tablet: 1 tab(s) orally 2 times a day  morphine 60 mg/8 to 12 hr oral tablet, extended release: 1 tab(s) orally 2 times a day  naloxone 8 mg/0.1 mL nasal spray: 8 milligram(s) intranasally once a day  oxyBUTYnin 10 mg/24 hr oral tablet, extended release: 1 orally once a day  pantoprazole 40 mg oral delayed release tablet: 1 tab(s) orally once a day  senna leaf extract oral tablet: 2 tab(s) orally once a day (at bedtime)  simvastatin 20 mg oral tablet: 1 tab(s) orally once a day (at bedtime)  tamsulosin 0.4 mg oral capsule: 1 cap(s) orally once a day  Trelegy Ellipta 100 mcg-62.5 mcg-25 mcg/inh inhalation powder: 1 puff(s) inhaled once a day  Ventolin HFA 90 mcg/inh inhalation aerosol:   Wellbutrin  mg/12 hours oral tablet, extended release: 1 orally once a day

## 2023-08-16 NOTE — PROGRESS NOTE ADULT - ASSESSMENT
67 year old patient, known to have HTN, HLD, DM, COPD (on 4L home O2), depression/anxiety, former smoker, 3 spinal operations (2010), history of DVT/PE s/p thrombolysis ( on Eliquis sent to the ED by his PCP for left leg swelling and redness.    AMS due to hypercapnia  - patient more lethargic, acute change in mental status from morning  - Stroke Code called, negative  - new findings of pulm nodules, spiculated?  - ABG pCO2 of 72  - Placed on BiPAP  - repeat ABG: pH; 7.47, pCO2 57, pO2 81, HCO3 43   - BiPAP at night  - started on NC on 6L   - Pulmonary consulted      Left leg erythema cellulitis, chronic venous insufficiency  - no SIRS/sepsis  - no recent bug bites, travel, medications, topicals  - prelim duplex negative for DVT   - ESR: 53, CRP:120.5  - ID recs appreciated  - add Azithromycin 500 IVBP qd  - Continue Cefazolin 2g IV q8h      HTN/HLD  - continue Simvastatin, Losartan, Lasix      DM  - ISS for now; add basal/bolus PRN  - FS q6    Chronic Hypoxic Respiratory Failure, COPD on 4L, Recent dx of PE  - supplemental O2, BoPAP trial  - duonebs PRN  - continue Eliquis    hx of spinal operations, Chronic Pain Opioid Dependence    - continue home pain meds  - consider reduction in rx, in lieu of hypercapnia and AMS    Anxiety, Benzo Dependence      DVT ppx: Eliquis  GI ppx: Protonix  Diet: DASH/TLC  Full code    Pending: CTA chest, Pulm consult 67 year old patient, known to have HTN, HLD, DM, COPD (on 4L home O2), depression/anxiety, former smoker, 3 spinal operations (2010), history of DVT/PE s/p thrombolysis ( on Eliquis sent to the ED by his PCP for left leg swelling and redness.    AMS due to hypercapnia  - patient s/p  lethargic, acute change in mental status- on 8/14; resolved  - Stroke Code called, negative  - new findings of pulm nodules, spiculated?  - ABG pCO2 of 72- c/w CO2 retention  - Placed on BiPAP- NIV and recommended for home on DC  - repeat ABG: pH; 7.47, pCO2 57, pO2 81, HCO3 43   - BiPAP at night  - stable on O2 supplementation  - Pulmonary consulted, 7 noted      Left leg erythema cellulitis, chronic venous insufficiency  - no SIRS/sepsis  - no recent bug bites, travel, medications, topicals  - prelim duplex negative for DVT   - ESR: 53, CRP:120.5  - ID recs appreciated  - add Azithromycin 500 IVBP qd- for 5 day total  - Continue Cefazolin 2g IV q8h;---confirm duration w ID      HTN/HLD  - continue Simvastatin, Losartan, Lasix      DM  - ISS for now; add basal/bolus PRN  - FS q6    Chronic Hypoxic Respiratory Failure, COPD on 4L, Recent dx of PE  - supplemental O2, BoPAP trial  - duonebs PRN  - continue Eliquis  - NIV- and plan to continue on DC    hx of spinal operations, Chronic Pain Opioid Dependence    - continue home pain meds  - consider reduction in rx, in lieu of hypercapnia and AMS    Anxiety, Benzo Dependence; PM to review  - see PM consult and  follow   recommendations.  DVT ppx: Eliquis  GI ppx: Protonix  Diet: DASH/TLC  Full code    Pending: CTA chest, Pulm consult

## 2023-08-16 NOTE — DISCHARGE NOTE PROVIDER - PROVIDER TOKENS
PROVIDER:[TOKEN:[[id number]]] PROVIDER:[TOKEN:[[id number]]],PROVIDER:[TOKEN:[[id number]]] PROVIDER:[TOKEN:[[id number]]],PROVIDER:[TOKEN:[[id number]]],PROVIDER:[TOKEN:[[id number]]]

## 2023-08-16 NOTE — CONSULT NOTE ADULT - SUBJECTIVE AND OBJECTIVE BOX
PAIN MANAGEMENT CONSULT NOTE    Chief Complaint:    HPI:  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 Says symptoms started this past Monday as a small "speck" near medial aspect of left knee. Since then, erythema has spread to along inner thigh. mild pain and itchiness. Also says erythema has spread down the leg with some associated swelling. Denies trauma, recent insect bite, new medications, new topicals, recent travel, IV drug use, immobilization, chest pain, abdominal pain, fever, chills, recent travel. Says this has never happened before.       In the ED, T 100, ,79. BNP 1360. XR left tibia/fibula showing diffuse soft tissue swelling. CXR showing some pulmonary vascular congestion.        patient seen and examined @ Bedside.   Patient sees pain physician Dr. Espinoza as o/p as is managed with morphine sulfate ER 60mg q12h       PAST MEDICAL & SURGICAL HISTORY:  Anxiety      HTN (hypertension)      Diabetes      High cholesterol      Asthma      Chronic low back pain with sciatica, sciatica laterality unspecified, unspecified back pain laterality      Uncomplicated opioid dependence      Syncopal episodes      History of back surgery  x 3          FAMILY HISTORY:  No pertinent family history in first degree relatives        SOCIAL HISTORY:  [ ] Denies Smoking, Alcohol, or Drug Use    HOME MEDICATIONS:   Please refer to initial HNP    PAIN HOME MEDICATIONS:    Allergies    Originally Entered as [ANGIOEDEMA] reaction to [pineapple] (Unknown)  aspirin (Stomach Upset)  pineapples (Anaphylaxis)  penicillins (Anaphylaxis)    Intolerances        PAIN MEDICATIONS:  ALPRAZolam 1 milliGRAM(s) Oral three times a day PRN  baclofen 10 milliGRAM(s) Oral every 8 hours  buPROPion XL (24-Hour) . 150 milliGRAM(s) Oral daily  DULoxetine 30 milliGRAM(s) Oral daily  morphine ER Tablet 30 milliGRAM(s) Oral two times a day PRN  oxycodone    5 mG/acetaminophen 325 mG 1 Tablet(s) Oral every 8 hours PRN    Heme:  apixaban 5 milliGRAM(s) Oral two times a day    Antibiotics:  levoFLOXacin  Tablet 750 milliGRAM(s) Oral every 24 hours    Cardiovascular:  furosemide   Injectable 40 milliGRAM(s) IV Push two times a day  losartan 100 milliGRAM(s) Oral daily    GI:  pantoprazole    Tablet 40 milliGRAM(s) Oral before breakfast    Endocrine:  dextrose 50% Injectable 25 Gram(s) IV Push once  dextrose 50% Injectable 12.5 Gram(s) IV Push once  dextrose 50% Injectable 25 Gram(s) IV Push once  dextrose Oral Gel 15 Gram(s) Oral once PRN  fenofibrate Tablet 145 milliGRAM(s) Oral daily  glucagon  Injectable 1 milliGRAM(s) IntraMuscular once  insulin lispro (ADMELOG) corrective regimen sliding scale   SubCutaneous three times a day before meals  simvastatin 20 milliGRAM(s) Oral at bedtime    All Other Medications:  dextrose 5%. 1000 milliLiter(s) IV Continuous <Continuous>  dextrose 5%. 1000 milliLiter(s) IV Continuous <Continuous>  oxybutynin 10 milliGRAM(s) Oral daily  tamsulosin 0.4 milliGRAM(s) Oral at bedtime      Vital Signs Last 24 Hrs  T(C): 36.9 (16 Aug 2023 13:45), Max: 37 (16 Aug 2023 04:35)  T(F): 98.5 (16 Aug 2023 13:45), Max: 98.6 (16 Aug 2023 04:35)  HR: 91 (16 Aug 2023 13:45) (55 - 96)  BP: 119/66 (16 Aug 2023 13:45) (119/66 - 123/68)  BP(mean): --  RR: 16 (16 Aug 2023 13:45) (16 - 19)  SpO2: 96% (16 Aug 2023 13:45) (94% - 96%)    Parameters below as of 16 Aug 2023 04:35  Patient On (Oxygen Delivery Method): nasal cannula        LABS:                        12.9   10.69 )-----------( 358      ( 16 Aug 2023 09:40 )             40.3     08-16    144  |  95<L>  |  14  ----------------------------<  231<H>  3.9   |  32  |  0.7    Ca    9.7      16 Aug 2023 09:40  Mg     1.8     08-16    TPro  7.8  /  Alb  3.8  /  TBili  0.3  /  DBili  x   /  AST  9   /  ALT  6   /  AlkPhos  84  08-16      Urinalysis Basic - ( 16 Aug 2023 09:40 )    Color: x / Appearance: x / SG: x / pH: x  Gluc: 231 mg/dL / Ketone: x  / Bili: x / Urobili: x   Blood: x / Protein: x / Nitrite: x   Leuk Esterase: x / RBC: x / WBC x   Sq Epi: x / Non Sq Epi: x / Bacteria: x      PHYSICAL EXAM  GENERAL: Laying in bed, NAD  Cranial nerves grossly intact  Motor exam: 5/5 b/l UE. 5/5 b/l LE  Sensation intact to LT in UE/LE in 3 dermatomes  CHEST/LUNG: Clear to auscultation bilaterally; No rales, rhonchi, wheezing, or rubs  ABDOMEN: Soft, Nontender, Nondistended; Bowel sounds present  EXTREMITIES:  2+ Peripheral Pulses, No clubbing, cyanosis, or edema  SKIN: No rashes or lesions      ASSESSMENT:   HPI:  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 Says symptoms started this past Monday as a small "speck" near medial aspect of left knee. Since then, erythema has spread to along inner thigh. mild pain and itchiness. Also says erythema has spread down the leg with some associated swelling. Denies trauma, recent insect bite, new medications, new topicals, recent travel, IV drug use, immobilization, chest pain, abdominal pain, fever, chills, recent travel. Says this has never happened before.         Recommendations  Restart home dose medications (as per istop is morphine sulfate eR 60mg q12h)    morphine sulfate ER 60mg q12h   baclofen 10mg q8h   duloxetine 30mg qD  acetaminophen 1000mg q8h standing while in hospital    Pt to follow with her pain physician as o/p    Bowel regimen: miralax / colace  Nausea ppx: zofran standing  Functional goals: oob-chair

## 2023-08-16 NOTE — PROGRESS NOTE ADULT - SUBJECTIVE AND OBJECTIVE BOX
Patient is a 67y old  Male who presents with a chief complaint of left leg swelling (16 Aug 2023 11:37)        SUBJECTIVE:  Much improved. on 2 L NC    REVIEW OF SYSTEMS:    All Pertinent ROS are negative except per HPI       PHYSICAL EXAM  Vital Signs Last 24 Hrs  T(C): 37 (16 Aug 2023 04:35), Max: 37 (16 Aug 2023 04:35)  T(F): 98.6 (16 Aug 2023 04:35), Max: 98.6 (16 Aug 2023 04:35)  HR: 55 (16 Aug 2023 04:35) (55 - 78)  BP: 123/68 (16 Aug 2023 04:35) (123/68 - 123/68)  BP(mean): --  RR: 19 (16 Aug 2023 04:35) (19 - 19)  SpO2: 95% (16 Aug 2023 04:35) (95% - 96%)    Parameters below as of 16 Aug 2023 04:35  Patient On (Oxygen Delivery Method): nasal cannula        CONSTITUTIONAL:  In NAD    ENT:   Airway patent,     CARDIAC:   Normal rate,   regular rhythm.    no edema    RESPIRATORY:   No Wheezing  No chest expansion  Not tachypneic,  No use of accessory muscles    GASTROINTESTINAL:  Abdomen soft,   non-tender,   no guarding,   + BS    MUSCULOSKELETAL:   range of motion is not limited,  no clubbing, cyanosis    NEUROLOGICAL:   Alert and oriented       08-16-23 @ 07:01  -  08-16-23 @ 12:08  --------------------------------------------------------  IN:  Total IN: 0 mL    OUT:    Voided (mL): 400 mL  Total OUT: 400 mL    Total NET: -400 mL          LABS:                          12.9   10.69 )-----------( 358      ( 16 Aug 2023 09:40 )             40.3                                               08-16    144  |  95<L>  |  14  ----------------------------<  231<H>  3.9   |  32  |  0.7    Ca    9.7      16 Aug 2023 09:40  Mg     1.8     08-16    TPro  7.8  /  Alb  3.8  /  TBili  0.3  /  DBili  x   /  AST  9   /  ALT  6   /  AlkPhos  84  08-16                                             Urinalysis Basic - ( 16 Aug 2023 09:40 )    Color: x / Appearance: x / SG: x / pH: x  Gluc: 231 mg/dL / Ketone: x  / Bili: x / Urobili: x   Blood: x / Protein: x / Nitrite: x   Leuk Esterase: x / RBC: x / WBC x   Sq Epi: x / Non Sq Epi: x / Bacteria: x                                                  LIVER FUNCTIONS - ( 16 Aug 2023 09:40 )  Alb: 3.8 g/dL / Pro: 7.8 g/dL / ALK PHOS: 84 U/L / ALT: 6 U/L / AST: 9 U/L / GGT: x                                                                                            ABG - ( 15 Aug 2023 07:32 )  pH, Arterial: 7.47  pH, Blood: x     /  pCO2: 59    /  pO2: 81    / HCO3: 43    / Base Excess: 16.2  /  SaO2: 97.9                MEDICATIONS  (STANDING):  albuterol/ipratropium for Nebulization 3 milliLiter(s) Nebulizer every 6 hours  apixaban 5 milliGRAM(s) Oral two times a day  azithromycin  IVPB 500 milliGRAM(s) IV Intermittent every 24 hours  baclofen 10 milliGRAM(s) Oral every 8 hours  buPROPion XL (24-Hour) . 150 milliGRAM(s) Oral daily  ceFAZolin   IVPB 2000 milliGRAM(s) IV Intermittent every 8 hours  dextrose 5%. 1000 milliLiter(s) (50 mL/Hr) IV Continuous <Continuous>  dextrose 5%. 1000 milliLiter(s) (100 mL/Hr) IV Continuous <Continuous>  dextrose 50% Injectable 25 Gram(s) IV Push once  dextrose 50% Injectable 12.5 Gram(s) IV Push once  dextrose 50% Injectable 25 Gram(s) IV Push once  DULoxetine 30 milliGRAM(s) Oral daily  fenofibrate Tablet 145 milliGRAM(s) Oral daily  furosemide   Injectable 40 milliGRAM(s) IV Push two times a day  glucagon  Injectable 1 milliGRAM(s) IntraMuscular once  hydrocortisone sodium succinate Injectable 60 milliGRAM(s) IV Push every 12 hours  insulin lispro (ADMELOG) corrective regimen sliding scale   SubCutaneous three times a day before meals  losartan 100 milliGRAM(s) Oral daily  magnesium sulfate  IVPB 2 Gram(s) IV Intermittent once  oxybutynin 10 milliGRAM(s) Oral daily  pantoprazole    Tablet 40 milliGRAM(s) Oral before breakfast  simvastatin 20 milliGRAM(s) Oral at bedtime  tamsulosin 0.4 milliGRAM(s) Oral at bedtime    MEDICATIONS  (PRN):  ALPRAZolam 1 milliGRAM(s) Oral three times a day PRN anxiety  dextrose Oral Gel 15 Gram(s) Oral once PRN Blood Glucose LESS THAN 70 milliGRAM(s)/deciliter  morphine ER Tablet 30 milliGRAM(s) Oral two times a day PRN pain  oxycodone    5 mG/acetaminophen 325 mG 1 Tablet(s) Oral every 8 hours PRN Severe Pain (7 - 10)

## 2023-08-16 NOTE — EEG REPORT - NS EEG TEXT BOX
[facility] Department of Neurology  Inpatient Routine-EEG Report      Patient Name:	SANA JOE    :	1955  MRN:	-  Study Date/Time:	8/15/2023, 8:29:52 AM  Referred by:	-    Brief Clinical History:  SANA JOE is a 67 year old Male; study performed to investigate for seizures or markers of epilepsy.   Diagnosis Code: R40.4 Transient alteration of awareness  CPT:  26026 (awake/drowsy)     Patient Medication:  ZOCOAR    PROTONIX    ELIQUS    ANEF    XANAX    LIORESAL    CYMBALTA    COZAAR      Acquisition Details:  Electroencephalography was acquired using a minimum of 21 channels on an [employer] system v 9.3.1 with electrode placement according to the standard International 10-20 system following ACNS (American Clinical Neurophysiology Society) guidelines.  Anterior temporal T1 and T2 electrodes were utilized whenever possible.   The [employer] automated spike & seizure detections were all reviewed in detail, in addition to the entire raw EEG.    Findings:  Background:  continuous.   Voltage:  Normal (20uV)  Organization:  Appropriate anterior-posterior gradient  Posterior Dominant Rhythm:  7-8 Hz symmetric, well-organized, and well-modulated  Variability:  Yes	Reactivity:  Yes  Sleep:  Absent.  Focal abnormalities:  No persistent asymmetries of voltage or frequency.  Interictal Activity:  None  Focal Slowing:  None  Generalized Slowing:  Mild  Events:  1)	No electrographic seizures or significant clinical events.  Provocations:  1)	Hyperventilation: was not performed.  2)	Photic stimulation: was not performed.  Impression:  Abnormal due to generalized slowing as above    Clinical Correlation:  Findings consistent with mild diffuse electrocerebral dysfunction secondary to nonspecific etiology    Nga Kumar MD  Attending Neurologist, Division of Epilepsy

## 2023-08-16 NOTE — DISCHARGE NOTE PROVIDER - NSDCCPCAREPLAN_GEN_ALL_CORE_FT
PRINCIPAL DISCHARGE DIAGNOSIS  Diagnosis: Cellulitis  Assessment and Plan of Treatment: You came to the hospital because you had left leg redness and swelling. You were found to have cellulitis. You were treated with antibiotics and monitored for improvement. Please follow up with your PCP outpatient.      SECONDARY DISCHARGE DIAGNOSES  Diagnosis: COPD exacerbation  Assessment and Plan of Treatment: Your course was complicated by a COPD exacerbation. You were treated with nebulizer treatment, steroids, and antibiotics. Please follow up with your pulmonologist outpatient,.     PRINCIPAL DISCHARGE DIAGNOSIS  Diagnosis: Cellulitis  Assessment and Plan of Treatment: You came to the hospital because you had left leg redness and swelling. You were found to have cellulitis. You were treated with antibiotics and monitored for improvement. Please follow up with your PCP outpatient.      SECONDARY DISCHARGE DIAGNOSES  Diagnosis: COPD exacerbation  Assessment and Plan of Treatment: Your course was complicated by a COPD exacerbation. You were treated with nebulizer treatment, steroids, and antibiotics. Please follow up with your pulmonologist outpatient,.    Diagnosis: Chronic low back pain  Assessment and Plan of Treatment: Please follow up with your pain management doctor outpatient.     PRINCIPAL DISCHARGE DIAGNOSIS  Diagnosis: Cellulitis  Assessment and Plan of Treatment: You came to the hospital because you had left leg redness and swelling. You were found to have cellulitis. You were treated with antibiotics and monitored for improvement. Please follow up with your PCP outpatient.      SECONDARY DISCHARGE DIAGNOSES  Diagnosis: COPD exacerbation  Assessment and Plan of Treatment: Your course was complicated by a COPD exacerbation. You were treated with nebulizer treatment, steroids, and antibiotics. Please follow up with your pulmonologist outpatient,. CT of your chest showed nodules in the lung that should be followed up with repeat CT of the chest in 4-6 weeks    Diagnosis: Chronic low back pain  Assessment and Plan of Treatment: Please follow up with your pain management doctor outpatient.

## 2023-08-16 NOTE — CHART NOTE - NSCHARTNOTEFT_GEN_A_CORE
67 yr old male with COPD, admitted for exacerbation. Will benefit from NIV upon DC.  AVAPS  RR 16.  IPAP max 20 IPAP min 16.  PEEP 8.

## 2023-08-16 NOTE — PROGRESS NOTE ADULT - SUBJECTIVE AND OBJECTIVE BOX
Chart reviewed, patient examined. Pertinent results reviewed.  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 Says symptoms started this past Monday as a small "speck" near medial aspect of left knee. Since then, erythema has spread to along inner thigh. mild pain and itchiness. Also says erythema has spread down the leg with some associated swelling. Denies trauma, recent insect bite, new medications, new topicals, recent travel, IV drug use, immobilization, chest pain, abdominal pain, fever, chills, recent travel. Says this has never happened before.       In the ED, T 100, ,79. BNP 1360. XR left tibia/fibula showing diffuse soft tissue swelling. CXR showing some pulmonary vascular congestion.    Events of the last 48 hrs reviewed: stroke code, AMS; hypoxia; urgent w/u-scans. Now NIV started.      INTERVAL EVENTS: Patient seen today without distress, alert, staff concerned patient about easily desaturates?      MEDICATIONS  (STANDING):  albuterol/ipratropium for Nebulization 3 milliLiter(s) Nebulizer every 6 hours  apixaban 5 milliGRAM(s) Oral two times a day  azithromycin  IVPB 500 milliGRAM(s) IV Intermittent every 24 hours  baclofen 10 milliGRAM(s) Oral every 8 hours  buPROPion XL (24-Hour) . 150 milliGRAM(s) Oral daily  ceFAZolin   IVPB 2000 milliGRAM(s) IV Intermittent every 8 hours  dextrose 5%. 1000 milliLiter(s) (50 mL/Hr) IV Continuous <Continuous>  dextrose 5%. 1000 milliLiter(s) (100 mL/Hr) IV Continuous <Continuous>  dextrose 50% Injectable 25 Gram(s) IV Push once  dextrose 50% Injectable 12.5 Gram(s) IV Push once  dextrose 50% Injectable 25 Gram(s) IV Push once  DULoxetine 30 milliGRAM(s) Oral daily  fenofibrate Tablet 145 milliGRAM(s) Oral daily  furosemide   Injectable 40 milliGRAM(s) IV Push two times a day  glucagon  Injectable 1 milliGRAM(s) IntraMuscular once  hydrocortisone sodium succinate Injectable 60 milliGRAM(s) IV Push every 12 hours  insulin lispro (ADMELOG) corrective regimen sliding scale   SubCutaneous three times a day before meals  losartan 100 milliGRAM(s) Oral daily  oxybutynin 10 milliGRAM(s) Oral daily  pantoprazole    Tablet 40 milliGRAM(s) Oral before breakfast  simvastatin 20 milliGRAM(s) Oral at bedtime  tamsulosin 0.4 milliGRAM(s) Oral at bedtime    MEDICATIONS  (PRN):  ALPRAZolam 1 milliGRAM(s) Oral three times a day PRN anxiety  dextrose Oral Gel 15 Gram(s) Oral once PRN Blood Glucose LESS THAN 70 milliGRAM(s)/deciliter  morphine ER Tablet 30 milliGRAM(s) Oral two times a day PRN pain  oxycodone    5 mG/acetaminophen 325 mG 1 Tablet(s) Oral every 8 hours PRN Severe Pain (7 - 10)      Vital Signs Last 24 Hrs  T(C): 37 (16 Aug 2023 04:35), Max: 37 (16 Aug 2023 04:35)  T(F): 98.6 (16 Aug 2023 04:35), Max: 98.6 (16 Aug 2023 04:35)  HR: 55 (16 Aug 2023 04:35) (55 - 79)  BP: 123/68 (16 Aug 2023 04:35) (123/68 - 155/71)  BP(mean): --  RR: 19 (16 Aug 2023 04:35) (19 - 20)  SpO2: 95% (16 Aug 2023 04:35) (95% - 96%)    Parameters below as of 16 Aug 2023 04:35  Patient On (Oxygen Delivery Method): nasal cannula      PHYSICAL EXAM:  GENERAL: Weak, O2 via NC in place  NECK: Supple, No JVD  CHEST/LUNG: Decreased BS Clear; No wheezing  HEART: S1, S2, Regular   ABDOMEN: Soft, Nontender, Nondistended; Bowel sounds present  EXTREMITIES: Trace edema      LABS:                        12.6   12.28 )-----------( 351      ( 15 Aug 2023 06:33 )             41.6             08-15    140  |  93<L>  |  11  ----------------------------<  132<H>  4.5   |  36<H>  |  0.7    Ca    9.4      15 Aug 2023 06:33  Phos  3.7     08-14  Mg     1.9     08-15    TPro  8.3<H>  /  Alb  3.8  /  TBili  0.4  /  DBili  x   /  AST  10  /  ALT  7   /  AlkPhos  96  08-15    LIVER FUNCTIONS - ( 15 Aug 2023 06:33 )  Alb: 3.8 g/dL / Pro: 8.3 g/dL / ALK PHOS: 96 U/L / ALT: 7 U/L / AST: 10 U/L / GGT: x                       ABG - ( 15 Aug 2023 07:32 )  pH, Arterial: 7.47  pH, Blood: x     /  pCO2: 59    /  pO2: 81    / HCO3: 43    / Base Excess: 16.2  /  SaO2: 97.9              Urinalysis Basic - ( 15 Aug 2023 06:33 )    Color: x / Appearance: x / SG: x / pH: x  Gluc: 132 mg/dL / Ketone: x  / Bili: x / Urobili: x   Blood: x / Protein: x / Nitrite: x   Leuk Esterase: x / RBC: x / WBC x   Sq Epi: x / Non Sq Epi: x / Bacteria: x        RADIOLOGY & ADDITIONAL TESTS:  < from: CT Angio Chest PE Protocol w/ IV Cont (08.15.23 @ 16:51) >  PROCEDURE DATE:  08/15/2023          INTERPRETATION:  CLINICAL STATEMENT: Evaluation for pulmonary embolism    TECHNIQUE: Multislice helical sections were obtained from the thoracic   inlet to the lung bases during rapid administration of 65 mL Omnipaque   350 intravenous contrast using a CTA protocol. Thin sections were   reconstructed through the pulmonary vasculature. Coronal, sagittal and   MIP reformatted images are also submitted.    COMPARISON: CTA chest 5/3/2023      FINDINGS:    PULMONARY EMBOLUS: No central or segmental pulmonary embolus.    LUNGS, PLEURA, AIRWAYS: Debris within the right distal mainstem bronchi.   Bibasilar consolidations, slightly increased in size on the right lower   lung.  Small right pleural effusion. New scattered groundglass/nodular   consolidations predominantly in the upper lungs.  Some of the scattered nodular consolidations have peripheral spiculations   and are concerning for focal lesions for example:  -2.6 x 2.5 cm right upper lung nodule (series 6 image 138)  -1.2 x 0.9 cm right upper lung nodule (series 6 image 161)    THORACIC NODES: Mediastinal lymphadenopathy with reference lesions as   follows:  -1.8cm pretracheal lymph node (series 6 image 107)  -1.2 cm left-sided lymph node just superolateral to the mayco (series 6   image 112)    MEDIASTINUM/GREAT VESSELS: Normal heart size. No pericardial effusion.   Ascending thoracic aorta measures 3.7 cm(series 6 image 155). Main   pulmonary artery measures 3 cm (series 6 image 155).    VISUALIZED UPPER ABDOMEN: No acute abnormality in the visualized upper   abdomen.    BONES/SOFT TISSUES: New expansile lesions versus healing fractures of the   right posterior lateral fifth, sixth and seventh ribs.      IMPRESSION:    No CT evidence of acute pulmonary embolus.    New nodular consolidations in the upper lobe some which have peripheral   spiculations and are concerning for focal lesions (i.e. metastases).   Mediastinal lymphadenopathy as described.    New expansile lesions versus healing fractures of the right posterior   fifth through seventh ribs.    Bibasilar consolidations with increase in size in the right. New small   right pleural effusion.    Additional findings in the body the report.    --- End of Report ---    < end of copied text >   Chart reviewed, patient examined. Pertinent results reviewed.  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 Says symptoms started this past Monday as a small "speck" near medial aspect of left knee. Since then, erythema has spread to along inner thigh. mild pain and itchiness. Also says erythema has spread down the leg with some associated swelling. Denies trauma, recent insect bite, new medications, new topicals, recent travel, IV drug use, immobilization, chest pain, abdominal pain, fever, chills, recent travel. Says this has never happened before.       In the ED, T 100, ,79. BNP 1360. XR left tibia/fibula showing diffuse soft tissue swelling. CXR showing some pulmonary vascular congestion.    Events of the last 48 hrs reviewed: stroke code, AMS; hypoxia; urgent w/u-scans. Now NIV started.      INTERVAL EVENTS: Patient seen today without distress, alert, staff concerned patient about patient easily desaturating??      MEDICATIONS  (STANDING):  albuterol/ipratropium for Nebulization 3 milliLiter(s) Nebulizer every 6 hours  apixaban 5 milliGRAM(s) Oral two times a day  azithromycin  IVPB 500 milliGRAM(s) IV Intermittent every 24 hours  baclofen 10 milliGRAM(s) Oral every 8 hours  buPROPion XL (24-Hour) . 150 milliGRAM(s) Oral daily  ceFAZolin   IVPB 2000 milliGRAM(s) IV Intermittent every 8 hours  dextrose 5%. 1000 milliLiter(s) (50 mL/Hr) IV Continuous <Continuous>  dextrose 5%. 1000 milliLiter(s) (100 mL/Hr) IV Continuous <Continuous>  dextrose 50% Injectable 25 Gram(s) IV Push once  dextrose 50% Injectable 12.5 Gram(s) IV Push once  dextrose 50% Injectable 25 Gram(s) IV Push once  DULoxetine 30 milliGRAM(s) Oral daily  fenofibrate Tablet 145 milliGRAM(s) Oral daily  furosemide   Injectable 40 milliGRAM(s) IV Push two times a day  glucagon  Injectable 1 milliGRAM(s) IntraMuscular once  hydrocortisone sodium succinate Injectable 60 milliGRAM(s) IV Push every 12 hours  insulin lispro (ADMELOG) corrective regimen sliding scale   SubCutaneous three times a day before meals  losartan 100 milliGRAM(s) Oral daily  oxybutynin 10 milliGRAM(s) Oral daily  pantoprazole    Tablet 40 milliGRAM(s) Oral before breakfast  simvastatin 20 milliGRAM(s) Oral at bedtime  tamsulosin 0.4 milliGRAM(s) Oral at bedtime    MEDICATIONS  (PRN):  ALPRAZolam 1 milliGRAM(s) Oral three times a day PRN anxiety  dextrose Oral Gel 15 Gram(s) Oral once PRN Blood Glucose LESS THAN 70 milliGRAM(s)/deciliter  morphine ER Tablet 30 milliGRAM(s) Oral two times a day PRN pain  oxycodone    5 mG/acetaminophen 325 mG 1 Tablet(s) Oral every 8 hours PRN Severe Pain (7 - 10)    Vital Signs Last 24 Hrs  T(C): 37.1 (16 Aug 2023 20:39), Max: 37.1 (16 Aug 2023 20:39)  T(F): 98.7 (16 Aug 2023 20:39), Max: 98.7 (16 Aug 2023 20:39)  HR: 86 (16 Aug 2023 20:39) (55 - 96)  BP: 136/67 (16 Aug 2023 20:39) (119/66 - 136/67)  BP(mean): --  RR: 18 (16 Aug 2023 20:39) (16 - 19)  SpO2: 93% (16 Aug 2023 20:39) (93% - 96%)    Parameters below as of 16 Aug 2023 20:39  Patient On (Oxygen Delivery Method): nasal cannula        PHYSICAL EXAM:  GENERAL: Weak, O2 via NC in place ( has to be reminded)  NECK: Supple, No JVD  CHEST/LUNG: Decreased BS Clear;  slight coarse crackles in L base  HEART:  RRR, no MRG;   ABDOMEN: Soft, Nontender, Nondistended; Bowel sounds present  EXTREMITIES: Trace edema on R; +1 pitting on L; LS area- straight w surgical incision scar.         LABS:                        12.6   12.28 )-----------( 351      ( 15 Aug 2023 06:33 )             41.6             08-15    140  |  93<L>  |  11  ----------------------------<  132<H>  4.5   |  36<H>  |  0.7    Ca    9.4      15 Aug 2023 06:33  Phos  3.7     08-14  Mg     1.9     08-15    TPro  8.3<H>  /  Alb  3.8  /  TBili  0.4  /  DBili  x   /  AST  10  /  ALT  7   /  AlkPhos  96  08-15    LIVER FUNCTIONS - ( 15 Aug 2023 06:33 )  Alb: 3.8 g/dL / Pro: 8.3 g/dL / ALK PHOS: 96 U/L / ALT: 7 U/L / AST: 10 U/L / GGT: x                       ABG - ( 15 Aug 2023 07:32 )  pH, Arterial: 7.47  pH, Blood: x     /  pCO2: 59    /  pO2: 81    / HCO3: 43    / Base Excess: 16.2  /  SaO2: 97.9              Urinalysis Basic - ( 15 Aug 2023 06:33 )    Color: x / Appearance: x / SG: x / pH: x  Gluc: 132 mg/dL / Ketone: x  / Bili: x / Urobili: x   Blood: x / Protein: x / Nitrite: x   Leuk Esterase: x / RBC: x / WBC x   Sq Epi: x / Non Sq Epi: x / Bacteria: x        RADIOLOGY & ADDITIONAL TESTS:  < from: CT Angio Chest PE Protocol w/ IV Cont (08.15.23 @ 16:51) >  PROCEDURE DATE:  08/15/2023          INTERPRETATION:  CLINICAL STATEMENT: Evaluation for pulmonary embolism    TECHNIQUE: Multislice helical sections were obtained from the thoracic   inlet to the lung bases during rapid administration of 65 mL Omnipaque   350 intravenous contrast using a CTA protocol. Thin sections were   reconstructed through the pulmonary vasculature. Coronal, sagittal and   MIP reformatted images are also submitted.    COMPARISON: CTA chest 5/3/2023      FINDINGS:    PULMONARY EMBOLUS: No central or segmental pulmonary embolus.    LUNGS, PLEURA, AIRWAYS: Debris within the right distal mainstem bronchi.   Bibasilar consolidations, slightly increased in size on the right lower   lung.  Small right pleural effusion. New scattered groundglass/nodular   consolidations predominantly in the upper lungs.  Some of the scattered nodular consolidations have peripheral spiculations   and are concerning for focal lesions for example:  -2.6 x 2.5 cm right upper lung nodule (series 6 image 138)  -1.2 x 0.9 cm right upper lung nodule (series 6 image 161)    THORACIC NODES: Mediastinal lymphadenopathy with reference lesions as   follows:  -1.8cm pretracheal lymph node (series 6 image 107)  -1.2 cm left-sided lymph node just superolateral to the mayco (series 6   image 112)    MEDIASTINUM/GREAT VESSELS: Normal heart size. No pericardial effusion.   Ascending thoracic aorta measures 3.7 cm(series 6 image 155). Main   pulmonary artery measures 3 cm (series 6 image 155).    VISUALIZED UPPER ABDOMEN: No acute abnormality in the visualized upper   abdomen.    BONES/SOFT TISSUES: New expansile lesions versus healing fractures of the   right posterior lateral fifth, sixth and seventh ribs.      IMPRESSION:    No CT evidence of acute pulmonary embolus.    New nodular consolidations in the upper lobe some which have peripheral   spiculations and are concerning for focal lesions (i.e. metastases).   Mediastinal lymphadenopathy as described.    New expansile lesions versus healing fractures of the right posterior   fifth through seventh ribs.    Bibasilar consolidations with increase in size in the right. New small   right pleural effusion.    Additional findings in the body the report.    --- End of Report ---    < end of copied text >

## 2023-08-16 NOTE — PROGRESS NOTE ADULT - ASSESSMENT
IMPRESSION:    Acute on Chronic Hypercapnic Respiratory Failure improved   Community acquired pneumonia  COPD exacerbation - improved  COPD on 4L NC  Incidental solid pulmonary nodules: RUL 2.8 cm spiculated lesion, RUL 1.4 cm lesion, 2 MC pleural based nodules, 1cm each.  Not presetn on Recent CT from May 2023   Former smoker, Quit3 months ago (40 pack years)  HO PE/DVT on Eliquis    PLAN:    Wean O2.   C/w Duoneb Q6 and PRN.    Decrease solumedrol to 60mg q24, and switch to oral prednisone taper 40 mg for 5 days more days.  Augmentin 850/125 BID + Azithromycin 500mg QD on day 1 and 250 mg from day 2 for total 7 days.  NIV qHS.  WIll benefit from NIV upon DC.  AVAPS  RR 16.  IPAP max 20 IPAP min 16.  PEEP 8.    CT Chest NC as OP in 2-3 weeks  Supplemental O2 to target SpO2 88-92%   HOB @ 45 degrees.  Aspiration precautions  Will need OP pulm follow up with Dr Carballo in 2 weeks for pulmonary nodule biopsy eval.

## 2023-08-16 NOTE — CHART NOTE - NSCHARTNOTEFT_GEN_A_CORE
Patient with CRF due to COPD. BIPAP has been tried and failed due to elevated PaCO2. Patient will benefit from NIPPV at home. Without non-invasive ventilation patient will clinically decline and cause readmission. Patient is aware he is going home on AVAP AE.

## 2023-08-17 ENCOUNTER — TRANSCRIPTION ENCOUNTER (OUTPATIENT)
Age: 68
End: 2023-08-17

## 2023-08-17 VITALS
HEART RATE: 58 BPM | DIASTOLIC BLOOD PRESSURE: 77 MMHG | TEMPERATURE: 97 F | OXYGEN SATURATION: 100 % | RESPIRATION RATE: 18 BRPM | SYSTOLIC BLOOD PRESSURE: 145 MMHG

## 2023-08-17 LAB
ALBUMIN SERPL ELPH-MCNC: 3.6 G/DL — SIGNIFICANT CHANGE UP (ref 3.5–5.2)
ALP SERPL-CCNC: 82 U/L — SIGNIFICANT CHANGE UP (ref 30–115)
ALT FLD-CCNC: 6 U/L — SIGNIFICANT CHANGE UP (ref 0–41)
ANION GAP SERPL CALC-SCNC: 9 MMOL/L — SIGNIFICANT CHANGE UP (ref 7–14)
AST SERPL-CCNC: 9 U/L — SIGNIFICANT CHANGE UP (ref 0–41)
BASOPHILS # BLD AUTO: 0.04 K/UL — SIGNIFICANT CHANGE UP (ref 0–0.2)
BASOPHILS NFR BLD AUTO: 0.5 % — SIGNIFICANT CHANGE UP (ref 0–1)
BILIRUB SERPL-MCNC: 0.2 MG/DL — SIGNIFICANT CHANGE UP (ref 0.2–1.2)
BUN SERPL-MCNC: 22 MG/DL — HIGH (ref 10–20)
CALCIUM SERPL-MCNC: 9.6 MG/DL — SIGNIFICANT CHANGE UP (ref 8.4–10.5)
CHLORIDE SERPL-SCNC: 93 MMOL/L — LOW (ref 98–110)
CO2 SERPL-SCNC: 37 MMOL/L — HIGH (ref 17–32)
CREAT SERPL-MCNC: 0.7 MG/DL — SIGNIFICANT CHANGE UP (ref 0.7–1.5)
EGFR: 102 ML/MIN/1.73M2 — SIGNIFICANT CHANGE UP
EOSINOPHIL # BLD AUTO: 0.07 K/UL — SIGNIFICANT CHANGE UP (ref 0–0.7)
EOSINOPHIL NFR BLD AUTO: 0.9 % — SIGNIFICANT CHANGE UP (ref 0–8)
GLUCOSE BLDC GLUCOMTR-MCNC: 120 MG/DL — HIGH (ref 70–99)
GLUCOSE BLDC GLUCOMTR-MCNC: 135 MG/DL — HIGH (ref 70–99)
GLUCOSE SERPL-MCNC: 119 MG/DL — HIGH (ref 70–99)
HCT VFR BLD CALC: 39.7 % — LOW (ref 42–52)
HGB BLD-MCNC: 12.2 G/DL — LOW (ref 14–18)
IMM GRANULOCYTES NFR BLD AUTO: 0.2 % — SIGNIFICANT CHANGE UP (ref 0.1–0.3)
LYMPHOCYTES # BLD AUTO: 2.19 K/UL — SIGNIFICANT CHANGE UP (ref 1.2–3.4)
LYMPHOCYTES # BLD AUTO: 27 % — SIGNIFICANT CHANGE UP (ref 20.5–51.1)
MAGNESIUM SERPL-MCNC: 2.1 MG/DL — SIGNIFICANT CHANGE UP (ref 1.8–2.4)
MCHC RBC-ENTMCNC: 28.2 PG — SIGNIFICANT CHANGE UP (ref 27–31)
MCHC RBC-ENTMCNC: 30.7 G/DL — LOW (ref 32–37)
MCV RBC AUTO: 91.7 FL — SIGNIFICANT CHANGE UP (ref 80–94)
MONOCYTES # BLD AUTO: 0.75 K/UL — HIGH (ref 0.1–0.6)
MONOCYTES NFR BLD AUTO: 9.3 % — SIGNIFICANT CHANGE UP (ref 1.7–9.3)
NEUTROPHILS # BLD AUTO: 5.03 K/UL — SIGNIFICANT CHANGE UP (ref 1.4–6.5)
NEUTROPHILS NFR BLD AUTO: 62.1 % — SIGNIFICANT CHANGE UP (ref 42.2–75.2)
NRBC # BLD: 0 /100 WBCS — SIGNIFICANT CHANGE UP (ref 0–0)
PLATELET # BLD AUTO: 393 K/UL — SIGNIFICANT CHANGE UP (ref 130–400)
PMV BLD: 9.3 FL — SIGNIFICANT CHANGE UP (ref 7.4–10.4)
POTASSIUM SERPL-MCNC: 4.1 MMOL/L — SIGNIFICANT CHANGE UP (ref 3.5–5)
POTASSIUM SERPL-SCNC: 4.1 MMOL/L — SIGNIFICANT CHANGE UP (ref 3.5–5)
PROT SERPL-MCNC: 7.6 G/DL — SIGNIFICANT CHANGE UP (ref 6–8)
RBC # BLD: 4.33 M/UL — LOW (ref 4.7–6.1)
RBC # FLD: 17 % — HIGH (ref 11.5–14.5)
SODIUM SERPL-SCNC: 139 MMOL/L — SIGNIFICANT CHANGE UP (ref 135–146)
WBC # BLD: 8.1 K/UL — SIGNIFICANT CHANGE UP (ref 4.8–10.8)
WBC # FLD AUTO: 8.1 K/UL — SIGNIFICANT CHANGE UP (ref 4.8–10.8)

## 2023-08-17 RX ORDER — LEVOFLOXACIN 5 MG/ML
1 INJECTION, SOLUTION INTRAVENOUS
Qty: 4 | Refills: 0
Start: 2023-08-17 | End: 2023-08-20

## 2023-08-17 RX ORDER — PANTOPRAZOLE SODIUM 20 MG/1
1 TABLET, DELAYED RELEASE ORAL
Qty: 30 | Refills: 0
Start: 2023-08-17 | End: 2023-09-15

## 2023-08-17 RX ORDER — ACETAMINOPHEN 500 MG
650 TABLET ORAL EVERY 6 HOURS
Refills: 0 | Status: DISCONTINUED | OUTPATIENT
Start: 2023-08-17 | End: 2023-08-17

## 2023-08-17 RX ORDER — CEFAZOLIN SODIUM 1 G
2000 VIAL (EA) INJECTION EVERY 8 HOURS
Refills: 0 | Status: DISCONTINUED | OUTPATIENT
Start: 2023-08-17 | End: 2023-08-17

## 2023-08-17 RX ORDER — APIXABAN 2.5 MG/1
1 TABLET, FILM COATED ORAL
Qty: 0 | Refills: 0 | DISCHARGE
Start: 2023-08-17

## 2023-08-17 RX ORDER — CEPHALEXIN 500 MG
1 CAPSULE ORAL
Qty: 24 | Refills: 0
Start: 2023-08-17 | End: 2023-08-22

## 2023-08-17 RX ORDER — MORPHINE SULFATE 50 MG/1
60 CAPSULE, EXTENDED RELEASE ORAL
Refills: 0 | Status: DISCONTINUED | OUTPATIENT
Start: 2023-08-17 | End: 2023-08-17

## 2023-08-17 RX ORDER — SENNA PLUS 8.6 MG/1
2 TABLET ORAL AT BEDTIME
Refills: 0 | Status: DISCONTINUED | OUTPATIENT
Start: 2023-08-17 | End: 2023-08-17

## 2023-08-17 RX ORDER — FLUTICASONE FUROATE, UMECLIDINIUM BROMIDE AND VILANTEROL TRIFENATATE 200; 62.5; 25 UG/1; UG/1; UG/1
1 POWDER RESPIRATORY (INHALATION)
Qty: 1 | Refills: 0
Start: 2023-08-17 | End: 2023-09-15

## 2023-08-17 RX ORDER — ONDANSETRON 8 MG/1
4 TABLET, FILM COATED ORAL EVERY 6 HOURS
Refills: 0 | Status: DISCONTINUED | OUTPATIENT
Start: 2023-08-17 | End: 2023-08-17

## 2023-08-17 RX ORDER — MORPHINE SULFATE 50 MG/1
1 CAPSULE, EXTENDED RELEASE ORAL
Qty: 0 | Refills: 0 | DISCHARGE
Start: 2023-08-17

## 2023-08-17 RX ORDER — SENNA PLUS 8.6 MG/1
2 TABLET ORAL
Qty: 60 | Refills: 0
Start: 2023-08-17 | End: 2023-09-15

## 2023-08-17 RX ORDER — POLYETHYLENE GLYCOL 3350 17 G/17G
17 POWDER, FOR SOLUTION ORAL
Refills: 0 | Status: DISCONTINUED | OUTPATIENT
Start: 2023-08-17 | End: 2023-08-17

## 2023-08-17 RX ADMIN — Medication 1 MILLIGRAM(S): at 01:15

## 2023-08-17 RX ADMIN — Medication 10 MILLIGRAM(S): at 14:15

## 2023-08-17 RX ADMIN — DULOXETINE HYDROCHLORIDE 30 MILLIGRAM(S): 30 CAPSULE, DELAYED RELEASE ORAL at 12:25

## 2023-08-17 RX ADMIN — PANTOPRAZOLE SODIUM 40 MILLIGRAM(S): 20 TABLET, DELAYED RELEASE ORAL at 05:30

## 2023-08-17 RX ADMIN — Medication 650 MILLIGRAM(S): at 12:29

## 2023-08-17 RX ADMIN — Medication 1 MILLIGRAM(S): at 12:32

## 2023-08-17 RX ADMIN — Medication 3 MILLILITER(S): at 07:29

## 2023-08-17 RX ADMIN — Medication 10 MILLIGRAM(S): at 05:30

## 2023-08-17 RX ADMIN — APIXABAN 5 MILLIGRAM(S): 2.5 TABLET, FILM COATED ORAL at 05:30

## 2023-08-17 RX ADMIN — BUPROPION HYDROCHLORIDE 150 MILLIGRAM(S): 150 TABLET, EXTENDED RELEASE ORAL at 12:25

## 2023-08-17 RX ADMIN — Medication 40 MILLIGRAM(S): at 05:30

## 2023-08-17 RX ADMIN — Medication 10 MILLIGRAM(S): at 12:26

## 2023-08-17 RX ADMIN — Medication 100 MILLIGRAM(S): at 14:15

## 2023-08-17 RX ADMIN — LOSARTAN POTASSIUM 100 MILLIGRAM(S): 100 TABLET, FILM COATED ORAL at 05:30

## 2023-08-17 RX ADMIN — Medication 145 MILLIGRAM(S): at 12:26

## 2023-08-17 RX ADMIN — Medication 40 MILLIGRAM(S): at 14:17

## 2023-08-17 RX ADMIN — Medication 3 MILLILITER(S): at 14:00

## 2023-08-17 NOTE — PROGRESS NOTE ADULT - ASSESSMENT
67 year old patient, known to have HTN, HLD, DM, COPD (on 4L home O2), depression/anxiety, former smoker, 3 spinal operations (2010), history of DVT/PE s/p thrombolysis ( on Eliquis sent to the ED by his PCP for left leg swelling and redness.      Left leg erythema cellulitis, chronic venous insufficiency  - no SIRS/sepsis  - no recent bug bites, travel, medications, topicals  - prelim duplex negative for DVT   - ESR: 53, CRP:120.5  - add Azithromycin 500 IVBP qd  - Continue Cefazolin 2g IV q8h  - transition to PO antibiotics    Chronic Hypoxic Respiratory Failure, COPD on 4L, Recent dx of PE, Former Smoker  - on supplemental O2, BiPAP trial failed  - duonebs PRN  - continue Eliquis  - per pulmonary recommendations : Will benefit from NIV upon DC.  AVAPS  RR 16.  IPAP max 20 IPAP min 16.  PEEP 8.    - CT Chest NC as OP in 2-3 weeks   -  Supplemental O2 to target SpO2 88-92%       HTN/HLD  - continue Simvastatin, Losartan, Lasix      DM  - ISS for now; add basal/bolus PRN  - FS q6    hx of spinal operations, Chronic Pain Opioid Dependence    - continue home pain meds  - consider reduction in rx, in lieu of hypercapnia and AMS    Anxiety, Benzo Dependence      DVT ppx: Eliquis  GI ppx: Protonix  Diet: DASH/TLC  Full code    D/C home with supplies, arrangements made. Patient left the hospital without delivery. SOC tomorrow. Patient has O2 at home

## 2023-08-17 NOTE — PROGRESS NOTE ADULT - REASON FOR ADMISSION
left leg swelling
left leg swelling, cellulitis
left leg swelling

## 2023-08-17 NOTE — PROGRESS NOTE ADULT - SUBJECTIVE AND OBJECTIVE BOX
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 Says symptoms started this past Monday as a small "speck" near medial aspect of left knee. Since then, erythema has spread to along inner thigh. mild pain and itchiness. Also says erythema has spread down the leg with some associated swelling. Denies trauma, recent insect bite, new medications, new topicals, recent travel, IV drug use, immobilization, chest pain, abdominal pain, fever, chills, recent travel. Says this has never happened before.       In the ED, T 100, ,79. BNP 1360. XR left tibia/fibula showing diffuse soft tissue swelling. CXR showing some pulmonary vascular congestion.           (11 Aug 2023 16:42)    MEDICATIONS  (STANDING):  acetaminophen     Tablet .. 650 milliGRAM(s) Oral every 6 hours  albuterol/ipratropium for Nebulization 3 milliLiter(s) Nebulizer every 6 hours  apixaban 5 milliGRAM(s) Oral two times a day  baclofen 10 milliGRAM(s) Oral every 8 hours  buPROPion XL (24-Hour) . 150 milliGRAM(s) Oral daily  ceFAZolin   IVPB 2000 milliGRAM(s) IV Intermittent every 8 hours  dextrose 5%. 1000 milliLiter(s) (50 mL/Hr) IV Continuous <Continuous>  dextrose 5%. 1000 milliLiter(s) (100 mL/Hr) IV Continuous <Continuous>  dextrose 50% Injectable 25 Gram(s) IV Push once  dextrose 50% Injectable 12.5 Gram(s) IV Push once  dextrose 50% Injectable 25 Gram(s) IV Push once  DULoxetine 30 milliGRAM(s) Oral daily  fenofibrate Tablet 145 milliGRAM(s) Oral daily  furosemide   Injectable 40 milliGRAM(s) IV Push two times a day  glucagon  Injectable 1 milliGRAM(s) IntraMuscular once  insulin lispro (ADMELOG) corrective regimen sliding scale   SubCutaneous three times a day before meals  levoFLOXacin  Tablet 750 milliGRAM(s) Oral every 24 hours  losartan 100 milliGRAM(s) Oral daily  morphine ER Tablet 60 milliGRAM(s) Oral two times a day  oxybutynin 10 milliGRAM(s) Oral daily  pantoprazole    Tablet 40 milliGRAM(s) Oral before breakfast  polyethylene glycol 3350 17 Gram(s) Oral two times a day  predniSONE   Tablet 40 milliGRAM(s) Oral daily  senna 2 Tablet(s) Oral at bedtime  simvastatin 20 milliGRAM(s) Oral at bedtime  tamsulosin 0.4 milliGRAM(s) Oral at bedtime    MEDICATIONS  (PRN):  ALPRAZolam 1 milliGRAM(s) Oral three times a day PRN anxiety  dextrose Oral Gel 15 Gram(s) Oral once PRN Blood Glucose LESS THAN 70 milliGRAM(s)/deciliter  ondansetron Injectable 4 milliGRAM(s) IV Push every 6 hours PRN Nausea    INTERVAL EVENTS: Patient seen  this am, dressed and waiting to go home    T(C): 36.1 (08-17-23 @ 03:25), Max: 36.1 (08-17-23 @ 03:25)  HR: 58 (08-17-23 @ 03:25) (58 - 58)  BP: 145/77 (08-17-23 @ 03:25) (145/77 - 145/77)  RR: 18 (08-17-23 @ 03:25) (18 - 18)  SpO2: 100% (08-17-23 @ 03:25) (100% - 100%)  Wt(kg): --Vital Signs Last 24 Hrs  T(C): 36.1 (17 Aug 2023 03:25), Max: 36.1 (17 Aug 2023 03:25)  T(F): 97 (17 Aug 2023 03:25), Max: 97 (17 Aug 2023 03:25)  HR: 58 (17 Aug 2023 03:25) (58 - 58)  BP: 145/77 (17 Aug 2023 03:25) (145/77 - 145/77)  BP(mean): --  RR: 18 (17 Aug 2023 03:25) (18 - 18)  SpO2: 100% (17 Aug 2023 03:25) (100% - 100%)        PHYSICAL EXAM:  GENERAL: NAD, O2 in place  NECK: Supple, No JVD  CHEST/LUNG: Clear; Decreased effort  HEART: S1, S2, Regular   ABDOMEN: Soft, Nontender, Nondistended; Bowel sounds present  EXTREMITIES: Trace edema    LABS:                          12.2   8.10  )-----------( 393      ( 17 Aug 2023 06:26 )             39.7             08-17    139  |  93<L>  |  22<H>  ----------------------------<  119<H>  4.1   |  37<H>  |  0.7    Ca    9.6      17 Aug 2023 06:26  Mg     2.1     08-17    TPro  7.6  /  Alb  3.6  /  TBili  0.2  /  DBili  x   /  AST  9   /  ALT  6   /  AlkPhos  82  08-17    LIVER FUNCTIONS - ( 17 Aug 2023 06:26 )  Alb: 3.6 g/dL / Pro: 7.6 g/dL / ALK PHOS: 82 U/L / ALT: 6 U/L / AST: 9 U/L / GGT: x                     Urinalysis Basic - ( 17 Aug 2023 06:26 )    Color: x / Appearance: x / SG: x / pH: x  Gluc: 119 mg/dL / Ketone: x  / Bili: x / Urobili: x   Blood: x / Protein: x / Nitrite: x   Leuk Esterase: x / RBC: x / WBC x   Sq Epi: x / Non Sq Epi: x / Bacteria: x          RADIOLOGY & ADDITIONAL TESTS:  < from: CT Angio Chest PE Protocol w/ IV Cont (08.15.23 @ 16:51) >  PROCEDURE DATE:  08/15/2023          INTERPRETATION:  CLINICAL STATEMENT: Evaluation for pulmonary embolism    TECHNIQUE: Multislice helical sections were obtained from the thoracic   inlet to the lung bases during rapid administration of 65 mL Omnipaque   350 intravenous contrast using a CTA protocol. Thin sections were   reconstructed through the pulmonary vasculature. Coronal, sagittal and   MIP reformatted images are also submitted.    COMPARISON: CTA chest 5/3/2023      FINDINGS:    PULMONARY EMBOLUS: No central or segmental pulmonary embolus.    LUNGS, PLEURA, AIRWAYS: Debris within the right distal mainstem bronchi.   Bibasilar consolidations, slightly increased in size on the right lower   lung.  Small right pleural effusion. New scattered groundglass/nodular   consolidations predominantly in the upper lungs.  Some of the scattered nodular consolidations have peripheral spiculations   and are concerning for focal lesions for example:  -2.6 x 2.5 cm right upper lung nodule (series 6 image 138)  -1.2 x 0.9 cm right upper lung nodule (series 6 image 161)    THORACIC NODES: Mediastinal lymphadenopathy with reference lesions as   follows:  -1.8cm pretracheal lymph node (series 6 image 107)  -1.2 cm left-sided lymph node just superolateral to the mayco (series 6   image 112)    MEDIASTINUM/GREAT VESSELS: Normal heart size. No pericardial effusion.   Ascending thoracic aorta measures 3.7 cm(series 6 image 155). Main   pulmonary artery measures 3 cm (series 6 image 155).    VISUALIZED UPPER ABDOMEN: No acute abnormality in the visualized upper   abdomen.    BONES/SOFT TISSUES: New expansile lesions versus healing fractures of the   right posterior lateral fifth, sixth and seventh ribs.      IMPRESSION:    No CT evidence of acute pulmonary embolus.    New nodular consolidations in the upper lobe some which have peripheral   spiculations and are concerning for focal lesions (i.e. metastases).   Mediastinal lymphadenopathy as described.    New expansile lesions versus healing fractures of the right posterior   fifth through seventh ribs.    Bibasilar consolidations with increase in size in the right. New small   right pleural effusion.    Additional findings in the body the report.    --- End of Report ---    < end of copied text >

## 2023-08-17 NOTE — PROGRESS NOTE ADULT - PROVIDER SPECIALTY LIST ADULT
Internal Medicine
Infectious Disease
Internal Medicine
Pulmonology
Internal Medicine
Pulmonology

## 2023-08-17 NOTE — DISCHARGE NOTE NURSING/CASE MANAGEMENT/SOCIAL WORK - PATIENT PORTAL LINK FT
You can access the FollowMyHealth Patient Portal offered by [facility] by registering at the following website: [email or web address]. By joining [employer]’s FollowMyHealth portal, you will also be able to view your health information using other applications (apps) compatible with our system.

## 2023-08-18 ENCOUNTER — APPOINTMENT (OUTPATIENT)
Dept: PAIN MANAGEMENT | Facility: CLINIC | Age: 68
End: 2023-08-18

## 2023-08-21 DIAGNOSIS — R91.8 OTHER NONSPECIFIC ABNORMAL FINDING OF LUNG FIELD: ICD-10-CM

## 2023-08-21 DIAGNOSIS — Z98.1 ARTHRODESIS STATUS: ICD-10-CM

## 2023-08-21 DIAGNOSIS — Z91.018 ALLERGY TO OTHER FOODS: ICD-10-CM

## 2023-08-21 DIAGNOSIS — R60.0 LOCALIZED EDEMA: ICD-10-CM

## 2023-08-21 DIAGNOSIS — Z88.6 ALLERGY STATUS TO ANALGESIC AGENT: ICD-10-CM

## 2023-08-21 DIAGNOSIS — E78.00 PURE HYPERCHOLESTEROLEMIA, UNSPECIFIED: ICD-10-CM

## 2023-08-21 DIAGNOSIS — J44.1 CHRONIC OBSTRUCTIVE PULMONARY DISEASE WITH (ACUTE) EXACERBATION: ICD-10-CM

## 2023-08-21 DIAGNOSIS — F41.9 ANXIETY DISORDER, UNSPECIFIED: ICD-10-CM

## 2023-08-21 DIAGNOSIS — I82.561 CHRONIC EMBOLISM AND THROMBOSIS OF RIGHT CALF MUSCULAR VEIN: ICD-10-CM

## 2023-08-21 DIAGNOSIS — R53.83 OTHER FATIGUE: ICD-10-CM

## 2023-08-21 DIAGNOSIS — I82.531 CHRONIC EMBOLISM AND THROMBOSIS OF RIGHT POPLITEAL VEIN: ICD-10-CM

## 2023-08-21 DIAGNOSIS — J96.22 ACUTE AND CHRONIC RESPIRATORY FAILURE WITH HYPERCAPNIA: ICD-10-CM

## 2023-08-21 DIAGNOSIS — Z79.01 LONG TERM (CURRENT) USE OF ANTICOAGULANTS: ICD-10-CM

## 2023-08-21 DIAGNOSIS — I27.20 PULMONARY HYPERTENSION, UNSPECIFIED: ICD-10-CM

## 2023-08-21 DIAGNOSIS — F11.20 OPIOID DEPENDENCE, UNCOMPLICATED: ICD-10-CM

## 2023-08-21 DIAGNOSIS — L03.116 CELLULITIS OF LEFT LOWER LIMB: ICD-10-CM

## 2023-08-21 DIAGNOSIS — F32.A DEPRESSION, UNSPECIFIED: ICD-10-CM

## 2023-08-21 DIAGNOSIS — Z79.84 LONG TERM (CURRENT) USE OF ORAL HYPOGLYCEMIC DRUGS: ICD-10-CM

## 2023-08-21 DIAGNOSIS — Z86.711 PERSONAL HISTORY OF PULMONARY EMBOLISM: ICD-10-CM

## 2023-08-21 DIAGNOSIS — E11.9 TYPE 2 DIABETES MELLITUS WITHOUT COMPLICATIONS: ICD-10-CM

## 2023-08-21 DIAGNOSIS — Z99.81 DEPENDENCE ON SUPPLEMENTAL OXYGEN: ICD-10-CM

## 2023-08-21 DIAGNOSIS — G89.29 OTHER CHRONIC PAIN: ICD-10-CM

## 2023-08-21 DIAGNOSIS — Z88.0 ALLERGY STATUS TO PENICILLIN: ICD-10-CM

## 2023-08-21 DIAGNOSIS — I50.33 ACUTE ON CHRONIC DIASTOLIC (CONGESTIVE) HEART FAILURE: ICD-10-CM

## 2023-08-21 DIAGNOSIS — Z87.891 PERSONAL HISTORY OF NICOTINE DEPENDENCE: ICD-10-CM

## 2023-08-21 DIAGNOSIS — I11.0 HYPERTENSIVE HEART DISEASE WITH HEART FAILURE: ICD-10-CM

## 2023-09-06 ENCOUNTER — INPATIENT (INPATIENT)
Facility: HOSPITAL | Age: 68
LOS: 5 days | Discharge: HOME HEALTH | DRG: 871 | End: 2023-09-12
Attending: INTERNAL MEDICINE | Admitting: STUDENT IN AN ORGANIZED HEALTH CARE EDUCATION/TRAINING PROGRAM
Payer: MEDICARE

## 2023-09-06 VITALS
TEMPERATURE: 100 F | HEART RATE: 88 BPM | OXYGEN SATURATION: 92 % | RESPIRATION RATE: 26 BRPM | SYSTOLIC BLOOD PRESSURE: 140 MMHG | DIASTOLIC BLOOD PRESSURE: 74 MMHG | HEIGHT: 71 IN | WEIGHT: 179.9 LBS

## 2023-09-06 DIAGNOSIS — J18.9 PNEUMONIA, UNSPECIFIED ORGANISM: ICD-10-CM

## 2023-09-06 DIAGNOSIS — Z98.890 OTHER SPECIFIED POSTPROCEDURAL STATES: Chronic | ICD-10-CM

## 2023-09-06 LAB
ALBUMIN SERPL ELPH-MCNC: 3.9 G/DL — SIGNIFICANT CHANGE UP (ref 3.5–5.2)
ALP SERPL-CCNC: 71 U/L — SIGNIFICANT CHANGE UP (ref 30–115)
ALT FLD-CCNC: 8 U/L — SIGNIFICANT CHANGE UP (ref 0–41)
ANION GAP SERPL CALC-SCNC: 12 MMOL/L — SIGNIFICANT CHANGE UP (ref 7–14)
APPEARANCE UR: CLEAR — SIGNIFICANT CHANGE UP
APTT BLD: 40.5 SEC — HIGH (ref 27–39.2)
AST SERPL-CCNC: 12 U/L — SIGNIFICANT CHANGE UP (ref 0–41)
BASE EXCESS BLDV CALC-SCNC: 6.4 MMOL/L — HIGH (ref -2–3)
BASOPHILS # BLD AUTO: 0.03 K/UL — SIGNIFICANT CHANGE UP (ref 0–0.2)
BASOPHILS NFR BLD AUTO: 0.5 % — SIGNIFICANT CHANGE UP (ref 0–1)
BILIRUB SERPL-MCNC: 0.3 MG/DL — SIGNIFICANT CHANGE UP (ref 0.2–1.2)
BILIRUB UR-MCNC: NEGATIVE — SIGNIFICANT CHANGE UP
BUN SERPL-MCNC: 22 MG/DL — HIGH (ref 10–20)
CA-I SERPL-SCNC: 1.19 MMOL/L — SIGNIFICANT CHANGE UP (ref 1.15–1.33)
CALCIUM SERPL-MCNC: 9.6 MG/DL — SIGNIFICANT CHANGE UP (ref 8.4–10.5)
CHLORIDE SERPL-SCNC: 97 MMOL/L — LOW (ref 98–110)
CO2 SERPL-SCNC: 26 MMOL/L — SIGNIFICANT CHANGE UP (ref 17–32)
COLOR SPEC: YELLOW — SIGNIFICANT CHANGE UP
CREAT SERPL-MCNC: 1 MG/DL — SIGNIFICANT CHANGE UP (ref 0.7–1.5)
DIFF PNL FLD: NEGATIVE — SIGNIFICANT CHANGE UP
EGFR: 82 ML/MIN/1.73M2 — SIGNIFICANT CHANGE UP
EOSINOPHIL # BLD AUTO: 0.11 K/UL — SIGNIFICANT CHANGE UP (ref 0–0.7)
EOSINOPHIL NFR BLD AUTO: 1.7 % — SIGNIFICANT CHANGE UP (ref 0–8)
FLUAV AG NPH QL: SIGNIFICANT CHANGE UP
FLUBV AG NPH QL: SIGNIFICANT CHANGE UP
GAS PNL BLDV: 131 MMOL/L — LOW (ref 136–145)
GAS PNL BLDV: SIGNIFICANT CHANGE UP
GLUCOSE SERPL-MCNC: 98 MG/DL — SIGNIFICANT CHANGE UP (ref 70–99)
GLUCOSE UR QL: NEGATIVE MG/DL — SIGNIFICANT CHANGE UP
HCO3 BLDV-SCNC: 31 MMOL/L — HIGH (ref 22–29)
HCT VFR BLD CALC: 37.1 % — LOW (ref 42–52)
HCT VFR BLDA CALC: 37 % — LOW (ref 39–51)
HGB BLD CALC-MCNC: 12.4 G/DL — LOW (ref 12.6–17.4)
HGB BLD-MCNC: 11.9 G/DL — LOW (ref 14–18)
IMM GRANULOCYTES NFR BLD AUTO: 0.3 % — SIGNIFICANT CHANGE UP (ref 0.1–0.3)
INR BLD: 1.66 RATIO — HIGH (ref 0.65–1.3)
KETONES UR-MCNC: NEGATIVE MG/DL — SIGNIFICANT CHANGE UP
LACTATE BLDV-MCNC: 1.4 MMOL/L — SIGNIFICANT CHANGE UP (ref 0.5–2)
LACTATE SERPL-SCNC: 1.3 MMOL/L — SIGNIFICANT CHANGE UP (ref 0.7–2)
LEUKOCYTE ESTERASE UR-ACNC: NEGATIVE — SIGNIFICANT CHANGE UP
LYMPHOCYTES # BLD AUTO: 0.48 K/UL — LOW (ref 1.2–3.4)
LYMPHOCYTES # BLD AUTO: 7.4 % — LOW (ref 20.5–51.1)
MCHC RBC-ENTMCNC: 29.3 PG — SIGNIFICANT CHANGE UP (ref 27–31)
MCHC RBC-ENTMCNC: 32.1 G/DL — SIGNIFICANT CHANGE UP (ref 32–37)
MCV RBC AUTO: 91.4 FL — SIGNIFICANT CHANGE UP (ref 80–94)
MONOCYTES # BLD AUTO: 0.73 K/UL — HIGH (ref 0.1–0.6)
MONOCYTES NFR BLD AUTO: 11.3 % — HIGH (ref 1.7–9.3)
NEUTROPHILS # BLD AUTO: 5.08 K/UL — SIGNIFICANT CHANGE UP (ref 1.4–6.5)
NEUTROPHILS NFR BLD AUTO: 78.8 % — HIGH (ref 42.2–75.2)
NITRITE UR-MCNC: NEGATIVE — SIGNIFICANT CHANGE UP
NRBC # BLD: 0 /100 WBCS — SIGNIFICANT CHANGE UP (ref 0–0)
PCO2 BLDV: 45 MMHG — SIGNIFICANT CHANGE UP (ref 42–55)
PH BLDV: 7.45 — HIGH (ref 7.32–7.43)
PH UR: 7.5 — SIGNIFICANT CHANGE UP (ref 5–8)
PLATELET # BLD AUTO: 194 K/UL — SIGNIFICANT CHANGE UP (ref 130–400)
PMV BLD: 10.3 FL — SIGNIFICANT CHANGE UP (ref 7.4–10.4)
PO2 BLDV: 31 MMHG — SIGNIFICANT CHANGE UP
POTASSIUM BLDV-SCNC: 3.4 MMOL/L — LOW (ref 3.5–5.1)
POTASSIUM SERPL-MCNC: 3.8 MMOL/L — SIGNIFICANT CHANGE UP (ref 3.5–5)
POTASSIUM SERPL-SCNC: 3.8 MMOL/L — SIGNIFICANT CHANGE UP (ref 3.5–5)
PROT SERPL-MCNC: 7.2 G/DL — SIGNIFICANT CHANGE UP (ref 6–8)
PROT UR-MCNC: NEGATIVE MG/DL — SIGNIFICANT CHANGE UP
PROTHROM AB SERPL-ACNC: 19.2 SEC — HIGH (ref 9.95–12.87)
RBC # BLD: 4.06 M/UL — LOW (ref 4.7–6.1)
RBC # FLD: 16.2 % — HIGH (ref 11.5–14.5)
RSV RNA NPH QL NAA+NON-PROBE: SIGNIFICANT CHANGE UP
SAO2 % BLDV: 49.1 % — SIGNIFICANT CHANGE UP
SARS-COV-2 RNA SPEC QL NAA+PROBE: SIGNIFICANT CHANGE UP
SODIUM SERPL-SCNC: 135 MMOL/L — SIGNIFICANT CHANGE UP (ref 135–146)
SP GR SPEC: 1.01 — SIGNIFICANT CHANGE UP (ref 1–1.03)
TROPONIN T SERPL-MCNC: <0.01 NG/ML — SIGNIFICANT CHANGE UP
UROBILINOGEN FLD QL: 0.2 MG/DL — SIGNIFICANT CHANGE UP (ref 0.2–1)
WBC # BLD: 6.45 K/UL — SIGNIFICANT CHANGE UP (ref 4.8–10.8)
WBC # FLD AUTO: 6.45 K/UL — SIGNIFICANT CHANGE UP (ref 4.8–10.8)

## 2023-09-06 PROCEDURE — 71045 X-RAY EXAM CHEST 1 VIEW: CPT | Mod: 26

## 2023-09-06 PROCEDURE — 93308 TTE F-UP OR LMTD: CPT | Mod: 26

## 2023-09-06 PROCEDURE — 85027 COMPLETE CBC AUTOMATED: CPT

## 2023-09-06 PROCEDURE — 71275 CT ANGIOGRAPHY CHEST: CPT | Mod: 26,MA

## 2023-09-06 PROCEDURE — 83735 ASSAY OF MAGNESIUM: CPT

## 2023-09-06 PROCEDURE — 80048 BASIC METABOLIC PNL TOTAL CA: CPT

## 2023-09-06 PROCEDURE — 94640 AIRWAY INHALATION TREATMENT: CPT

## 2023-09-06 PROCEDURE — 92610 EVALUATE SWALLOWING FUNCTION: CPT | Mod: GN

## 2023-09-06 PROCEDURE — 82962 GLUCOSE BLOOD TEST: CPT

## 2023-09-06 PROCEDURE — 99285 EMERGENCY DEPT VISIT HI MDM: CPT

## 2023-09-06 PROCEDURE — 97166 OT EVAL MOD COMPLEX 45 MIN: CPT | Mod: GO

## 2023-09-06 PROCEDURE — 85025 COMPLETE CBC W/AUTO DIFF WBC: CPT

## 2023-09-06 PROCEDURE — 84145 PROCALCITONIN (PCT): CPT

## 2023-09-06 PROCEDURE — 97162 PT EVAL MOD COMPLEX 30 MIN: CPT | Mod: GP

## 2023-09-06 PROCEDURE — 36415 COLL VENOUS BLD VENIPUNCTURE: CPT

## 2023-09-06 RX ORDER — APIXABAN 2.5 MG/1
5 TABLET, FILM COATED ORAL EVERY 12 HOURS
Refills: 0 | Status: DISCONTINUED | OUTPATIENT
Start: 2023-09-06 | End: 2023-09-12

## 2023-09-06 RX ORDER — TAMSULOSIN HYDROCHLORIDE 0.4 MG/1
0.4 CAPSULE ORAL AT BEDTIME
Refills: 0 | Status: DISCONTINUED | OUTPATIENT
Start: 2023-09-06 | End: 2023-09-12

## 2023-09-06 RX ORDER — GLUCAGON INJECTION, SOLUTION 0.5 MG/.1ML
1 INJECTION, SOLUTION SUBCUTANEOUS ONCE
Refills: 0 | Status: DISCONTINUED | OUTPATIENT
Start: 2023-09-06 | End: 2023-09-08

## 2023-09-06 RX ORDER — SIMVASTATIN 20 MG/1
20 TABLET, FILM COATED ORAL AT BEDTIME
Refills: 0 | Status: DISCONTINUED | OUTPATIENT
Start: 2023-09-06 | End: 2023-09-12

## 2023-09-06 RX ORDER — INSULIN LISPRO 100/ML
VIAL (ML) SUBCUTANEOUS
Refills: 0 | Status: DISCONTINUED | OUTPATIENT
Start: 2023-09-06 | End: 2023-09-12

## 2023-09-06 RX ORDER — ACETAMINOPHEN 500 MG
975 TABLET ORAL ONCE
Refills: 0 | Status: COMPLETED | OUTPATIENT
Start: 2023-09-06 | End: 2023-09-06

## 2023-09-06 RX ORDER — PANTOPRAZOLE SODIUM 20 MG/1
40 TABLET, DELAYED RELEASE ORAL
Refills: 0 | Status: DISCONTINUED | OUTPATIENT
Start: 2023-09-06 | End: 2023-09-12

## 2023-09-06 RX ORDER — MORPHINE SULFATE 50 MG/1
60 CAPSULE, EXTENDED RELEASE ORAL
Refills: 0 | Status: DISCONTINUED | OUTPATIENT
Start: 2023-09-06 | End: 2023-09-12

## 2023-09-06 RX ORDER — ALPRAZOLAM 0.25 MG
1 TABLET ORAL ONCE
Refills: 0 | Status: DISCONTINUED | OUTPATIENT
Start: 2023-09-06 | End: 2023-09-06

## 2023-09-06 RX ORDER — SODIUM CHLORIDE 9 MG/ML
1000 INJECTION, SOLUTION INTRAVENOUS
Refills: 0 | Status: DISCONTINUED | OUTPATIENT
Start: 2023-09-06 | End: 2023-09-08

## 2023-09-06 RX ORDER — DEXTROSE 50 % IN WATER 50 %
12.5 SYRINGE (ML) INTRAVENOUS ONCE
Refills: 0 | Status: DISCONTINUED | OUTPATIENT
Start: 2023-09-06 | End: 2023-09-08

## 2023-09-06 RX ORDER — BUPROPION HYDROCHLORIDE 150 MG/1
150 TABLET, EXTENDED RELEASE ORAL DAILY
Refills: 0 | Status: DISCONTINUED | OUTPATIENT
Start: 2023-09-06 | End: 2023-09-12

## 2023-09-06 RX ORDER — FUROSEMIDE 40 MG
20 TABLET ORAL
Refills: 0 | Status: DISCONTINUED | OUTPATIENT
Start: 2023-09-06 | End: 2023-09-12

## 2023-09-06 RX ORDER — OXYBUTYNIN CHLORIDE 5 MG
10 TABLET ORAL DAILY
Refills: 0 | Status: DISCONTINUED | OUTPATIENT
Start: 2023-09-06 | End: 2023-09-12

## 2023-09-06 RX ORDER — DEXTROSE 50 % IN WATER 50 %
25 SYRINGE (ML) INTRAVENOUS ONCE
Refills: 0 | Status: DISCONTINUED | OUTPATIENT
Start: 2023-09-06 | End: 2023-09-08

## 2023-09-06 RX ORDER — LOSARTAN POTASSIUM 100 MG/1
100 TABLET, FILM COATED ORAL DAILY
Refills: 0 | Status: DISCONTINUED | OUTPATIENT
Start: 2023-09-06 | End: 2023-09-12

## 2023-09-06 RX ORDER — IPRATROPIUM/ALBUTEROL SULFATE 18-103MCG
3 AEROSOL WITH ADAPTER (GRAM) INHALATION EVERY 6 HOURS
Refills: 0 | Status: DISCONTINUED | OUTPATIENT
Start: 2023-09-06 | End: 2023-09-12

## 2023-09-06 RX ORDER — DULOXETINE HYDROCHLORIDE 30 MG/1
30 CAPSULE, DELAYED RELEASE ORAL DAILY
Refills: 0 | Status: DISCONTINUED | OUTPATIENT
Start: 2023-09-06 | End: 2023-09-12

## 2023-09-06 RX ORDER — SODIUM CHLORIDE 9 MG/ML
2500 INJECTION, SOLUTION INTRAVENOUS ONCE
Refills: 0 | Status: COMPLETED | OUTPATIENT
Start: 2023-09-06 | End: 2023-09-06

## 2023-09-06 RX ORDER — IPRATROPIUM/ALBUTEROL SULFATE 18-103MCG
3 AEROSOL WITH ADAPTER (GRAM) INHALATION
Refills: 0 | Status: DISCONTINUED | OUTPATIENT
Start: 2023-09-06 | End: 2023-09-12

## 2023-09-06 RX ORDER — DEXTROSE 50 % IN WATER 50 %
15 SYRINGE (ML) INTRAVENOUS ONCE
Refills: 0 | Status: DISCONTINUED | OUTPATIENT
Start: 2023-09-06 | End: 2023-09-08

## 2023-09-06 RX ORDER — BACLOFEN 100 %
5 POWDER (GRAM) MISCELLANEOUS EVERY 8 HOURS
Refills: 0 | Status: DISCONTINUED | OUTPATIENT
Start: 2023-09-06 | End: 2023-09-12

## 2023-09-06 RX ADMIN — Medication 3 MILLILITER(S): at 12:38

## 2023-09-06 RX ADMIN — SIMVASTATIN 20 MILLIGRAM(S): 20 TABLET, FILM COATED ORAL at 22:28

## 2023-09-06 RX ADMIN — Medication 3 MILLILITER(S): at 20:09

## 2023-09-06 RX ADMIN — Medication 975 MILLIGRAM(S): at 12:24

## 2023-09-06 RX ADMIN — SODIUM CHLORIDE 2500 MILLILITER(S): 9 INJECTION, SOLUTION INTRAVENOUS at 12:14

## 2023-09-06 RX ADMIN — TAMSULOSIN HYDROCHLORIDE 0.4 MILLIGRAM(S): 0.4 CAPSULE ORAL at 22:27

## 2023-09-06 RX ADMIN — Medication 1 MILLIGRAM(S): at 23:56

## 2023-09-06 RX ADMIN — Medication 125 MILLIGRAM(S): at 12:37

## 2023-09-06 NOTE — H&P ADULT - TIME BILLING
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 Patient came to the ER with hypoxia and cough and increased phelgm production for the past 3 days.     Afebrile  Hypoxic while walking     weak, frail  lungs rhonchi, wheeze  heart RR, S1S2  abdomen benign  extremities trace edema    labs noted    IMPRESSION:    No evidence of pulmonary embolism.    There are consolidative/atelectatic changes at both lung bases; not   significantly changed in the right lower lobe, but increased in the left   lower lobe. There is narrowing and/or secretions within the right and   left lower lobe bronchi.    There has been partial resolution of the previously noted 2.6 cm (4/104)   and 1 cm (4/63) nodular lesions in the right upper lobe. Several nodules   bilaterally are unchanged in size however there are several nodules in   the upper lobes that are new (4/128-166) or have increased in size.    There is chronic atelectasis and bronchiectasis within the right middle   lobe.    A/P  Acute and on Chronic Hypoxic Respiratory Failure,Pneumonia, COPD Exacerbation  - continue O2 support  - IV antibiotics, concern for aspiration  - IV steroids  - CT results noted, needs early interval f/u on pulm nodules  - pulm evaluation  - non compliance with AVAPs at home    PE/DVT hx  - remains on Eliquis    HTN  - continue home medications    HLD  - continue statin    DM  - continue rx  - sliding scale    Chronic back pain  Opioid Dependece  - monitor for sedation due to medications    Depression  Anxiety Disorder  Benzo dependent

## 2023-09-06 NOTE — H&P ADULT - NSHPLABSRESULTS_GEN_ALL_CORE
LABS:                        11.9   6.45  )-----------( 194      ( 06 Sep 2023 12:20 )             37.1     09-06    135  |  97<L>  |  22<H>  ----------------------------<  98  3.8   |  26  |  1.0    Ca    9.6      06 Sep 2023 12:20    TPro  7.2  /  Alb  3.9  /  TBili  0.3  /  DBili  x   /  AST  12  /  ALT  8   /  AlkPhos  71  09-06    PT/INR - ( 06 Sep 2023 12:20 )   PT: 19.20 sec;   INR: 1.66 ratio         PTT - ( 06 Sep 2023 12:20 )  PTT:40.5 sec    < from: CT Angio Chest PE Protocol w/ IV Cont (09.06.23 @ 14:54) >    IMPRESSION:    No evidence of pulmonary embolism.    There are consolidative/atelectatic changes at both lung bases; not   significantly changed in the right lower lobe, but increased in the left   lower lobe. There is narrowing and/or secretions within the right and   left lower lobe bronchi.    There has been partial resolution of the previously noted 2.6 cm (4/104)   and 1 cm (4/63) nodular lesions in the right upper lobe. Several nodules   bilaterally are unchanged in size however there are several nodules in   the upper lobes that are new (4/128-166) or have increased in size.    Thereis chronic atelectasis and bronchiectasis within the right middle   lobe.    Short-term follow-up chest CT in 2-3 months is recommended.    --- End of Report ---    < end of copied text >    < from: Xray Chest 1 View-PORTABLE IMMEDIATE (09.06.23 @ 13:33) >    Impression:    Bibasilar opacities/pleural effusion, unchanged.        --- End of Report ---        < end of copied text >

## 2023-09-06 NOTE — ED PROVIDER NOTE - ATTENDING APP SHARED VISIT CONTRIBUTION OF CARE
68-year-old male history of hypertension diabetes dyslipidemia COPD on 3 L nasal cannula home O2, DVT/PE on Eliquis presenting for evaluation of fever with associated shortness of breath, generalized weakness for the past 2 days getting progressively worse to today.  Per patient, home health aide came this morning and noted him to be hypoxic to low 80s, came in for further evaluation.  Chronically ill appearing, tachypneic on exertion. NCAT PERRLA EOMI neck supple non tender cta bl regular, tachycardic abdomen s nt nd no rebound no guarding WWPx4 neuro non focal no lower extremity edema/tenderness.  2+ equal pulses throughout.

## 2023-09-06 NOTE — ED ADULT NURSE NOTE - NSFALLHARMRISKINTERV_ED_ALL_ED

## 2023-09-06 NOTE — H&P ADULT - NSHPREVIEWOFSYSTEMS_GEN_ALL_CORE
CONSTITUTIONAL: +weakness. no fever or chills   EYES/ENT: No visual changes;  No vertigo or throat pain   NECK: No pain or stiffness  RESPIRATORY: +cough and SOB. no wheezing   CARDIOVASCULAR: No chest pain or palpitations  GASTROINTESTINAL: No abdominal or epigastric pain; No nausea, vomiting, diarrhea, or constipation; No hematemesis, melena, or hematochezia  GENITOURINARY: No dysuria, frequency, or hematuria  NEUROLOGICAL: No numbness or weakness  SKIN: No itching or new rashes

## 2023-09-06 NOTE — ED ADULT TRIAGE NOTE - CHIEF COMPLAINT QUOTE
pt bibems for difficulty breathing, pt normally on 3L, spo2 was 89%, now on 6L nc. per HHA, pt more confused then usual pt bibems for difficulty breathing, pt normally on 3L, spo2 was 89% on arrival, now on 6L nc. per HHA, pt more confused then usual

## 2023-09-06 NOTE — H&P ADULT - NSHPADDITIONALINFOADULT_GEN_ALL_CORE
Patient recently discharged from facility with acute on chronic hypoxic/hypercapnic respiratory failure. Patient was discharged with AVAP at night. Patient admits to non use.... choking sensation

## 2023-09-06 NOTE — H&P ADULT - ATTENDING COMMENTS
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 CT chest concerning for LLL PNA. Admit to Medicine for acute hypoxic resp failure 2/2 PNA.    Agree  with assessment  except for changes below.     IMPRESSION

## 2023-09-06 NOTE — H&P ADULT - HISTORY OF PRESENT ILLNESS
68 year old male PMHx HTN, HLD, DM, COPD (on home 4L O2), Depression, Anxiety, chronic back pain, hx DVT/PE (2018) s/o thrombolysis, recent admission for COPD exacerbation and LLE cellulitis, brought in to the ED for SOB. Pulse ox this morning was 82% as per home health aid.     T(F): 102.3 HR: 88 BP: 140/74 RR: 26 SpO2: 92% on 6L NC  Hgb 11.9, trop negative, lactate 1.4. VBG pH 7.45, pCO2 45, pO2 31, HCO3 31  CXR: Bibasilar opacities/pleural effusion, unchanged.  CTA no PE. Consolidative/atelectatic changes at both lung bases. Chronic atelectasis and bronchiectasis within the right middle lobe.     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 Pulse ox this morning was 82% as per home health aid. Pt also reports weakness and productive cough w/ green phlegm for the past 3 days. Health aid reports recent URI symptoms. Denies chest pain, palpitations, fever/chills, sick contacts.     T(F): 102.3 HR: 88 BP: 140/74 RR: 26 SpO2: 92% on 6L NC  Hgb 11.9, trop negative, lactate 1.4. VBG pH 7.45, pCO2 45, pO2 31, HCO3 31  CXR: Bibasilar opacities/pleural effusion, unchanged.  CTA no PE. Consolidative/atelectatic changes at both lung bases; not significantly changed in the right lower lobe, but increased in the left lower lobe. There is narrowing and/or secretions within the right and left lower lobe bronchi.  Received 2.5L bolus and Levaquin in ED  Admit to Medicine for PNA

## 2023-09-06 NOTE — H&P ADULT - ASSESSMENT
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 CT chest concerning for LLL PNA. Admit to Medicine for acute hypoxic resp failure 2/2 PNA.    # Acute hypoxic respiratory failure 2/2 Pneumonia  - productive cough and SOB x3 days, hypoxia  - sepsis POA (RR 26 T 102.3)  - lactate 1.4. VBG pH 7.45, pCO2 45, pO2 31, HCO3 31  - CTA no PE. Consolidative/atelectatic changes at both lung bases; not significantly changed in the right lower lobe, but increased in the left lower lobe. There is narrowing and/or secretions within the right and left lower lobe bronchi.  - received 2.5L IVF, Levaquin, Solu-Medrol, DuoNebs in ED, placed on 6L NC  - c/w Levaquin 750mg qd  - c/w DuoNebs q6 and PRN  - f/u blood and urine cultures    # COPD on home 3L O2, not in acute exacerbation  - no wheezing on exam  - home inhalers: Trelegy and Albuterol  - c/w DuoNebs  - AVAPs at night     # hx DVT/PE (2018) s/p thrombolysis  - c/w Eliquis 5mg BID     # HTN  - c/w Losartan 100mg qd    # HLD  - c/w Simvastatin 20mg qd    # DM Type II  - home med: Metformin 50mg BID     # Depression  # Anxiety  - c/w Wellbutrin 150mg ER qd  - hold Xanax for now as pt lethargic     # Chronic back pain  - s/p multiple back surgeries  - c/w Morphine 60mg ER BID  - c/w Baclofen 5mg q8    # Lung nodules   - CTAP: There has been partial resolution of the previously noted 2.6 cm (4/104) and 1 cm (4/63) nodular lesions in the right upper lobe. Several nodules bilaterally are unchanged in size however there are several nodules in the upper lobes that are new (4/128-166) or have increased in size.  - repeat CT chest in 2-3 months    #DVT ppx: Eliquis 5mg BID  #GI ppx: Pantoprazole 40mg qd  #Diet: DASH/CC  #Activity: IAT  #Dispo: Medicine

## 2023-09-06 NOTE — H&P ADULT - NSHPPHYSICALEXAM_GEN_ALL_CORE
GENERAL: NAD, resting comfortably in chair. Ill-appearing  NECK: no JVD  CHEST/LUNG: CTAB. No wheezing no crackles   HEART: RRR no MRG  ABDOMEN: BSx4; Soft, nontender, nondistended  EXTREMITIES:  No clubbing, cyanosis, or edema  NERVOUS SYSTEM:  A&Ox3, no focal deficits   SKIN: No rashes or lesions

## 2023-09-06 NOTE — ED PROVIDER NOTE - CLINICAL SUMMARY MEDICAL DECISION MAKING FREE TEXT BOX
68-year-old male history of hypertension diabetes dyslipidemia COPD on 3 L nasal cannula home O2, DVT/PE on Eliquis presenting for evaluation of fever with associated shortness of breath, generalized weakness for the past 2 days getting progressively worse to today.  Per patient, home health aide came this morning and noted him to be hypoxic to low 80s, came in for further evaluation.  Chronically ill appearing, tachypneic on exertion. NCAT PERRLA EOMI neck supple non tender cta bl regular, tachycardic abdomen s nt nd no rebound no guarding WWPx4 neuro non focal no lower extremity edema/tenderness.  2+ equal pulses throughout. Labs, imaging, ekg reviewed/interpreted by myself as above. pt found to have pna, O2 requirements cont. empiric resp abx given. will admit for further eval

## 2023-09-06 NOTE — ED PROVIDER NOTE - OBJECTIVE STATEMENT
68 year old male with pmhx of HTN, HLD, DM, COPD (on 4L home O2), depression/anxiety, former smoker, 3 spinal operations (2010), history of DVT/PE (2018) s/p thrombolysis recent admission for copd and L leg cellulitis brought in by ems for sob since last night. Pts pulse ox this morning was 82% by home health aid. Pt admits to weakness and body aches. No chest pain, calf pain or swelling. pt states his home health aid has been sick with congestion.

## 2023-09-07 ENCOUNTER — APPOINTMENT (OUTPATIENT)
Dept: PULMONOLOGY | Facility: CLINIC | Age: 68
End: 2023-09-07

## 2023-09-07 LAB
ANION GAP SERPL CALC-SCNC: 8 MMOL/L — SIGNIFICANT CHANGE UP (ref 7–14)
BASOPHILS # BLD AUTO: 0.02 K/UL — SIGNIFICANT CHANGE UP (ref 0–0.2)
BASOPHILS NFR BLD AUTO: 0.4 % — SIGNIFICANT CHANGE UP (ref 0–1)
BUN SERPL-MCNC: 22 MG/DL — HIGH (ref 10–20)
CALCIUM SERPL-MCNC: 9.2 MG/DL — SIGNIFICANT CHANGE UP (ref 8.4–10.4)
CHLORIDE SERPL-SCNC: 103 MMOL/L — SIGNIFICANT CHANGE UP (ref 98–110)
CO2 SERPL-SCNC: 27 MMOL/L — SIGNIFICANT CHANGE UP (ref 17–32)
CREAT SERPL-MCNC: 1 MG/DL — SIGNIFICANT CHANGE UP (ref 0.7–1.5)
CULTURE RESULTS: SIGNIFICANT CHANGE UP
EGFR: 82 ML/MIN/1.73M2 — SIGNIFICANT CHANGE UP
EOSINOPHIL # BLD AUTO: 0.01 K/UL — SIGNIFICANT CHANGE UP (ref 0–0.7)
EOSINOPHIL NFR BLD AUTO: 0.2 % — SIGNIFICANT CHANGE UP (ref 0–8)
GLUCOSE BLDC GLUCOMTR-MCNC: 108 MG/DL — HIGH (ref 70–99)
GLUCOSE BLDC GLUCOMTR-MCNC: 108 MG/DL — HIGH (ref 70–99)
GLUCOSE BLDC GLUCOMTR-MCNC: 127 MG/DL — HIGH (ref 70–99)
GLUCOSE BLDC GLUCOMTR-MCNC: 160 MG/DL — HIGH (ref 70–99)
GLUCOSE SERPL-MCNC: 98 MG/DL — SIGNIFICANT CHANGE UP (ref 70–99)
HCT VFR BLD CALC: 33.7 % — LOW (ref 42–52)
HGB BLD-MCNC: 10.8 G/DL — LOW (ref 14–18)
IMM GRANULOCYTES NFR BLD AUTO: 0.4 % — HIGH (ref 0.1–0.3)
LYMPHOCYTES # BLD AUTO: 0.71 K/UL — LOW (ref 1.2–3.4)
LYMPHOCYTES # BLD AUTO: 13.6 % — LOW (ref 20.5–51.1)
MAGNESIUM SERPL-MCNC: 1.6 MG/DL — LOW (ref 1.8–2.4)
MCHC RBC-ENTMCNC: 29.4 PG — SIGNIFICANT CHANGE UP (ref 27–31)
MCHC RBC-ENTMCNC: 32 G/DL — SIGNIFICANT CHANGE UP (ref 32–37)
MCV RBC AUTO: 91.8 FL — SIGNIFICANT CHANGE UP (ref 80–94)
MONOCYTES # BLD AUTO: 0.8 K/UL — HIGH (ref 0.1–0.6)
MONOCYTES NFR BLD AUTO: 15.3 % — HIGH (ref 1.7–9.3)
NEUTROPHILS # BLD AUTO: 3.67 K/UL — SIGNIFICANT CHANGE UP (ref 1.4–6.5)
NEUTROPHILS NFR BLD AUTO: 70.1 % — SIGNIFICANT CHANGE UP (ref 42.2–75.2)
NRBC # BLD: 0 /100 WBCS — SIGNIFICANT CHANGE UP (ref 0–0)
PLATELET # BLD AUTO: 186 K/UL — SIGNIFICANT CHANGE UP (ref 130–400)
PMV BLD: 10.5 FL — HIGH (ref 7.4–10.4)
POTASSIUM SERPL-MCNC: 4.1 MMOL/L — SIGNIFICANT CHANGE UP (ref 3.5–5)
POTASSIUM SERPL-SCNC: 4.1 MMOL/L — SIGNIFICANT CHANGE UP (ref 3.5–5)
RBC # BLD: 3.67 M/UL — LOW (ref 4.7–6.1)
RBC # FLD: 15.9 % — HIGH (ref 11.5–14.5)
SODIUM SERPL-SCNC: 138 MMOL/L — SIGNIFICANT CHANGE UP (ref 135–146)
SPECIMEN SOURCE: SIGNIFICANT CHANGE UP
WBC # BLD: 5.23 K/UL — SIGNIFICANT CHANGE UP (ref 4.8–10.8)
WBC # FLD AUTO: 5.23 K/UL — SIGNIFICANT CHANGE UP (ref 4.8–10.8)

## 2023-09-07 PROCEDURE — 99222 1ST HOSP IP/OBS MODERATE 55: CPT

## 2023-09-07 RX ORDER — AZTREONAM 2 G
VIAL (EA) INJECTION
Refills: 0 | Status: DISCONTINUED | OUTPATIENT
Start: 2023-09-07 | End: 2023-09-08

## 2023-09-07 RX ORDER — MAGNESIUM SULFATE 500 MG/ML
2 VIAL (ML) INJECTION
Refills: 0 | Status: COMPLETED | OUTPATIENT
Start: 2023-09-07 | End: 2023-09-07

## 2023-09-07 RX ORDER — ACETAMINOPHEN 500 MG
650 TABLET ORAL EVERY 6 HOURS
Refills: 0 | Status: DISCONTINUED | OUTPATIENT
Start: 2023-09-07 | End: 2023-09-12

## 2023-09-07 RX ORDER — BUDESONIDE AND FORMOTEROL FUMARATE DIHYDRATE 160; 4.5 UG/1; UG/1
2 AEROSOL RESPIRATORY (INHALATION)
Refills: 0 | Status: DISCONTINUED | OUTPATIENT
Start: 2023-09-07 | End: 2023-09-12

## 2023-09-07 RX ORDER — ALPRAZOLAM 0.25 MG
1 TABLET ORAL ONCE
Refills: 0 | Status: DISCONTINUED | OUTPATIENT
Start: 2023-09-07 | End: 2023-09-07

## 2023-09-07 RX ORDER — AZTREONAM 2 G
2000 VIAL (EA) INJECTION ONCE
Refills: 0 | Status: COMPLETED | OUTPATIENT
Start: 2023-09-07 | End: 2023-09-07

## 2023-09-07 RX ORDER — AZTREONAM 2 G
2000 VIAL (EA) INJECTION EVERY 8 HOURS
Refills: 0 | Status: DISCONTINUED | OUTPATIENT
Start: 2023-09-08 | End: 2023-09-08

## 2023-09-07 RX ADMIN — Medication 25 GRAM(S): at 11:16

## 2023-09-07 RX ADMIN — BUPROPION HYDROCHLORIDE 150 MILLIGRAM(S): 150 TABLET, EXTENDED RELEASE ORAL at 11:15

## 2023-09-07 RX ADMIN — BUDESONIDE AND FORMOTEROL FUMARATE DIHYDRATE 2 PUFF(S): 160; 4.5 AEROSOL RESPIRATORY (INHALATION) at 21:52

## 2023-09-07 RX ADMIN — APIXABAN 5 MILLIGRAM(S): 2.5 TABLET, FILM COATED ORAL at 05:47

## 2023-09-07 RX ADMIN — TAMSULOSIN HYDROCHLORIDE 0.4 MILLIGRAM(S): 0.4 CAPSULE ORAL at 21:47

## 2023-09-07 RX ADMIN — DULOXETINE HYDROCHLORIDE 30 MILLIGRAM(S): 30 CAPSULE, DELAYED RELEASE ORAL at 11:15

## 2023-09-07 RX ADMIN — APIXABAN 5 MILLIGRAM(S): 2.5 TABLET, FILM COATED ORAL at 17:28

## 2023-09-07 RX ADMIN — Medication 3 MILLILITER(S): at 02:59

## 2023-09-07 RX ADMIN — Medication 3 MILLILITER(S): at 09:14

## 2023-09-07 RX ADMIN — LOSARTAN POTASSIUM 100 MILLIGRAM(S): 100 TABLET, FILM COATED ORAL at 05:46

## 2023-09-07 RX ADMIN — Medication 5 MILLIGRAM(S): at 21:43

## 2023-09-07 RX ADMIN — MORPHINE SULFATE 60 MILLIGRAM(S): 50 CAPSULE, EXTENDED RELEASE ORAL at 05:49

## 2023-09-07 RX ADMIN — MORPHINE SULFATE 60 MILLIGRAM(S): 50 CAPSULE, EXTENDED RELEASE ORAL at 17:48

## 2023-09-07 RX ADMIN — PANTOPRAZOLE SODIUM 40 MILLIGRAM(S): 20 TABLET, DELAYED RELEASE ORAL at 05:47

## 2023-09-07 RX ADMIN — SIMVASTATIN 20 MILLIGRAM(S): 20 TABLET, FILM COATED ORAL at 21:43

## 2023-09-07 RX ADMIN — Medication 20 MILLIGRAM(S): at 05:47

## 2023-09-07 RX ADMIN — Medication 100 MILLIGRAM(S): at 21:39

## 2023-09-07 RX ADMIN — Medication 1 MILLIGRAM(S): at 21:47

## 2023-09-07 RX ADMIN — Medication 25 GRAM(S): at 09:14

## 2023-09-07 RX ADMIN — Medication 3 MILLILITER(S): at 17:27

## 2023-09-07 RX ADMIN — Medication 10 MILLIGRAM(S): at 11:14

## 2023-09-07 RX ADMIN — Medication 20 MILLIGRAM(S): at 13:33

## 2023-09-07 NOTE — CONSULT NOTE ADULT - ASSESSMENT
IMPRESSION    CAP  HO COPD on 4L NC  Recent admission w/ COPD exacerbation DC on AVAPS  Multiple pulmonary nodular opacities, some with partial improvement  Former smoker, Quit 4 months ago (40 pack years)  HO PE/DVT on Eliquis    PLAN      CT chest with partial resolution of the previously noted 2.6 cm and 1 cm nodular lesions in the right upper lobe  New nodules noted, likely infectious  Triple therapy: ICS/LABA/LAMA; Albuterol as needed   Encourage AVAP use   Keep spo2 more than 88%; on O2 at baseline  HOB @ 45 degrees.  Aspiration precautions  Outpatient follow up; states he sees Dr Culver IMPRESSION    CAP  HO COPD on 4L NC  Recent admission w/ COPD exacerbation DC on AVAPS  Multiple pulmonary nodular opacities, some with partial improvement  Former smoker, Quit 4 months ago (40 pack years)  HO PE/DVT on Eliquis    PLAN      CT chest with partial resolution of the previously noted 2.6 cm and 1 cm nodular lesions in the right upper lobe  New nodules noted, likely infectious  Triple therapy: ICS/LABA/LAMA; Albuterol PRN  Encourage AVAP use   Keep spo2 more than 88%; on O2 at baseline  HOB @ 45 degrees.  Aspiration precautions  Outpatient follow up; states he sees Dr Culver IMPRESSION    CAP, likely aspiration  HO COPD on 4L NC  Recent admission w/ COPD exacerbation DC on AVAPS  Multiple pulmonary nodular opacities, some with partial improvement  Former smoker, Quit 4 months ago (40 pack years)  HO PE/DVT on Eliquis    PLAN    Broaden Abx coverage to Zosyn  CT chest with partial resolution of the previously noted 2.6 cm and 1 cm nodular lesions in the right upper lobe  New nodules noted, likely infectious  Repeat CT Chest in 3-4 weeks for resolution  Triple therapy: ICS/LABA/LAMA; Albuterol PRN  Encourage AVAP use qHS  Keep spo2 more than 88%; on O2 at baseline  HOB @ 45 degrees.  Aspiration precautions  Outpatient follow up on DC with Dr Culver IMPRESSION    CAP, likely aspiration  HO COPD on 4L NC  Recent admission w/ COPD exacerbation DC on AVAPS  Multiple pulmonary nodular opacities, some with partial improvement  Former smoker, Quit 4 months ago (40 pack years)  HO PE/DVT on Eliquis    PLAN    Broaden Abx coverage to Zosyn  Obtain Procal, Urine Legionella/Strep, BCx  CT chest with partial resolution of the previously noted 2.6 cm and 1 cm nodular lesions in the right upper lobe  New nodules noted, likely infectious  Repeat CT Chest in 3-4 weeks for resolution  Triple therapy: ICS/LABA/LAMA; Albuterol PRN  Encourage AVAP use qHS  Keep spo2 more than 88%; on O2 at baseline  HOB @ 45 degrees.  Aspiration precautions  Outpatient follow up on DC with Dr Culver IMPRESSION    CAP, likely aspiration  HO COPD on 4L NC  Recent admission w/ COPD exacerbation DC on AVAPS  Multiple pulmonary nodular opacities, some with partial improvement  Former smoker, Quit 4 months ago (40 pack years)  HO PE/DVT on Eliquis    PLAN    CT reviewed; areas of consolidation; nodular opacities; some improved from last admission; some new  Likely has been having aspiration pneumonia/pneumonitis   Obtain Procal, Urine Legionella/Strep, Blood cultures  Check sputum culture including AFB  Start Zosyn adn Levaquin for possible aspiration pneumonia; ID evaluation   Triple therapy: ICS/LABA/LAMA; Albuterol PRN  Encourage AVAP use qHS and as needed   Keep spo2 more than 88%; on O2 at baseline  DVT ppx: On Eliquis   Bedside swallow eval to rule out aspiration   Ambulate as able; swallow eval

## 2023-09-07 NOTE — CONSULT NOTE ADULT - ATTENDING COMMENTS
IMPRESSION    CAP, likely aspiration  HO COPD on 4L NC  Recent admission w/ COPD exacerbation DC on AVAPS  Multiple pulmonary nodular opacities, some with partial improvement  Former smoker, Quit 4 months ago (40 pack years)  HO PE/DVT on Eliquis    Impression and plan above have been altered and are my own.

## 2023-09-07 NOTE — OCCUPATIONAL THERAPY INITIAL EVALUATION ADULT - NSOTDISCHREC_GEN_A_CORE
Patient is independent with activities of daily living. OT recommends D/C to home when medically appropriate. Refer to evaluation/treatment for details.

## 2023-09-07 NOTE — PATIENT PROFILE ADULT - FALL HARM RISK - HARM RISK INTERVENTIONS
Assistance OOB with selected safe patient handling equipment/Communicate Risk of Fall with Harm to all staff/Discuss with provider need for PT consult/Monitor gait and stability/Provide patient with walking aids - walker, cane, crutches/Reinforce activity limits and safety measures with patient and family/Tailored Fall Risk Interventions/Use of alarms - bed, chair and/or voice tab/Visual Cue: Yellow wristband and red socks/Bed in lowest position, wheels locked, appropriate side rails in place/Call bell, personal items and telephone in reach/Instruct patient to call for assistance before getting out of bed or chair/Non-slip footwear when patient is out of bed/[place] to call system/Physically safe environment - no spills, clutter or unnecessary equipment/Purposeful Proactive Rounding/Room/bathroom lighting operational, light cord in reach

## 2023-09-07 NOTE — OCCUPATIONAL THERAPY INITIAL EVALUATION ADULT - ADAPTIVE EQUIPMENT NEEDED
shower stool. Pt requesting tub transfer bench. OT spoke to MD and patient. MD will provide presription and pt will obtain on his own

## 2023-09-07 NOTE — PHYSICAL THERAPY INITIAL EVALUATION ADULT - PERTINENT HX OF CURRENT PROBLEM, REHAB EVAL
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 Pulse ox this morning was 82% as per home health aid. Pt also reports weakness and productive cough w/ green phlegm for the past 3 days. Health aid reports recent URI symptoms. Denies chest pain, palpitations, fever/chills, sick contacts.

## 2023-09-07 NOTE — PHYSICAL THERAPY INITIAL EVALUATION ADULT - ADDITIONAL COMMENTS
pt states he lives alone, has HHA, has been able to ambulate in his apt independently with oxygen.  No stairs ti climb, lives in 1[address] fl apt.

## 2023-09-07 NOTE — PHYSICAL THERAPY INITIAL EVALUATION ADULT - GENERAL OBSERVATIONS, REHAB EVAL
9:40-10:15 pt encountered sitting inbedside chair in NAD, + O2 via nasal canula, 2L/min. SPO2 92%.  he ambulates independently with supplemental oxygen. There is no skilled need for PT at this time

## 2023-09-08 LAB
ANION GAP SERPL CALC-SCNC: 13 MMOL/L — SIGNIFICANT CHANGE UP (ref 7–14)
BASOPHILS # BLD AUTO: 0.02 K/UL — SIGNIFICANT CHANGE UP (ref 0–0.2)
BASOPHILS NFR BLD AUTO: 0.4 % — SIGNIFICANT CHANGE UP (ref 0–1)
BUN SERPL-MCNC: 18 MG/DL — SIGNIFICANT CHANGE UP (ref 10–20)
CALCIUM SERPL-MCNC: 8.3 MG/DL — LOW (ref 8.4–10.5)
CHLORIDE SERPL-SCNC: 99 MMOL/L — SIGNIFICANT CHANGE UP (ref 98–110)
CO2 SERPL-SCNC: 25 MMOL/L — SIGNIFICANT CHANGE UP (ref 17–32)
CREAT SERPL-MCNC: 0.7 MG/DL — SIGNIFICANT CHANGE UP (ref 0.7–1.5)
EGFR: 101 ML/MIN/1.73M2 — SIGNIFICANT CHANGE UP
EOSINOPHIL # BLD AUTO: 0.06 K/UL — SIGNIFICANT CHANGE UP (ref 0–0.7)
EOSINOPHIL NFR BLD AUTO: 1.1 % — SIGNIFICANT CHANGE UP (ref 0–8)
GLUCOSE BLDC GLUCOMTR-MCNC: 107 MG/DL — HIGH (ref 70–99)
GLUCOSE BLDC GLUCOMTR-MCNC: 119 MG/DL — HIGH (ref 70–99)
GLUCOSE BLDC GLUCOMTR-MCNC: 129 MG/DL — HIGH (ref 70–99)
GLUCOSE BLDC GLUCOMTR-MCNC: 98 MG/DL — SIGNIFICANT CHANGE UP (ref 70–99)
GLUCOSE SERPL-MCNC: 97 MG/DL — SIGNIFICANT CHANGE UP (ref 70–99)
HCT VFR BLD CALC: 34.8 % — LOW (ref 42–52)
HGB BLD-MCNC: 11.1 G/DL — LOW (ref 14–18)
IMM GRANULOCYTES NFR BLD AUTO: 0.2 % — SIGNIFICANT CHANGE UP (ref 0.1–0.3)
LYMPHOCYTES # BLD AUTO: 0.69 K/UL — LOW (ref 1.2–3.4)
LYMPHOCYTES # BLD AUTO: 12.8 % — LOW (ref 20.5–51.1)
MAGNESIUM SERPL-MCNC: 1.6 MG/DL — LOW (ref 1.8–2.4)
MCHC RBC-ENTMCNC: 28.6 PG — SIGNIFICANT CHANGE UP (ref 27–31)
MCHC RBC-ENTMCNC: 31.9 G/DL — LOW (ref 32–37)
MCV RBC AUTO: 89.7 FL — SIGNIFICANT CHANGE UP (ref 80–94)
MONOCYTES # BLD AUTO: 0.51 K/UL — SIGNIFICANT CHANGE UP (ref 0.1–0.6)
MONOCYTES NFR BLD AUTO: 9.4 % — HIGH (ref 1.7–9.3)
NEUTROPHILS # BLD AUTO: 4.11 K/UL — SIGNIFICANT CHANGE UP (ref 1.4–6.5)
NEUTROPHILS NFR BLD AUTO: 76.1 % — HIGH (ref 42.2–75.2)
NRBC # BLD: 0 /100 WBCS — SIGNIFICANT CHANGE UP (ref 0–0)
PLATELET # BLD AUTO: 172 K/UL — SIGNIFICANT CHANGE UP (ref 130–400)
PMV BLD: 10.7 FL — HIGH (ref 7.4–10.4)
POTASSIUM SERPL-MCNC: 3.6 MMOL/L — SIGNIFICANT CHANGE UP (ref 3.5–5)
POTASSIUM SERPL-SCNC: 3.6 MMOL/L — SIGNIFICANT CHANGE UP (ref 3.5–5)
RBC # BLD: 3.88 M/UL — LOW (ref 4.7–6.1)
RBC # FLD: 15.9 % — HIGH (ref 11.5–14.5)
SODIUM SERPL-SCNC: 137 MMOL/L — SIGNIFICANT CHANGE UP (ref 135–146)
WBC # BLD: 5.4 K/UL — SIGNIFICANT CHANGE UP (ref 4.8–10.8)
WBC # FLD AUTO: 5.4 K/UL — SIGNIFICANT CHANGE UP (ref 4.8–10.8)

## 2023-09-08 RX ORDER — ALPRAZOLAM 0.25 MG
0.25 TABLET ORAL ONCE
Refills: 0 | Status: DISCONTINUED | OUTPATIENT
Start: 2023-09-08 | End: 2023-09-08

## 2023-09-08 RX ORDER — TIOTROPIUM BROMIDE 18 UG/1
2 CAPSULE ORAL; RESPIRATORY (INHALATION) DAILY
Refills: 0 | Status: DISCONTINUED | OUTPATIENT
Start: 2023-09-08 | End: 2023-09-12

## 2023-09-08 RX ORDER — AZTREONAM 2 G
2000 VIAL (EA) INJECTION EVERY 8 HOURS
Refills: 0 | Status: DISCONTINUED | OUTPATIENT
Start: 2023-09-08 | End: 2023-09-09

## 2023-09-08 RX ORDER — MAGNESIUM SULFATE 500 MG/ML
2 VIAL (ML) INJECTION ONCE
Refills: 0 | Status: COMPLETED | OUTPATIENT
Start: 2023-09-08 | End: 2023-09-08

## 2023-09-08 RX ADMIN — Medication 5 MILLIGRAM(S): at 18:47

## 2023-09-08 RX ADMIN — PANTOPRAZOLE SODIUM 40 MILLIGRAM(S): 20 TABLET, DELAYED RELEASE ORAL at 05:42

## 2023-09-08 RX ADMIN — Medication 25 GRAM(S): at 11:28

## 2023-09-08 RX ADMIN — LOSARTAN POTASSIUM 100 MILLIGRAM(S): 100 TABLET, FILM COATED ORAL at 05:42

## 2023-09-08 RX ADMIN — Medication 100 MILLIGRAM(S): at 13:19

## 2023-09-08 RX ADMIN — TAMSULOSIN HYDROCHLORIDE 0.4 MILLIGRAM(S): 0.4 CAPSULE ORAL at 21:22

## 2023-09-08 RX ADMIN — BUDESONIDE AND FORMOTEROL FUMARATE DIHYDRATE 2 PUFF(S): 160; 4.5 AEROSOL RESPIRATORY (INHALATION) at 08:43

## 2023-09-08 RX ADMIN — Medication 100 MILLIGRAM(S): at 21:21

## 2023-09-08 RX ADMIN — MORPHINE SULFATE 60 MILLIGRAM(S): 50 CAPSULE, EXTENDED RELEASE ORAL at 05:41

## 2023-09-08 RX ADMIN — Medication 0.25 MILLIGRAM(S): at 11:32

## 2023-09-08 RX ADMIN — Medication 3 MILLILITER(S): at 15:29

## 2023-09-08 RX ADMIN — Medication 0.25 MILLIGRAM(S): at 22:12

## 2023-09-08 RX ADMIN — MORPHINE SULFATE 60 MILLIGRAM(S): 50 CAPSULE, EXTENDED RELEASE ORAL at 06:11

## 2023-09-08 RX ADMIN — BUPROPION HYDROCHLORIDE 150 MILLIGRAM(S): 150 TABLET, EXTENDED RELEASE ORAL at 11:29

## 2023-09-08 RX ADMIN — Medication 20 MILLIGRAM(S): at 05:43

## 2023-09-08 RX ADMIN — MORPHINE SULFATE 60 MILLIGRAM(S): 50 CAPSULE, EXTENDED RELEASE ORAL at 17:11

## 2023-09-08 RX ADMIN — Medication 100 MILLIGRAM(S): at 05:43

## 2023-09-08 RX ADMIN — SIMVASTATIN 20 MILLIGRAM(S): 20 TABLET, FILM COATED ORAL at 21:22

## 2023-09-08 RX ADMIN — Medication 20 MILLIGRAM(S): at 13:21

## 2023-09-08 RX ADMIN — MORPHINE SULFATE 60 MILLIGRAM(S): 50 CAPSULE, EXTENDED RELEASE ORAL at 17:41

## 2023-09-08 RX ADMIN — DULOXETINE HYDROCHLORIDE 30 MILLIGRAM(S): 30 CAPSULE, DELAYED RELEASE ORAL at 11:29

## 2023-09-08 RX ADMIN — Medication 3 MILLILITER(S): at 08:17

## 2023-09-08 RX ADMIN — Medication 10 MILLIGRAM(S): at 11:29

## 2023-09-08 RX ADMIN — Medication 3 MILLILITER(S): at 20:42

## 2023-09-08 RX ADMIN — APIXABAN 5 MILLIGRAM(S): 2.5 TABLET, FILM COATED ORAL at 17:12

## 2023-09-08 RX ADMIN — APIXABAN 5 MILLIGRAM(S): 2.5 TABLET, FILM COATED ORAL at 05:42

## 2023-09-08 NOTE — PROGRESS NOTE ADULT - ASSESSMENT
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 CT chest concerning for LLL PNA. Admit to Medicine for acute hypoxic resp failure 2/2 PNA.    # Acute hypoxic respiratory failure 2/2 Pneumonia  - productive cough and SOB x3 days, hypoxia  - sepsis POA (RR 26 T 102.3)  - lactate 1.4. VBG pH 7.45, pCO2 45, pO2 31, HCO3 31  - CTA no PE. Consolidative/atelectatic changes at both lung bases; not significantly changed in the right lower lobe, but increased in the left lower lobe. There is narrowing and/or secretions within the right and left lower lobe bronchi.  - received 2.5L IVF, Levaquin, Solu-Medrol, DuoNebs in ED, placed on 6L NC  - c/w Levaquin 750mg qd  - Aztreonam started ( Penicillin allergy )   - c/w DuoNebs q6 and PRN  Procal, Urine Legionella/Strep, Blood cultures  Check sputum culture including AFB  -Triple therapy: ICS/LABA/LAMA; Albuterol PRN started  -Encourage AVAP use qHS and as needed   -Keep spo2 more than 88%; on O2 at baseline  - SHort term 2-3 month follow up is recommended for the lung nodules     # COPD on home 3L O2, not in acute exacerbation   -home inhalers: Trelegy and Albuterol  -Triple therapy: ICS/LABA/LAMA; Albuterol PRN started  - AVAPs at night     # hx DVT/PE (2018) s/p thrombolysis  - c/w Eliquis 5mg BID     # HTN  - c/w Losartan 100mg qd    # HLD  - c/w Simvastatin 20mg qd    # DM Type II  - home med: Metformin 50mg BID     # Depression  # Anxiety  - c/w Wellbutrin 150mg ER qd    # Chronic back pain  - s/p multiple back surgeries  - c/w Morphine 60mg ER BID  - c/w Baclofen 5mg q8    # Lung nodules   - CTAP: There has been partial resolution of the previously noted 2.6 cm (4/104) and 1 cm (4/63) nodular lesions in the right upper lobe. Several nodules bilaterally are unchanged in size however there are several nodules in the upper lobes that are new (4/128-166) or have increased in size.  - repeat CT chest in 2-3 months    #DVT ppx: Eliquis 5mg BID  #GI ppx: Pantoprazole 40mg qd  #Diet: DASH/CC  #Activity: IAT  #Dispo: Acute

## 2023-09-08 NOTE — CONSULT NOTE ADULT - ASSESSMENT
ASSESSMENT  68 year old male PMHx HTN, HLD, DM, COPD (on home 4L O2), Depression, Anxiety, chronic back pain, hx DVT/PE (2018) s/o thrombolysis, recent admission for COPD exacerbation and LLE cellulitis, brought in to the ED for SOB and hypoxia.    IMPRESSION  #Acute respiratory failure - suspected aspiration   #COPD  #DM II  #Hx of DVT/PE    #Abx allergy: aspirin (Stomach Upset)    RECOMMENDATIONS  - tolerated cefazolin/ampicillin previously   - switch azthrenam/levofloxaicn to cefepime 1g q 8 hours   - follow-up blood cx  - trend fever curve    Please call or message on [employer] Teams if with any questions.  Spectra [phone]

## 2023-09-08 NOTE — SWALLOW BEDSIDE ASSESSMENT ADULT - COMMENTS
Known to ST April 2021-regular/thin liquids Known to ST April 2021-regular/thin liquids  9/6/2023 CXR Impression: Bibasilar opacities/pleural effusion, unchanged.

## 2023-09-08 NOTE — SWALLOW BEDSIDE ASSESSMENT ADULT - SLP GENERAL OBSERVATIONS
Pt found laying in bed a&ox4. Pt denies any hx of dysphagia. Pt states he doesn't have a consistent history of PNA; However did have it once years ago.

## 2023-09-08 NOTE — CONSULT NOTE ADULT - SUBJECTIVE AND OBJECTIVE BOX
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 Pulse ox this morning was 82% as per home health aid. Pt also reports weakness and productive cough w/ green phlegm for the past 3 days. Health aid reports recent URI symptoms. Denies chest pain, palpitations, fever/chills, sick contacts.     T(F): 102.3 HR: 88 BP: 140/74 RR: 26 SpO2: 92% on 6L NC  Hgb 11.9, trop negative, lactate 1.4. VBG pH 7.45, pCO2 45, pO2 31, HCO3 31  CXR: Bibasilar opacities/pleural effusion, unchanged.  CTA no PE. Consolidative/atelectatic changes at both lung bases; not significantly changed in the right lower lobe, but increased in the left lower lobe. There is narrowing and/or secretions within the right and left lower lobe bronchi.  Received 2.5L bolus and Levaquin in ED  Admit to Medicine for PNA   (06 Sep 2023 18:28)      PAST MEDICAL & SURGICAL HISTORY:  Anxiety      HTN (hypertension)      Diabetes      High cholesterol      Asthma      Chronic low back pain with sciatica, sciatica laterality unspecified, unspecified back pain laterality      Uncomplicated opioid dependence      Syncopal episodes      History of back surgery  x 3          SOCIAL HX:   Smoking                         ETOH                            Other    FAMILY HISTORY:  No pertinent family history in first degree relatives    .  No cardiovascular or pulmonary family history     REVIEW OF SYSTEMS:    All ROS are negative exept per HPI       Allergies    Originally Entered as [ANGIOEDEMA] reaction to [pineapple] (Unknown)  aspirin (Stomach Upset)  penicillins (Anaphylaxis)  pineapples (Anaphylaxis)    Intolerances          PHYSICAL EXAM  Vital Signs Last 24 Hrs  T(C): 36.7 (07 Sep 2023 08:40), Max: 36.7 (07 Sep 2023 08:40)  T(F): 98 (07 Sep 2023 08:40), Max: 98 (07 Sep 2023 08:40)  HR: 66 (07 Sep 2023 08:40) (54 - 66)  BP: 120/66 (07 Sep 2023 08:40) (120/66 - 140/70)  BP(mean): --  RR: 18 (07 Sep 2023 08:40) (18 - 20)  SpO2: 97% (07 Sep 2023 08:40) (97% - 100%)    Parameters below as of 07 Sep 2023 05:44  Patient On (Oxygen Delivery Method): nasal cannula  O2 Flow (L/min): 3      CONSTITUTIONAL:  NAD    ENT:   Airway patent    EYES:   Clear bilaterally    CARDIAC:   Normal rate,   regular rhythm.    no edema    RESPIRATORY:   No wheezing   Normal chest expansion  Not tachypneic,  No use of accessory muscles    GASTROINTESTINAL:  Abdomen soft, non-tender,   No guarding,   Positive BS    MUSCULOSKELETAL:   Range of motion is not limited,  No clubbing, cyanosis    NEUROLOGICAL:   Alert and oriented   No motor deficits.    SKIN:   Skin normal color for race,   No evidence of rash.        LABS:                          10.8   5.23  )-----------( 186      ( 07 Sep 2023 06:33 )             33.7                                               09-07    138  |  103  |  22<H>  ----------------------------<  98  4.1   |  27  |  1.0    Ca    9.2      07 Sep 2023 06:33  Mg     1.6     09-07    TPro  7.2  /  Alb  3.9  /  TBili  0.3  /  DBili  x   /  AST  12  /  ALT  8   /  AlkPhos  71  09-06      PT/INR - ( 06 Sep 2023 12:20 )   PT: 19.20 sec;   INR: 1.66 ratio         PTT - ( 06 Sep 2023 12:20 )  PTT:40.5 sec                                       Urinalysis Basic - ( 07 Sep 2023 06:33 )    Color: x / Appearance: x / SG: x / pH: x  Gluc: 98 mg/dL / Ketone: x  / Bili: x / Urobili: x   Blood: x / Protein: x / Nitrite: x   Leuk Esterase: x / RBC: x / WBC x   Sq Epi: x / Non Sq Epi: x / Bacteria: x        CARDIAC MARKERS ( 06 Sep 2023 12:20 )  x     / <0.01 ng/mL / x     / x     / x                                                LIVER FUNCTIONS - ( 06 Sep 2023 12:20 )  Alb: 3.9 g/dL / Pro: 7.2 g/dL / ALK PHOS: 71 U/L / ALT: 8 U/L / AST: 12 U/L / GGT: x                                                                                                MEDICATIONS  (STANDING):  albuterol/ipratropium for Nebulization 3 milliLiter(s) Nebulizer every 6 hours  albuterol/ipratropium for Nebulization.. 3 milliLiter(s) Nebulizer every 20 minutes  apixaban 5 milliGRAM(s) Oral every 12 hours  buPROPion XL (24-Hour) . 150 milliGRAM(s) Oral daily  dextrose 5%. 1000 milliLiter(s) (100 mL/Hr) IV Continuous <Continuous>  dextrose 5%. 1000 milliLiter(s) (50 mL/Hr) IV Continuous <Continuous>  dextrose 50% Injectable 25 Gram(s) IV Push once  dextrose 50% Injectable 12.5 Gram(s) IV Push once  dextrose 50% Injectable 25 Gram(s) IV Push once  DULoxetine 30 milliGRAM(s) Oral daily  furosemide    Tablet 20 milliGRAM(s) Oral two times a day  glucagon  Injectable 1 milliGRAM(s) IntraMuscular once  insulin lispro (ADMELOG) corrective regimen sliding scale   SubCutaneous three times a day before meals  levoFLOXacin IVPB 750 milliGRAM(s) IV Intermittent every 24 hours  losartan 100 milliGRAM(s) Oral daily  morphine ER Tablet 60 milliGRAM(s) Oral two times a day  oxybutynin 10 milliGRAM(s) Oral daily  pantoprazole    Tablet 40 milliGRAM(s) Oral before breakfast  simvastatin 20 milliGRAM(s) Oral at bedtime  tamsulosin 0.4 milliGRAM(s) Oral at bedtime    MEDICATIONS  (PRN):  baclofen 5 milliGRAM(s) Oral every 8 hours PRN Musculoskeletal Pain  dextrose Oral Gel 15 Gram(s) Oral once PRN Blood Glucose LESS THAN 70 milliGRAM(s)/deciliter      X-Rays reviewed:    CXR interpreted by me:
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 Pulse ox this morning was 82% as per home health aid. Pt also reports weakness and productive cough w/ green phlegm for the past 3 days. Health aid reports recent URI symptoms. Denies chest pain, palpitations, fever/chills, sick contacts.     T(F): 102.3 HR: 88 BP: 140/74 RR: 26 SpO2: 92% on 6L NC  Hgb 11.9, trop negative, lactate 1.4. VBG pH 7.45, pCO2 45, pO2 31, HCO3 31  CXR: Bibasilar opacities/pleural effusion, unchanged.  CTA no PE. Consolidative/atelectatic changes at both lung bases; not significantly changed in the right lower lobe, but increased in the left lower lobe. There is narrowing and/or secretions within the right and left lower lobe bronchi.  Received 2.5L bolus and Levaquin in ED  Admit to Medicine for PNA   (06 Sep 2023 18:28)      INFECTIOUS DISEASE HISTORY:  History as above.    PAST MEDICAL & SURGICAL HISTORY:  Anxiety      HTN (hypertension)      Diabetes      High cholesterol      Asthma      Chronic low back pain with sciatica, sciatica laterality unspecified, unspecified back pain laterality      Uncomplicated opioid dependence      Syncopal episodes      History of back surgery  x 3          FAMILY HISTORY  No pertinent family history in first degree relatives        SOCIAL HISTORY  Social History:  lives at home with home health aid  ambulates independently  on chronic home O2 3L (06 Sep 2023 18:28)        ROS  General: Denies rigors, nightsweats  HEENT: Denies headache, rhinorrhea, sore throat, eye pain  CV: Denies CP, palpitations  PULM: Denies wheezing, hemoptysis  GI: Denies hematemesis, hematochezia, melena  : Denies discharge, hematuria  MSK: Denies arthralgias, myalgias  SKIN: Denies rash, lesions  NEURO: Denies paresthesias, weakness  PSYCH: Denies depression, anxiety    VITALS:  T(F): 99.7, Max: 99.8 (09-07-23 @ 20:43)  HR: 76  BP: 113/60  RR: 18Vital Signs Last 24 Hrs  T(C): 37.6 (08 Sep 2023 13:44), Max: 37.7 (07 Sep 2023 20:43)  T(F): 99.7 (08 Sep 2023 13:44), Max: 99.8 (07 Sep 2023 20:43)  HR: 76 (08 Sep 2023 13:44) (76 - 85)  BP: 113/60 (08 Sep 2023 13:44) (113/60 - 140/77)  BP(mean): --  RR: 18 (08 Sep 2023 13:44) (18 - 18)  SpO2: 96% (08 Sep 2023 13:44) (96% - 98%)    Parameters below as of 08 Sep 2023 05:21  Patient On (Oxygen Delivery Method): nasal cannula  O2 Flow (L/min): 3      PHYSICAL EXAM:  Gen: NAD, resting in bed  HEENT: Normocephalic, atraumatic  Neck: supple, no lymphadenopathy  CV: Regular rate & regular rhythm  Lungs: decreased BS at bases, no fremitus  Abdomen: Soft, BS present  Ext: Warm, well perfused  Neuro: non focal, awake  Skin: no rash, no erythema  Lines: no phlebitis    TESTS & MEASUREMENTS:                        11.1   5.40  )-----------( 172      ( 08 Sep 2023 07:42 )             34.8     09-08    137  |  99  |  18  ----------------------------<  97  3.6   |  25  |  0.7    Ca    8.3<L>      08 Sep 2023 07:42  Mg     1.6     09-08          Urinalysis Basic - ( 08 Sep 2023 07:42 )    Color: x / Appearance: x / SG: x / pH: x  Gluc: 97 mg/dL / Ketone: x  / Bili: x / Urobili: x   Blood: x / Protein: x / Nitrite: x   Leuk Esterase: x / RBC: x / WBC x   Sq Epi: x / Non Sq Epi: x / Bacteria: x        Culture - Urine (collected 09-06-23 @ 15:01)  Source: Clean Catch Clean Catch (Midstream)  Final Report (09-07-23 @ 22:30):    <10,000 CFU/mL Normal Urogenital Lori    Culture - Blood (collected 09-06-23 @ 12:20)  Source: .Blood Blood-Peripheral  Preliminary Report (09-07-23 @ 23:02):    No growth at 24 hours    Culture - Blood (collected 09-06-23 @ 12:20)  Source: .Blood Blood-Peripheral  Preliminary Report (09-07-23 @ 23:02):    No growth at 24 hours    Culture - Blood (collected 08-11-23 @ 13:46)  Source: .Blood Blood  Final Report (08-16-23 @ 23:00):    No growth at 5 days    Culture - Blood (collected 08-11-23 @ 13:46)  Source: .Blood Blood  Final Report (08-16-23 @ 23:00):    No growth at 5 days        Blood Gas Venous - Lactate: 1.40 mmol/L (09-06-23 @ 12:21)  Lactate, Blood: 1.3 mmol/L (09-06-23 @ 12:20)      INFECTIOUS DISEASES TESTING  Procalcitonin, Serum: 0.09 ng/mL (08-12-23 @ 06:48)      RADIOLOGY & ADDITIONAL TESTS:  I have personally reviewed the last Chest xray  CXR      CT  CT Angio Chest PE Protocol w/ IV Cont:   ACC: [id number] EXAM:  CT ANGIO CHEST PULM [facility]   ORDERED BY: HEMA SHUKLA     PROCEDURE DATE:  09/06/2023          INTERPRETATION:  CLINICAL HISTORY / REASON FOR EXAM: SOB.  Pulmonary   embolus evaluation. WBC 6.45   PMHx of HTN, HLD, DM, COPD (on 4L home   O2), depression/anxiety, former smoker, 3 spinal operations (2010),   history of DVT/PE (2018) s/p thrombolysis recent admission for COPD and L   leg cellulitis    TECHNIQUE:   Contiguous axial CT images were obtained from the thoracic  inlet to the upper abdomen with intravenous contrast. IV contrast was   employed (Omnipaque 350); 80 ml was administered and 20 ml was discarded.    Thin sections were reconstructed through the pulmonary vasculature.   Imaging was timed for evaluation of pulmonary embolism.  Reformatted   images in the coronal and sagittal planes were acquired, as well as 3D,   Volume rendering, and MIP reconstructed images.    Comparison: Chest CT dated 8/15/2023      FINDINGS:    TUBES/LINES: None.    PULMONARY ARTERY CIRCULATION: No evidence of central or segmental   pulmonary embolism.    GREAT VESSELS/CARDIAC STRUCTURES/PERICARDIUM: The heart size is enlarged.   No pericardial effusion. Normal caliber aorta and pulmonary artery.   Coronary artery and aortic calcifications are noted. Mural soft plaque is   noted along the wall of the descending thoracic aorta. There is no   evidence of aortic aneurysm or dissection. The brachiocephalic vessels   are unremarkable.    LUNG PARENCHYMA/CENTRAL AIRWAYS/PLEURA: The central tracheobronchial tree   is patent.    There are consolidative/atelectatic changes at both lung bases; not   significantly changed in the right lower lobe, but increased in the left   lower lobe. There is narrowing and/or secretions within the right and   left lower lobe bronchi.    There has been partial resolution of the previously noted 2.6 cm (4/104)   and 1 cm (4/63) nodular lesions in the right upper lobe. Several nodules   are unchanged in size however there are several nodules in the upper   lobes that are new (4/128-166) or have increased in size.    There is chronic atelectasis and bronchiectasis within the right middle   lobe.    No evidence of pleural effusion or pneumothorax    MEDIASTINUM/HILUM: Unchanged paratracheal, subcarinal, and aortopulmonary   window lymphadenopathy.  The visualized portion of the thyroid gland is   unremarkable.    CHEST WALL/AXILLA: Unremarkable.    UPPER ABDOMEN: The partially visualized upper abdomen shows no acute   pathology.    OSSEOUS STRUCTURES: Degenerative changes of the spine are noted. Chronic   right rib fractures (5-7 laterally) (8-12 posteromedial) are again noted.      IMPRESSION:    No evidence of pulmonary embolism.    There are consolidative/atelectatic changes at both lung bases; not   significantly changed in the right lower lobe, but increased in the left   lower lobe. There is narrowing and/or secretions within the right and   left lower lobe bronchi.    There has been partial resolution of the previously noted 2.6 cm (4/104)   and 1 cm (4/63) nodular lesions in the right upper lobe. Several nodules   bilaterally are unchanged in size however there are several nodules in   the upper lobes that are new (4/128-166) or have increased in size.    Thereis chronic atelectasis and bronchiectasis within the right middle   lobe.    Short-term follow-up chest CT in 2-3 months is recommended.    --- End of Report ---            AMY MATUTE MD; Attending Interventional Radiologist  This document has been electronically signed. Sep  6 2023  4:35PM (09-06-23 @ 14:54)      CARDIOLOGY TESTING  12 Lead ECG:   Ventricular Rate 89 BPM    Atrial Rate 89 BPM    P-R Interval 200 ms    QRS Duration 114 ms    Q-T Interval 360 ms    QTC Calculation(Bazett) 438 ms    P Axis 34 degrees    R Axis 30 degrees    T Axis -23 degrees    Diagnosis Line Sinus rhythm withPremature supraventricular complexes  Incomplete right bundle branch block  Possible Inferior infarct , age undetermined  Abnormal ECG    Confirmed by Rashaad Ozuna ([id number]) on 9/6/2023 4:58:18 PM (09-06-23 @ 11:50)      MEDICATIONS  albuterol/ipratropium for Nebulization 3 Nebulizer every 6 hours  albuterol/ipratropium for Nebulization.. 3 Nebulizer every 20 minutes  apixaban 5 Oral every 12 hours  aztreonam  IVPB 2000 IV Intermittent every 8 hours  budesonide  80 MICROgram(s)/formoterol 4.5 MICROgram(s) Inhaler 2 Inhalation two times a day  buPROPion XL (24-Hour) . 150 Oral daily  dextrose 5%. 1000 IV Continuous <Continuous>  dextrose 5%. 1000 IV Continuous <Continuous>  dextrose 50% Injectable 25 IV Push once  dextrose 50% Injectable 12.5 IV Push once  dextrose 50% Injectable 25 IV Push once  DULoxetine 30 Oral daily  furosemide    Tablet 20 Oral two times a day  glucagon  Injectable 1 IntraMuscular once  insulin lispro (ADMELOG) corrective regimen sliding scale  SubCutaneous three times a day before meals  levoFLOXacin IVPB 750 IV Intermittent every 24 hours  losartan 100 Oral daily  morphine ER Tablet 60 Oral two times a day  oxybutynin 10 Oral daily  pantoprazole    Tablet 40 Oral before breakfast  simvastatin 20 Oral at bedtime  tamsulosin 0.4 Oral at bedtime  tiotropium 2.5 MICROgram(s) Inhaler 2 Inhalation daily      Weight  Weight (kg): 82.4 (09-07-23 @ 20:43)    ANTIBIOTICS:  aztreonam  IVPB 2000 milliGRAM(s) IV Intermittent every 8 hours  levoFLOXacin IVPB 750 milliGRAM(s) IV Intermittent every 24 hours      ALLERGIES:  Originally Entered as [ANGIOEDEMA] reaction to [pineapple] (Unknown)  aspirin (Stomach Upset)  penicillins (Anaphylaxis)  pineapples (Anaphylaxis)

## 2023-09-08 NOTE — SWALLOW BEDSIDE ASSESSMENT ADULT - SLP PERTINENT HISTORY OF CURRENT PROBLEM
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 Pulse ox this morning was 82% as per home health aid. Pt also reports weakness and productive cough w/ green phlegm for the past 3 days. Health aid reports recent URI symptoms. Denies chest pain, palpitations, fever/chills, sick contacts. negative

## 2023-09-08 NOTE — PROGRESS NOTE ADULT - ASSESSMENT
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 CT chest concerning for LLL PNA. Admit to Medicine for acute hypoxic resp failure 2/2 PNA.      Acute and on Chronic Hypoxic Respiratory Failure,Pneumonia, COPD Exacerbation  - continue O2 support  - on IV antibiotics, concern for aspiration  - IV steroids  - CT results noted, needs early interval f/u on pulm nodules  - pulm evaluation noted  - non compliance with AVAPs at home  - ID evaluation noted, recommendations noted    PE/DVT hx  - continue on Eliquis    HTN  - continue home medications    HLD  - continue statin    DM  - continue rx  - sliding scale    Chronic back pain  Opioid Dependece  - monitor for sedation due to medications    Depression  Anxiety Disorder  Benzo dependent  - continue home medications  - hold benzo      GI Prophylaxis : Pantoprazole  Diet: NCS, Low fat heart healthy    Patient remains acutely ill. Patient is a full code

## 2023-09-09 LAB
ANION GAP SERPL CALC-SCNC: 12 MMOL/L — SIGNIFICANT CHANGE UP (ref 7–14)
BUN SERPL-MCNC: 14 MG/DL — SIGNIFICANT CHANGE UP (ref 10–20)
CALCIUM SERPL-MCNC: 8.4 MG/DL — SIGNIFICANT CHANGE UP (ref 8.4–10.5)
CHLORIDE SERPL-SCNC: 98 MMOL/L — SIGNIFICANT CHANGE UP (ref 98–110)
CO2 SERPL-SCNC: 24 MMOL/L — SIGNIFICANT CHANGE UP (ref 17–32)
CREAT SERPL-MCNC: 0.8 MG/DL — SIGNIFICANT CHANGE UP (ref 0.7–1.5)
EGFR: 97 ML/MIN/1.73M2 — SIGNIFICANT CHANGE UP
GLUCOSE BLDC GLUCOMTR-MCNC: 100 MG/DL — HIGH (ref 70–99)
GLUCOSE BLDC GLUCOMTR-MCNC: 116 MG/DL — HIGH (ref 70–99)
GLUCOSE BLDC GLUCOMTR-MCNC: 121 MG/DL — HIGH (ref 70–99)
GLUCOSE BLDC GLUCOMTR-MCNC: 135 MG/DL — HIGH (ref 70–99)
GLUCOSE SERPL-MCNC: 93 MG/DL — SIGNIFICANT CHANGE UP (ref 70–99)
HCT VFR BLD CALC: 37.1 % — LOW (ref 42–52)
HGB BLD-MCNC: 11.9 G/DL — LOW (ref 14–18)
MCHC RBC-ENTMCNC: 28.8 PG — SIGNIFICANT CHANGE UP (ref 27–31)
MCHC RBC-ENTMCNC: 32.1 G/DL — SIGNIFICANT CHANGE UP (ref 32–37)
MCV RBC AUTO: 89.8 FL — SIGNIFICANT CHANGE UP (ref 80–94)
NRBC # BLD: 0 /100 WBCS — SIGNIFICANT CHANGE UP (ref 0–0)
PLATELET # BLD AUTO: 171 K/UL — SIGNIFICANT CHANGE UP (ref 130–400)
PMV BLD: 10.3 FL — SIGNIFICANT CHANGE UP (ref 7.4–10.4)
POTASSIUM SERPL-MCNC: 3.7 MMOL/L — SIGNIFICANT CHANGE UP (ref 3.5–5)
POTASSIUM SERPL-SCNC: 3.7 MMOL/L — SIGNIFICANT CHANGE UP (ref 3.5–5)
PROCALCITONIN SERPL-MCNC: 0.07 NG/ML — SIGNIFICANT CHANGE UP (ref 0.02–0.1)
RBC # BLD: 4.13 M/UL — LOW (ref 4.7–6.1)
RBC # FLD: 15.5 % — HIGH (ref 11.5–14.5)
SODIUM SERPL-SCNC: 134 MMOL/L — LOW (ref 135–146)
WBC # BLD: 3.09 K/UL — LOW (ref 4.8–10.8)
WBC # FLD AUTO: 3.09 K/UL — LOW (ref 4.8–10.8)

## 2023-09-09 RX ORDER — OXYCODONE HYDROCHLORIDE 5 MG/1
10 TABLET ORAL EVERY 6 HOURS
Refills: 0 | Status: DISCONTINUED | OUTPATIENT
Start: 2023-09-09 | End: 2023-09-12

## 2023-09-09 RX ORDER — CEFEPIME 1 G/1
1000 INJECTION, POWDER, FOR SOLUTION INTRAMUSCULAR; INTRAVENOUS ONCE
Refills: 0 | Status: COMPLETED | OUTPATIENT
Start: 2023-09-09 | End: 2023-09-09

## 2023-09-09 RX ORDER — CEFEPIME 1 G/1
INJECTION, POWDER, FOR SOLUTION INTRAMUSCULAR; INTRAVENOUS
Refills: 0 | Status: DISCONTINUED | OUTPATIENT
Start: 2023-09-09 | End: 2023-09-12

## 2023-09-09 RX ORDER — ALPRAZOLAM 0.25 MG
0.5 TABLET ORAL THREE TIMES A DAY
Refills: 0 | Status: DISCONTINUED | OUTPATIENT
Start: 2023-09-09 | End: 2023-09-09

## 2023-09-09 RX ORDER — ALPRAZOLAM 0.25 MG
0.25 TABLET ORAL ONCE
Refills: 0 | Status: DISCONTINUED | OUTPATIENT
Start: 2023-09-09 | End: 2023-09-09

## 2023-09-09 RX ORDER — CEFEPIME 1 G/1
1000 INJECTION, POWDER, FOR SOLUTION INTRAMUSCULAR; INTRAVENOUS EVERY 8 HOURS
Refills: 0 | Status: DISCONTINUED | OUTPATIENT
Start: 2023-09-09 | End: 2023-09-12

## 2023-09-09 RX ORDER — ALPRAZOLAM 0.25 MG
0.25 TABLET ORAL THREE TIMES A DAY
Refills: 0 | Status: DISCONTINUED | OUTPATIENT
Start: 2023-09-09 | End: 2023-09-12

## 2023-09-09 RX ADMIN — Medication 100 MILLIGRAM(S): at 05:42

## 2023-09-09 RX ADMIN — Medication 20 MILLIGRAM(S): at 14:20

## 2023-09-09 RX ADMIN — PANTOPRAZOLE SODIUM 40 MILLIGRAM(S): 20 TABLET, DELAYED RELEASE ORAL at 08:24

## 2023-09-09 RX ADMIN — Medication 3 MILLILITER(S): at 08:05

## 2023-09-09 RX ADMIN — APIXABAN 5 MILLIGRAM(S): 2.5 TABLET, FILM COATED ORAL at 05:41

## 2023-09-09 RX ADMIN — Medication 10 MILLIGRAM(S): at 14:20

## 2023-09-09 RX ADMIN — Medication 0.25 MILLIGRAM(S): at 14:22

## 2023-09-09 RX ADMIN — BUPROPION HYDROCHLORIDE 150 MILLIGRAM(S): 150 TABLET, EXTENDED RELEASE ORAL at 12:11

## 2023-09-09 RX ADMIN — TAMSULOSIN HYDROCHLORIDE 0.4 MILLIGRAM(S): 0.4 CAPSULE ORAL at 21:15

## 2023-09-09 RX ADMIN — CEFEPIME 100 MILLIGRAM(S): 1 INJECTION, POWDER, FOR SOLUTION INTRAMUSCULAR; INTRAVENOUS at 08:23

## 2023-09-09 RX ADMIN — CEFEPIME 100 MILLIGRAM(S): 1 INJECTION, POWDER, FOR SOLUTION INTRAMUSCULAR; INTRAVENOUS at 21:16

## 2023-09-09 RX ADMIN — APIXABAN 5 MILLIGRAM(S): 2.5 TABLET, FILM COATED ORAL at 17:34

## 2023-09-09 RX ADMIN — Medication 20 MILLIGRAM(S): at 05:41

## 2023-09-09 RX ADMIN — Medication 3 MILLILITER(S): at 20:25

## 2023-09-09 RX ADMIN — LOSARTAN POTASSIUM 100 MILLIGRAM(S): 100 TABLET, FILM COATED ORAL at 05:41

## 2023-09-09 RX ADMIN — DULOXETINE HYDROCHLORIDE 30 MILLIGRAM(S): 30 CAPSULE, DELAYED RELEASE ORAL at 12:12

## 2023-09-09 RX ADMIN — MORPHINE SULFATE 60 MILLIGRAM(S): 50 CAPSULE, EXTENDED RELEASE ORAL at 17:34

## 2023-09-09 RX ADMIN — Medication 0.25 MILLIGRAM(S): at 21:15

## 2023-09-09 RX ADMIN — MORPHINE SULFATE 60 MILLIGRAM(S): 50 CAPSULE, EXTENDED RELEASE ORAL at 05:41

## 2023-09-09 RX ADMIN — TIOTROPIUM BROMIDE 2 PUFF(S): 18 CAPSULE ORAL; RESPIRATORY (INHALATION) at 11:28

## 2023-09-09 RX ADMIN — Medication 0.25 MILLIGRAM(S): at 11:22

## 2023-09-09 RX ADMIN — Medication 3 MILLILITER(S): at 13:26

## 2023-09-09 RX ADMIN — Medication 650 MILLIGRAM(S): at 05:41

## 2023-09-09 RX ADMIN — SIMVASTATIN 20 MILLIGRAM(S): 20 TABLET, FILM COATED ORAL at 21:16

## 2023-09-09 RX ADMIN — CEFEPIME 100 MILLIGRAM(S): 1 INJECTION, POWDER, FOR SOLUTION INTRAMUSCULAR; INTRAVENOUS at 14:19

## 2023-09-09 RX ADMIN — Medication 5 MILLIGRAM(S): at 05:40

## 2023-09-09 NOTE — PROGRESS NOTE ADULT - ASSESSMENT
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 CT chest concerning for LLL PNA. Admit to Medicine for acute hypoxic resp failure 2/2 PNA.      Acute and on Chronic Hypoxic Respiratory Failure,Pneumonia, COPD Exacerbation  - continue O2 support  - on IV antibiotics, concern for aspiration- pain and anti-anxiety meds adjusted downward.  - IV steroids  - CT results noted, needs early interval f/u on pulm nodules  - pulm evaluation noted  - non compliance with AVAPs at home  - ID evaluation noted, recommendations noted    PE/DVT hx  - continue on Eliquis    HTN  - continue home medications    HLD  - continue statin    DM  - continue rx  - sliding scale    Chronic back pain  Opioid Dependece  - monitor for sedation due to medications    Depression  Anxiety Disorder  Benzo dependent  - continue home medications  - restarted benzo- lower dosing.      GI Prophylaxis : Pantoprazole  Diet: NCS, Low fat heart healthy    Patient remains acutely ill. Patient is a full code; will consider DC in 48hrs.

## 2023-09-09 NOTE — PROGRESS NOTE ADULT - ASSESSMENT
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 CT chest concerning for LLL PNA. Admit to Medicine for acute hypoxic resp failure 2/2 PNA.    # Acute hypoxic respiratory failure 2/2 Pneumonia  - productive cough and SOB x3 days, hypoxia  - sepsis POA (RR 26 T 102.3)  - lactate 1.4. VBG pH 7.45, pCO2 45, pO2 31, HCO3 31  - CTA no PE. Consolidative/atelectatic changes at both lung bases; not significantly changed in the right lower lobe, but increased in the left lower lobe. There is narrowing and/or secretions within the right and left lower lobe bronchi.  - received 2.5L IVF, Levaquin, Solu-Medrol, DuoNebs in ED, placed on 6L NC  - c/w Levaquin 750mg qd  - Aztreonam started ( Penicillin allergy )   - c/w DuoNebs q6 and PRN  Procal, Urine Legionella/Strep, Blood cultures  Check sputum culture including AFB  -Triple therapy: ICS/LABA/LAMA; Albuterol PRN started  -Encourage AVAP use qHS and as needed   -Keep spo2 more than 88%; on O2 at baseline  - SHort term 2-3 month follow up is recommended for the lung nodules     # COPD on home 3L O2, not in acute exacerbation   -home inhalers: Trelegy and Albuterol  -Triple therapy: ICS/LABA/LAMA; Albuterol PRN started  - AVAPs at night - NON COMPLAINT WITH AVAPS.     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 Several nodules bilaterally are unchanged in size however there are several nodules in the upper lobes that are new (4/128-166) or have increased in size.  - repeat CT chest in 2-3 months    # tremors, anxiety likely benzodiazepine withdrawal  - xanax 0.25 mg tds started     #DVT ppx: Eliquis 5mg BID  #GI ppx: Pantoprazole 40mg qd  #Diet: DASH/CC  #Activity: IAT  #Dispo: Acute

## 2023-09-10 LAB
ANION GAP SERPL CALC-SCNC: 15 MMOL/L — HIGH (ref 7–14)
BUN SERPL-MCNC: 15 MG/DL — SIGNIFICANT CHANGE UP (ref 10–20)
CALCIUM SERPL-MCNC: 8.8 MG/DL — SIGNIFICANT CHANGE UP (ref 8.4–10.5)
CHLORIDE SERPL-SCNC: 99 MMOL/L — SIGNIFICANT CHANGE UP (ref 98–110)
CO2 SERPL-SCNC: 25 MMOL/L — SIGNIFICANT CHANGE UP (ref 17–32)
CREAT SERPL-MCNC: 0.8 MG/DL — SIGNIFICANT CHANGE UP (ref 0.7–1.5)
EGFR: 97 ML/MIN/1.73M2 — SIGNIFICANT CHANGE UP
GLUCOSE BLDC GLUCOMTR-MCNC: 107 MG/DL — HIGH (ref 70–99)
GLUCOSE BLDC GLUCOMTR-MCNC: 112 MG/DL — HIGH (ref 70–99)
GLUCOSE BLDC GLUCOMTR-MCNC: 142 MG/DL — HIGH (ref 70–99)
GLUCOSE BLDC GLUCOMTR-MCNC: 99 MG/DL — SIGNIFICANT CHANGE UP (ref 70–99)
GLUCOSE SERPL-MCNC: 97 MG/DL — SIGNIFICANT CHANGE UP (ref 70–99)
HCT VFR BLD CALC: 39.7 % — LOW (ref 42–52)
HGB BLD-MCNC: 12.4 G/DL — LOW (ref 14–18)
MCHC RBC-ENTMCNC: 28.4 PG — SIGNIFICANT CHANGE UP (ref 27–31)
MCHC RBC-ENTMCNC: 31.2 G/DL — LOW (ref 32–37)
MCV RBC AUTO: 91.1 FL — SIGNIFICANT CHANGE UP (ref 80–94)
NRBC # BLD: 0 /100 WBCS — SIGNIFICANT CHANGE UP (ref 0–0)
PLATELET # BLD AUTO: 139 K/UL — SIGNIFICANT CHANGE UP (ref 130–400)
PMV BLD: 10.4 FL — SIGNIFICANT CHANGE UP (ref 7.4–10.4)
POTASSIUM SERPL-MCNC: 4 MMOL/L — SIGNIFICANT CHANGE UP (ref 3.5–5)
POTASSIUM SERPL-SCNC: 4 MMOL/L — SIGNIFICANT CHANGE UP (ref 3.5–5)
RBC # BLD: 4.36 M/UL — LOW (ref 4.7–6.1)
RBC # FLD: 15.5 % — HIGH (ref 11.5–14.5)
SODIUM SERPL-SCNC: 139 MMOL/L — SIGNIFICANT CHANGE UP (ref 135–146)
WBC # BLD: 3.09 K/UL — LOW (ref 4.8–10.8)
WBC # FLD AUTO: 3.09 K/UL — LOW (ref 4.8–10.8)

## 2023-09-10 RX ADMIN — CEFEPIME 100 MILLIGRAM(S): 1 INJECTION, POWDER, FOR SOLUTION INTRAMUSCULAR; INTRAVENOUS at 13:06

## 2023-09-10 RX ADMIN — TIOTROPIUM BROMIDE 2 PUFF(S): 18 CAPSULE ORAL; RESPIRATORY (INHALATION) at 07:24

## 2023-09-10 RX ADMIN — Medication 0.25 MILLIGRAM(S): at 05:34

## 2023-09-10 RX ADMIN — Medication 10 MILLIGRAM(S): at 11:07

## 2023-09-10 RX ADMIN — SIMVASTATIN 20 MILLIGRAM(S): 20 TABLET, FILM COATED ORAL at 21:41

## 2023-09-10 RX ADMIN — TAMSULOSIN HYDROCHLORIDE 0.4 MILLIGRAM(S): 0.4 CAPSULE ORAL at 21:41

## 2023-09-10 RX ADMIN — DULOXETINE HYDROCHLORIDE 30 MILLIGRAM(S): 30 CAPSULE, DELAYED RELEASE ORAL at 11:08

## 2023-09-10 RX ADMIN — PANTOPRAZOLE SODIUM 40 MILLIGRAM(S): 20 TABLET, DELAYED RELEASE ORAL at 05:31

## 2023-09-10 RX ADMIN — APIXABAN 5 MILLIGRAM(S): 2.5 TABLET, FILM COATED ORAL at 05:31

## 2023-09-10 RX ADMIN — Medication 0.25 MILLIGRAM(S): at 13:07

## 2023-09-10 RX ADMIN — CEFEPIME 100 MILLIGRAM(S): 1 INJECTION, POWDER, FOR SOLUTION INTRAMUSCULAR; INTRAVENOUS at 21:41

## 2023-09-10 RX ADMIN — MORPHINE SULFATE 60 MILLIGRAM(S): 50 CAPSULE, EXTENDED RELEASE ORAL at 05:31

## 2023-09-10 RX ADMIN — Medication 3 MILLILITER(S): at 13:24

## 2023-09-10 RX ADMIN — Medication 3 MILLILITER(S): at 07:23

## 2023-09-10 RX ADMIN — MORPHINE SULFATE 60 MILLIGRAM(S): 50 CAPSULE, EXTENDED RELEASE ORAL at 06:34

## 2023-09-10 RX ADMIN — APIXABAN 5 MILLIGRAM(S): 2.5 TABLET, FILM COATED ORAL at 17:28

## 2023-09-10 RX ADMIN — Medication 0.25 MILLIGRAM(S): at 21:41

## 2023-09-10 RX ADMIN — BUDESONIDE AND FORMOTEROL FUMARATE DIHYDRATE 2 PUFF(S): 160; 4.5 AEROSOL RESPIRATORY (INHALATION) at 07:23

## 2023-09-10 RX ADMIN — CEFEPIME 100 MILLIGRAM(S): 1 INJECTION, POWDER, FOR SOLUTION INTRAMUSCULAR; INTRAVENOUS at 05:33

## 2023-09-10 RX ADMIN — LOSARTAN POTASSIUM 100 MILLIGRAM(S): 100 TABLET, FILM COATED ORAL at 05:31

## 2023-09-10 RX ADMIN — Medication 20 MILLIGRAM(S): at 13:07

## 2023-09-10 RX ADMIN — Medication 3 MILLILITER(S): at 19:54

## 2023-09-10 RX ADMIN — BUPROPION HYDROCHLORIDE 150 MILLIGRAM(S): 150 TABLET, EXTENDED RELEASE ORAL at 11:08

## 2023-09-10 RX ADMIN — Medication 20 MILLIGRAM(S): at 05:31

## 2023-09-10 RX ADMIN — MORPHINE SULFATE 60 MILLIGRAM(S): 50 CAPSULE, EXTENDED RELEASE ORAL at 17:28

## 2023-09-10 NOTE — PROGRESS NOTE ADULT - ASSESSMENT
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 CT chest concerning for LLL PNA. Admit to Medicine for acute hypoxic resp failure 2/2 PNA.      Acute and on Chronic Hypoxic Respiratory Failure,Pneumonia, COPD Exacerbation  - continue O2 support  - on IV antibiotics, concern for aspiration- pain and anti-anxiety meds adjusted downward.  - IV steroids  - CT results noted, needs early interval f/u on pulm nodules  - pulm evaluation noted  - non compliance with AVAPs at home  - ID evaluation noted, recommendations noted    PE/DVT hx  - continue on Eliquis    HTN  - continue home medications    HLD  - continue statin    DM  - continue rx  - sliding scale    Chronic back pain  Opioid Dependece  - monitor for sedation due to medications    Depression  Anxiety Disorder  Benzo dependent  - continue home medications  - restarted benzo- lower dosing. Stable      GI Prophylaxis : Pantoprazole  Diet: NCS, Low fat heart healthy    Patient remains acutely ill. Patient is a full code; aim for DC in 24 hrs.

## 2023-09-11 ENCOUNTER — TRANSCRIPTION ENCOUNTER (OUTPATIENT)
Age: 68
End: 2023-09-11

## 2023-09-11 LAB
ANION GAP SERPL CALC-SCNC: 10 MMOL/L — SIGNIFICANT CHANGE UP (ref 7–14)
BUN SERPL-MCNC: 17 MG/DL — SIGNIFICANT CHANGE UP (ref 10–20)
CALCIUM SERPL-MCNC: 9.1 MG/DL — SIGNIFICANT CHANGE UP (ref 8.4–10.4)
CHLORIDE SERPL-SCNC: 100 MMOL/L — SIGNIFICANT CHANGE UP (ref 98–110)
CO2 SERPL-SCNC: 30 MMOL/L — SIGNIFICANT CHANGE UP (ref 17–32)
CREAT SERPL-MCNC: 0.7 MG/DL — SIGNIFICANT CHANGE UP (ref 0.7–1.5)
CULTURE RESULTS: SIGNIFICANT CHANGE UP
CULTURE RESULTS: SIGNIFICANT CHANGE UP
EGFR: 101 ML/MIN/1.73M2 — SIGNIFICANT CHANGE UP
GLUCOSE BLDC GLUCOMTR-MCNC: 119 MG/DL — HIGH (ref 70–99)
GLUCOSE BLDC GLUCOMTR-MCNC: 127 MG/DL — HIGH (ref 70–99)
GLUCOSE BLDC GLUCOMTR-MCNC: 138 MG/DL — HIGH (ref 70–99)
GLUCOSE BLDC GLUCOMTR-MCNC: 99 MG/DL — SIGNIFICANT CHANGE UP (ref 70–99)
GLUCOSE SERPL-MCNC: 92 MG/DL — SIGNIFICANT CHANGE UP (ref 70–99)
HCT VFR BLD CALC: 40 % — LOW (ref 42–52)
HGB BLD-MCNC: 12.3 G/DL — LOW (ref 14–18)
MCHC RBC-ENTMCNC: 28.3 PG — SIGNIFICANT CHANGE UP (ref 27–31)
MCHC RBC-ENTMCNC: 30.8 G/DL — LOW (ref 32–37)
MCV RBC AUTO: 92 FL — SIGNIFICANT CHANGE UP (ref 80–94)
NRBC # BLD: 0 /100 WBCS — SIGNIFICANT CHANGE UP (ref 0–0)
PLATELET # BLD AUTO: 141 K/UL — SIGNIFICANT CHANGE UP (ref 130–400)
PMV BLD: 10.1 FL — SIGNIFICANT CHANGE UP (ref 7.4–10.4)
POTASSIUM SERPL-MCNC: 4.4 MMOL/L — SIGNIFICANT CHANGE UP (ref 3.5–5)
POTASSIUM SERPL-SCNC: 4.4 MMOL/L — SIGNIFICANT CHANGE UP (ref 3.5–5)
RBC # BLD: 4.35 M/UL — LOW (ref 4.7–6.1)
RBC # FLD: 15.4 % — HIGH (ref 11.5–14.5)
SODIUM SERPL-SCNC: 140 MMOL/L — SIGNIFICANT CHANGE UP (ref 135–146)
SPECIMEN SOURCE: SIGNIFICANT CHANGE UP
SPECIMEN SOURCE: SIGNIFICANT CHANGE UP
WBC # BLD: 3.45 K/UL — LOW (ref 4.8–10.8)
WBC # FLD AUTO: 3.45 K/UL — LOW (ref 4.8–10.8)

## 2023-09-11 RX ORDER — ACETAMINOPHEN 500 MG
2 TABLET ORAL
Qty: 60 | Refills: 0
Start: 2023-09-11 | End: 2023-09-17

## 2023-09-11 RX ORDER — METFORMIN HYDROCHLORIDE 850 MG/1
1 TABLET ORAL
Qty: 60 | Refills: 0
Start: 2023-09-11 | End: 2023-10-10

## 2023-09-11 RX ORDER — CEFPODOXIME PROXETIL 100 MG
1 TABLET ORAL
Qty: 10 | Refills: 0
Start: 2023-09-11 | End: 2023-09-15

## 2023-09-11 RX ORDER — FENOFIBRATE,MICRONIZED 130 MG
1 CAPSULE ORAL
Qty: 0 | Refills: 0 | DISCHARGE

## 2023-09-11 RX ORDER — ALBUTEROL 90 UG/1
0 AEROSOL, METERED ORAL
Qty: 0 | Refills: 0 | DISCHARGE

## 2023-09-11 RX ORDER — SENNA PLUS 8.6 MG/1
2 TABLET ORAL
Qty: 60 | Refills: 0
Start: 2023-09-11 | End: 2023-10-10

## 2023-09-11 RX ORDER — ALBUTEROL 90 UG/1
2 AEROSOL, METERED ORAL
Qty: 2 | Refills: 2
Start: 2023-09-11 | End: 2023-12-09

## 2023-09-11 RX ORDER — FENOFIBRATE,MICRONIZED 130 MG
1 CAPSULE ORAL
Qty: 30 | Refills: 0
Start: 2023-09-11 | End: 2023-10-10

## 2023-09-11 RX ORDER — METFORMIN HYDROCHLORIDE 850 MG/1
1 TABLET ORAL
Qty: 0 | Refills: 0 | DISCHARGE

## 2023-09-11 RX ORDER — FLUTICASONE FUROATE, UMECLIDINIUM BROMIDE AND VILANTEROL TRIFENATATE 200; 62.5; 25 UG/1; UG/1; UG/1
1 POWDER RESPIRATORY (INHALATION)
Qty: 1 | Refills: 2
Start: 2023-09-11 | End: 2023-12-09

## 2023-09-11 RX ORDER — TIOTROPIUM BROMIDE 18 UG/1
2 CAPSULE ORAL; RESPIRATORY (INHALATION)
Qty: 2 | Refills: 0
Start: 2023-09-11 | End: 2023-10-10

## 2023-09-11 RX ORDER — NALOXONE HYDROCHLORIDE 4 MG/.1ML
8 SPRAY NASAL
Qty: 10 | Refills: 0
Start: 2023-09-11 | End: 2023-09-15

## 2023-09-11 RX ORDER — TIOTROPIUM BROMIDE 18 UG/1
2 CAPSULE ORAL; RESPIRATORY (INHALATION)
Qty: 1 | Refills: 1
Start: 2023-09-11 | End: 2023-11-09

## 2023-09-11 RX ADMIN — DULOXETINE HYDROCHLORIDE 30 MILLIGRAM(S): 30 CAPSULE, DELAYED RELEASE ORAL at 11:18

## 2023-09-11 RX ADMIN — CEFEPIME 100 MILLIGRAM(S): 1 INJECTION, POWDER, FOR SOLUTION INTRAMUSCULAR; INTRAVENOUS at 05:12

## 2023-09-11 RX ADMIN — PANTOPRAZOLE SODIUM 40 MILLIGRAM(S): 20 TABLET, DELAYED RELEASE ORAL at 05:13

## 2023-09-11 RX ADMIN — BUPROPION HYDROCHLORIDE 150 MILLIGRAM(S): 150 TABLET, EXTENDED RELEASE ORAL at 11:18

## 2023-09-11 RX ADMIN — Medication 3 MILLILITER(S): at 13:20

## 2023-09-11 RX ADMIN — Medication 3 MILLILITER(S): at 07:19

## 2023-09-11 RX ADMIN — Medication 20 MILLIGRAM(S): at 05:10

## 2023-09-11 RX ADMIN — Medication 0.25 MILLIGRAM(S): at 21:37

## 2023-09-11 RX ADMIN — Medication 0.25 MILLIGRAM(S): at 13:56

## 2023-09-11 RX ADMIN — MORPHINE SULFATE 60 MILLIGRAM(S): 50 CAPSULE, EXTENDED RELEASE ORAL at 05:11

## 2023-09-11 RX ADMIN — Medication 0.25 MILLIGRAM(S): at 05:11

## 2023-09-11 RX ADMIN — Medication 3 MILLILITER(S): at 19:53

## 2023-09-11 RX ADMIN — APIXABAN 5 MILLIGRAM(S): 2.5 TABLET, FILM COATED ORAL at 05:11

## 2023-09-11 RX ADMIN — Medication 10 MILLIGRAM(S): at 11:18

## 2023-09-11 RX ADMIN — LOSARTAN POTASSIUM 100 MILLIGRAM(S): 100 TABLET, FILM COATED ORAL at 05:10

## 2023-09-11 RX ADMIN — Medication 20 MILLIGRAM(S): at 13:57

## 2023-09-11 RX ADMIN — CEFEPIME 100 MILLIGRAM(S): 1 INJECTION, POWDER, FOR SOLUTION INTRAMUSCULAR; INTRAVENOUS at 21:37

## 2023-09-11 RX ADMIN — TAMSULOSIN HYDROCHLORIDE 0.4 MILLIGRAM(S): 0.4 CAPSULE ORAL at 21:31

## 2023-09-11 RX ADMIN — BUDESONIDE AND FORMOTEROL FUMARATE DIHYDRATE 2 PUFF(S): 160; 4.5 AEROSOL RESPIRATORY (INHALATION) at 18:10

## 2023-09-11 RX ADMIN — APIXABAN 5 MILLIGRAM(S): 2.5 TABLET, FILM COATED ORAL at 17:23

## 2023-09-11 RX ADMIN — CEFEPIME 100 MILLIGRAM(S): 1 INJECTION, POWDER, FOR SOLUTION INTRAMUSCULAR; INTRAVENOUS at 13:57

## 2023-09-11 RX ADMIN — MORPHINE SULFATE 60 MILLIGRAM(S): 50 CAPSULE, EXTENDED RELEASE ORAL at 17:22

## 2023-09-11 RX ADMIN — TIOTROPIUM BROMIDE 2 PUFF(S): 18 CAPSULE ORAL; RESPIRATORY (INHALATION) at 07:19

## 2023-09-11 RX ADMIN — SIMVASTATIN 20 MILLIGRAM(S): 20 TABLET, FILM COATED ORAL at 21:31

## 2023-09-11 NOTE — PROGRESS NOTE ADULT - ASSESSMENT
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 CT chest concerning for LLL PNA. Admit to Medicine for acute hypoxic resp failure 2/2 PNA.      Acute and on Chronic Hypoxic Respiratory Failure,Pneumonia, COPD Exacerbation  - continue O2 support  - on IV antibiotics, concern for aspiration- pain and anti-anxiety meds adjusted downward.  - IV steroids  - CT results noted, needs early interval f/u on pulm nodules  - pulm evaluation noted  - non compliance with AVAPs at home  - ID evaluation noted, recommendations noted; switched to Cefepime    PE/DVT hx  - continue on Eliquis    HTN  - continue home medications    HLD  - continue statin    DM  - continue rx  - sliding scale    Chronic back pain  Opioid Dependece  - monitor for sedation due to medications    Depression  Anxiety Disorder  Benzo dependent  - continue home medications  - restarted benzo- lower dosing. Stable      GI Prophylaxis : Pantoprazole  Diet: NCS, Low fat heart healthy    Patient remains acutely ill. Patient is a full code; aim for DC in 24 hrs.  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 CT chest concerning for LLL PNA. Admit to Medicine for acute hypoxic resp failure 2/2 PNA.      Acute and on Chronic Hypoxic Respiratory Failure, Pneumonia, COPD Exacerbation  - continue O2 support  - on IV antibiotics, concern for aspiration- pain and anti-anxiety meds adjusted downward.  - IV steroids received x1  - CT results noted, needs early interval f/u on pulm nodules  - pulm evaluation noted- will advise f/u after DC  - non compliance with AVAPs at home  - ID evaluation noted, recommendations noted; switched to Cefepime        will DC home w Vantin to complete 10 days of Rx.        Continue bronchodilators and MDI steroids.    PE/DVT hx  - continue on Eliquis    HTN  - continue home medications    HLD  - continue statin    DM  - continue rx  - sliding scale    Chronic back pain  Opioid Dependence  - monitor for sedation due to medications  - continue pre-admission dosing    Depression  Anxiety Disorder  Benzo dependent  - continue home medications  - restarted benzo- lower dosing. Stable      GI Prophylaxis : Pantoprazole  Diet: NCS, Low fat heart healthy    Patient remains acutely ill. Patient is a full code; stable for DC today

## 2023-09-11 NOTE — DISCHARGE NOTE PROVIDER - CARE PROVIDER_API CALL
Shannan Adame  Internal Medicine  [address] [place] [address]  Phone: [phone]  Fax: [phone]  Follow Up Time: 2 weeks

## 2023-09-11 NOTE — DISCHARGE NOTE PROVIDER - NSDCCPCAREPLAN_GEN_ALL_CORE_FT
PRINCIPAL DISCHARGE DIAGNOSIS  Diagnosis: Pneumonia  Assessment and Plan of Treatment: You were admitted to the hospital for shortness of breath and cough for acute exacerbation of COPD with likely aspiration pneumonia .   # Aspiration pneumonia:  You were treated with antibiotcs LEvaquin and Atreonam which was changed to cefepime IV. Your vitals were monitored and spo2 was maintained at 88-92% WITH 4L of oxygen.   You are being discharged with Vantin antibiotics for 5 days.   You had the antibiotics due to aspiration most probably due to oversedation with your narcotic medications.   we have adjusted the medications so that this does not happen again.   # Acute exacerbation of COPD:   You were treated with 3 different types off medication including steroid and bronchodilators.   Your SOB resloved and are stable to get discharged.  # Lung Nodules:   CT result was:  No evidence of pulmonary embolism.  There are consolidative/atelectatic changes at both lung bases; not significantly changed in the right lower lobe, but increased in the left lower lobe. There is narrowing and/or secretions within the right and left lower lobe bronchi.  There has been partial resolution of the previously noted 2.6 cm (4/104) and 1 cm (4/63) nodular lesions in the right upper lobe. Several nodules bilaterally are unchanged in size however there are several nodules in the upper lobes that are new (4/128-166) or have increased in size.  There is chronic atelectasis and bronchiectasis within the right middle lobe.  Short-term follow-up chest CT in 2-3 months is recommended.  PLEASE FOLLOW UP WITH THE PULMONOLOGY FOR THE LUNG NODULES. YOU WILL NEED THE CT SCAN .  #Diabetes:  You were managed with sliding scale Insulin. Please continue your diabetes medication as prescribed.   #Hypertension:  Please continue your home meds for Hypertesnion   Please continue your bella meds and follow up with your PCP.         SECONDARY DISCHARGE DIAGNOSES  Diagnosis: Acute respiratory failure with hypoxia  Assessment and Plan of Treatment:

## 2023-09-11 NOTE — DISCHARGE NOTE PROVIDER - HOSPITAL COURSE
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 Pulse ox this morning was 82% as per home health aid. Pt also reports weakness and productive cough w/ green phlegm for the past 3 days. Health aid reports recent URI symptoms. Denies chest pain, palpitations, fever/chills, sick contacts.     T(F): 102.3 HR: 88 BP: 140/74 RR: 26 SpO2: 92% on 6L NC  Hgb 11.9, trop negative, lactate 1.4. VBG pH 7.45, pCO2 45, pO2 31, HCO3 31  CXR: Bibasilar opacities/pleural effusion, unchanged.  CTA no PE. Consolidative/atelectatic changes at both lung bases; not significantly changed in the right lower lobe, but increased in the left lower lobe. There is narrowing and/or secretions within the right and left lower lobe bronchi.  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 CT chest concerning for LLL PNA. Admit to Medicine for acute hypoxic resp failure 2/2 PNA.    # Acute hypoxic respiratory failure 2/2 Pneumonia  - productive cough and SOB x3 days, hypoxia  - sepsis POA (RR 26 T 102.3)  - lactate 1.4. VBG pH 7.45, pCO2 45, pO2 31, HCO3 31  - CTA no PE. Consolidative/atelectatic changes at both lung bases; not significantly changed in the right lower lobe, but increased in the left lower lobe. There is narrowing and/or secretions within the right and left lower lobe bronchi.  - received 2.5L IVF, Levaquin, Solu-Medrol, DuoNebs in ED, placed on 6L NC  - c/w Levaquin 750mg qd  - Aztreonam started ( Penicillin allergy )   - we had the concern for Aspiration Pneumonia due to oversedation caused by benzodiazepine and pain meds and adjustment was tried   - Antibiotics changed to CEFEPIME as per ID  - c/w DuoNebs q6 and PRN  - To be discharged on Vantin 200 mg BID for 5 days       # COPD on home 3L O2, not in acute exacerbation   -home inhalers: Trelegy and Albuterol  -Triple therapy: ICS/LABA/LAMA; Albuterol PRN started  - AVAPs at night - NON COMPLAINT WITH AVAPS.  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  - managed with Eliquis 5mg BID     # HTN  - managed with Losartan 100mg qd  - Bp on the stable range     # HLD  - managed with Simvastatin 20mg qd    # DM Type II  - home med: Metformin 50mg BID     # Depression  # Anxiety  - managed with Xanax on tapering dose  - Concern of oversedation leading to aspiration pneumonia was sought through     # Chronic back pain  - s/p multiple back surgeries  - managed with Morphine 60mg ER BID  - managed with Baclofen 5mg q8  - complaining of back pain       # Lung nodules   - CTAP: There has been partial resolution of the previously noted 2.6 cm (4/104) and 1 cm (4/63) nodular lesions in the right upper lobe. Several nodules bilaterally are unchanged in size however there are several nodules in the upper lobes that are new (4/128-166) or have increased in size.  - To be repeated CT chest in 2-3 months    # tremors, anxiety likely benzodiazepine withdrawal  - Discharge with 0.5 mg TDS    #DVT ppx: Eliquis 5mg BID  #GI ppx: Pantoprazole 40mg qd  #Diet: DASH/CC

## 2023-09-11 NOTE — DISCHARGE NOTE PROVIDER - NSDCFUSCHEDAPPT_GEN_ALL_CORE_FT
[facility]  ONCPAINMGT [address]  Scheduled Appointment: 09/15/2023    Jesús Watson  [facility]  Scheduled Appointment: 11/16/2023    [facility]  UROLOGY [facility] Dimitrios Dey  Scheduled Appointment: 11/16/2023

## 2023-09-12 ENCOUNTER — TRANSCRIPTION ENCOUNTER (OUTPATIENT)
Age: 68
End: 2023-09-12

## 2023-09-12 VITALS
SYSTOLIC BLOOD PRESSURE: 146 MMHG | TEMPERATURE: 97 F | WEIGHT: 182.32 LBS | HEART RATE: 60 BPM | RESPIRATION RATE: 18 BRPM | DIASTOLIC BLOOD PRESSURE: 79 MMHG

## 2023-09-12 LAB
GLUCOSE BLDC GLUCOMTR-MCNC: 91 MG/DL — SIGNIFICANT CHANGE UP (ref 70–99)
GLUCOSE BLDC GLUCOMTR-MCNC: 92 MG/DL — SIGNIFICANT CHANGE UP (ref 70–99)

## 2023-09-12 RX ADMIN — PANTOPRAZOLE SODIUM 40 MILLIGRAM(S): 20 TABLET, DELAYED RELEASE ORAL at 06:48

## 2023-09-12 RX ADMIN — CEFEPIME 100 MILLIGRAM(S): 1 INJECTION, POWDER, FOR SOLUTION INTRAMUSCULAR; INTRAVENOUS at 05:31

## 2023-09-12 RX ADMIN — Medication 10 MILLIGRAM(S): at 11:50

## 2023-09-12 RX ADMIN — MORPHINE SULFATE 60 MILLIGRAM(S): 50 CAPSULE, EXTENDED RELEASE ORAL at 06:10

## 2023-09-12 RX ADMIN — BUPROPION HYDROCHLORIDE 150 MILLIGRAM(S): 150 TABLET, EXTENDED RELEASE ORAL at 11:50

## 2023-09-12 RX ADMIN — DULOXETINE HYDROCHLORIDE 30 MILLIGRAM(S): 30 CAPSULE, DELAYED RELEASE ORAL at 11:50

## 2023-09-12 RX ADMIN — Medication 20 MILLIGRAM(S): at 05:33

## 2023-09-12 RX ADMIN — APIXABAN 5 MILLIGRAM(S): 2.5 TABLET, FILM COATED ORAL at 05:33

## 2023-09-12 RX ADMIN — LOSARTAN POTASSIUM 100 MILLIGRAM(S): 100 TABLET, FILM COATED ORAL at 05:33

## 2023-09-12 RX ADMIN — Medication 0.25 MILLIGRAM(S): at 05:33

## 2023-09-12 RX ADMIN — MORPHINE SULFATE 60 MILLIGRAM(S): 50 CAPSULE, EXTENDED RELEASE ORAL at 05:32

## 2023-09-12 NOTE — PROGRESS NOTE ADULT - ASSESSMENT
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 CT chest concerning for LLL PNA. Admit to Medicine for acute hypoxic resp failure 2/2 PNA.      Acute and on Chronic Hypoxic Respiratory Failure,Pneumonia, COPD Exacerbation  - continue O2 support  - completing IV antibiotics and steroids  - CT results noted, needs early interval f/u on pulm nodules  - pulm evaluation noted  - non compliance with AVAPs at home    PE/DVT hx  - continue on Eliquis    HTN  - continue home medications    HLD  - continue statin    DM  - continue rx  - sliding scale    Chronic back pain  Opioid Dependece  - monitor for sedation due to medications    Depression  Anxiety Disorder  Benzo dependent  - benzo dose reduced      GI Prophylaxis : Pantoprazole  Diet: NCS, Low fat heart healthy    D/C today, out patient f/u. Transportation arrangements in place

## 2023-09-12 NOTE — PROGRESS NOTE ADULT - SUBJECTIVE AND OBJECTIVE BOX
24H events:    Patient is a 68y old Male who presents with a chief complaint of   Primary diagnosis of Pneumonia        Today is hospital day 2d. This morning patient was seen and examined at bedside, resting comfortably in bed.    No acute or major events overnight.    Code Status:    Family communication:  Contact date:  Name of person contacted:  Relationship to patient:  Communication details:  What matters most:    PAST MEDICAL & SURGICAL HISTORY  Anxiety    HTN (hypertension)    Diabetes    High cholesterol    Asthma    Chronic low back pain with sciatica, sciatica laterality unspecified, unspecified back pain laterality    Uncomplicated opioid dependence    Syncopal episodes    History of back surgery  x 3      SOCIAL HISTORY:  Social History:  lives at home with home health aid  ambulates independently  on chronic home O2 3L (06 Sep 2023 18:28)      ALLERGIES:  Originally Entered as [ANGIOEDEMA] reaction to [pineapple] (Unknown)  aspirin (Stomach Upset)  penicillins (Anaphylaxis)  pineapples (Anaphylaxis)    MEDICATIONS:  STANDING MEDICATIONS  albuterol/ipratropium for Nebulization 3 milliLiter(s) Nebulizer every 6 hours  albuterol/ipratropium for Nebulization.. 3 milliLiter(s) Nebulizer every 20 minutes  apixaban 5 milliGRAM(s) Oral every 12 hours  aztreonam  IVPB 2000 milliGRAM(s) IV Intermittent every 8 hours  budesonide  80 MICROgram(s)/formoterol 4.5 MICROgram(s) Inhaler 2 Puff(s) Inhalation two times a day  buPROPion XL (24-Hour) . 150 milliGRAM(s) Oral daily  dextrose 5%. 1000 milliLiter(s) IV Continuous <Continuous>  dextrose 5%. 1000 milliLiter(s) IV Continuous <Continuous>  dextrose 50% Injectable 25 Gram(s) IV Push once  dextrose 50% Injectable 12.5 Gram(s) IV Push once  dextrose 50% Injectable 25 Gram(s) IV Push once  DULoxetine 30 milliGRAM(s) Oral daily  furosemide    Tablet 20 milliGRAM(s) Oral two times a day  glucagon  Injectable 1 milliGRAM(s) IntraMuscular once  insulin lispro (ADMELOG) corrective regimen sliding scale   SubCutaneous three times a day before meals  levoFLOXacin IVPB 750 milliGRAM(s) IV Intermittent every 24 hours  losartan 100 milliGRAM(s) Oral daily  morphine ER Tablet 60 milliGRAM(s) Oral two times a day  oxybutynin 10 milliGRAM(s) Oral daily  pantoprazole    Tablet 40 milliGRAM(s) Oral before breakfast  simvastatin 20 milliGRAM(s) Oral at bedtime  tamsulosin 0.4 milliGRAM(s) Oral at bedtime  tiotropium 2.5 MICROgram(s) Inhaler 2 Puff(s) Inhalation daily    PRN MEDICATIONS  acetaminophen     Tablet .. 650 milliGRAM(s) Oral every 6 hours PRN  baclofen 5 milliGRAM(s) Oral every 8 hours PRN  dextrose Oral Gel 15 Gram(s) Oral once PRN    VITALS:   T(F): 97.2  HR: 76  BP: 140/77  RR: 18  SpO2: 98%    PHYSICAL EXAM:  GENERAL:   ( x) NAD, lying in bed comfortably     (  ) obtunded     (  ) lethargic     (  ) somnolent    HEAD:   ( x ) Atraumatic     (  ) hematoma     (  ) laceration (specify location:       )     NECK:  ( x ) Supple     (  ) neck stiffness     (  ) nuchal rigidity     (  )  no JVD     (  ) JVD present ( -- cm)    HEART:  Rate -->     (  x) normal rate     (  ) bradycardic     (  ) tachycardic  Rhythm -->     ( x ) regular     (  ) regularly irregular     (  ) irregularly irregular  Murmurs -->     ( x ) normal s1s2     (  ) systolic murmur     (  ) diastolic murmur     (  ) continuous murmur      (  ) S3 present     (  ) S4 present    LUNGS:   Patient under 4L NC   (  )Unlabored respirations     (  ) tachypnea  (  ) B/L air entry     (x  ) decreased breath sounds in:  (location   Bilateral  )    (  ) no adventitious sound     (x  ) crackles     ( x ) wheezing      (  ) rhonchi      (specify location:       )  (  ) chest wall tenderness (specify location:       )    ABDOMEN:   ( x ) Soft     (  ) tense   |   ( x ) nondistended     (  ) distended   |   ( x) +BS     (  ) hypoactive bowel sounds     (  ) hyperactive bowel sounds  (  ) nontender     (  ) RUQ tenderness     (  ) RLQ tenderness     (  ) LLQ tenderness     (  ) epigastric tenderness     (  ) diffuse tenderness  (  ) Splenomegaly      (  ) Hepatomegaly      (  ) Jaundice     (  ) ecchymosis     EXTREMITIES:  ( x ) Normal     (  ) Rash     (  ) ecchymosis     (  ) varicose veins      (  ) pitting edema     (  ) non-pitting edema   (  ) ulceration     (  ) gangrene:     (location:     )    NERVOUS SYSTEM:    ( x ) A&Ox3     (  ) confused     (  ) lethargic  CN II-XII:     (  ) Intact     (  ) deficits found     (Specify:     )   Upper extremities:     (  ) no sensorimotor deficits     (  ) weakness     (  ) loss of proprioception/vibration     (  ) loss of touch/temperature (specify:    )  Lower extremities:     (  ) no sensorimotor deficits     (  ) weakness     (  ) loss of proprioception/vibration     (  ) loss of touch/temperature (specify:    )          LABS:                        11.1   5.40  )-----------( 172      ( 08 Sep 2023 07:42 )             34.8     09-08    137  |  99  |  18  ----------------------------<  97  3.6   |  25  |  0.7    Ca    8.3<L>      08 Sep 2023 07:42  Mg     1.6     09-08        Urinalysis Basic - ( 08 Sep 2023 07:42 )    Color: x / Appearance: x / SG: x / pH: x  Gluc: 97 mg/dL / Ketone: x  / Bili: x / Urobili: x   Blood: x / Protein: x / Nitrite: x   Leuk Esterase: x / RBC: x / WBC x   Sq Epi: x / Non Sq Epi: x / Bacteria: x      Culture - Urine (collected 06 Sep 2023 15:01)  Source: Clean Catch Clean Catch (Midstream)  Final Report (07 Sep 2023 22:30):    <10,000 CFU/mL Normal Urogenital Lori    Culture - Blood (collected 06 Sep 2023 12:20)  Source: .Blood Blood-Peripheral  Preliminary Report (07 Sep 2023 23:02):    No growth at 24 hours    Culture - Blood (collected 06 Sep 2023 12:20)  Source: .Blood Blood-Peripheral  Preliminary Report (07 Sep 2023 23:02):    No growth at 24 hours          RADIOLOGY:  < from: CT Angio Chest PE Protocol w/ IV Cont (09.06.23 @ 14:54) >  IMPRESSION:    No evidence of pulmonary embolism.    There are consolidative/atelectatic changes at both lung bases; not   significantly changed in the right lower lobe, but increased in the left   lower lobe. There is narrowing and/or secretions within the right and   left lower lobe bronchi.    There has been partial resolution of the previously noted 2.6 cm (4/104)   and 1 cm (4/63) nodular lesions in the right upper lobe. Several nodules   bilaterally are unchanged in size however there are several nodules in   the upper lobes that are new (4/128-166) or have increased in size.    Thereis chronic atelectasis and bronchiectasis within the right middle   lobe.    Short-term follow-up chest CT in 2-3 months is recommended.    < end of copied text >  < from: Xray Chest 1 View-PORTABLE IMMEDIATE (09.06.23 @ 13:33) >  Cardiac/mediastinum/hilum: Unremarkable.    Lung parenchyma/Pleura: Right basilar opacity/pleural effusion, unchanged.    Skeleton/soft tissues: Stable.    Impression:    Bibasilar opacities/pleural effusion, unchanged.    < end of copied text >  < from: CT Angio Neck w/ IV Cont (08.14.23 @ 14:41) >  IMPRESSION:    1.  No significant vascular stenosis or large vessel occlusion.    2.  Multiple findings in both lungs with the largest lesion in the right   lung measuring at least 2.8 cm in maximum diameter, further evaluation   with a CT scan of the chest is recommended to exclude neoplasm.    < end of copied text >            
Chart reviewed, patient examined. Pertinent results reviewed.  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 Pulse ox the morning of admission was 82% as per home health aid (his wife). Pt also reported weakness and productive cough w/ green phlegm for the prior 3 days. Health aid reports recent URI symptoms. Denies chest pain, palpitations, fever/chills, sick contacts. Evaluation here included a CT chest w contrast- and showed now opacities. Patient takes MOD dose chronic ooioids and BZD for anxiety. He had a rapid response during his last admission- ? 2/2 over-medication.    INTERVAL EVENTS: Patient seen today without distress, OOB in chair looks fine. He says shaking is better (alprazolam 0.25mg q8h)' and says his breathing is better.    MEDICATIONS  (STANDING):  albuterol/ipratropium for Nebulization 3 milliLiter(s) Nebulizer every 6 hours  albuterol/ipratropium for Nebulization.. 3 milliLiter(s) Nebulizer every 20 minutes  ALPRAZolam 0.25 milliGRAM(s) Oral three times a day---    apixaban 5 milliGRAM(s) Oral every 12 hours  budesonide  80 MICROgram(s)/formoterol 4.5 MICROgram(s) Inhaler 2 Puff(s) Inhalation two times a day  buPROPion XL (24-Hour) . 150 milliGRAM(s) Oral daily  cefepime   IVPB 1000 milliGRAM(s) IV Intermittent every 8 hours  cefepime   IVPB      DULoxetine 30 milliGRAM(s) Oral daily  furosemide    Tablet 20 milliGRAM(s) Oral two times a day  insulin lispro (ADMELOG) corrective regimen sliding scale   SubCutaneous three times a day before meals  losartan 100 milliGRAM(s) Oral daily  morphine ER Tablet 60 milliGRAM(s) Oral two times a day---same as home dose; 30 mg q12 during last admit  oxybutynin 10 milliGRAM(s) Oral daily  pantoprazole    Tablet 40 milliGRAM(s) Oral before breakfast  simvastatin 20 milliGRAM(s) Oral at bedtime  tamsulosin 0.4 milliGRAM(s) Oral at bedtime  tiotropium 2.5 MICROgram(s) Inhaler 2 Puff(s) Inhalation daily    MEDICATIONS  (PRN):  acetaminophen     Tablet .. 650 milliGRAM(s) Oral every 6 hours PRN Moderate Pain (4 - 6)  baclofen 5 milliGRAM(s) Oral every 8 hours PRN Musculoskeletal Pain  oxyCODONE    IR 10 milliGRAM(s) Oral every 6 hours PRN Severe Pain (7 - 10)-- similar to home and prior-       PHYSICAL EXAM:  GENERAL: NAD, calm, relaxed, no visible pain; no tremor  NECK: Supple, No JVD  CHEST/LUNG: Few crackles, decreased at bases; rare rhonchi on R   HEART:RRR, no MRG  ABDOMEN: Soft, Nontender, Nondistended; Bowel sounds present  EXTREMITIES: No edema; LSSp- vertical scar- well healed.    LABS:                              12.4   3.09  )-----------( 139      ( 10 Sep 2023 08:06 )             39.7                   11.1   5.40  )-----------( 172      ( 08 Sep 2023 07:42 )             34.8     09-10    139  |  99  |  15  ----------------------------<  97  4.0   |  25  |  0.8    Ca    8.8      10 Sep 2023 08:06                09-08    137  |  99  |  18  ----------------------------<  97  3.6   |  25  |  0.7    Ca    8.3<L>      08 Sep 2023 07:42  Mg     1.6     09-08    TPro  7.2  /  Alb  3.9  /  TBili  0.3  /  DBili  x   /  AST  12  /  ALT  8   /  AlkPhos  71  09-06    LIVER FUNCTIONS - ( 06 Sep 2023 12:20 )  Alb: 3.9 g/dL / Pro: 7.2 g/dL / ALK PHOS: 71 U/L / ALT: 8 U/L / AST: 12 U/L / GGT: x                   PT/INR - ( 06 Sep 2023 12:20 )   PT: 19.20 sec;   INR: 1.66 ratio         PTT - ( 06 Sep 2023 12:20 )  PTT:40.5 sec  CARDIAC MARKERS ( 06 Sep 2023 12:20 )  x     / <0.01 ng/mL / x     / x     / x            Urinalysis Basic - ( 08 Sep 2023 07:42 )    Color: x / Appearance: x / SG: x / pH: x  Gluc: 97 mg/dL / Ketone: x  / Bili: x / Urobili: x   Blood: x / Protein: x / Nitrite: x   Leuk Esterase: x / RBC: x / WBC x   Sq Epi: x / Non Sq Epi: x / Bacteria: x        Culture - Urine (collected 06 Sep 2023 15:01)  Source: Clean Catch Clean Catch (Midstream)  Final Report (07 Sep 2023 22:30):    <10,000 CFU/mL Normal Urogenital Lori    Culture - Blood (collected 06 Sep 2023 12:20)  Source: .Blood Blood-Peripheral  Preliminary Report (07 Sep 2023 23:02):    No growth at 24 hours    Culture - Blood (collected 06 Sep 2023 12:20)  Source: .Blood Blood-Peripheral  Preliminary Report (07 Sep 2023 23:02):    No growth at 24 hours      RADIOLOGY & ADDITIONAL TESTS:  < from: POCUS ED TTE 2D F/U, Limited w/o Cont. (09.06.23 @ 15:07) >    INTERPRETATION:  A focused transthoracic cardiac ultrasound examination   was performed.  No pericardial effusion was present.    R1-R4 and L1-L4 were also visualized.  No pleural effusion.      IMPRESSION:  No Pericardial Effusion.  No pleural effusion.    --- End of Report ---    < end of copied text >  < from: CT Angio Chest PE Protocol w/ IV Cont (09.06.23 @ 14:54) >  FINDINGS:    TUBES/LINES: None.    PULMONARY ARTERY CIRCULATION: No evidence of central or segmental   pulmonary embolism.    GREAT VESSELS/CARDIAC STRUCTURES/PERICARDIUM: The heart size is enlarged.   No pericardial effusion. Normal caliber aorta and pulmonary artery.   Coronary artery and aortic calcifications are noted. Mural soft plaque is   noted along the wall of the descending thoracic aorta. There is no   evidence of aortic aneurysm or dissection. The brachiocephalic vessels   are unremarkable.    LUNG PARENCHYMA/CENTRAL AIRWAYS/PLEURA: The central tracheobronchial tree   is patent.    There are consolidative/atelectatic changes at both lung bases; not   significantly changed in the right lower lobe, but increased in the left   lower lobe. There is narrowing and/or secretions within the right and   left lower lobe bronchi.    There has been partial resolution of the previously noted 2.6 cm (4/104)   and 1 cm (4/63) nodular lesions in the right upper lobe. Several nodules   are unchanged in size however there are several nodules in the upper   lobes that are new (4/128-166) or have increased in size.    There is chronic atelectasis and bronchiectasis within the right middle   lobe.    No evidence of pleural effusion or pneumothorax    MEDIASTINUM/HILUM: Unchanged paratracheal, subcarinal, and aortopulmonary   window lymphadenopathy.  The visualized portion of the thyroid gland is   unremarkable.    CHEST WALL/AXILLA: Unremarkable.    UPPER ABDOMEN: The partially visualized upper abdomen shows no acute   pathology.    OSSEOUS STRUCTURES: Degenerative changes of the spine are noted. Chronic   right rib fractures (5-7 laterally) (8-12 posteromedial) are again noted.      IMPRESSION:    No evidence of pulmonary embolism.    There are consolidative/atelectatic changes at both lung bases; not   significantly changed in the right lower lobe, but increased in the left   lower lobe. There is narrowing and/or secretions within the right and   left lower lobe bronchi.    There has been partial resolution of the previously noted 2.6 cm (4/104)   and 1 cm (4/63) nodular lesions in the right upper lobe. Several nodules   bilaterally are unchanged in size however there are several nodules in   the upper lobes that are new (4/128-166) or have increased in size.    Thereis chronic atelectasis and bronchiectasis within the right middle   lobe.    Short-term follow-up chest CT in 2-3 months is recommended.      < end of copied text >  
Chart reviewed, patient examined. Pertinent results reviewed.  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 Pulse ox the morning of admission was 82% as per home health aid (his wife). Pt also reported weakness and productive cough w/ green phlegm for the prior 3 days. Health aid reports recent URI symptoms. Denies chest pain, palpitations, fever/chills, sick contacts. Evaluation here included a CT chest w contrast- and showed now opacities. Patient takes MOD dose chronic ooioids and BZD for anxiety. He had a rapid response during his last admission- ? 2/2 over-medication.    INTERVAL EVENTS: Patient seen today without distress, OOB in chair looks fine. He says shaking is better (alprazolam 0.25mg q8h)' and says his breathing is better.    MEDICATIONS  (STANDING):  albuterol/ipratropium for Nebulization 3 milliLiter(s) Nebulizer every 6 hours  albuterol/ipratropium for Nebulization.. 3 milliLiter(s) Nebulizer every 20 minutes  ALPRAZolam 0.25 milliGRAM(s) Oral three times a day---    apixaban 5 milliGRAM(s) Oral every 12 hours  budesonide  80 MICROgram(s)/formoterol 4.5 MICROgram(s) Inhaler 2 Puff(s) Inhalation two times a day  buPROPion XL (24-Hour) . 150 milliGRAM(s) Oral daily  cefepime   IVPB 1000 milliGRAM(s) IV Intermittent every 8 hours  cefepime   IVPB      DULoxetine 30 milliGRAM(s) Oral daily  furosemide    Tablet 20 milliGRAM(s) Oral two times a day  insulin lispro (ADMELOG) corrective regimen sliding scale   SubCutaneous three times a day before meals  losartan 100 milliGRAM(s) Oral daily  morphine ER Tablet 60 milliGRAM(s) Oral two times a day---same as home dose; 30 mg q12 during last admit  oxybutynin 10 milliGRAM(s) Oral daily  pantoprazole    Tablet 40 milliGRAM(s) Oral before breakfast  simvastatin 20 milliGRAM(s) Oral at bedtime  tamsulosin 0.4 milliGRAM(s) Oral at bedtime  tiotropium 2.5 MICROgram(s) Inhaler 2 Puff(s) Inhalation daily    MEDICATIONS  (PRN):  acetaminophen     Tablet .. 650 milliGRAM(s) Oral every 6 hours PRN Moderate Pain (4 - 6)  baclofen 5 milliGRAM(s) Oral every 8 hours PRN Musculoskeletal Pain  oxyCODONE    IR 10 milliGRAM(s) Oral every 6 hours PRN Severe Pain (7 - 10)-- similar to home and prior-       PHYSICAL EXAM:  GENERAL: NAD, calm, relaxed, no visible pain; no tremor  NECK: Supple, No JVD  CHEST/LUNG: Few crackles, decreased at bases; rare rhonchi on R   HEART:RRR, no MRG  ABDOMEN: Soft, Nontender, Nondistended; Bowel sounds present  EXTREMITIES: No edema; LSSp- vertical scar- well healed.    LABS:                              12.4   3.09  )-----------( 139      ( 10 Sep 2023 08:06 )             39.7                   11.1   5.40  )-----------( 172      ( 08 Sep 2023 07:42 )             34.8     09-10    139  |  99  |  15  ----------------------------<  97  4.0   |  25  |  0.8    Ca    8.8      10 Sep 2023 08:06                09-08    137  |  99  |  18  ----------------------------<  97  3.6   |  25  |  0.7    Ca    8.3<L>      08 Sep 2023 07:42  Mg     1.6     09-08    TPro  7.2  /  Alb  3.9  /  TBili  0.3  /  DBili  x   /  AST  12  /  ALT  8   /  AlkPhos  71  09-06    LIVER FUNCTIONS - ( 06 Sep 2023 12:20 )  Alb: 3.9 g/dL / Pro: 7.2 g/dL / ALK PHOS: 71 U/L / ALT: 8 U/L / AST: 12 U/L / GGT: x                   PT/INR - ( 06 Sep 2023 12:20 )   PT: 19.20 sec;   INR: 1.66 ratio         PTT - ( 06 Sep 2023 12:20 )  PTT:40.5 sec  CARDIAC MARKERS ( 06 Sep 2023 12:20 )  x     / <0.01 ng/mL / x     / x     / x            Urinalysis Basic - ( 08 Sep 2023 07:42 )    Color: x / Appearance: x / SG: x / pH: x  Gluc: 97 mg/dL / Ketone: x  / Bili: x / Urobili: x   Blood: x / Protein: x / Nitrite: x   Leuk Esterase: x / RBC: x / WBC x   Sq Epi: x / Non Sq Epi: x / Bacteria: x        Culture - Urine (collected 06 Sep 2023 15:01)  Source: Clean Catch Clean Catch (Midstream)  Final Report (07 Sep 2023 22:30):    <10,000 CFU/mL Normal Urogenital Lori    Culture - Blood (collected 06 Sep 2023 12:20)  Source: .Blood Blood-Peripheral  Preliminary Report (07 Sep 2023 23:02):    No growth at 24 hours    Culture - Blood (collected 06 Sep 2023 12:20)  Source: .Blood Blood-Peripheral  Preliminary Report (07 Sep 2023 23:02):    No growth at 24 hours      RADIOLOGY & ADDITIONAL TESTS:  < from: POCUS ED TTE 2D F/U, Limited w/o Cont. (09.06.23 @ 15:07) >    INTERPRETATION:  A focused transthoracic cardiac ultrasound examination   was performed.  No pericardial effusion was present.    R1-R4 and L1-L4 were also visualized.  No pleural effusion.      IMPRESSION:  No Pericardial Effusion.  No pleural effusion.    --- End of Report ---    < end of copied text >  < from: CT Angio Chest PE Protocol w/ IV Cont (09.06.23 @ 14:54) >  FINDINGS:    TUBES/LINES: None.    PULMONARY ARTERY CIRCULATION: No evidence of central or segmental   pulmonary embolism.    GREAT VESSELS/CARDIAC STRUCTURES/PERICARDIUM: The heart size is enlarged.   No pericardial effusion. Normal caliber aorta and pulmonary artery.   Coronary artery and aortic calcifications are noted. Mural soft plaque is   noted along the wall of the descending thoracic aorta. There is no   evidence of aortic aneurysm or dissection. The brachiocephalic vessels   are unremarkable.    LUNG PARENCHYMA/CENTRAL AIRWAYS/PLEURA: The central tracheobronchial tree   is patent.    There are consolidative/atelectatic changes at both lung bases; not   significantly changed in the right lower lobe, but increased in the left   lower lobe. There is narrowing and/or secretions within the right and   left lower lobe bronchi.    There has been partial resolution of the previously noted 2.6 cm (4/104)   and 1 cm (4/63) nodular lesions in the right upper lobe. Several nodules   are unchanged in size however there are several nodules in the upper   lobes that are new (4/128-166) or have increased in size.    There is chronic atelectasis and bronchiectasis within the right middle   lobe.    No evidence of pleural effusion or pneumothorax    MEDIASTINUM/HILUM: Unchanged paratracheal, subcarinal, and aortopulmonary   window lymphadenopathy.  The visualized portion of the thyroid gland is   unremarkable.    CHEST WALL/AXILLA: Unremarkable.    UPPER ABDOMEN: The partially visualized upper abdomen shows no acute   pathology.    OSSEOUS STRUCTURES: Degenerative changes of the spine are noted. Chronic   right rib fractures (5-7 laterally) (8-12 posteromedial) are again noted.      IMPRESSION:    No evidence of pulmonary embolism.    There are consolidative/atelectatic changes at both lung bases; not   significantly changed in the right lower lobe, but increased in the left   lower lobe. There is narrowing and/or secretions within the right and   left lower lobe bronchi.    There has been partial resolution of the previously noted 2.6 cm (4/104)   and 1 cm (4/63) nodular lesions in the right upper lobe. Several nodules   bilaterally are unchanged in size however there are several nodules in   the upper lobes that are new (4/128-166) or have increased in size.    Thereis chronic atelectasis and bronchiectasis within the right middle   lobe.    Short-term follow-up chest CT in 2-3 months is recommended.      < end of copied text >  
  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 Pulse ox this morning was 82% as per home health aid. Pt also reports weakness and productive cough w/ green phlegm for the past 3 days. Health aid reports recent URI symptoms. Denies chest pain, palpitations, fever/chills, sick contacts.     T(F): 102.3 HR: 88 BP: 140/74 RR: 26 SpO2: 92% on 6L NC  Hgb 11.9, trop negative, lactate 1.4. VBG pH 7.45, pCO2 45, pO2 31, HCO3 31  CXR: Bibasilar opacities/pleural effusion, unchanged.  CTA no PE. Consolidative/atelectatic changes at both lung bases; not significantly changed in the right lower lobe, but increased in the left lower lobe. There is narrowing and/or secretions within the right and left lower lobe bronchi.  Received 2.5L bolus and Levaquin in ED  Admit to Medicine for PNA   (06 Sep 2023 18:28)    MEDICATIONS  (STANDING):  albuterol/ipratropium for Nebulization 3 milliLiter(s) Nebulizer every 6 hours  albuterol/ipratropium for Nebulization.. 3 milliLiter(s) Nebulizer every 20 minutes  ALPRAZolam 0.25 milliGRAM(s) Oral three times a day  apixaban 5 milliGRAM(s) Oral every 12 hours  budesonide  80 MICROgram(s)/formoterol 4.5 MICROgram(s) Inhaler 2 Puff(s) Inhalation two times a day  buPROPion XL (24-Hour) . 150 milliGRAM(s) Oral daily  cefepime   IVPB      cefepime   IVPB 1000 milliGRAM(s) IV Intermittent every 8 hours  DULoxetine 30 milliGRAM(s) Oral daily  furosemide    Tablet 20 milliGRAM(s) Oral two times a day  insulin lispro (ADMELOG) corrective regimen sliding scale   SubCutaneous three times a day before meals  losartan 100 milliGRAM(s) Oral daily  morphine ER Tablet 60 milliGRAM(s) Oral two times a day  oxybutynin 10 milliGRAM(s) Oral daily  pantoprazole    Tablet 40 milliGRAM(s) Oral before breakfast  simvastatin 20 milliGRAM(s) Oral at bedtime  tamsulosin 0.4 milliGRAM(s) Oral at bedtime  tiotropium 2.5 MICROgram(s) Inhaler 2 Puff(s) Inhalation daily    MEDICATIONS  (PRN):  acetaminophen     Tablet .. 650 milliGRAM(s) Oral every 6 hours PRN Moderate Pain (4 - 6)  baclofen 5 milliGRAM(s) Oral every 8 hours PRN Musculoskeletal Pain  guaiFENesin  milliGRAM(s) Oral every 12 hours PRN Cough  oxyCODONE    IR 10 milliGRAM(s) Oral every 6 hours PRN Severe Pain (7 - 10)    INTERVAL EVENTS: Patient seen today, D/C held yesterday due to transportation issues?    T(C): 36.2 (09-12-23 @ 05:08), Max: 36.6 (09-11-23 @ 13:05)  HR: 60 (09-12-23 @ 05:08) (60 - 71)  BP: 146/79 (09-12-23 @ 05:08) (112/60 - 146/79)  RR: 18 (09-12-23 @ 05:08) (18 - 18)  SpO2: 95% (09-11-23 @ 21:00) (92% - 95%)  Wt(kg): --Vital Signs Last 24 Hrs  T(C): 36.2 (12 Sep 2023 05:08), Max: 36.6 (11 Sep 2023 13:05)  T(F): 97.2 (12 Sep 2023 05:08), Max: 97.8 (11 Sep 2023 13:05)  HR: 60 (12 Sep 2023 05:08) (60 - 71)  BP: 146/79 (12 Sep 2023 05:08) (112/60 - 146/79)  BP(mean): --  RR: 18 (12 Sep 2023 05:08) (18 - 18)  SpO2: 95% (11 Sep 2023 21:00) (92% - 95%)    Parameters below as of 11 Sep 2023 13:05  Patient On (Oxygen Delivery Method): nasal cannula  O2 Flow (L/min): 3      PHYSICAL EXAM:  GENERAL:   NECK: Supple, No JVD, Normal thyroid  CHEST/LUNG: Clear; No crackles or wheezing  HEART: S1, S2, Regular rate and rhythm;   ABDOMEN: Soft, Nontender, Nondistended; Bowel sounds present  EXTREMITIES: No clubbing, cyanosis, or edema  SKIN: No rashes or lesions    LABS:  Labs:                        12.3   3.45  )-----------( 141      ( 11 Sep 2023 07:28 )             40.0             09-11    140  |  100  |  17  ----------------------------<  92  4.4   |  30  |  0.7    Ca    9.1      11 Sep 2023 07:28                      Urinalysis Basic - ( 11 Sep 2023 07:28 )    Color: x / Appearance: x / SG: x / pH: x  Gluc: 92 mg/dL / Ketone: x  / Bili: x / Urobili: x   Blood: x / Protein: x / Nitrite: x   Leuk Esterase: x / RBC: x / WBC x   Sq Epi: x / Non Sq Epi: x / Bacteria: x              RADIOLOGY & ADDITIONAL TESTS:  
  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 Pulse ox this morning was 82% as per home health aid. Pt also reports weakness and productive cough w/ green phlegm for the past 3 days. Health aid reports recent URI symptoms. Denies chest pain, palpitations, fever/chills, sick contacts.     T(F): 102.3 HR: 88 BP: 140/74 RR: 26 SpO2: 92% on 6L NC  Hgb 11.9, trop negative, lactate 1.4. VBG pH 7.45, pCO2 45, pO2 31, HCO3 31  CXR: Bibasilar opacities/pleural effusion, unchanged.  CTA no PE. Consolidative/atelectatic changes at both lung bases; not significantly changed in the right lower lobe, but increased in the left lower lobe. There is narrowing and/or secretions within the right and left lower lobe bronchi.  Received 2.5L bolus and Levaquin in ED  Admit to Medicine for PNA   (06 Sep 2023 18:28)    MEDICATIONS  (STANDING):  albuterol/ipratropium for Nebulization 3 milliLiter(s) Nebulizer every 6 hours  albuterol/ipratropium for Nebulization.. 3 milliLiter(s) Nebulizer every 20 minutes  apixaban 5 milliGRAM(s) Oral every 12 hours  aztreonam  IVPB 2000 milliGRAM(s) IV Intermittent every 8 hours  budesonide  80 MICROgram(s)/formoterol 4.5 MICROgram(s) Inhaler 2 Puff(s) Inhalation two times a day  buPROPion XL (24-Hour) . 150 milliGRAM(s) Oral daily  dextrose 5%. 1000 milliLiter(s) (100 mL/Hr) IV Continuous <Continuous>  dextrose 5%. 1000 milliLiter(s) (50 mL/Hr) IV Continuous <Continuous>  dextrose 50% Injectable 25 Gram(s) IV Push once  dextrose 50% Injectable 12.5 Gram(s) IV Push once  dextrose 50% Injectable 25 Gram(s) IV Push once  DULoxetine 30 milliGRAM(s) Oral daily  furosemide    Tablet 20 milliGRAM(s) Oral two times a day  glucagon  Injectable 1 milliGRAM(s) IntraMuscular once  insulin lispro (ADMELOG) corrective regimen sliding scale   SubCutaneous three times a day before meals  levoFLOXacin IVPB 750 milliGRAM(s) IV Intermittent every 24 hours  losartan 100 milliGRAM(s) Oral daily  morphine ER Tablet 60 milliGRAM(s) Oral two times a day  oxybutynin 10 milliGRAM(s) Oral daily  pantoprazole    Tablet 40 milliGRAM(s) Oral before breakfast  simvastatin 20 milliGRAM(s) Oral at bedtime  tamsulosin 0.4 milliGRAM(s) Oral at bedtime  tiotropium 2.5 MICROgram(s) Inhaler 2 Puff(s) Inhalation daily    MEDICATIONS  (PRN):  acetaminophen     Tablet .. 650 milliGRAM(s) Oral every 6 hours PRN Moderate Pain (4 - 6)  baclofen 5 milliGRAM(s) Oral every 8 hours PRN Musculoskeletal Pain  dextrose Oral Gel 15 Gram(s) Oral once PRN Blood Glucose LESS THAN 70 milliGRAM(s)/deciliter    INTERVAL EVENTS: Patient seen today without distress, OOB in chair looks better.    T(C): 36.2 (09-08-23 @ 05:21), Max: 37.7 (09-07-23 @ 20:43)  HR: 76 (09-08-23 @ 05:21) (69 - 85)  BP: 140/77 (09-08-23 @ 05:21) (119/65 - 140/77)  RR: 18 (09-08-23 @ 05:21) (18 - 18)  SpO2: 98% (09-08-23 @ 05:21) (97% - 98%)  Wt(kg): --Vital Signs Last 24 Hrs  T(C): 36.2 (08 Sep 2023 05:21), Max: 37.7 (07 Sep 2023 20:43)  T(F): 97.2 (08 Sep 2023 05:21), Max: 99.8 (07 Sep 2023 20:43)  HR: 76 (08 Sep 2023 05:21) (69 - 85)  BP: 140/77 (08 Sep 2023 05:21) (119/65 - 140/77)  BP(mean): --  RR: 18 (08 Sep 2023 05:21) (18 - 18)  SpO2: 98% (08 Sep 2023 05:21) (97% - 98%)    Parameters below as of 08 Sep 2023 05:21  Patient On (Oxygen Delivery Method): nasal cannula  O2 Flow (L/min): 3      PHYSICAL EXAM:  GENERAL: NAD, calm, relaxed, no visible pain  NECK: Supple, No JVD  CHEST/LUNG: Few crackles, decreased at bases  HEART: S1, S2, Regular  ABDOMEN: Soft, Nontender, Nondistended; Bowel sounds present  EXTREMITIES: No edema    LABS:                        11.1   5.40  )-----------( 172      ( 08 Sep 2023 07:42 )             34.8             09-08    137  |  99  |  18  ----------------------------<  97  3.6   |  25  |  0.7    Ca    8.3<L>      08 Sep 2023 07:42  Mg     1.6     09-08    TPro  7.2  /  Alb  3.9  /  TBili  0.3  /  DBili  x   /  AST  12  /  ALT  8   /  AlkPhos  71  09-06    LIVER FUNCTIONS - ( 06 Sep 2023 12:20 )  Alb: 3.9 g/dL / Pro: 7.2 g/dL / ALK PHOS: 71 U/L / ALT: 8 U/L / AST: 12 U/L / GGT: x                   PT/INR - ( 06 Sep 2023 12:20 )   PT: 19.20 sec;   INR: 1.66 ratio         PTT - ( 06 Sep 2023 12:20 )  PTT:40.5 sec  CARDIAC MARKERS ( 06 Sep 2023 12:20 )  x     / <0.01 ng/mL / x     / x     / x            Urinalysis Basic - ( 08 Sep 2023 07:42 )    Color: x / Appearance: x / SG: x / pH: x  Gluc: 97 mg/dL / Ketone: x  / Bili: x / Urobili: x   Blood: x / Protein: x / Nitrite: x   Leuk Esterase: x / RBC: x / WBC x   Sq Epi: x / Non Sq Epi: x / Bacteria: x        Culture - Urine (collected 06 Sep 2023 15:01)  Source: Clean Catch Clean Catch (Midstream)  Final Report (07 Sep 2023 22:30):    <10,000 CFU/mL Normal Urogenital Lori    Culture - Blood (collected 06 Sep 2023 12:20)  Source: .Blood Blood-Peripheral  Preliminary Report (07 Sep 2023 23:02):    No growth at 24 hours    Culture - Blood (collected 06 Sep 2023 12:20)  Source: .Blood Blood-Peripheral  Preliminary Report (07 Sep 2023 23:02):    No growth at 24 hours      RADIOLOGY & ADDITIONAL TESTS:  < from: POCUS ED TTE 2D F/U, Limited w/o Cont. (09.06.23 @ 15:07) >    INTERPRETATION:  A focused transthoracic cardiac ultrasound examination   was performed.  No pericardial effusion was present.    R1-R4 and L1-L4 were also visualized.  No pleural effusion.      IMPRESSION:  No Pericardial Effusion.  No pleural effusion.    --- End of Report ---    < end of copied text >  < from: CT Angio Chest PE Protocol w/ IV Cont (09.06.23 @ 14:54) >  FINDINGS:    TUBES/LINES: None.    PULMONARY ARTERY CIRCULATION: No evidence of central or segmental   pulmonary embolism.    GREAT VESSELS/CARDIAC STRUCTURES/PERICARDIUM: The heart size is enlarged.   No pericardial effusion. Normal caliber aorta and pulmonary artery.   Coronary artery and aortic calcifications are noted. Mural soft plaque is   noted along the wall of the descending thoracic aorta. There is no   evidence of aortic aneurysm or dissection. The brachiocephalic vessels   are unremarkable.    LUNG PARENCHYMA/CENTRAL AIRWAYS/PLEURA: The central tracheobronchial tree   is patent.    There are consolidative/atelectatic changes at both lung bases; not   significantly changed in the right lower lobe, but increased in the left   lower lobe. There is narrowing and/or secretions within the right and   left lower lobe bronchi.    There has been partial resolution of the previously noted 2.6 cm (4/104)   and 1 cm (4/63) nodular lesions in the right upper lobe. Several nodules   are unchanged in size however there are several nodules in the upper   lobes that are new (4/128-166) or have increased in size.    There is chronic atelectasis and bronchiectasis within the right middle   lobe.    No evidence of pleural effusion or pneumothorax    MEDIASTINUM/HILUM: Unchanged paratracheal, subcarinal, and aortopulmonary   window lymphadenopathy.  The visualized portion of the thyroid gland is   unremarkable.    CHEST WALL/AXILLA: Unremarkable.    UPPER ABDOMEN: The partially visualized upper abdomen shows no acute   pathology.    OSSEOUS STRUCTURES: Degenerative changes of the spine are noted. Chronic   right rib fractures (5-7 laterally) (8-12 posteromedial) are again noted.      IMPRESSION:    No evidence of pulmonary embolism.    There are consolidative/atelectatic changes at both lung bases; not   significantly changed in the right lower lobe, but increased in the left   lower lobe. There is narrowing and/or secretions within the right and   left lower lobe bronchi.    There has been partial resolution of the previously noted 2.6 cm (4/104)   and 1 cm (4/63) nodular lesions in the right upper lobe. Several nodules   bilaterally are unchanged in size however there are several nodules in   the upper lobes that are new (4/128-166) or have increased in size.    Thereis chronic atelectasis and bronchiectasis within the right middle   lobe.    Short-term follow-up chest CT in 2-3 months is recommended.      < end of copied text >  
Chart reviewed, patient examined. Pertinent results reviewed.  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 Pulse ox the morning of admission was 82% as per home health aid (his wife). Pt also reported weakness and productive cough w/ green phlegm for the prior 3 days. Health aid reports recent URI symptoms. Denies chest pain, palpitations, fever/chills, sick contacts. Evaluation here included a CT chest w contrast- and showed now opacities. Patient takes MOD dose chronic ooioids and BZD for anxiety. He had a rapid response during his last admission- ? 2/2 over-medication.    INTERVAL EVENTS: Patient seen today without distress, OOB in chair looks fine. He c/o severe back pain, and shaking hands 2/2 his " not getting his Xanax and pain meds"    MEDICATIONS  (STANDING):  albuterol/ipratropium for Nebulization 3 milliLiter(s) Nebulizer every 6 hours  albuterol/ipratropium for Nebulization.. 3 milliLiter(s) Nebulizer every 20 minutes  ALPRAZolam 0.25 milliGRAM(s) Oral three times a day---  Case discussed with HO- to 0.5; was on 1-2 mg prior.  apixaban 5 milliGRAM(s) Oral every 12 hours  budesonide  80 MICROgram(s)/formoterol 4.5 MICROgram(s) Inhaler 2 Puff(s) Inhalation two times a day  buPROPion XL (24-Hour) . 150 milliGRAM(s) Oral daily  cefepime   IVPB 1000 milliGRAM(s) IV Intermittent every 8 hours  cefepime   IVPB      DULoxetine 30 milliGRAM(s) Oral daily  furosemide    Tablet 20 milliGRAM(s) Oral two times a day  insulin lispro (ADMELOG) corrective regimen sliding scale   SubCutaneous three times a day before meals  losartan 100 milliGRAM(s) Oral daily  morphine ER Tablet 60 milliGRAM(s) Oral two times a day---same as home dose; 30 mg q12 during last admit  oxybutynin 10 milliGRAM(s) Oral daily  pantoprazole    Tablet 40 milliGRAM(s) Oral before breakfast  simvastatin 20 milliGRAM(s) Oral at bedtime  tamsulosin 0.4 milliGRAM(s) Oral at bedtime  tiotropium 2.5 MICROgram(s) Inhaler 2 Puff(s) Inhalation daily    MEDICATIONS  (PRN):  acetaminophen     Tablet .. 650 milliGRAM(s) Oral every 6 hours PRN Moderate Pain (4 - 6)  baclofen 5 milliGRAM(s) Oral every 8 hours PRN Musculoskeletal Pain  oxyCODONE    IR 10 milliGRAM(s) Oral every 6 hours PRN Severe Pain (7 - 10)-- similar to home and prior-       PHYSICAL EXAM:  GENERAL: NAD, calm, relaxed, no visible pain; min tremulous hands.  NECK: Supple, No JVD  CHEST/LUNG: Few crackles, decreased at bases  HEART:RRR, no MRG  ABDOMEN: Soft, Nontender, Nondistended; Bowel sounds present  EXTREMITIES: No edema; LSSp- vertical scar- well healed.    LABS:                        11.1   5.40  )-----------( 172      ( 08 Sep 2023 07:42 )             34.8             09-08    137  |  99  |  18  ----------------------------<  97  3.6   |  25  |  0.7    Ca    8.3<L>      08 Sep 2023 07:42  Mg     1.6     09-08    TPro  7.2  /  Alb  3.9  /  TBili  0.3  /  DBili  x   /  AST  12  /  ALT  8   /  AlkPhos  71  09-06    LIVER FUNCTIONS - ( 06 Sep 2023 12:20 )  Alb: 3.9 g/dL / Pro: 7.2 g/dL / ALK PHOS: 71 U/L / ALT: 8 U/L / AST: 12 U/L / GGT: x                   PT/INR - ( 06 Sep 2023 12:20 )   PT: 19.20 sec;   INR: 1.66 ratio         PTT - ( 06 Sep 2023 12:20 )  PTT:40.5 sec  CARDIAC MARKERS ( 06 Sep 2023 12:20 )  x     / <0.01 ng/mL / x     / x     / x            Urinalysis Basic - ( 08 Sep 2023 07:42 )    Color: x / Appearance: x / SG: x / pH: x  Gluc: 97 mg/dL / Ketone: x  / Bili: x / Urobili: x   Blood: x / Protein: x / Nitrite: x   Leuk Esterase: x / RBC: x / WBC x   Sq Epi: x / Non Sq Epi: x / Bacteria: x        Culture - Urine (collected 06 Sep 2023 15:01)  Source: Clean Catch Clean Catch (Midstream)  Final Report (07 Sep 2023 22:30):    <10,000 CFU/mL Normal Urogenital Lori    Culture - Blood (collected 06 Sep 2023 12:20)  Source: .Blood Blood-Peripheral  Preliminary Report (07 Sep 2023 23:02):    No growth at 24 hours    Culture - Blood (collected 06 Sep 2023 12:20)  Source: .Blood Blood-Peripheral  Preliminary Report (07 Sep 2023 23:02):    No growth at 24 hours      RADIOLOGY & ADDITIONAL TESTS:  < from: POCUS ED TTE 2D F/U, Limited w/o Cont. (09.06.23 @ 15:07) >    INTERPRETATION:  A focused transthoracic cardiac ultrasound examination   was performed.  No pericardial effusion was present.    R1-R4 and L1-L4 were also visualized.  No pleural effusion.      IMPRESSION:  No Pericardial Effusion.  No pleural effusion.    --- End of Report ---    < end of copied text >  < from: CT Angio Chest PE Protocol w/ IV Cont (09.06.23 @ 14:54) >  FINDINGS:    TUBES/LINES: None.    PULMONARY ARTERY CIRCULATION: No evidence of central or segmental   pulmonary embolism.    GREAT VESSELS/CARDIAC STRUCTURES/PERICARDIUM: The heart size is enlarged.   No pericardial effusion. Normal caliber aorta and pulmonary artery.   Coronary artery and aortic calcifications are noted. Mural soft plaque is   noted along the wall of the descending thoracic aorta. There is no   evidence of aortic aneurysm or dissection. The brachiocephalic vessels   are unremarkable.    LUNG PARENCHYMA/CENTRAL AIRWAYS/PLEURA: The central tracheobronchial tree   is patent.    There are consolidative/atelectatic changes at both lung bases; not   significantly changed in the right lower lobe, but increased in the left   lower lobe. There is narrowing and/or secretions within the right and   left lower lobe bronchi.    There has been partial resolution of the previously noted 2.6 cm (4/104)   and 1 cm (4/63) nodular lesions in the right upper lobe. Several nodules   are unchanged in size however there are several nodules in the upper   lobes that are new (4/128-166) or have increased in size.    There is chronic atelectasis and bronchiectasis within the right middle   lobe.    No evidence of pleural effusion or pneumothorax    MEDIASTINUM/HILUM: Unchanged paratracheal, subcarinal, and aortopulmonary   window lymphadenopathy.  The visualized portion of the thyroid gland is   unremarkable.    CHEST WALL/AXILLA: Unremarkable.    UPPER ABDOMEN: The partially visualized upper abdomen shows no acute   pathology.    OSSEOUS STRUCTURES: Degenerative changes of the spine are noted. Chronic   right rib fractures (5-7 laterally) (8-12 posteromedial) are again noted.      IMPRESSION:    No evidence of pulmonary embolism.    There are consolidative/atelectatic changes at both lung bases; not   significantly changed in the right lower lobe, but increased in the left   lower lobe. There is narrowing and/or secretions within the right and   left lower lobe bronchi.    There has been partial resolution of the previously noted 2.6 cm (4/104)   and 1 cm (4/63) nodular lesions in the right upper lobe. Several nodules   bilaterally are unchanged in size however there are several nodules in   the upper lobes that are new (4/128-166) or have increased in size.    Thereis chronic atelectasis and bronchiectasis within the right middle   lobe.    Short-term follow-up chest CT in 2-3 months is recommended.      < end of copied text >  
24H events:    Patient is a 68y old Male who presents with a chief complaint of   Primary diagnosis of Pneumonia      Day 1:  Day 2:  Day 3:     Today is hospital day 2d. This morning patient was seen and examined at bedside, resting comfortably in bed.    No acute or major events overnight.    Code Status:    Family communication:  Contact date:  Name of person contacted:  Relationship to patient:  Communication details:  What matters most:    PAST MEDICAL & SURGICAL HISTORY  Anxiety    HTN (hypertension)    Diabetes    High cholesterol    Asthma    Chronic low back pain with sciatica, sciatica laterality unspecified, unspecified back pain laterality    Uncomplicated opioid dependence    Syncopal episodes    History of back surgery  x 3      SOCIAL HISTORY:  Social History:  lives at home with home health aid  ambulates independently  on chronic home O2 3L (06 Sep 2023 18:28)      ALLERGIES:  Originally Entered as [ANGIOEDEMA] reaction to [pineapple] (Unknown)  aspirin (Stomach Upset)  penicillins (Anaphylaxis)  pineapples (Anaphylaxis)    MEDICATIONS:  STANDING MEDICATIONS  albuterol/ipratropium for Nebulization 3 milliLiter(s) Nebulizer every 6 hours  albuterol/ipratropium for Nebulization.. 3 milliLiter(s) Nebulizer every 20 minutes  apixaban 5 milliGRAM(s) Oral every 12 hours  aztreonam  IVPB 2000 milliGRAM(s) IV Intermittent every 8 hours  budesonide  80 MICROgram(s)/formoterol 4.5 MICROgram(s) Inhaler 2 Puff(s) Inhalation two times a day  buPROPion XL (24-Hour) . 150 milliGRAM(s) Oral daily  dextrose 5%. 1000 milliLiter(s) IV Continuous <Continuous>  dextrose 5%. 1000 milliLiter(s) IV Continuous <Continuous>  dextrose 50% Injectable 25 Gram(s) IV Push once  dextrose 50% Injectable 12.5 Gram(s) IV Push once  dextrose 50% Injectable 25 Gram(s) IV Push once  DULoxetine 30 milliGRAM(s) Oral daily  furosemide    Tablet 20 milliGRAM(s) Oral two times a day  glucagon  Injectable 1 milliGRAM(s) IntraMuscular once  insulin lispro (ADMELOG) corrective regimen sliding scale   SubCutaneous three times a day before meals  levoFLOXacin IVPB 750 milliGRAM(s) IV Intermittent every 24 hours  losartan 100 milliGRAM(s) Oral daily  morphine ER Tablet 60 milliGRAM(s) Oral two times a day  oxybutynin 10 milliGRAM(s) Oral daily  pantoprazole    Tablet 40 milliGRAM(s) Oral before breakfast  simvastatin 20 milliGRAM(s) Oral at bedtime  tamsulosin 0.4 milliGRAM(s) Oral at bedtime  tiotropium 2.5 MICROgram(s) Inhaler 2 Puff(s) Inhalation daily    PRN MEDICATIONS  acetaminophen     Tablet .. 650 milliGRAM(s) Oral every 6 hours PRN  baclofen 5 milliGRAM(s) Oral every 8 hours PRN  dextrose Oral Gel 15 Gram(s) Oral once PRN    VITALS:   T(F): 97.2  HR: 76  BP: 140/77  RR: 18  SpO2: 98%    PHYSICAL EXAM:  GENERAL:   ( x) NAD, lying in bed comfortably     (  ) obtunded     (  ) lethargic     (  ) somnolent    HEAD:   ( x ) Atraumatic     (  ) hematoma     (  ) laceration (specify location:       )     NECK:  ( x ) Supple     (  ) neck stiffness     (  ) nuchal rigidity     (  )  no JVD     (  ) JVD present ( -- cm)    HEART:  Rate -->     (  x) normal rate     (  ) bradycardic     (  ) tachycardic  Rhythm -->     ( x ) regular     (  ) regularly irregular     (  ) irregularly irregular  Murmurs -->     ( x ) normal s1s2     (  ) systolic murmur     (  ) diastolic murmur     (  ) continuous murmur      (  ) S3 present     (  ) S4 present    LUNGS:   Patient under 4L NC   (  )Unlabored respirations     (  ) tachypnea  (  ) B/L air entry     (x  ) decreased breath sounds in:  (location   Bilateral  )    (  ) no adventitious sound     (x  ) crackles     ( x ) wheezing      (  ) rhonchi      (specify location:       )  (  ) chest wall tenderness (specify location:       )    ABDOMEN:   ( x ) Soft     (  ) tense   |   ( x ) nondistended     (  ) distended   |   ( x) +BS     (  ) hypoactive bowel sounds     (  ) hyperactive bowel sounds  (  ) nontender     (  ) RUQ tenderness     (  ) RLQ tenderness     (  ) LLQ tenderness     (  ) epigastric tenderness     (  ) diffuse tenderness  (  ) Splenomegaly      (  ) Hepatomegaly      (  ) Jaundice     (  ) ecchymosis     EXTREMITIES:  ( x ) Normal     (  ) Rash     (  ) ecchymosis     (  ) varicose veins      (  ) pitting edema     (  ) non-pitting edema   (  ) ulceration     (  ) gangrene:     (location:     )    NERVOUS SYSTEM:    ( x ) A&Ox3     (  ) confused     (  ) lethargic  CN II-XII:     (  ) Intact     (  ) deficits found     (Specify:     )   Upper extremities:     (  ) no sensorimotor deficits     (  ) weakness     (  ) loss of proprioception/vibration     (  ) loss of touch/temperature (specify:    )  Lower extremities:     (  ) no sensorimotor deficits     (  ) weakness     (  ) loss of proprioception/vibration     (  ) loss of touch/temperature (specify:    )          LABS:                        11.1   5.40  )-----------( 172      ( 08 Sep 2023 07:42 )             34.8     09-08    137  |  99  |  18  ----------------------------<  97  3.6   |  25  |  0.7    Ca    8.3<L>      08 Sep 2023 07:42  Mg     1.6     09-08        Urinalysis Basic - ( 08 Sep 2023 07:42 )    Color: x / Appearance: x / SG: x / pH: x  Gluc: 97 mg/dL / Ketone: x  / Bili: x / Urobili: x   Blood: x / Protein: x / Nitrite: x   Leuk Esterase: x / RBC: x / WBC x   Sq Epi: x / Non Sq Epi: x / Bacteria: x      Culture - Urine (collected 06 Sep 2023 15:01)  Source: Clean Catch Clean Catch (Midstream)  Final Report (07 Sep 2023 22:30):    <10,000 CFU/mL Normal Urogenital Lori    Culture - Blood (collected 06 Sep 2023 12:20)  Source: .Blood Blood-Peripheral  Preliminary Report (07 Sep 2023 23:02):    No growth at 24 hours    Culture - Blood (collected 06 Sep 2023 12:20)  Source: .Blood Blood-Peripheral  Preliminary Report (07 Sep 2023 23:02):    No growth at 24 hours          RADIOLOGY:  < from: CT Angio Chest PE Protocol w/ IV Cont (09.06.23 @ 14:54) >  IMPRESSION:    No evidence of pulmonary embolism.    There are consolidative/atelectatic changes at both lung bases; not   significantly changed in the right lower lobe, but increased in the left   lower lobe. There is narrowing and/or secretions within the right and   left lower lobe bronchi.    There has been partial resolution of the previously noted 2.6 cm (4/104)   and 1 cm (4/63) nodular lesions in the right upper lobe. Several nodules   bilaterally are unchanged in size however there are several nodules in   the upper lobes that are new (4/128-166) or have increased in size.    Thereis chronic atelectasis and bronchiectasis within the right middle   lobe.    Short-term follow-up chest CT in 2-3 months is recommended.    < end of copied text >  < from: Xray Chest 1 View-PORTABLE IMMEDIATE (09.06.23 @ 13:33) >  Cardiac/mediastinum/hilum: Unremarkable.    Lung parenchyma/Pleura: Right basilar opacity/pleural effusion, unchanged.    Skeleton/soft tissues: Stable.    Impression:    Bibasilar opacities/pleural effusion, unchanged.    < end of copied text >  < from: CT Angio Neck w/ IV Cont (08.14.23 @ 14:41) >  IMPRESSION:    1.  No significant vascular stenosis or large vessel occlusion.    2.  Multiple findings in both lungs with the largest lesion in the right   lung measuring at least 2.8 cm in maximum diameter, further evaluation   with a CT scan of the chest is recommended to exclude neoplasm.    < end of copied text >

## 2023-09-12 NOTE — DISCHARGE NOTE NURSING/CASE MANAGEMENT/SOCIAL WORK - NSDCPEFALRISK_GEN_ALL_CORE
For information on Fall & Injury Prevention, visit: [email or web address] OR  [email or web address] OR  https://www.cdc.gov/steadi/patient.html

## 2023-09-12 NOTE — DISCHARGE NOTE NURSING/CASE MANAGEMENT/SOCIAL WORK - PATIENT PORTAL LINK FT
You can access the FollowMyHealth Patient Portal offered by [facility] by registering at the following website: [email or web address]. By joining [facility]’s FollowMyHealth portal, you will also be able to view your health information using other applications (apps) compatible with our system. Yes

## 2023-09-15 ENCOUNTER — APPOINTMENT (OUTPATIENT)
Dept: PAIN MANAGEMENT | Facility: CLINIC | Age: 68
End: 2023-09-15
Payer: OTHER MISCELLANEOUS

## 2023-09-15 PROCEDURE — 99213 OFFICE O/P EST LOW 20 MIN: CPT | Mod: ACP

## 2023-09-15 RX ORDER — MORPHINE SULFATE 30 MG/1
30 TABLET, FILM COATED, EXTENDED RELEASE ORAL 3 TIMES DAILY
Qty: 90 | Refills: 0 | Status: DISCONTINUED | COMMUNITY
Start: 2022-07-22 | End: 2023-09-15

## 2023-09-15 RX ORDER — OXYCODONE AND ACETAMINOPHEN 10; 325 MG/1; MG/1
10-325 TABLET ORAL DAILY
Qty: 30 | Refills: 0 | Status: DISCONTINUED | COMMUNITY
Start: 2022-07-22 | End: 2023-09-15

## 2023-09-19 DIAGNOSIS — M54.59 OTHER LOW BACK PAIN: ICD-10-CM

## 2023-09-19 DIAGNOSIS — Z79.52 LONG TERM (CURRENT) USE OF SYSTEMIC STEROIDS: ICD-10-CM

## 2023-09-19 DIAGNOSIS — Z87.891 PERSONAL HISTORY OF NICOTINE DEPENDENCE: ICD-10-CM

## 2023-09-19 DIAGNOSIS — F32.A DEPRESSION, UNSPECIFIED: ICD-10-CM

## 2023-09-19 DIAGNOSIS — F13.939 SEDATIVE, HYPNOTIC OR ANXIOLYTIC USE, UNSPECIFIED WITH WITHDRAWAL, UNSPECIFIED: ICD-10-CM

## 2023-09-19 DIAGNOSIS — F41.9 ANXIETY DISORDER, UNSPECIFIED: ICD-10-CM

## 2023-09-19 DIAGNOSIS — E78.00 PURE HYPERCHOLESTEROLEMIA, UNSPECIFIED: ICD-10-CM

## 2023-09-19 DIAGNOSIS — Z79.84 LONG TERM (CURRENT) USE OF ORAL HYPOGLYCEMIC DRUGS: ICD-10-CM

## 2023-09-19 DIAGNOSIS — J69.0 PNEUMONITIS DUE TO INHALATION OF FOOD AND VOMIT: ICD-10-CM

## 2023-09-19 DIAGNOSIS — Z88.8 ALLERGY STATUS TO OTHER DRUGS, MEDICAMENTS AND BIOLOGICAL SUBSTANCES STATUS: ICD-10-CM
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
## 2023-09-19 DIAGNOSIS — Z99.81 DEPENDENCE ON SUPPLEMENTAL OXYGEN: ICD-10-CM

## 2023-09-19 DIAGNOSIS — J44.1 CHRONIC OBSTRUCTIVE PULMONARY DISEASE WITH (ACUTE) EXACERBATION: ICD-10-CM

## 2023-09-19 DIAGNOSIS — Z88.0 ALLERGY STATUS TO PENICILLIN: ICD-10-CM

## 2023-09-19 DIAGNOSIS — A41.9 SEPSIS, UNSPECIFIED ORGANISM: ICD-10-CM

## 2023-09-19 DIAGNOSIS — Z86.73 PERSONAL HISTORY OF TRANSIENT ISCHEMIC ATTACK (TIA), AND CEREBRAL INFARCTION WITHOUT RESIDUAL DEFICITS: ICD-10-CM

## 2023-09-19 DIAGNOSIS — J96.21 ACUTE AND CHRONIC RESPIRATORY FAILURE WITH HYPOXIA: ICD-10-CM

## 2023-09-19 DIAGNOSIS — G89.29 OTHER CHRONIC PAIN: ICD-10-CM

## 2023-09-19 DIAGNOSIS — Z91.198 PATIENT'S NONCOMPLIANCE WITH OTHER MEDICAL TREATMENT AND REGIMEN FOR OTHER REASON: ICD-10-CM

## 2023-09-19 DIAGNOSIS — R91.8 OTHER NONSPECIFIC ABNORMAL FINDING OF LUNG FIELD: ICD-10-CM

## 2023-09-19 DIAGNOSIS — J98.11 ATELECTASIS: ICD-10-CM

## 2023-09-19 DIAGNOSIS — Z79.891 LONG TERM (CURRENT) USE OF OPIATE ANALGESIC: ICD-10-CM

## 2023-09-19 DIAGNOSIS — Z86.711 PERSONAL HISTORY OF PULMONARY EMBOLISM: ICD-10-CM

## 2023-09-19 DIAGNOSIS — T40.605A ADVERSE EFFECT OF UNSPECIFIED NARCOTICS, INITIAL ENCOUNTER: ICD-10-CM

## 2023-09-19 DIAGNOSIS — Z86.718 PERSONAL HISTORY OF OTHER VENOUS THROMBOSIS AND EMBOLISM: ICD-10-CM

## 2023-09-19 DIAGNOSIS — E11.9 TYPE 2 DIABETES MELLITUS WITHOUT COMPLICATIONS: ICD-10-CM

## 2023-09-19 DIAGNOSIS — I10 ESSENTIAL (PRIMARY) HYPERTENSION: ICD-10-CM

## 2023-10-12 ENCOUNTER — APPOINTMENT (OUTPATIENT)
Dept: PAIN MANAGEMENT | Facility: CLINIC | Age: 68
End: 2023-10-12
Payer: OTHER MISCELLANEOUS

## 2023-10-12 VITALS
DIASTOLIC BLOOD PRESSURE: 56 MMHG | BODY MASS INDEX: 26.04 KG/M2 | WEIGHT: 186 LBS | HEART RATE: 53 BPM | HEIGHT: 71 IN | SYSTOLIC BLOOD PRESSURE: 111 MMHG

## 2023-10-12 PROCEDURE — 99214 OFFICE O/P EST MOD 30 MIN: CPT

## 2023-11-29 ENCOUNTER — APPOINTMENT (OUTPATIENT)
Dept: PAIN MANAGEMENT | Facility: CLINIC | Age: 68
End: 2023-11-29

## 2023-12-18 ENCOUNTER — APPOINTMENT (OUTPATIENT)
Dept: UROLOGY | Facility: CLINIC | Age: 68
End: 2023-12-18

## 2023-12-18 NOTE — HISTORY OF PRESENT ILLNESS
[FreeTextEntry1] : Mr. Stanley is a 67-year-old male who complains of itchiness on the scrotum difficulty retracting foreskin occasionally swelling of the foreskin and some urgency he denies any hematuria dysuria or UTIs. He denies any definitive urge incontinence. 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 and he has recently been diagnosed with a DVT and pulmonary emboli and is on anticoagulation. The issues with the phallus have been on and off for the last 6 months or so. He has always been able to retract the foreskin but it is getting more difficult.  Past medical history as noted the aforementioned back issues DVT pulmonary embolism  Allergies: Patient thinks he is allergic to penicillin aspirin and pineapples

## 2023-12-18 NOTE — ASSESSMENT
[FreeTextEntry1] : #Phimosis/Lichen sclerosus of penis - pt hx of DVT and PE on AC - c/w conservative management, no plan for circumcision at this time  Discussed management options: 1) Topical steroids/antifungal with proper daily hygiene - ie. Lotrisone 2) Circumcision - The patient and I talked about how a circumcision is done and the risks and benefits of the procedure. He understands that the risks include bleeding, infection, change (increase or decrease) in sensation, scarring, and urethral injury. Because the procedure is being done as an adult there is almost always more scarring and there can be "gathering" at the incision line. He also knows that at the suture line, there will be a difference in skin color proximally and distally. The patient was shown where the incisions would be. He understands that he will have stitches when he goes home and these will dissolve with time, but will dissolve too soon if he does not follow his post op instructions. He will need to abstain from sexual activity for at least 6 weeks.

## 2023-12-22 ENCOUNTER — APPOINTMENT (OUTPATIENT)
Dept: PAIN MANAGEMENT | Facility: CLINIC | Age: 68
End: 2023-12-22
Payer: OTHER MISCELLANEOUS

## 2023-12-22 PROCEDURE — 99214 OFFICE O/P EST MOD 30 MIN: CPT | Mod: ACP

## 2023-12-22 NOTE — ASSESSMENT
[FreeTextEntry1] : This is a 68-year-old male with chronic lumbar pain with radiculopathy. He is managing his pain with MS Contin 60mg BID. We discussed decreasing his regimen of MS Contin today and he is hesitant to proceed with this plan. He wishes to establish care with another pain management provider.   I explained the risk of addiction, tolerance and withdrawals. UDS will be done randomly and a drug agreement was signed.  Risk factors: Bipolar illness, positive for any illicit drugs, history of ETOH and drug abuse, any signs of diversion, sharing medications, selling medications. Non-consistent [place] drug reporting and above 120 mEq of morphine.  Total clinician time spent today on the patient is 30 minutes including preparing to see the patient, obtaining and/or reviewing and confirming history, performing medically necessary and appropriate examination, counseling and educating the patient and/or family, documenting clinical information in the EHR and communicating and/or referring to other healthcare professionals.  ASHLEY Roberts MD

## 2023-12-22 NOTE — DATA REVIEWED
[FreeTextEntry1] : MRI of the lumbar spine dated 7/14/22 finds at L3-L4 postsurgical changes from laminectomy with the canal decompressed. No significant canal enhancement to suggest granulation tissue. Despite the disc spacer, there is still advanced disc space Narrowing.  There is an associated disc bulge with disc edema.  Evaluation of the neural foramina is limited due to the susceptibility artifact from the hardware.  Combined with facet arthropathy there appears to be moderate bilateral neural foraminal stenosis with significant crowding of the exiting left L3 nerve root and mild crowding of the exiting right L3 nerve root.  At L2-3 5 mm of retrolisthesis with associated broad-based disc bulge and facet arthropathy with moderate left and mild to moderate right-sided neural foraminal stenosis.  Combined with buckling of the ligamentum flavum and prominent dorsal epidural fat there is moderate canal stenosis.  At L4-L5 trace 1 mm of retrolisthesis with associated broad-based disc bulge demonstrating minimal annular fissuring contacts the exiting right L4 nerve root.  Combined with facet arthropathy there is moderate right-sided neural foraminal external stenosis.  At L1-L2 left-sided facet arthropathy abuts the exiting left exiting L1 nerve root with mild left-sided neural foraminal stenosis.  Trace 1 mm of retrolisthesis with tiny disc bulge without significant canal stenosis.  At T12- L1 there is left-sided facet arthropathy likely with a tiny synovial cyst abutting the exiting left T12 nerve root.  IMAGING: MRI in January 2017, prior L3 laminectomies and posterior fusions with right-sided pedicle screws at L3 and L4, L3 through L4 interbody fusion cage with a widely patent spinal canal and foramina, multilevel lumbar spondylosis, grade 1 mild retrolisthesis at L2, and presence of multiple prevertebral bridging osteophyte throughout the lumbar region especially at L2-L3. There is a large right renal cyst. Bulging at L4-L5 disc with subligamentous shallow enhancing disc protrusion.   Flexion and extension x-ray of the lumbar spine in 2017, instability appreciated at L2-L3 with retrolisthesis appreciated. It does appear to worse on flexion views. EMG revealed a left L4 nerve root dysfunction.   SOAPP: low on 12/28/22 Low risk: Patient has combination of a low risk SOAP and no risk factors. UDS would be repeated randomly every quarter.   UDS: 7/20/23, + MOP, BZO.  [place] REGISTRY: Checked

## 2023-12-22 NOTE — HISTORY OF PRESENT ILLNESS
[FreeTextEntry1] : ORIGINAL PRESENTATION: This is a 68 year old man complaining of lower back and bilateral leg and foot pain. He also complains of upper back and neck pain into the upper extremities. The pain started after an injury at work on December 21, 2006 when he was moving a dresser and he fell down the steps with the dresser landing on top of him. Patient describes pain as severe. During the last month the pain has been no typical pattern. Pain described as burning, shooting, sharp, cutting, pressure-like, cramping, tall/aching, throbbing. Pain is associated with numbness and pins and needles into the upper and lower extremities. Patient has weakness in the upper and lower extremities Pain is not changed with lying down, standing, sitting, walking, exercise, relaxation, coughing sneezing or bowel movements.  ACTIVITES: Can walk less than 1 block, uses scooter or rolling walker. He can sit for several hours, stand for 30-45 minutes. He sometimes lies down due to pain. He uses a cane, walker or scooter for mobility. HE cannot go to work, perform household chores, run local errands, participate in recreational activities or exercise.  PRIOR PAIN TREATMENTS: No relief with surgery, traction, nerve block/injections, physical therapy, exercise, TENS unit, heat/cold treatment, psychotherapy, acupuncture, hypnosis, biofeedback, chiropractic manipulation, spinal cord manipulation.  PRIOR PAIN MEDICATIONS: MSContin, Oxycodone, Baclofen, Cymbalta, Xanax  PATIENT PRESENTS FOR FOLLOW UP: He is under our care for chronic lumbar pain with radiculopathy which he is receiving continuing active treatment for. He continues to experience low back pain radiating into the left leg. He states he is prone to frequent falls because of his pain.  He states it interferes with his ability to ambulate. He is consistently on oxygen and was advised that the medication can lower his respiratory rate. He is on MS Contin 60 mg BID which is providing him with incomplete relief throughout the day. We discussed decreasing his regimen of MS Contin today and he is hesitant to proceed with this plan. He wishes to establish care with another pain management provider.

## 2024-01-19 ENCOUNTER — APPOINTMENT (OUTPATIENT)
Dept: PAIN MANAGEMENT | Facility: CLINIC | Age: 69
End: 2024-01-19
Payer: OTHER MISCELLANEOUS

## 2024-01-19 PROCEDURE — 99214 OFFICE O/P EST MOD 30 MIN: CPT | Mod: ACP

## 2024-01-19 NOTE — ASSESSMENT
[FreeTextEntry1] : This is a 68-year-old male with chronic lumbar pain and radiculopathy. He is managing his pain with MS Contin 60 mg BID. Today, we are decreasing his MS Contin to 30 mg TID. Patient will follow up in 4 weeks for reevaluation.  I have consulted the  registry for the purpose of reviewing the patient's controlled substance. There are no inconsistencies on urine toxicology screening which would necessitate an alteration of therapy.  Total clinician time spent today on the patient is 30 minutes including preparing to see the patient, obtaining and/or reviewing and confirming history, performing medically necessary and appropriate examination, counseling and educating the patient and/or family, documenting clinical information in the EHR and communicating and/or referring to other healthcare professionals.  ASHLEY Meyers MD

## 2024-01-19 NOTE — HISTORY OF PRESENT ILLNESS
[FreeTextEntry1] : ORIGINAL PRESENTATION: This is a 68 year old man complaining of lower back and bilateral leg and foot pain. He also complains of upper back and neck pain into the upper extremities. The pain started after an injury at work on December 21, 2006 when he was moving a dresser and he fell down the steps with the dresser landing on top of him. Patient describes pain as severe. During the last month the pain has been no typical pattern. Pain described as burning, shooting, sharp, cutting, pressure-like, cramping, tall/aching, throbbing. Pain is associated with numbness and pins and needles into the upper and lower extremities. Patient has weakness in the upper and lower extremities Pain is not changed with lying down, standing, sitting, walking, exercise, relaxation, coughing sneezing or bowel movements.  ACTIVITES: Can walk less than 1 block, uses scooter or rolling walker. He can sit for several hours, stand for 30-45 minutes. He sometimes lies down due to pain. He uses a cane, walker or scooter for mobility. HE cannot go to work, perform household chores, run local errands, participate in recreational activities or exercise.  PRIOR PAIN TREATMENTS: No relief with surgery, traction, nerve block/injections, physical therapy, exercise, TENS unit, heat/cold treatment, psychotherapy, acupuncture, hypnosis, biofeedback, chiropractic manipulation, spinal cord manipulation.  PRIOR PAIN MEDICATIONS: MSContin, Oxycodone, Baclofen, Cymbalta, Xanax  PATIENT PRESENTS FOR FOLLOW UP: He is under our care for chronic lumbar pain with radiculopathy which he is receiving continuing active treatment for. He continues to experience low back pain radiating into the left leg. He states he is prone to frequent falls because of his pain. He states his pain interferes with his ability to ambulate. He is consistently on oxygen and was advised that the medication can lower his respiratory rate. He is on MS Contin 60 mg BID which is providing him with incomplete relief throughout the day. On his last visit, we discussed decreasing his MS Contin, which he was hesitant about. He had advised us that he would seek care elsewhere. Unfortunately, he states that the pain specialists that he has been calling are not taking his WC. Therefore, he came back to our office today. I advised him that we will be decreasing his medication to 30 mg TID.

## 2024-02-16 ENCOUNTER — APPOINTMENT (OUTPATIENT)
Dept: PAIN MANAGEMENT | Facility: CLINIC | Age: 69
End: 2024-02-16
Payer: OTHER MISCELLANEOUS

## 2024-02-16 DIAGNOSIS — G89.4 CHRONIC PAIN SYNDROME: ICD-10-CM

## 2024-02-16 DIAGNOSIS — M54.50 LOW BACK PAIN, UNSPECIFIED: ICD-10-CM

## 2024-02-16 DIAGNOSIS — Z79.891 LONG TERM (CURRENT) USE OF OPIATE ANALGESIC: ICD-10-CM

## 2024-02-16 DIAGNOSIS — M43.16 SPONDYLOLISTHESIS, LUMBAR REGION: ICD-10-CM

## 2024-02-16 PROCEDURE — 99214 OFFICE O/P EST MOD 30 MIN: CPT | Mod: ACP

## 2024-02-16 NOTE — ASSESSMENT
[FreeTextEntry1] : This is a 68-year-old male with chronic lumbar pain and radiculopathy. He is managing his pain with MS Contin 30 mg TID which was decreased since his last visit. Today, we discussed that he will be decreased again to MS Contin 30 mg BID. The pt stated that he doesn't want to be decreased further and that he will be seeking care at another office, however, still needs an appt. Pt is not due for a medication refill until 2/23/24. If he chooses to seek care elsewhere, we will be sending MS Contin 30 mg BID. If he chooses to stay, his medication will be decreased. He will call and update me. Pt agrees with the plan and all his questions were answered.   I have consulted the  registry for the purpose of reviewing the patient's controlled substance. There are no inconsistencies on urine toxicology screening which would necessitate an alteration of therapy.  I explained the risk of addiction, tolerance and withdrawals and  advised the patient they must keep their medication under a lock and key, or in a safe place away from children or other individuals. This medication given is solely for the patient and under no circumstances to be shared. Patient verbalized this and is in agreement with the aforementioned. I explained the risk of addiction, tolerance, and withdrawals. UDS will be done randomly and a drug agreement was signed.  Total clinician time spent today on the patient is 30 minutes including preparing to see the patient, obtaining and/or reviewing and confirming history, performing medically necessary and appropriate examination, counseling and educating the patient and/or family, documenting clinical information in the EHR and communicating and/or referring to other healthcare professionals.  ASHLEY Meyers MD

## 2024-02-16 NOTE — HISTORY OF PRESENT ILLNESS
[FreeTextEntry1] : ORIGINAL PRESENTATION: This is a 68 year old man complaining of lower back and bilateral leg and foot pain. He also complains of upper back and neck pain into the upper extremities. The pain started after an injury at work on December 21, 2006 when he was moving a dresser and he fell down the steps with the dresser landing on top of him. Patient describes pain as severe. During the last month the pain has been no typical pattern. Pain described as burning, shooting, sharp, cutting, pressure-like, cramping, tall/aching, throbbing. Pain is associated with numbness and pins and needles into the upper and lower extremities. Patient has weakness in the upper and lower extremities Pain is not changed with lying down, standing, sitting, walking, exercise, relaxation, coughing sneezing or bowel movements.  ACTIVITES: Can walk less than 1 block, uses scooter or rolling walker. He can sit for several hours, stand for 30-45 minutes. He sometimes lies down due to pain. He uses a cane, walker or scooter for mobility. HE cannot go to work, perform household chores, run local errands, participate in recreational activities or exercise.  PRIOR PAIN TREATMENTS: No relief with surgery, traction, nerve block/injections, physical therapy, exercise, TENS unit, heat/cold treatment, psychotherapy, acupuncture, hypnosis, biofeedback, chiropractic manipulation, spinal cord manipulation.  PRIOR PAIN MEDICATIONS: MSContin, Oxycodone, Baclofen, Cymbalta, Xanax  PATIENT PRESENTS FOR FOLLOW UP:  He is here for a revisit, last seen on 1/19/24. Since the last visit, there have been no new complaints or acute changes.  He is under our care for chronic lumbar pain with radiculopathy which he is receiving continuing active treatment for. He continues to experience low back pain radiating into the left leg. He states he is prone to frequent falls because of his pain. He states his pain interferes with his ability to ambulate.   For his back pain he is medically managed with MS Contin which is providing him with incomplete relief throughout the day. He is consistently on oxygen and was advised that the medication can lower his respiratory rate. On his last visit, we decreased his MS Contin from 60 mg BID to 30 mg TID. Pt is hesitant about decreasing further, stating his last decrease has increased his pain. He is trying to transfer his care to Dr. Strickland's office. We discussed that if he chooses to transfer his care, we will sign a disengagement form and send his medication as is (MS Contin 30 mg TID), however, if he chooses to stay, we will continue to decrease him down to 30 mg BID. I advised him that I will not send his medication until he calls us, to let us know his decision. Pt verbalized understanding and agrees with the plan.

## 2024-02-16 NOTE — PHYSICAL EXAM
[Normal Coordination] : normal coordination [Normal DTR UE/LE] : normal DTR UE/LE  [Normal Sensation] : normal sensation [Normal Mood and Affect] : normal mood and affect [Oriented] : oriented [Able to Communicate] : able to communicate [Normal Skin] : normal skin [No obvious lymphadenopathy in areas examined] : no obvious lymphadenopathy in areas examined [Well Developed] : well developed [Well Nourished] : well nourished [Peripheral vascular exam is grossly normal] : peripheral vascular exam is grossly normal [] : no sciatic nerve tenderness

## 2024-02-21 RX ORDER — MORPHINE SULFATE 30 MG/1
30 CAPSULE, EXTENDED RELEASE ORAL TWICE DAILY
Qty: 60 | Refills: 0 | Status: ACTIVE | COMMUNITY
Start: 2023-07-20 | End: 1900-01-01

## 2024-02-21 RX ORDER — BACLOFEN 10 MG/1
10 TABLET ORAL
Qty: 90 | Refills: 2 | Status: ACTIVE | COMMUNITY
Start: 2022-09-28 | End: 1900-01-01

## 2024-02-21 RX ORDER — DULOXETINE HYDROCHLORIDE 30 MG/1
30 CAPSULE, DELAYED RELEASE PELLETS ORAL
Qty: 90 | Refills: 2 | Status: ACTIVE | COMMUNITY
Start: 2022-09-23 | End: 1900-01-01

## 2024-08-29 NOTE — DISCHARGE NOTE ADULT - NS AS ACTIVITY OBS
PA approved   Walking-Outdoors allowed/Stairs allowed/Walking-Indoors allowed/No Heavy lifting/straining/Driving allowed

## 2024-11-08 ENCOUNTER — APPOINTMENT (OUTPATIENT)
Dept: ORTHOPEDIC SURGERY | Facility: CLINIC | Age: 69
End: 2024-11-08
Payer: MEDICARE

## 2024-11-08 VITALS — BODY MASS INDEX: 26.6 KG/M2 | WEIGHT: 190 LBS | HEIGHT: 71 IN

## 2024-11-08 DIAGNOSIS — S49.91XA UNSPECIFIED INJURY OF RIGHT SHOULDER AND UPPER ARM, INITIAL ENCOUNTER: ICD-10-CM

## 2024-11-08 PROCEDURE — 99203 OFFICE O/P NEW LOW 30 MIN: CPT | Mod: 25

## 2024-11-08 PROCEDURE — 73030 X-RAY EXAM OF SHOULDER: CPT | Mod: RT

## 2024-11-08 PROCEDURE — 20610 DRAIN/INJ JOINT/BURSA W/O US: CPT | Mod: RT

## 2024-11-18 ENCOUNTER — APPOINTMENT (OUTPATIENT)
Dept: MRI IMAGING | Facility: CLINIC | Age: 69
End: 2024-11-18

## 2024-12-09 ENCOUNTER — APPOINTMENT (OUTPATIENT)
Dept: ORTHOPEDIC SURGERY | Facility: CLINIC | Age: 69
End: 2024-12-09

## 2025-01-06 ENCOUNTER — APPOINTMENT (OUTPATIENT)
Dept: ORTHOPEDIC SURGERY | Facility: CLINIC | Age: 70
End: 2025-01-06

## 2025-01-29 NOTE — CONSULT NOTE ADULT - CONSULT REQUESTED BY NAME
Restart the one tablet of amitriptyline     CAN use melatonin at night as well     Check BPs at home morning and prior to supper for a week, then forward to us     Talk to the pharmacist at [facility] about their Alpha Lipoic Acid supplement for neuropathy     Ask neuro about a therapy options for the walking ( though you probably should at least have a cane/walking stick )    1/2 piece of dark chocolate at night     If all is good, then I would see you in about 6 months .        
ed
ED
Tala Estevez
medicine

## 2025-07-12 ENCOUNTER — OUTPATIENT (OUTPATIENT)
Dept: OUTPATIENT SERVICES | Facility: HOSPITAL | Age: 70
LOS: 1 days | End: 2025-07-12
Payer: MEDICARE

## 2025-07-12 DIAGNOSIS — Z98.890 OTHER SPECIFIED POSTPROCEDURAL STATES: Chronic | ICD-10-CM

## 2025-07-12 DIAGNOSIS — J18.9 PNEUMONIA, UNSPECIFIED ORGANISM: ICD-10-CM

## 2025-07-12 PROCEDURE — 71046 X-RAY EXAM CHEST 2 VIEWS: CPT | Mod: 26

## 2025-07-12 PROCEDURE — 71046 X-RAY EXAM CHEST 2 VIEWS: CPT

## 2025-07-13 DIAGNOSIS — J18.9 PNEUMONIA, UNSPECIFIED ORGANISM: ICD-10-CM

## 2025-08-12 ENCOUNTER — OUTPATIENT (OUTPATIENT)
Dept: OUTPATIENT SERVICES | Facility: HOSPITAL | Age: 70
LOS: 1 days | End: 2025-08-12
Payer: MEDICARE

## 2025-08-12 DIAGNOSIS — M25.512 PAIN IN LEFT SHOULDER: ICD-10-CM

## 2025-08-12 DIAGNOSIS — Z98.890 OTHER SPECIFIED POSTPROCEDURAL STATES: Chronic | ICD-10-CM

## 2025-08-12 PROCEDURE — 73030 X-RAY EXAM OF SHOULDER: CPT | Mod: 26,LT

## 2025-08-12 PROCEDURE — 73030 X-RAY EXAM OF SHOULDER: CPT | Mod: LT

## 2025-08-13 DIAGNOSIS — M25.512 PAIN IN LEFT SHOULDER: ICD-10-CM
